# Patient Record
Sex: FEMALE | Race: WHITE | Employment: UNEMPLOYED
[De-identification: names, ages, dates, MRNs, and addresses within clinical notes are randomized per-mention and may not be internally consistent; named-entity substitution may affect disease eponyms.]

---

## 2017-01-26 ENCOUNTER — OFFICE VISIT (OUTPATIENT)
Dept: FAMILY MEDICINE CLINIC | Facility: CLINIC | Age: 77
End: 2017-01-26

## 2017-01-26 VITALS
SYSTOLIC BLOOD PRESSURE: 128 MMHG | HEART RATE: 65 BPM | OXYGEN SATURATION: 97 % | HEIGHT: 60 IN | RESPIRATION RATE: 18 BRPM | TEMPERATURE: 97 F | DIASTOLIC BLOOD PRESSURE: 62 MMHG | BODY MASS INDEX: 20.42 KG/M2 | WEIGHT: 104 LBS

## 2017-01-26 DIAGNOSIS — J18.9 ATYPICAL PNEUMONIA: ICD-10-CM

## 2017-01-26 DIAGNOSIS — R53.1 GENERALIZED WEAKNESS: ICD-10-CM

## 2017-01-26 DIAGNOSIS — Z09 HOSPITAL DISCHARGE FOLLOW-UP: Primary | ICD-10-CM

## 2017-01-26 DIAGNOSIS — I10 ESSENTIAL HYPERTENSION: ICD-10-CM

## 2017-01-26 PROCEDURE — 1036F TOBACCO NON-USER: CPT | Performed by: FAMILY MEDICINE

## 2017-01-26 PROCEDURE — 1123F ACP DISCUSS/DSCN MKR DOCD: CPT | Performed by: FAMILY MEDICINE

## 2017-01-26 PROCEDURE — 90670 PCV13 VACCINE IM: CPT | Performed by: FAMILY MEDICINE

## 2017-01-26 PROCEDURE — G8427 DOCREV CUR MEDS BY ELIG CLIN: HCPCS | Performed by: FAMILY MEDICINE

## 2017-01-26 PROCEDURE — G8419 CALC BMI OUT NRM PARAM NOF/U: HCPCS | Performed by: FAMILY MEDICINE

## 2017-01-26 PROCEDURE — G8484 FLU IMMUNIZE NO ADMIN: HCPCS | Performed by: FAMILY MEDICINE

## 2017-01-26 PROCEDURE — 4040F PNEUMOC VAC/ADMIN/RCVD: CPT | Performed by: FAMILY MEDICINE

## 2017-01-26 PROCEDURE — G8399 PT W/DXA RESULTS DOCUMENT: HCPCS | Performed by: FAMILY MEDICINE

## 2017-01-26 PROCEDURE — G0009 ADMIN PNEUMOCOCCAL VACCINE: HCPCS | Performed by: FAMILY MEDICINE

## 2017-01-26 PROCEDURE — 99213 OFFICE O/P EST LOW 20 MIN: CPT | Performed by: FAMILY MEDICINE

## 2017-01-26 PROCEDURE — 1090F PRES/ABSN URINE INCON ASSESS: CPT | Performed by: FAMILY MEDICINE

## 2017-01-26 RX ORDER — OYSTER SHELL CALCIUM WITH VITAMIN D 500; 200 MG/1; [IU]/1
1 TABLET, FILM COATED ORAL DAILY
Qty: 30 TABLET | Refills: 3 | Status: SHIPPED | OUTPATIENT
Start: 2017-01-26 | End: 2017-05-08 | Stop reason: SDUPTHER

## 2017-01-26 RX ORDER — AMINO ACIDS/PROTEIN HYDROLYS 15G-100/30
LIQUID (ML) ORAL
COMMUNITY
End: 2018-01-24 | Stop reason: ALTCHOICE

## 2017-01-26 ASSESSMENT — ENCOUNTER SYMPTOMS
COUGH: 0
VOMITING: 0
ABDOMINAL PAIN: 0
SHORTNESS OF BREATH: 0
NAUSEA: 0

## 2017-03-20 ENCOUNTER — TELEPHONE (OUTPATIENT)
Dept: GASTROENTEROLOGY | Age: 77
End: 2017-03-20

## 2017-03-22 ENCOUNTER — HOSPITAL ENCOUNTER (OUTPATIENT)
Age: 77
Setting detail: SPECIMEN
Discharge: HOME | End: 2017-03-22
Payer: MEDICARE

## 2017-03-22 LAB
ANION GAP SERPL CALCULATED.3IONS-SCNC: 11 MMOL/L (ref 9–17)
BUN BLDV-MCNC: 16 MG/DL (ref 8–23)
BUN/CREAT BLD: ABNORMAL (ref 9–20)
CALCIUM SERPL-MCNC: 8.2 MG/DL (ref 8.6–10.4)
CHLORIDE BLD-SCNC: 105 MMOL/L (ref 98–107)
CO2: 25 MMOL/L (ref 20–31)
CREAT SERPL-MCNC: 0.53 MG/DL (ref 0.5–0.9)
GFR AFRICAN AMERICAN: >60 ML/MIN
GFR NON-AFRICAN AMERICAN: >60 ML/MIN
GFR SERPL CREATININE-BSD FRML MDRD: ABNORMAL ML/MIN/{1.73_M2}
GFR SERPL CREATININE-BSD FRML MDRD: ABNORMAL ML/MIN/{1.73_M2}
GLUCOSE BLD-MCNC: 88 MG/DL (ref 70–99)
POTASSIUM SERPL-SCNC: 3.8 MMOL/L (ref 3.7–5.3)
SODIUM BLD-SCNC: 141 MMOL/L (ref 135–144)

## 2017-03-22 PROCEDURE — 80048 BASIC METABOLIC PNL TOTAL CA: CPT

## 2017-03-22 PROCEDURE — P9603 ONE-WAY ALLOW PRORATED MILES: HCPCS

## 2017-03-22 PROCEDURE — 36415 COLL VENOUS BLD VENIPUNCTURE: CPT

## 2017-04-19 ENCOUNTER — HOSPITAL ENCOUNTER (OUTPATIENT)
Age: 77
Discharge: HOME | End: 2017-04-19
Payer: MEDICARE

## 2017-04-19 LAB
ALBUMIN SERPL-MCNC: 3.1 G/DL (ref 3.5–5.2)
ALBUMIN/GLOBULIN RATIO: ABNORMAL (ref 1–2.5)
ALP BLD-CCNC: 99 U/L (ref 35–104)
ALT SERPL-CCNC: 69 U/L (ref 5–33)
ANION GAP SERPL CALCULATED.3IONS-SCNC: 13 MMOL/L (ref 9–17)
AST SERPL-CCNC: 112 U/L
BILIRUB SERPL-MCNC: 0.65 MG/DL (ref 0.3–1.2)
BUN BLDV-MCNC: 21 MG/DL (ref 8–23)
BUN/CREAT BLD: ABNORMAL (ref 9–20)
CALCIUM SERPL-MCNC: 8.5 MG/DL (ref 8.6–10.4)
CHLORIDE BLD-SCNC: 103 MMOL/L (ref 98–107)
CO2: 22 MMOL/L (ref 20–31)
CREAT SERPL-MCNC: 0.76 MG/DL (ref 0.5–0.9)
GFR AFRICAN AMERICAN: >60 ML/MIN
GFR NON-AFRICAN AMERICAN: >60 ML/MIN
GFR SERPL CREATININE-BSD FRML MDRD: ABNORMAL ML/MIN/{1.73_M2}
GFR SERPL CREATININE-BSD FRML MDRD: ABNORMAL ML/MIN/{1.73_M2}
GLUCOSE BLD-MCNC: 166 MG/DL (ref 70–99)
HCT VFR BLD CALC: 31.1 % (ref 36–46)
HEMOGLOBIN: 10.2 G/DL (ref 12–16)
MCH RBC QN AUTO: 29.6 PG (ref 26–34)
MCHC RBC AUTO-ENTMCNC: 32.9 G/DL (ref 31–37)
MCV RBC AUTO: 89.8 FL (ref 80–100)
PDW BLD-RTO: 17 % (ref 11.5–14.9)
PLATELET # BLD: 81 K/UL (ref 150–450)
PMV BLD AUTO: 10 FL (ref 6–12)
POTASSIUM SERPL-SCNC: 3.7 MMOL/L (ref 3.7–5.3)
RBC # BLD: 3.46 M/UL (ref 4–5.2)
SODIUM BLD-SCNC: 138 MMOL/L (ref 135–144)
TOTAL PROTEIN: 8.1 G/DL (ref 6.4–8.3)
WBC # BLD: 3 K/UL (ref 3.5–11)

## 2017-04-19 PROCEDURE — 85027 COMPLETE CBC AUTOMATED: CPT

## 2017-04-19 PROCEDURE — 80053 COMPREHEN METABOLIC PANEL: CPT

## 2017-04-19 PROCEDURE — 87522 HEPATITIS C REVRS TRNSCRPJ: CPT

## 2017-04-19 PROCEDURE — 36415 COLL VENOUS BLD VENIPUNCTURE: CPT

## 2017-04-21 LAB
DIRECT EXAM: ABNORMAL
Lab: ABNORMAL
SPECIMEN DESCRIPTION: ABNORMAL
SPECIMEN DESCRIPTION: ABNORMAL
STATUS: ABNORMAL

## 2017-04-26 ENCOUNTER — OFFICE VISIT (OUTPATIENT)
Dept: FAMILY MEDICINE CLINIC | Age: 77
End: 2017-04-26
Payer: MEDICARE

## 2017-04-26 VITALS
WEIGHT: 94.2 LBS | TEMPERATURE: 98.1 F | SYSTOLIC BLOOD PRESSURE: 137 MMHG | HEART RATE: 84 BPM | BODY MASS INDEX: 18.49 KG/M2 | OXYGEN SATURATION: 99 % | HEIGHT: 60 IN | DIASTOLIC BLOOD PRESSURE: 65 MMHG

## 2017-04-26 DIAGNOSIS — K92.2 UPPER GI BLEED: ICD-10-CM

## 2017-04-26 DIAGNOSIS — B18.2 CHRONIC HEPATITIS C WITHOUT HEPATIC COMA (HCC): Primary | ICD-10-CM

## 2017-04-26 DIAGNOSIS — K21.00 GASTROESOPHAGEAL REFLUX DISEASE WITH ESOPHAGITIS: ICD-10-CM

## 2017-04-26 DIAGNOSIS — I10 ESSENTIAL HYPERTENSION: ICD-10-CM

## 2017-04-26 PROCEDURE — G8399 PT W/DXA RESULTS DOCUMENT: HCPCS | Performed by: FAMILY MEDICINE

## 2017-04-26 PROCEDURE — 99214 OFFICE O/P EST MOD 30 MIN: CPT | Performed by: FAMILY MEDICINE

## 2017-04-26 PROCEDURE — G8419 CALC BMI OUT NRM PARAM NOF/U: HCPCS | Performed by: FAMILY MEDICINE

## 2017-04-26 PROCEDURE — G8427 DOCREV CUR MEDS BY ELIG CLIN: HCPCS | Performed by: FAMILY MEDICINE

## 2017-04-26 PROCEDURE — 1090F PRES/ABSN URINE INCON ASSESS: CPT | Performed by: FAMILY MEDICINE

## 2017-04-26 PROCEDURE — 1036F TOBACCO NON-USER: CPT | Performed by: FAMILY MEDICINE

## 2017-04-26 PROCEDURE — 1123F ACP DISCUSS/DSCN MKR DOCD: CPT | Performed by: FAMILY MEDICINE

## 2017-04-26 PROCEDURE — 4040F PNEUMOC VAC/ADMIN/RCVD: CPT | Performed by: FAMILY MEDICINE

## 2017-04-26 RX ORDER — PROPRANOLOL HYDROCHLORIDE 20 MG/1
20 TABLET ORAL 2 TIMES DAILY
Qty: 60 TABLET | Refills: 3 | Status: SHIPPED | OUTPATIENT
Start: 2017-04-26 | End: 2017-09-25 | Stop reason: SDUPTHER

## 2017-04-26 RX ORDER — BETHANECHOL CHLORIDE 10 MG/1
TABLET ORAL
Refills: 0 | COMMUNITY
Start: 2017-03-11 | End: 2017-09-26 | Stop reason: SDUPTHER

## 2017-04-26 RX ORDER — SPIRONOLACTONE 25 MG/1
25 TABLET ORAL 2 TIMES DAILY
Qty: 60 TABLET | Refills: 3 | Status: SHIPPED | OUTPATIENT
Start: 2017-04-26 | End: 2017-09-25 | Stop reason: SDUPTHER

## 2017-04-26 RX ORDER — FUROSEMIDE 20 MG/1
20 TABLET ORAL DAILY
Qty: 30 TABLET | Refills: 5 | Status: SHIPPED | OUTPATIENT
Start: 2017-04-26 | End: 2017-09-25 | Stop reason: SDUPTHER

## 2017-04-26 RX ORDER — PROPRANOLOL HYDROCHLORIDE 20 MG/1
20 TABLET ORAL 2 TIMES DAILY
COMMUNITY
End: 2017-04-26

## 2017-04-26 RX ORDER — PANTOPRAZOLE SODIUM 20 MG/1
20 TABLET, DELAYED RELEASE ORAL DAILY
Qty: 30 TABLET | Refills: 3 | Status: SHIPPED | OUTPATIENT
Start: 2017-04-26 | End: 2017-09-25 | Stop reason: ALTCHOICE

## 2017-04-26 RX ORDER — SPIRONOLACTONE 25 MG/1
25 TABLET ORAL 2 TIMES DAILY
COMMUNITY
End: 2017-04-26

## 2017-04-26 RX ORDER — LACTULOSE 10 G/15ML
20 SOLUTION ORAL 3 TIMES DAILY
COMMUNITY
End: 2017-09-25 | Stop reason: ALTCHOICE

## 2017-04-26 ASSESSMENT — ENCOUNTER SYMPTOMS
VOMITING: 0
COUGH: 0
CONSTIPATION: 0
NAUSEA: 0
DIARRHEA: 0
SHORTNESS OF BREATH: 0

## 2017-05-30 ENCOUNTER — HOSPITAL ENCOUNTER (OUTPATIENT)
Age: 77
Setting detail: SPECIMEN
Discharge: HOME | End: 2017-05-30
Payer: MEDICARE

## 2017-05-30 LAB
ANION GAP SERPL CALCULATED.3IONS-SCNC: 14 MMOL/L (ref 9–17)
BUN BLDV-MCNC: 19 MG/DL (ref 8–23)
BUN/CREAT BLD: 30 (ref 9–20)
CALCIUM SERPL-MCNC: 8.3 MG/DL (ref 8.6–10.4)
CHLORIDE BLD-SCNC: 103 MMOL/L (ref 98–107)
CO2: 26 MMOL/L (ref 20–31)
CREAT SERPL-MCNC: 0.63 MG/DL (ref 0.5–0.9)
GFR AFRICAN AMERICAN: >60 ML/MIN
GFR NON-AFRICAN AMERICAN: >60 ML/MIN
GFR SERPL CREATININE-BSD FRML MDRD: ABNORMAL ML/MIN/{1.73_M2}
GFR SERPL CREATININE-BSD FRML MDRD: ABNORMAL ML/MIN/{1.73_M2}
GLUCOSE BLD-MCNC: 95 MG/DL (ref 70–99)
HCT VFR BLD CALC: 27.4 % (ref 36–46)
HEMOGLOBIN: 9.2 G/DL (ref 12–16)
MCH RBC QN AUTO: 30.3 PG (ref 26–34)
MCHC RBC AUTO-ENTMCNC: 33.4 G/DL (ref 31–37)
MCV RBC AUTO: 90.7 FL (ref 80–100)
PDW BLD-RTO: 18.3 % (ref 11.5–14.5)
PLATELET # BLD: 94 K/UL (ref 130–400)
PMV BLD AUTO: 8.9 FL (ref 6–12)
POTASSIUM SERPL-SCNC: 2.9 MMOL/L (ref 3.7–5.3)
RBC # BLD: 3.02 M/UL (ref 4–5.2)
SODIUM BLD-SCNC: 143 MMOL/L (ref 135–144)
WBC # BLD: 5.7 K/UL (ref 3.5–11)

## 2017-05-30 PROCEDURE — 87086 URINE CULTURE/COLONY COUNT: CPT

## 2017-05-30 PROCEDURE — P9603 ONE-WAY ALLOW PRORATED MILES: HCPCS

## 2017-05-30 PROCEDURE — 80048 BASIC METABOLIC PNL TOTAL CA: CPT

## 2017-05-30 PROCEDURE — 85027 COMPLETE CBC AUTOMATED: CPT

## 2017-05-30 PROCEDURE — 36415 COLL VENOUS BLD VENIPUNCTURE: CPT

## 2017-05-30 PROCEDURE — 81001 URINALYSIS AUTO W/SCOPE: CPT

## 2017-05-31 ENCOUNTER — TELEPHONE (OUTPATIENT)
Dept: GASTROENTEROLOGY | Age: 77
End: 2017-05-31

## 2017-05-31 LAB
CULTURE: NORMAL
CULTURE: NORMAL
Lab: NORMAL
SPECIMEN DESCRIPTION: NORMAL
SPECIMEN DESCRIPTION: NORMAL
STATUS: NORMAL

## 2017-06-01 ENCOUNTER — HOSPITAL ENCOUNTER (OUTPATIENT)
Age: 77
Setting detail: SPECIMEN
Discharge: HOME | End: 2017-06-01
Payer: MEDICARE

## 2017-06-01 LAB
-: ABNORMAL
AMORPHOUS: ABNORMAL
BACTERIA: ABNORMAL
BILIRUBIN URINE: ABNORMAL
CASTS UA: ABNORMAL /LPF
COLOR: ABNORMAL
COMMENT UA: ABNORMAL
CRYSTALS, UA: ABNORMAL /HPF
EPITHELIAL CELLS UA: ABNORMAL /HPF
GLUCOSE URINE: NEGATIVE
KETONES, URINE: ABNORMAL
LEUKOCYTE ESTERASE, URINE: NEGATIVE
MUCUS: ABNORMAL
NITRITE, URINE: NEGATIVE
OTHER OBSERVATIONS UA: ABNORMAL
PH UA: 5.5 (ref 5–8)
POTASSIUM SERPL-SCNC: 3.8 MMOL/L (ref 3.7–5.3)
PROTEIN UA: ABNORMAL
RBC UA: ABNORMAL /HPF (ref 0–2)
RENAL EPITHELIAL, UA: ABNORMAL /HPF
SPECIFIC GRAVITY UA: 1.02 (ref 1–1.03)
TRICHOMONAS: ABNORMAL
TURBIDITY: ABNORMAL
URINE HGB: NEGATIVE
UROBILINOGEN, URINE: NORMAL
WBC UA: ABNORMAL /HPF (ref 0–5)
YEAST: ABNORMAL

## 2017-06-01 PROCEDURE — P9603 ONE-WAY ALLOW PRORATED MILES: HCPCS

## 2017-06-01 PROCEDURE — 36415 COLL VENOUS BLD VENIPUNCTURE: CPT

## 2017-06-01 PROCEDURE — 84132 ASSAY OF SERUM POTASSIUM: CPT

## 2017-06-03 ENCOUNTER — HOSPITAL ENCOUNTER (OUTPATIENT)
Age: 77
Setting detail: SPECIMEN
Discharge: HOME | End: 2017-06-03
Payer: MEDICARE

## 2017-06-03 LAB
ANION GAP SERPL CALCULATED.3IONS-SCNC: 10 MMOL/L (ref 9–17)
BUN BLDV-MCNC: 9 MG/DL (ref 8–23)
BUN/CREAT BLD: 18 (ref 9–20)
CALCIUM SERPL-MCNC: 8.3 MG/DL (ref 8.6–10.4)
CHLORIDE BLD-SCNC: 99 MMOL/L (ref 98–107)
CO2: 27 MMOL/L (ref 20–31)
CREAT SERPL-MCNC: 0.5 MG/DL (ref 0.5–0.9)
GFR AFRICAN AMERICAN: >60 ML/MIN
GFR NON-AFRICAN AMERICAN: >60 ML/MIN
GFR SERPL CREATININE-BSD FRML MDRD: ABNORMAL ML/MIN/{1.73_M2}
GFR SERPL CREATININE-BSD FRML MDRD: ABNORMAL ML/MIN/{1.73_M2}
GLUCOSE BLD-MCNC: 100 MG/DL (ref 70–99)
POTASSIUM SERPL-SCNC: 3.4 MMOL/L (ref 3.7–5.3)
SODIUM BLD-SCNC: 136 MMOL/L (ref 135–144)

## 2017-06-03 PROCEDURE — 36415 COLL VENOUS BLD VENIPUNCTURE: CPT

## 2017-06-03 PROCEDURE — P9603 ONE-WAY ALLOW PRORATED MILES: HCPCS

## 2017-06-03 PROCEDURE — 80048 BASIC METABOLIC PNL TOTAL CA: CPT

## 2017-06-05 ENCOUNTER — TELEPHONE (OUTPATIENT)
Dept: GASTROENTEROLOGY | Age: 77
End: 2017-06-05

## 2017-06-08 ENCOUNTER — HOSPITAL ENCOUNTER (OUTPATIENT)
Age: 77
Setting detail: SPECIMEN
Discharge: HOME | End: 2017-06-08
Payer: MEDICARE

## 2017-06-08 LAB
HCT VFR BLD CALC: 27.9 % (ref 36–46)
HEMOGLOBIN: 9 G/DL (ref 12–16)
MCH RBC QN AUTO: 29.3 PG (ref 26–34)
MCHC RBC AUTO-ENTMCNC: 32.2 G/DL (ref 31–37)
MCV RBC AUTO: 90.9 FL (ref 80–100)
PDW BLD-RTO: 19 % (ref 11.5–14.5)
PLATELET # BLD: 118 K/UL (ref 130–400)
PMV BLD AUTO: 8.8 FL (ref 6–12)
RBC # BLD: 3.07 M/UL (ref 4–5.2)
WBC # BLD: 3.9 K/UL (ref 3.5–11)

## 2017-06-08 PROCEDURE — 36415 COLL VENOUS BLD VENIPUNCTURE: CPT

## 2017-06-08 PROCEDURE — 85027 COMPLETE CBC AUTOMATED: CPT

## 2017-06-08 PROCEDURE — P9603 ONE-WAY ALLOW PRORATED MILES: HCPCS

## 2017-06-10 ENCOUNTER — HOSPITAL ENCOUNTER (OUTPATIENT)
Age: 77
Setting detail: SPECIMEN
Discharge: HOME | End: 2017-06-10
Payer: MEDICARE

## 2017-06-10 LAB
-: ABNORMAL
AMORPHOUS: ABNORMAL
BACTERIA: ABNORMAL
BILIRUBIN URINE: NEGATIVE
CASTS UA: ABNORMAL /LPF (ref 0–8)
COLOR: YELLOW
COMMENT UA: ABNORMAL
CRYSTALS, UA: ABNORMAL /HPF
EPITHELIAL CELLS UA: ABNORMAL /HPF (ref 0–5)
GLUCOSE URINE: NEGATIVE
KETONES, URINE: NEGATIVE
LEUKOCYTE ESTERASE, URINE: ABNORMAL
MUCUS: ABNORMAL
NITRITE, URINE: NEGATIVE
OTHER OBSERVATIONS UA: ABNORMAL
PH UA: 7 (ref 5–8)
PROTEIN UA: NEGATIVE
RBC UA: ABNORMAL /HPF (ref 0–4)
RENAL EPITHELIAL, UA: ABNORMAL /HPF
SPECIFIC GRAVITY UA: 1.01 (ref 1–1.03)
TRICHOMONAS: ABNORMAL
TURBIDITY: CLEAR
URINE HGB: NEGATIVE
UROBILINOGEN, URINE: NORMAL
WBC UA: ABNORMAL /HPF (ref 0–5)
YEAST: ABNORMAL

## 2017-06-10 PROCEDURE — 87186 SC STD MICRODIL/AGAR DIL: CPT

## 2017-06-10 PROCEDURE — 87088 URINE BACTERIA CULTURE: CPT

## 2017-06-10 PROCEDURE — 81001 URINALYSIS AUTO W/SCOPE: CPT

## 2017-06-10 PROCEDURE — 87086 URINE CULTURE/COLONY COUNT: CPT

## 2017-06-11 LAB
CULTURE: ABNORMAL
CULTURE: ABNORMAL
Lab: ABNORMAL
Lab: ABNORMAL
ORGANISM: ABNORMAL
SPECIMEN DESCRIPTION: ABNORMAL
SPECIMEN DESCRIPTION: ABNORMAL
STATUS: ABNORMAL

## 2017-06-28 ENCOUNTER — HOSPITAL ENCOUNTER (OUTPATIENT)
Age: 77
Setting detail: SPECIMEN
Discharge: HOME | End: 2017-06-28
Payer: MEDICARE

## 2017-06-28 PROCEDURE — 36415 COLL VENOUS BLD VENIPUNCTURE: CPT

## 2017-06-28 PROCEDURE — P9603 ONE-WAY ALLOW PRORATED MILES: HCPCS

## 2017-06-28 PROCEDURE — 87522 HEPATITIS C REVRS TRNSCRPJ: CPT

## 2017-06-30 LAB
DIRECT EXAM: NORMAL
Lab: NORMAL
SPECIMEN DESCRIPTION: NORMAL
SPECIMEN DESCRIPTION: NORMAL
STATUS: NORMAL

## 2017-07-01 ENCOUNTER — HOSPITAL ENCOUNTER (OUTPATIENT)
Age: 77
Setting detail: SPECIMEN
Discharge: HOME | End: 2017-07-01
Payer: MEDICARE

## 2017-07-01 LAB
-: ABNORMAL
AMORPHOUS: ABNORMAL
BACTERIA: ABNORMAL
BILIRUBIN URINE: NEGATIVE
CASTS UA: ABNORMAL /LPF (ref 0–2)
COLOR: ABNORMAL
COMMENT UA: ABNORMAL
CRYSTALS, UA: ABNORMAL /HPF
EPITHELIAL CELLS UA: ABNORMAL /HPF (ref 0–5)
GLUCOSE URINE: NEGATIVE
KETONES, URINE: NEGATIVE
LEUKOCYTE ESTERASE, URINE: ABNORMAL
MUCUS: ABNORMAL
NITRITE, URINE: NEGATIVE
OTHER OBSERVATIONS UA: ABNORMAL
PH UA: 6 (ref 5–8)
PROTEIN UA: NEGATIVE
RBC UA: ABNORMAL /HPF (ref 0–2)
RENAL EPITHELIAL, UA: ABNORMAL /HPF
SPECIFIC GRAVITY UA: 1.02 (ref 1–1.03)
TRICHOMONAS: ABNORMAL
TURBIDITY: CLEAR
URINE HGB: NEGATIVE
UROBILINOGEN, URINE: NORMAL
WBC UA: ABNORMAL /HPF (ref 0–5)
YEAST: ABNORMAL

## 2017-07-01 PROCEDURE — 87086 URINE CULTURE/COLONY COUNT: CPT

## 2017-07-01 PROCEDURE — 87186 SC STD MICRODIL/AGAR DIL: CPT

## 2017-07-01 PROCEDURE — 81001 URINALYSIS AUTO W/SCOPE: CPT

## 2017-07-01 PROCEDURE — 87088 URINE BACTERIA CULTURE: CPT

## 2017-07-02 LAB
CULTURE: ABNORMAL
CULTURE: ABNORMAL
Lab: ABNORMAL
ORGANISM: ABNORMAL
SPECIMEN DESCRIPTION: ABNORMAL
STATUS: ABNORMAL

## 2017-07-26 ENCOUNTER — HOSPITAL ENCOUNTER (OUTPATIENT)
Age: 77
Setting detail: SPECIMEN
Discharge: HOME | End: 2017-07-26
Payer: MEDICARE

## 2017-07-26 LAB
BILIRUBIN URINE: NEGATIVE
COLOR: YELLOW
COMMENT UA: NORMAL
GLUCOSE URINE: NEGATIVE
KETONES, URINE: NEGATIVE
LEUKOCYTE ESTERASE, URINE: NEGATIVE
NITRITE, URINE: NEGATIVE
PH UA: 6.5 (ref 5–8)
PROTEIN UA: NEGATIVE
SPECIFIC GRAVITY UA: 1.02 (ref 1–1.03)
TURBIDITY: CLEAR
URINE HGB: NEGATIVE
UROBILINOGEN, URINE: NORMAL

## 2017-07-26 PROCEDURE — 81003 URINALYSIS AUTO W/O SCOPE: CPT

## 2017-07-28 ENCOUNTER — HOSPITAL ENCOUNTER (OUTPATIENT)
Age: 77
Setting detail: SPECIMEN
Discharge: HOME | End: 2017-07-28
Payer: MEDICARE

## 2017-07-28 PROCEDURE — 36415 COLL VENOUS BLD VENIPUNCTURE: CPT

## 2017-07-28 PROCEDURE — 87522 HEPATITIS C REVRS TRNSCRPJ: CPT

## 2017-07-28 PROCEDURE — P9603 ONE-WAY ALLOW PRORATED MILES: HCPCS

## 2017-08-02 ENCOUNTER — HOSPITAL ENCOUNTER (OUTPATIENT)
Age: 77
Setting detail: SPECIMEN
Discharge: HOME | End: 2017-08-02
Payer: MEDICARE

## 2017-08-02 LAB
ANION GAP SERPL CALCULATED.3IONS-SCNC: 14 MMOL/L (ref 9–17)
BUN BLDV-MCNC: 22 MG/DL (ref 8–23)
BUN/CREAT BLD: NORMAL (ref 9–20)
CALCIUM SERPL-MCNC: 9.1 MG/DL (ref 8.6–10.4)
CHLORIDE BLD-SCNC: 100 MMOL/L (ref 98–107)
CO2: 24 MMOL/L (ref 20–31)
CREAT SERPL-MCNC: 0.54 MG/DL (ref 0.5–0.9)
GFR AFRICAN AMERICAN: >60 ML/MIN
GFR NON-AFRICAN AMERICAN: >60 ML/MIN
GFR SERPL CREATININE-BSD FRML MDRD: NORMAL ML/MIN/{1.73_M2}
GFR SERPL CREATININE-BSD FRML MDRD: NORMAL ML/MIN/{1.73_M2}
GLUCOSE BLD-MCNC: 85 MG/DL (ref 70–99)
HCT VFR BLD CALC: 31.5 % (ref 36–46)
HEMOGLOBIN: 10.1 G/DL (ref 12–16)
MCH RBC QN AUTO: 26.9 PG (ref 26–34)
MCHC RBC AUTO-ENTMCNC: 32.2 G/DL (ref 31–37)
MCV RBC AUTO: 83.6 FL (ref 80–100)
PDW BLD-RTO: 20.3 % (ref 12.5–15.4)
PLATELET # BLD: 90 K/UL (ref 140–450)
PMV BLD AUTO: 10.2 FL (ref 6–12)
POTASSIUM SERPL-SCNC: 4.5 MMOL/L (ref 3.7–5.3)
RBC # BLD: 3.76 M/UL (ref 4–5.2)
SODIUM BLD-SCNC: 138 MMOL/L (ref 135–144)
WBC # BLD: 4.5 K/UL (ref 3.5–11)

## 2017-08-02 PROCEDURE — 36415 COLL VENOUS BLD VENIPUNCTURE: CPT

## 2017-08-02 PROCEDURE — P9603 ONE-WAY ALLOW PRORATED MILES: HCPCS

## 2017-08-02 PROCEDURE — 80048 BASIC METABOLIC PNL TOTAL CA: CPT

## 2017-08-02 PROCEDURE — 85027 COMPLETE CBC AUTOMATED: CPT

## 2017-09-04 ENCOUNTER — HOSPITAL ENCOUNTER (OUTPATIENT)
Age: 77
Setting detail: SPECIMEN
Discharge: HOME | End: 2017-09-04
Payer: MEDICARE

## 2017-09-04 PROCEDURE — 87522 HEPATITIS C REVRS TRNSCRPJ: CPT

## 2017-09-04 PROCEDURE — 80048 BASIC METABOLIC PNL TOTAL CA: CPT

## 2017-09-04 PROCEDURE — 36415 COLL VENOUS BLD VENIPUNCTURE: CPT

## 2017-09-04 PROCEDURE — 85025 COMPLETE CBC W/AUTO DIFF WBC: CPT

## 2017-09-05 LAB
ABSOLUTE EOS #: 0.13 K/UL (ref 0–0.4)
ABSOLUTE LYMPH #: 0.78 K/UL (ref 1–4.8)
ABSOLUTE MONO #: 0.85 K/UL (ref 0.1–1.3)
ANION GAP SERPL CALCULATED.3IONS-SCNC: 15 MMOL/L (ref 9–17)
BASOPHILS # BLD: 0 %
BASOPHILS ABSOLUTE: 0 K/UL (ref 0–0.2)
BUN BLDV-MCNC: 39 MG/DL (ref 8–23)
BUN/CREAT BLD: ABNORMAL (ref 9–20)
CALCIUM SERPL-MCNC: 9.6 MG/DL (ref 8.6–10.4)
CHLORIDE BLD-SCNC: 100 MMOL/L (ref 98–107)
CO2: 22 MMOL/L (ref 20–31)
CREAT SERPL-MCNC: 0.68 MG/DL (ref 0.5–0.9)
DIFFERENTIAL TYPE: ABNORMAL
EOSINOPHILS RELATIVE PERCENT: 2 %
GFR AFRICAN AMERICAN: >60 ML/MIN
GFR NON-AFRICAN AMERICAN: >60 ML/MIN
GFR SERPL CREATININE-BSD FRML MDRD: ABNORMAL ML/MIN/{1.73_M2}
GFR SERPL CREATININE-BSD FRML MDRD: ABNORMAL ML/MIN/{1.73_M2}
GLUCOSE BLD-MCNC: 89 MG/DL (ref 70–99)
HCT VFR BLD CALC: 36.2 % (ref 36–46)
HEMOGLOBIN: 11.9 G/DL (ref 12–16)
LYMPHOCYTES # BLD: 12 %
MCH RBC QN AUTO: 28.7 PG (ref 26–34)
MCHC RBC AUTO-ENTMCNC: 32.8 G/DL (ref 31–37)
MCV RBC AUTO: 87.5 FL (ref 80–100)
MONOCYTES # BLD: 13 %
MORPHOLOGY: ABNORMAL
PDW BLD-RTO: 21.9 % (ref 11.5–14.9)
PLATELET # BLD: 121 K/UL (ref 150–450)
PLATELET ESTIMATE: ABNORMAL
PMV BLD AUTO: 9.5 FL (ref 6–12)
POTASSIUM SERPL-SCNC: 3.8 MMOL/L (ref 3.7–5.3)
RBC # BLD: 4.14 M/UL (ref 4–5.2)
RBC # BLD: ABNORMAL 10*6/UL
SEG NEUTROPHILS: 73 %
SEGMENTED NEUTROPHILS ABSOLUTE COUNT: 4.74 K/UL (ref 1.3–9.1)
SODIUM BLD-SCNC: 137 MMOL/L (ref 135–144)
WBC # BLD: 6.5 K/UL (ref 3.5–11)
WBC # BLD: ABNORMAL 10*3/UL

## 2017-09-25 ENCOUNTER — OFFICE VISIT (OUTPATIENT)
Dept: FAMILY MEDICINE CLINIC | Age: 77
End: 2017-09-25
Payer: MEDICARE

## 2017-09-25 VITALS
TEMPERATURE: 97.9 F | HEART RATE: 89 BPM | WEIGHT: 96 LBS | SYSTOLIC BLOOD PRESSURE: 132 MMHG | HEIGHT: 60 IN | BODY MASS INDEX: 18.85 KG/M2 | DIASTOLIC BLOOD PRESSURE: 63 MMHG | RESPIRATION RATE: 21 BRPM

## 2017-09-25 DIAGNOSIS — K21.00 GASTROESOPHAGEAL REFLUX DISEASE WITH ESOPHAGITIS: ICD-10-CM

## 2017-09-25 DIAGNOSIS — K74.69 OTHER CIRRHOSIS OF LIVER (HCC): ICD-10-CM

## 2017-09-25 DIAGNOSIS — F51.04 PSYCHOPHYSIOLOGICAL INSOMNIA: ICD-10-CM

## 2017-09-25 DIAGNOSIS — Z13.6 SCREENING FOR CARDIOVASCULAR CONDITION: ICD-10-CM

## 2017-09-25 DIAGNOSIS — B18.2 CHRONIC HEPATITIS C WITHOUT HEPATIC COMA (HCC): Primary | ICD-10-CM

## 2017-09-25 DIAGNOSIS — I10 ESSENTIAL HYPERTENSION: ICD-10-CM

## 2017-09-25 DIAGNOSIS — Z87.19 HISTORY OF GI BLEED: ICD-10-CM

## 2017-09-25 DIAGNOSIS — F33.42 RECURRENT MAJOR DEPRESSIVE EPISODES, IN FULL REMISSION (HCC): ICD-10-CM

## 2017-09-25 DIAGNOSIS — K73.2 HEPATITIS, CHRONIC ACTIVE (HCC): ICD-10-CM

## 2017-09-25 DIAGNOSIS — E88.89: ICD-10-CM

## 2017-09-25 DIAGNOSIS — I77.6 VASCULITIS (HCC): ICD-10-CM

## 2017-09-25 DIAGNOSIS — K76.9 LIVER DISEASE: ICD-10-CM

## 2017-09-25 DIAGNOSIS — M15.9 PRIMARY OSTEOARTHRITIS INVOLVING MULTIPLE JOINTS: ICD-10-CM

## 2017-09-25 PROBLEM — K92.2 GASTROINTESTINAL BLEEDING, UPPER: Status: ACTIVE | Noted: 2017-05-23

## 2017-09-25 PROBLEM — K92.2 BLEEDING GASTROINTESTINAL: Status: ACTIVE | Noted: 2017-03-03

## 2017-09-25 PROBLEM — K74.60 HEPATIC CIRRHOSIS (HCC): Status: ACTIVE | Noted: 2017-09-25

## 2017-09-25 PROCEDURE — 1123F ACP DISCUSS/DSCN MKR DOCD: CPT | Performed by: FAMILY MEDICINE

## 2017-09-25 PROCEDURE — 4040F PNEUMOC VAC/ADMIN/RCVD: CPT | Performed by: FAMILY MEDICINE

## 2017-09-25 PROCEDURE — 1036F TOBACCO NON-USER: CPT | Performed by: FAMILY MEDICINE

## 2017-09-25 PROCEDURE — G8420 CALC BMI NORM PARAMETERS: HCPCS | Performed by: FAMILY MEDICINE

## 2017-09-25 PROCEDURE — G8399 PT W/DXA RESULTS DOCUMENT: HCPCS | Performed by: FAMILY MEDICINE

## 2017-09-25 PROCEDURE — 1090F PRES/ABSN URINE INCON ASSESS: CPT | Performed by: FAMILY MEDICINE

## 2017-09-25 PROCEDURE — G8427 DOCREV CUR MEDS BY ELIG CLIN: HCPCS | Performed by: FAMILY MEDICINE

## 2017-09-25 PROCEDURE — 99215 OFFICE O/P EST HI 40 MIN: CPT | Performed by: FAMILY MEDICINE

## 2017-09-25 RX ORDER — PROPRANOLOL HYDROCHLORIDE 20 MG/1
20 TABLET ORAL 2 TIMES DAILY
Qty: 60 TABLET | Refills: 3 | Status: SHIPPED | OUTPATIENT
Start: 2017-09-25 | End: 2017-12-18 | Stop reason: SDUPTHER

## 2017-09-25 RX ORDER — SPIRONOLACTONE 25 MG/1
25 TABLET ORAL DAILY
Qty: 30 TABLET | Refills: 3 | Status: SHIPPED | OUTPATIENT
Start: 2017-09-25 | End: 2017-12-18 | Stop reason: SDUPTHER

## 2017-09-25 RX ORDER — TRAZODONE HYDROCHLORIDE 50 MG/1
50 TABLET ORAL NIGHTLY
Qty: 30 TABLET | Refills: 0 | Status: SHIPPED | OUTPATIENT
Start: 2017-09-25 | End: 2017-10-19 | Stop reason: SDUPTHER

## 2017-09-25 RX ORDER — PANTOPRAZOLE SODIUM 20 MG/1
20 TABLET, DELAYED RELEASE ORAL DAILY
Qty: 30 TABLET | Refills: 3 | Status: SHIPPED | OUTPATIENT
Start: 2017-09-25 | End: 2018-02-07 | Stop reason: SDUPTHER

## 2017-09-25 RX ORDER — FUROSEMIDE 20 MG/1
20 TABLET ORAL 2 TIMES DAILY
Qty: 60 TABLET | Refills: 5 | Status: SHIPPED | OUTPATIENT
Start: 2017-09-25 | End: 2018-02-07 | Stop reason: SDUPTHER

## 2017-09-25 RX ORDER — GABAPENTIN 100 MG/1
100 CAPSULE ORAL 3 TIMES DAILY
Qty: 90 CAPSULE | Refills: 3 | Status: SHIPPED | OUTPATIENT
Start: 2017-09-25 | End: 2017-12-18 | Stop reason: SDUPTHER

## 2017-09-25 ASSESSMENT — ENCOUNTER SYMPTOMS
ABDOMINAL PAIN: 0
COUGH: 0
CONSTIPATION: 0
TROUBLE SWALLOWING: 1
CHEST TIGHTNESS: 0
ABDOMINAL DISTENTION: 0
WHEEZING: 0
DIARRHEA: 1
SORE THROAT: 0
VOMITING: 0
SHORTNESS OF BREATH: 0
NAUSEA: 0

## 2017-09-25 ASSESSMENT — PATIENT HEALTH QUESTIONNAIRE - PHQ9
SUM OF ALL RESPONSES TO PHQ QUESTIONS 1-9: 0
SUM OF ALL RESPONSES TO PHQ9 QUESTIONS 1 & 2: 0
1. LITTLE INTEREST OR PLEASURE IN DOING THINGS: 0
2. FEELING DOWN, DEPRESSED OR HOPELESS: 0

## 2017-09-26 DIAGNOSIS — M1A.9XX0 CHRONIC GOUT WITHOUT TOPHUS, UNSPECIFIED CAUSE, UNSPECIFIED SITE: Primary | ICD-10-CM

## 2017-09-26 RX ORDER — BETHANECHOL CHLORIDE 10 MG/1
10 TABLET ORAL 3 TIMES DAILY
Qty: 21 TABLET | Refills: 0 | Status: SHIPPED | OUTPATIENT
Start: 2017-09-26 | End: 2018-02-07 | Stop reason: ALTCHOICE

## 2017-09-27 DIAGNOSIS — K76.82 HEPATIC ENCEPHALOPATHY: ICD-10-CM

## 2017-09-27 DIAGNOSIS — K74.69 OTHER CIRRHOSIS OF LIVER (HCC): Primary | ICD-10-CM

## 2017-09-27 RX ORDER — LACTULOSE 10 G/15ML
10 SOLUTION ORAL 2 TIMES DAILY
Qty: 946 ML | Refills: 3 | Status: SHIPPED | OUTPATIENT
Start: 2017-09-27 | End: 2017-12-20 | Stop reason: SDUPTHER

## 2017-10-19 DIAGNOSIS — F51.04 PSYCHOPHYSIOLOGICAL INSOMNIA: ICD-10-CM

## 2017-10-20 RX ORDER — TRAZODONE HYDROCHLORIDE 50 MG/1
50 TABLET ORAL NIGHTLY
Qty: 30 TABLET | Refills: 3 | Status: SHIPPED | OUTPATIENT
Start: 2017-10-20 | End: 2018-02-07 | Stop reason: SDUPTHER

## 2017-12-11 DIAGNOSIS — Z29.9 PREVENTIVE MEASURE: Primary | ICD-10-CM

## 2017-12-12 NOTE — TELEPHONE ENCOUNTER
Please Approve or Refuse. Next Visit Date:  Visit date not found    Hemoglobin A1C (%)   Date Value   06/22/2016 4.6   09/04/2015 4.9   10/25/2012 4.9             ( goal A1C is < 7)   Microalb/Crt. Ratio (mcg/mg creat)   Date Value   09/29/2011 Unable to calculate or report.      LDL Cholesterol (mg/dL)   Date Value   06/22/2016 54       (goal LDL is <100)   AST (U/L)   Date Value   04/19/2017 112 (H)     ALT (U/L)   Date Value   04/19/2017 69 (H)     BUN (mg/dL)   Date Value   09/04/2017 39 (H)     BP Readings from Last 3 Encounters:   09/25/17 132/63   04/26/17 137/65   01/26/17 128/62          (goal 120/80)        Patient Active Problem List:     Osteoarthritis     Menopausal state     Carpal tunnel syndrome     Hypertension     GERD (gastroesophageal reflux disease)     Hepatitis, chronic active ([address]     Osteopenia     Allergic rhinitis     History of TIA (transient ischemic attack)     Hepatitis C, chronic (HCC)     Chronic viral hepatitis C (HCC)     Anemia     Acid reflux disease     Ulnar neuropathy of right upper extremity     Chronic midline low back pain without sciatica     Essential hypertension     Hep C w/o coma, chronic (HCC)     Chronic hepatitis C without hepatic coma (HCC)     Hepatic cirrhosis (HCC)     Psychophysiological insomnia     History of GI bleed     Recurrent major depressive episodes, in full remission ([address]     Hepatic encephalopathy ([address]

## 2017-12-18 ENCOUNTER — HOSPITAL ENCOUNTER (OUTPATIENT)
Age: 77
Discharge: HOME | End: 2017-12-18
Payer: MEDICARE

## 2017-12-18 DIAGNOSIS — I10 ESSENTIAL HYPERTENSION: ICD-10-CM

## 2017-12-18 DIAGNOSIS — M15.9 PRIMARY OSTEOARTHRITIS INVOLVING MULTIPLE JOINTS: ICD-10-CM

## 2017-12-18 DIAGNOSIS — B18.2 CHRONIC HEPATITIS C WITHOUT HEPATIC COMA (HCC): ICD-10-CM

## 2017-12-18 DIAGNOSIS — Z13.6 SCREENING FOR CARDIOVASCULAR CONDITION: ICD-10-CM

## 2017-12-18 DIAGNOSIS — K74.69 OTHER CIRRHOSIS OF LIVER (HCC): ICD-10-CM

## 2017-12-18 DIAGNOSIS — K73.2 HEPATITIS, CHRONIC ACTIVE (HCC): ICD-10-CM

## 2017-12-18 DIAGNOSIS — K76.9 LIVER DISEASE: ICD-10-CM

## 2017-12-18 LAB
ALBUMIN SERPL-MCNC: 3.8 G/DL (ref 3.5–5.2)
ALBUMIN/GLOBULIN RATIO: ABNORMAL (ref 1–2.5)
ALP BLD-CCNC: 98 U/L (ref 35–104)
ALT SERPL-CCNC: 19 U/L (ref 5–33)
ANION GAP SERPL CALCULATED.3IONS-SCNC: 12 MMOL/L (ref 9–17)
AST SERPL-CCNC: 35 U/L
BILIRUB SERPL-MCNC: 0.7 MG/DL (ref 0.3–1.2)
BUN BLDV-MCNC: 24 MG/DL (ref 8–23)
BUN/CREAT BLD: ABNORMAL (ref 9–20)
CALCIUM SERPL-MCNC: 9.6 MG/DL (ref 8.6–10.4)
CHLORIDE BLD-SCNC: 104 MMOL/L (ref 98–107)
CHOLESTEROL/HDL RATIO: 3.4
CHOLESTEROL: 158 MG/DL
CO2: 26 MMOL/L (ref 20–31)
CREAT SERPL-MCNC: 0.72 MG/DL (ref 0.5–0.9)
GFR AFRICAN AMERICAN: >60 ML/MIN
GFR NON-AFRICAN AMERICAN: >60 ML/MIN
GFR SERPL CREATININE-BSD FRML MDRD: ABNORMAL ML/MIN/{1.73_M2}
GFR SERPL CREATININE-BSD FRML MDRD: ABNORMAL ML/MIN/{1.73_M2}
GLUCOSE BLD-MCNC: 93 MG/DL (ref 70–99)
HCT VFR BLD CALC: 33.7 % (ref 36–46)
HDLC SERPL-MCNC: 46 MG/DL
HEMOGLOBIN: 11.1 G/DL (ref 12–16)
LDL CHOLESTEROL: 91 MG/DL (ref 0–130)
MCH RBC QN AUTO: 28.8 PG (ref 26–34)
MCHC RBC AUTO-ENTMCNC: 32.8 G/DL (ref 31–37)
MCV RBC AUTO: 87.6 FL (ref 80–100)
PDW BLD-RTO: 15.3 % (ref 11.5–14.9)
PLATELET # BLD: 79 K/UL (ref 150–450)
PMV BLD AUTO: 9.4 FL (ref 6–12)
POTASSIUM SERPL-SCNC: 4 MMOL/L (ref 3.7–5.3)
RBC # BLD: 3.85 M/UL (ref 4–5.2)
SODIUM BLD-SCNC: 142 MMOL/L (ref 135–144)
TOTAL PROTEIN: 8.2 G/DL (ref 6.4–8.3)
TRIGL SERPL-MCNC: 103 MG/DL
TSH SERPL DL<=0.05 MIU/L-ACNC: 0.7 MIU/L (ref 0.3–5)
VITAMIN D 25-HYDROXY: 52.1 NG/ML (ref 30–100)
VLDLC SERPL CALC-MCNC: NORMAL MG/DL (ref 1–30)
WBC # BLD: 3.4 K/UL (ref 3.5–11)

## 2017-12-18 PROCEDURE — 84443 ASSAY THYROID STIM HORMONE: CPT

## 2017-12-18 PROCEDURE — 82306 VITAMIN D 25 HYDROXY: CPT

## 2017-12-18 PROCEDURE — 36415 COLL VENOUS BLD VENIPUNCTURE: CPT

## 2017-12-18 PROCEDURE — 80061 LIPID PANEL: CPT

## 2017-12-18 PROCEDURE — 80053 COMPREHEN METABOLIC PANEL: CPT

## 2017-12-18 PROCEDURE — 85027 COMPLETE CBC AUTOMATED: CPT

## 2017-12-19 DIAGNOSIS — D69.6 THROMBOCYTOPENIA (HCC): Primary | ICD-10-CM

## 2017-12-19 DIAGNOSIS — D64.9 ANEMIA, UNSPECIFIED TYPE: ICD-10-CM

## 2017-12-19 DIAGNOSIS — D72.818 OTHER DECREASED WHITE BLOOD CELL (WBC) COUNT: ICD-10-CM

## 2017-12-19 PROBLEM — D72.819 LEUCOPENIA: Status: ACTIVE | Noted: 2017-12-19

## 2017-12-19 RX ORDER — PROPRANOLOL HYDROCHLORIDE 20 MG/1
20 TABLET ORAL 2 TIMES DAILY
Qty: 60 TABLET | Refills: 3 | Status: SHIPPED | OUTPATIENT
Start: 2017-12-19 | End: 2018-02-07 | Stop reason: SDUPTHER

## 2017-12-19 RX ORDER — SPIRONOLACTONE 25 MG/1
25 TABLET ORAL DAILY
Qty: 30 TABLET | Refills: 3 | Status: SHIPPED | OUTPATIENT
Start: 2017-12-19 | End: 2018-02-07 | Stop reason: SDUPTHER

## 2017-12-19 RX ORDER — GABAPENTIN 100 MG/1
100 CAPSULE ORAL 3 TIMES DAILY
Qty: 90 CAPSULE | Refills: 3 | Status: SHIPPED | OUTPATIENT
Start: 2017-12-19 | End: 2018-02-07 | Stop reason: DRUGHIGH

## 2017-12-20 NOTE — PROGRESS NOTES
PLEASE NOTIFY PATIENT. Improved liver function tests  improved kidney function  Has worsening anemia, low white blood cells, low platelets which could be related to her liver disease or bone marrow disease. I have made a referral to hematology oncology please giving her the contact information.   Needs to make an appointment with me in a few weeks

## 2018-01-24 ENCOUNTER — HOSPITAL ENCOUNTER (OUTPATIENT)
Age: 78
Discharge: HOME | End: 2018-01-24
Payer: MEDICARE

## 2018-01-24 ENCOUNTER — INITIAL CONSULT (OUTPATIENT)
Dept: ONCOLOGY | Age: 78
End: 2018-01-24
Payer: MEDICARE

## 2018-01-24 VITALS
RESPIRATION RATE: 20 BRPM | SYSTOLIC BLOOD PRESSURE: 147 MMHG | DIASTOLIC BLOOD PRESSURE: 72 MMHG | HEART RATE: 70 BPM | TEMPERATURE: 97.5 F

## 2018-01-24 DIAGNOSIS — D61.818 PANCYTOPENIA (HCC): Primary | ICD-10-CM

## 2018-01-24 DIAGNOSIS — K73.9 CHRONIC HEPATITIS (HCC): ICD-10-CM

## 2018-01-24 DIAGNOSIS — R64 CACHEXIA (HCC): ICD-10-CM

## 2018-01-24 DIAGNOSIS — R73.03 PREDIABETES: ICD-10-CM

## 2018-01-24 DIAGNOSIS — D61.818 PANCYTOPENIA (HCC): ICD-10-CM

## 2018-01-24 DIAGNOSIS — K74.60 CIRRHOSIS OF LIVER WITHOUT ASCITES, UNSPECIFIED HEPATIC CIRRHOSIS TYPE (HCC): ICD-10-CM

## 2018-01-24 LAB
ABSOLUTE EOS #: 0.1 K/UL (ref 0–0.4)
ABSOLUTE IMMATURE GRANULOCYTE: ABNORMAL K/UL (ref 0–0.3)
ABSOLUTE LYMPH #: 0.8 K/UL (ref 1–4.8)
ABSOLUTE MONO #: 0.4 K/UL (ref 0.1–1.3)
ABSOLUTE RETIC #: 0.05 M/UL (ref 0.02–0.1)
AFP: 4 UG/L
ALBUMIN SERPL-MCNC: 4.1 G/DL (ref 3.5–5.2)
ALBUMIN/GLOBULIN RATIO: ABNORMAL (ref 1–2.5)
ALP BLD-CCNC: 123 U/L (ref 35–104)
ALT SERPL-CCNC: 19 U/L (ref 5–33)
ANION GAP SERPL CALCULATED.3IONS-SCNC: 16 MMOL/L (ref 9–17)
AST SERPL-CCNC: 35 U/L
BASOPHILS # BLD: 1 % (ref 0–2)
BASOPHILS ABSOLUTE: 0 K/UL (ref 0–0.2)
BILIRUB SERPL-MCNC: 1.22 MG/DL (ref 0.3–1.2)
BILIRUBIN DIRECT: 0.24 MG/DL
BILIRUBIN, INDIRECT: 0.98 MG/DL (ref 0–1)
BUN BLDV-MCNC: 22 MG/DL (ref 8–23)
BUN/CREAT BLD: NORMAL (ref 9–20)
CALCIUM SERPL-MCNC: 10.2 MG/DL (ref 8.6–10.4)
CHLORIDE BLD-SCNC: 99 MMOL/L (ref 98–107)
CO2: 27 MMOL/L (ref 20–31)
CREAT SERPL-MCNC: 0.7 MG/DL (ref 0.5–0.9)
DIFFERENTIAL TYPE: ABNORMAL
EOSINOPHILS RELATIVE PERCENT: 2 % (ref 0–4)
FERRITIN: 17 UG/L (ref 13–150)
FOLATE: >20 NG/ML
GFR AFRICAN AMERICAN: >60 ML/MIN
GFR NON-AFRICAN AMERICAN: >60 ML/MIN
GFR SERPL CREATININE-BSD FRML MDRD: NORMAL ML/MIN/{1.73_M2}
GFR SERPL CREATININE-BSD FRML MDRD: NORMAL ML/MIN/{1.73_M2}
GLOBULIN: ABNORMAL G/DL (ref 1.5–3.8)
GLUCOSE BLD-MCNC: 98 MG/DL (ref 70–99)
HAPTOGLOBIN: 136 MG/DL (ref 30–200)
HCT VFR BLD CALC: 33.9 % (ref 36–46)
HEMOGLOBIN: 11.2 G/DL (ref 12–16)
IMMATURE GRANULOCYTES: ABNORMAL %
IMMATURE RETIC FRACT: NORMAL %
IRON SATURATION: 26 % (ref 20–55)
IRON: 143 UG/DL (ref 37–145)
LYMPHOCYTES # BLD: 16 % (ref 24–44)
MCH RBC QN AUTO: 28.3 PG (ref 26–34)
MCHC RBC AUTO-ENTMCNC: 33.1 G/DL (ref 31–37)
MCV RBC AUTO: 85.5 FL (ref 80–100)
MONOCYTES # BLD: 9 % (ref 1–7)
NRBC AUTOMATED: ABNORMAL PER 100 WBC
PDW BLD-RTO: 16.1 % (ref 11.5–14.9)
PLATELET # BLD: 92 K/UL (ref 150–450)
PLATELET ESTIMATE: ABNORMAL
PMV BLD AUTO: 9.3 FL (ref 6–12)
POTASSIUM SERPL-SCNC: 3.9 MMOL/L (ref 3.7–5.3)
RBC # BLD: 3.97 M/UL (ref 4–5.2)
RBC # BLD: ABNORMAL 10*6/UL
RETIC %: 1.3 % (ref 0.5–2)
RETIC HEMOGLOBIN: NORMAL PG (ref 28.2–35.7)
SEG NEUTROPHILS: 72 % (ref 36–66)
SEGMENTED NEUTROPHILS ABSOLUTE COUNT: 3.6 K/UL (ref 1.3–9.1)
SODIUM BLD-SCNC: 142 MMOL/L (ref 135–144)
TOTAL IRON BINDING CAPACITY: 547 UG/DL (ref 250–450)
TOTAL PROTEIN: 9.4 G/DL (ref 6.4–8.3)
TSH SERPL DL<=0.05 MIU/L-ACNC: 0.88 MIU/L (ref 0.3–5)
UNSATURATED IRON BINDING CAPACITY: 404 UG/DL (ref 112–347)
VITAMIN B-12: 1396 PG/ML (ref 232–1245)
WBC # BLD: 4.9 K/UL (ref 3.5–11)
WBC # BLD: ABNORMAL 10*3/UL

## 2018-01-24 PROCEDURE — 1090F PRES/ABSN URINE INCON ASSESS: CPT | Performed by: INTERNAL MEDICINE

## 2018-01-24 PROCEDURE — 82746 ASSAY OF FOLIC ACID SERUM: CPT

## 2018-01-24 PROCEDURE — 83540 ASSAY OF IRON: CPT

## 2018-01-24 PROCEDURE — 80048 BASIC METABOLIC PNL TOTAL CA: CPT

## 2018-01-24 PROCEDURE — 82607 VITAMIN B-12: CPT

## 2018-01-24 PROCEDURE — 99205 OFFICE O/P NEW HI 60 MIN: CPT | Performed by: INTERNAL MEDICINE

## 2018-01-24 PROCEDURE — G8420 CALC BMI NORM PARAMETERS: HCPCS | Performed by: INTERNAL MEDICINE

## 2018-01-24 PROCEDURE — G8427 DOCREV CUR MEDS BY ELIG CLIN: HCPCS | Performed by: INTERNAL MEDICINE

## 2018-01-24 PROCEDURE — 82728 ASSAY OF FERRITIN: CPT

## 2018-01-24 PROCEDURE — 83550 IRON BINDING TEST: CPT

## 2018-01-24 PROCEDURE — 84443 ASSAY THYROID STIM HORMONE: CPT

## 2018-01-24 PROCEDURE — 85025 COMPLETE CBC W/AUTO DIFF WBC: CPT

## 2018-01-24 PROCEDURE — 99211 OFF/OP EST MAY X REQ PHY/QHP: CPT | Performed by: INTERNAL MEDICINE

## 2018-01-24 PROCEDURE — G8484 FLU IMMUNIZE NO ADMIN: HCPCS | Performed by: INTERNAL MEDICINE

## 2018-01-24 PROCEDURE — 4040F PNEUMOC VAC/ADMIN/RCVD: CPT | Performed by: INTERNAL MEDICINE

## 2018-01-24 PROCEDURE — 82105 ALPHA-FETOPROTEIN SERUM: CPT

## 2018-01-24 PROCEDURE — 85045 AUTOMATED RETICULOCYTE COUNT: CPT

## 2018-01-24 PROCEDURE — 83010 ASSAY OF HAPTOGLOBIN QUANT: CPT

## 2018-01-24 PROCEDURE — 86334 IMMUNOFIX E-PHORESIS SERUM: CPT

## 2018-01-24 PROCEDURE — 80076 HEPATIC FUNCTION PANEL: CPT

## 2018-01-24 PROCEDURE — 36415 COLL VENOUS BLD VENIPUNCTURE: CPT

## 2018-01-24 NOTE — PROGRESS NOTES
Ulnar tunnel release (LEFT); Total knee arthroplasty; Finger surgery; cervical fusion (06/20/2016); Upper gastrointestinal endoscopy (05/2017); and Esophageal varice ligation (05/2017). CURRENT MEDICATIONS:  has a current medication list which includes the following prescription(s): gabapentin, spironolactone, propranolol, multivitamin women 50+, trazodone, bethanechol, furosemide, pantoprazole, oyster shell calcium/d, and milk thistle. ALLERGIES:  is allergic to morphine; aspirin; cephalexin; fosamax [alendronate]; tylenol [acetaminophen]; ciprofloxacin; and seasonal.    FAMILY HISTORY: Negative for any hematological or oncological conditions. SOCIAL HISTORY:  reports that she has never smoked. She has never used smokeless tobacco. She reports that she does not drink alcohol or use drugs. REVIEW OF SYSTEMS:     · General: No weakness or fatigue. No unanticipated weight loss or decreased appetite. No fever or chills. · Eyes: No blurred vision, eye pain or double vision. · Ears: No hearing problems or drainage. No tinnitus. · Throat: No sore throat, problems with swallowing or dysphagia. · Respiratory: No cough, sputum or hemoptysis. No shortness of breath. No pleuritic chest pain. · Cardiovascular: No chest pain, orthopnea or PND. No lower extremity edema. No palpitation. · Gastrointestinal: No problems with swallowing. No abdominal pain or bloating. No nausea or vomiting. No diarrhea or constipation. No GI bleeding. · Genitourinary: No dysuria, hematuria, frequency or urgency. · Musculoskeletal: No muscle aches or pains. No limitation of movement. No back pain. No gait disturbance, No joint complaints. · Dermatologic: No skin rashes or pruritus. No skin lesions or discolorations. · Psychiatric: No depression, anxiety, or stress or signs of schizophrenia. No change in mood or affect. · Hematologic: No history of bleeding tendency. No bruises or ecchymosis.  No history of clotting problems. · Infectious disease: No fever, chills or frequent infections. · Endocrine: No problems with opacity. No polydipsia or polyuria. No temperature intolerance. · Neurologic: No headaches or dizziness. No weakness or numbness of the extremities. No changes in balance, coordination,  memory, mentation, behavior. · Allergic/Immunologic: No nasal congestion or hives. No repeated infections. PHYSICAL EXAM:  The patient is not in acute distress. Vital signs: Blood pressure (!) 147/72, pulse 70, temperature 97.5 °F (36.4 °C), temperature source Oral, resp. rate 20, last menstrual period 01/01/1987, not currently breastfeeding. HEENT:  Eyes are normal. Ears, nose and throat are normal.  Neck: Supple. No lymph node enlargement. No thyroid enlargement. Trachea is centrally located. Chest:  Clear to auscultation. No wheezes or crepitations. Heart: Regular sinus rhythm. Abdomen: Soft, nontender. No hepatosplenomegaly. No masses. Extremities:  With no edema. Lymph Nodes:  No cervical, axillary or inguinal lymph node enlargement. Neurologic:  Conscious and oriented. No focal neurological deficits. Psychosocial: No depression, anxiety or stress. Skin: No rashes, bruises or ecchymoses. Review of Diagnostic data:   Lab Results   Component Value Date    WBC 4.9 01/24/2018    HGB 11.2 (L) 01/24/2018    HCT 33.9 (L) 01/24/2018    MCV 85.5 01/24/2018    PLT 92 (L) 01/24/2018    LYMPHOPCT 16 (L) 01/24/2018    RBC 3.97 (L) 01/24/2018    MCH 28.3 01/24/2018    MCHC 33.1 01/24/2018    RDW 16.1 (H) 01/24/2018         Recent Labs      01/24/18   1613   WBC  4.9   HGB  11.2*   HCT  33.9*   MCV  85.5   PLT  92*         IMPRESSION:   Normocytic anemia  Thrombocytopenia  Previous history of pancytopenia  Previous history of macrocytosis  Hepatitis C status post treatment with Harvoni. Liver cirrhosis    PLAN: I reviewed the labs available to me and discussed with the patient.  For more than 60 minutes of face to face discussion, I explained to the patient the nature of this hematologic problem. I explained the significance of these abnormalities in layman language. Lab tests in the past showed significant pancytopenia related to liver cirrhosis and hepatitis C. Previously she had GI bleeding from varices and she had transfusions. Recent labs for hepatitis C showed undetected virus. Recent lab tests related to her blood counts showed recovery of the white blood cells but continues to have anemia. At the present time her anemia is normocytic. This is not the typical finding with liver cirrhosis as well expect to see macrocytosis. So concerned about possible other underlying causes for her anemia. We will investigate this further and will make further recommendation based on the results. Patient's questions were answered to the best of her satisfaction and she verbalized full understanding and agreement.

## 2018-01-26 LAB
PATHOLOGIST: NORMAL
SERUM IFX INTERP: NORMAL

## 2018-01-29 ENCOUNTER — OFFICE VISIT (OUTPATIENT)
Dept: ONCOLOGY | Age: 78
End: 2018-01-29
Payer: MEDICARE

## 2018-01-29 VITALS
HEART RATE: 67 BPM | SYSTOLIC BLOOD PRESSURE: 134 MMHG | WEIGHT: 105.7 LBS | BODY MASS INDEX: 20.64 KG/M2 | RESPIRATION RATE: 16 BRPM | DIASTOLIC BLOOD PRESSURE: 63 MMHG | TEMPERATURE: 98.1 F

## 2018-01-29 DIAGNOSIS — K74.69 OTHER CIRRHOSIS OF LIVER (HCC): ICD-10-CM

## 2018-01-29 DIAGNOSIS — D69.6 THROMBOCYTOPENIA (HCC): Primary | ICD-10-CM

## 2018-01-29 DIAGNOSIS — D72.818 OTHER DECREASED WHITE BLOOD CELL (WBC) COUNT: ICD-10-CM

## 2018-01-29 PROCEDURE — G8484 FLU IMMUNIZE NO ADMIN: HCPCS | Performed by: INTERNAL MEDICINE

## 2018-01-29 PROCEDURE — 1036F TOBACCO NON-USER: CPT | Performed by: INTERNAL MEDICINE

## 2018-01-29 PROCEDURE — 1090F PRES/ABSN URINE INCON ASSESS: CPT | Performed by: INTERNAL MEDICINE

## 2018-01-29 PROCEDURE — 4040F PNEUMOC VAC/ADMIN/RCVD: CPT | Performed by: INTERNAL MEDICINE

## 2018-01-29 PROCEDURE — G8399 PT W/DXA RESULTS DOCUMENT: HCPCS | Performed by: INTERNAL MEDICINE

## 2018-01-29 PROCEDURE — G8427 DOCREV CUR MEDS BY ELIG CLIN: HCPCS | Performed by: INTERNAL MEDICINE

## 2018-01-29 PROCEDURE — 99214 OFFICE O/P EST MOD 30 MIN: CPT | Performed by: INTERNAL MEDICINE

## 2018-01-29 PROCEDURE — G8420 CALC BMI NORM PARAMETERS: HCPCS | Performed by: INTERNAL MEDICINE

## 2018-01-29 PROCEDURE — 1123F ACP DISCUSS/DSCN MKR DOCD: CPT | Performed by: INTERNAL MEDICINE

## 2018-01-30 NOTE — PROGRESS NOTES
Hyperglycerolemia ([facility] [address] Hypertension; Menopausal state; Osteoarthritis; Osteopenia; PONV (postoperative nausea and vomiting); PT-NANBH (post-transfusion non-A, non-B hepatitis); TIA (transient ischemic attack); Urinary retention; Vasculitis ([address] Vertigo, central; and Vertigo, central.    PAST SURGICAL HISTORY: has a past surgical history that includes back surgery (89); Carpal tunnel release (Left); shoulder surgery (Left); Colonoscopy (2007); Cataract removal (2008); Cholecystectomy (1994); Rotator cuff repair (Left, 1994); lumbar laminectomy (2009); Ulnar tunnel release (LEFT); Total knee arthroplasty; Finger surgery; cervical fusion (06/20/2016); Upper gastrointestinal endoscopy (05/2017); and Esophageal varice ligation (05/2017). CURRENT MEDICATIONS:  has a current medication list which includes the following prescription(s): gabapentin, spironolactone, propranolol, multivitamin women 50+, trazodone, bethanechol, furosemide, pantoprazole, oyster shell calcium/d, and milk thistle. ALLERGIES:  is allergic to morphine; aspirin; cephalexin; fosamax [alendronate]; tylenol [acetaminophen]; ciprofloxacin; and seasonal.    FAMILY HISTORY: Negative for any hematological or oncological conditions. SOCIAL HISTORY:  reports that she has never smoked. She has never used smokeless tobacco. She reports that she does not drink alcohol or use drugs. REVIEW OF SYSTEMS:     · General: No weakness or fatigue. No unanticipated weight loss or decreased appetite. No fever or chills. · Eyes: No blurred vision, eye pain or double vision. · Ears: No hearing problems or drainage. No tinnitus. · Throat: No sore throat, problems with swallowing or dysphagia. · Respiratory: No cough, sputum or hemoptysis. No shortness of breath. No pleuritic chest pain. · Cardiovascular: No chest pain, orthopnea or PND. No lower extremity edema. No palpitation. · Gastrointestinal: No problems with swallowing.  No abdominal pain

## 2018-02-07 ENCOUNTER — OFFICE VISIT (OUTPATIENT)
Dept: FAMILY MEDICINE CLINIC | Age: 78
End: 2018-02-07
Payer: MEDICARE

## 2018-02-07 VITALS
RESPIRATION RATE: 16 BRPM | WEIGHT: 106.13 LBS | TEMPERATURE: 98 F | HEART RATE: 73 BPM | BODY MASS INDEX: 21.4 KG/M2 | HEIGHT: 59 IN | SYSTOLIC BLOOD PRESSURE: 100 MMHG | DIASTOLIC BLOOD PRESSURE: 54 MMHG

## 2018-02-07 DIAGNOSIS — M54.41 CHRONIC BILATERAL LOW BACK PAIN WITH BILATERAL SCIATICA: Primary | ICD-10-CM

## 2018-02-07 DIAGNOSIS — I10 ESSENTIAL HYPERTENSION: ICD-10-CM

## 2018-02-07 DIAGNOSIS — M15.9 PRIMARY OSTEOARTHRITIS INVOLVING MULTIPLE JOINTS: ICD-10-CM

## 2018-02-07 DIAGNOSIS — K21.00 GASTROESOPHAGEAL REFLUX DISEASE WITH ESOPHAGITIS: ICD-10-CM

## 2018-02-07 DIAGNOSIS — F51.04 PSYCHOPHYSIOLOGICAL INSOMNIA: ICD-10-CM

## 2018-02-07 DIAGNOSIS — B18.2 CHRONIC HEPATITIS C WITHOUT HEPATIC COMA (HCC): ICD-10-CM

## 2018-02-07 DIAGNOSIS — K74.69 OTHER CIRRHOSIS OF LIVER (HCC): ICD-10-CM

## 2018-02-07 DIAGNOSIS — D69.6 THROMBOCYTOPENIA (HCC): ICD-10-CM

## 2018-02-07 DIAGNOSIS — G89.29 CHRONIC BILATERAL LOW BACK PAIN WITH BILATERAL SCIATICA: Primary | ICD-10-CM

## 2018-02-07 DIAGNOSIS — D64.9 ANEMIA, UNSPECIFIED TYPE: ICD-10-CM

## 2018-02-07 DIAGNOSIS — M54.42 CHRONIC BILATERAL LOW BACK PAIN WITH BILATERAL SCIATICA: Primary | ICD-10-CM

## 2018-02-07 PROCEDURE — G8420 CALC BMI NORM PARAMETERS: HCPCS | Performed by: FAMILY MEDICINE

## 2018-02-07 PROCEDURE — 1090F PRES/ABSN URINE INCON ASSESS: CPT | Performed by: FAMILY MEDICINE

## 2018-02-07 PROCEDURE — G8399 PT W/DXA RESULTS DOCUMENT: HCPCS | Performed by: FAMILY MEDICINE

## 2018-02-07 PROCEDURE — 1036F TOBACCO NON-USER: CPT | Performed by: FAMILY MEDICINE

## 2018-02-07 PROCEDURE — 99214 OFFICE O/P EST MOD 30 MIN: CPT | Performed by: FAMILY MEDICINE

## 2018-02-07 PROCEDURE — 4040F PNEUMOC VAC/ADMIN/RCVD: CPT | Performed by: FAMILY MEDICINE

## 2018-02-07 PROCEDURE — G8484 FLU IMMUNIZE NO ADMIN: HCPCS | Performed by: FAMILY MEDICINE

## 2018-02-07 PROCEDURE — G8427 DOCREV CUR MEDS BY ELIG CLIN: HCPCS | Performed by: FAMILY MEDICINE

## 2018-02-07 PROCEDURE — 1123F ACP DISCUSS/DSCN MKR DOCD: CPT | Performed by: FAMILY MEDICINE

## 2018-02-07 RX ORDER — FUROSEMIDE 20 MG/1
20 TABLET ORAL 2 TIMES DAILY
Qty: 180 TABLET | Refills: 3 | Status: SHIPPED | OUTPATIENT
Start: 2018-02-07 | End: 2018-12-03 | Stop reason: SDUPTHER

## 2018-02-07 RX ORDER — SPIRONOLACTONE 25 MG/1
25 TABLET ORAL DAILY
Qty: 90 TABLET | Refills: 3 | Status: SHIPPED | OUTPATIENT
Start: 2018-02-07 | End: 2018-12-07 | Stop reason: SDUPTHER

## 2018-02-07 RX ORDER — TRAZODONE HYDROCHLORIDE 50 MG/1
50 TABLET ORAL NIGHTLY
Qty: 90 TABLET | Refills: 3 | Status: SHIPPED | OUTPATIENT
Start: 2018-02-07 | End: 2018-12-03 | Stop reason: SDUPTHER

## 2018-02-07 RX ORDER — LIDOCAINE 40 MG/G
CREAM TOPICAL
Qty: 120 G | Refills: 3 | Status: SHIPPED | OUTPATIENT
Start: 2018-02-07

## 2018-02-07 RX ORDER — GABAPENTIN 300 MG/1
300 CAPSULE ORAL 3 TIMES DAILY
Qty: 90 CAPSULE | Refills: 0 | Status: SHIPPED | OUTPATIENT
Start: 2018-02-07 | End: 2018-07-31 | Stop reason: SDUPTHER

## 2018-02-07 RX ORDER — PROPRANOLOL HYDROCHLORIDE 20 MG/1
20 TABLET ORAL 2 TIMES DAILY
Qty: 180 TABLET | Refills: 3 | Status: SHIPPED | OUTPATIENT
Start: 2018-02-07 | End: 2018-12-03 | Stop reason: SDUPTHER

## 2018-02-07 RX ORDER — PANTOPRAZOLE SODIUM 20 MG/1
20 TABLET, DELAYED RELEASE ORAL DAILY
Qty: 90 TABLET | Refills: 3 | Status: SHIPPED | OUTPATIENT
Start: 2018-02-07 | End: 2018-08-23 | Stop reason: SDUPTHER

## 2018-02-07 ASSESSMENT — ENCOUNTER SYMPTOMS
SHORTNESS OF BREATH: 0
WHEEZING: 0
ABDOMINAL DISTENTION: 0
VOMITING: 0
CHEST TIGHTNESS: 0
CONSTIPATION: 0
NAUSEA: 0
COUGH: 0
DIARRHEA: 0
ABDOMINAL PAIN: 0
BACK PAIN: 1

## 2018-02-07 NOTE — PATIENT INSTRUCTIONS
FOAM TOP MATTRESS -FOR YOUR BACK. I increased gabapentin. Take it 3 times a day. Avoid taking trazodone if gabapentin is enough to make you sleep well. If unable to sleep, then take 1/2 tablet of Trazodone      Patient Education        DASH Diet: Care Instructions  Your Care Instructions    The DASH diet is an eating plan that can help lower your blood pressure. DASH stands for Dietary Approaches to Stop Hypertension. Hypertension is high blood pressure. The DASH diet focuses on eating foods that are high in calcium, potassium, and magnesium. These nutrients can lower blood pressure. The foods that are highest in these nutrients are fruits, vegetables, low-fat dairy products, nuts, seeds, and legumes. But taking calcium, potassium, and magnesium supplements instead of eating foods that are high in those nutrients does not have the same effect. The DASH diet also includes whole grains, fish, and poultry. The DASH diet is one of several lifestyle changes your doctor may recommend to lower your high blood pressure. Your doctor may also want you to decrease the amount of sodium in your diet. Lowering sodium while following the DASH diet can lower blood pressure even further than just the DASH diet alone. Follow-up care is a key part of your treatment and safety. Be sure to make and go to all appointments, and call your doctor if you are having problems. It's also a good idea to know your test results and keep a list of the medicines you take. How can you care for yourself at home? Following the DASH diet  · Eat 4 to 5 servings of fruit each day. A serving is 1 medium-sized piece of fruit, ½ cup chopped or canned fruit, 1/4 cup dried fruit, or 4 ounces (½ cup) of fruit juice. Choose fruit more often than fruit juice. · Eat 4 to 5 servings of vegetables each day. A serving is 1 cup of lettuce or raw leafy vegetables, ½ cup of chopped or cooked vegetables, or 4 ounces (½ cup) of vegetable juice.  Choose vegetables more often than vegetable juice. · Get 2 to 3 servings of low-fat and fat-free dairy each day. A serving is 8 ounces of milk, 1 cup of yogurt, or 1 ½ ounces of cheese. · Eat 6 to 8 servings of grains each day. A serving is 1 slice of bread, 1 ounce of dry cereal, or ½ cup of cooked rice, pasta, or cooked cereal. Try to choose whole-grain products as much as possible. · Limit lean meat, poultry, and fish to 2 servings each day. A serving is 3 ounces, about the size of a deck of cards. · Eat 4 to 5 servings of nuts, seeds, and legumes (cooked dried beans, lentils, and split peas) each week. A serving is 1/3 cup of nuts, 2 tablespoons of seeds, or ½ cup of cooked beans or peas. · Limit fats and oils to 2 to 3 servings each day. A serving is 1 teaspoon of vegetable oil or 2 tablespoons of salad dressing. · Limit sweets and added sugars to 5 servings or less a week. A serving is 1 tablespoon jelly or jam, ½ cup sorbet, or 1 cup of lemonade. · Eat less than 2,300 milligrams (mg) of sodium a day. If you limit your sodium to 1,500 mg a day, you can lower your blood pressure even more. Tips for success  · Start small. Do not try to make dramatic changes to your diet all at once. You might feel that you are missing out on your favorite foods and then be more likely to not follow the plan. Make small changes, and stick with them. Once those changes become habit, add a few more changes. · Try some of the following:  ¨ Make it a goal to eat a fruit or vegetable at every meal and at snacks. This will make it easy to get the recommended amount of fruits and vegetables each day. ¨ Try yogurt topped with fruit and nuts for a snack or healthy dessert. ¨ Add lettuce, tomato, cucumber, and onion to sandwiches. ¨ Combine a ready-made pizza crust with low-fat mozzarella cheese and lots of vegetable toppings. Try using tomatoes, squash, spinach, broccoli, carrots, cauliflower, and onions.   ¨ Have a variety of cut-up 12/10/2011    CALCIUM 10.2 01/24/2018        Lab Results   Component Value Date    ALT 19 01/24/2018    AST 35 (H) 01/24/2018    ALKPHOS 123 (H) 01/24/2018    BILITOT 1.22 (H) 01/24/2018       Lab Results   Component Value Date    TSH 0.88 01/24/2018       Lab Results   Component Value Date    CHOL 158 12/18/2017    CHOL 89 06/22/2016    CHOL 122 09/04/2015     Lab Results   Component Value Date    TRIG 103 12/18/2017    TRIG 73 06/22/2016    TRIG 97 09/04/2015     Lab Results   Component Value Date    HDL 46 12/18/2017    HDL 20 (L) 06/22/2016    HDL 30 (L) 09/04/2015     Lab Results   Component Value Date    LDLCHOLESTEROL 91 12/18/2017    LDLCHOLESTEROL 54 06/22/2016    LDLCHOLESTEROL 73 09/04/2015     Lab Results   Component Value Date    VLDL NOT REPORTED 12/18/2017    VLDL NOT REPORTED 06/22/2016    VLDL NOT REPORTED 09/04/2015     Lab Results   Component Value Date    CHOLHDLRATIO 3.4 12/18/2017    CHOLHDLRATIO 4.5 06/22/2016    CHOLHDLRATIO 4.1 09/04/2015       Lab Results   Component Value Date    LABA1C 4.6 06/22/2016       Lab Results   Component Value Date    [id number] 1396 (H) 01/24/2018       Lab Results   Component Value Date    FOLATE >20.0 01/24/2018       Lab Results   Component Value Date    IRON 143 01/24/2018    TIBC 547 (H) 01/24/2018    FERRITIN 17 01/24/2018       Lab Results   Component Value Date    VITD25 52.1 12/18/2017

## 2018-02-07 NOTE — PROGRESS NOTES
Chief Complaint   Patient presents with   [facility]    Hypertension       Jenn Norberto  here today for follow up on chronic medical problems, go over labs and/or diagnostic studies, and medication refills. Discuss Labs and Hypertension    Comes alone. Aleksandra complains of chronic low back pain,  radiates both legs. Pain is constant. Intensity of pain is 5/10. Had back 2 surgeries. Reports that she has been doing some home exercises, she declines referral to physical therapy at this time, wants to do home exercises. Has been using Heating pad and helps. Patient reports she is able to do her own cleaning, dusting, chores, and if she gets back pain, she just rests a bit and starts working again. Cold makes the back pain worse too. Tens unit helped before. Had shots before, didn't help much, and declines referral to pain management. Says she was on higher dose of Gabapentin and it did help with pain better. She also takes gabapentin for osteoarthritis pain. Has walker and cane at home. Had home care, but not anymore. Doesn't drive, says her children doesn't let her drive anymore. Comes with the [employer] Almendarez. Hypertension: Patient here for follow-up of elevated blood pressure. She is active, doing her chores,  and is adherent to low salt diet. Blood pressure is well controlled at home. Cardiac symptoms fatigue. Patient denies chest pain, chest pressure/discomfort, claudication, dyspnea, exertional chest pressure/discomfort, irregular heart beat, lower extremity edema, near-syncope, orthopnea, palpitations, paroxysmal nocturnal dyspnea, syncope and tachypnea. Cardiovascular risk factors: advanced age (older than 54 for men, 72 for women) and hypertension. Use of agents associated with hypertension: none. History of target organ damage: transient ischemic attack. Had Echo 2-D 9/30/16, EF greater than 63%, grade 1 diastolic dysfunction.   Patient had stress test 6/13/16 -low risk, EKG portion was borderline    Low BP today, but better or high recently. Denies lightheadedness   BP Readings from Last 3 Encounters:   02/07/18 (!) 100/54   01/29/18 134/63   01/24/18 (!) 147/72      Pulse controlled. Pulse Readings from Last 3 Encounters:   02/07/18 73   01/29/18 67   01/24/18 70     Gained some weight, says the appetite has improved  Wt Readings from Last 3 Encounters:   02/07/18 106 lb 2 oz (48.1 kg)   01/29/18 105 lb 11.2 oz (47.9 kg)   09/25/17 96 lb (43.5 kg)     Lucia Casas has known Hep C status post Harvoni, in April 2017. Follows with Dr. Bri Sanchez. She acquired Hep C after blood transfusions due to having genital bleeding after  5 miscarriages . Lucia Casas has now Liver cirrhosis. Patient was last admitted 5/23 through 5/28/17 for esophageal varices bleeding,and she underwent EGD and banding of esophageal varices. Fosamax was stopped at that time    -vital signs stable and within normal limits except mildly low BP    BP (!) 100/54   Pulse 73   Temp 98 °F (36.7 °C) (Oral)   Resp 16   Ht 4' 11\" (1.499 m)   Wt 106 lb 2 oz (48.1 kg)   LMP 01/01/1987   BMI 21.43 kg/m²   Body mass index is 21.43 kg/m². Discussed testing with the patient and all questions fully answered. Anemia, thrombocytopenia .  Patient was referred to heme/onc and per notes reviewed, hematologic changes are due to Hep C  Increased liver function tests related to hep C  High B12    Hospital Outpatient Visit on 01/24/2018   Component Date Value Ref Range Status    WBC 01/24/2018 4.9  3.5 - 11.0 k/uL Final    RBC 01/24/2018 3.97* 4.0 - 5.2 m/uL Final    Hemoglobin 01/24/2018 11.2* 12.0 - 16.0 g/dL Final    Hematocrit 01/24/2018 33.9* 36 - 46 % Final    MCV 01/24/2018 85.5  80 - 100 fL Final    MCH 01/24/2018 28.3  26 - 34 pg Final    MCHC 01/24/2018 33.1  31 - 37 g/dL Final    RDW 01/24/2018 16.1* 11.5 - 14.9 % Final    Platelets 37/51/8114 92* 150 - 450 k/uL Final    MPV 01/24/2018 9.3  6.0 - 12.0 fL Final    NRBC Automated Saturation 01/24/2018 26  20 - 55 % Final    UIBC 01/24/2018 404* 112 - 347 ug/dL Final    Serum IFX Interp 01/26/2018 NO MONOCLONAL BANDS IDENTIFIED. Final    Comment: Reviewed by pathologist:  Calista Brady M.D. Performed at [facility]: JARRELL TAYLOR [address] [place] [address].  [address]   [phone]      Pathologist 01/26/2018 NOT REPORTED   Final         Lab Results   Component Value Date    CHOL 158 12/18/2017    CHOL 89 06/22/2016    CHOL 122 09/04/2015     Lab Results   Component Value Date    TRIG 103 12/18/2017    TRIG 73 06/22/2016    TRIG 97 09/04/2015     Lab Results   Component Value Date    HDL 46 12/18/2017    HDL 20 (L) 06/22/2016    HDL 30 (L) 09/04/2015     Lab Results   Component Value Date    LDLCHOLESTEROL 91 12/18/2017    LDLCHOLESTEROL 54 06/22/2016    LDLCHOLESTEROL 73 09/04/2015       Lab Results   Component Value Date    CHOLHDLRATIO 3.4 12/18/2017    CHOLHDLRATIO 4.5 06/22/2016    CHOLHDLRATIO 4.1 09/04/2015         Lab Results   Component Value Date    VNKAUHWF30 1396 (H) 01/24/2018     Lab Results   Component Value Date    FOLATE >20.0 01/24/2018     Lab Results   Component Value Date    VITD25 52.1 12/18/2017           Allergies   Allergen Reactions    Aspirin Itching     Thrombocytopenia    Fosamax [Alendronate]      Upper GI bleed, required admission, EGD and banding of the esophageal varices May 2017    Morphine Shortness Of Breath    Cephalexin Diarrhea    Ciprofloxacin Rash    Seasonal Other (See Comments)     Running Nose    Tylenol [Acetaminophen] Nausea Only        Current Outpatient Prescriptions   Medication Sig Dispense Refill    gabapentin (NEURONTIN) 100 MG capsule Take 1 capsule by mouth 3 times daily **for pain** 90 capsule 3    spironolactone (ALDACTONE) 25 MG tablet Take 1 tablet by mouth daily 30 tablet 3    propranolol (INDERAL) 20 MG tablet Take 1 tablet by mouth 2 times daily 60 tablet 3    Multiple Vitamins-Minerals (MULTIVITAMIN have the cane with her  Long vertical old lumbar surgical scar noted. Unable to lay on her back   Neurological: She is alert and oriented to person, place, and time. No cranial nerve deficit. She exhibits normal muscle tone. Skin: Skin is warm and dry. No rash noted. She is not diaphoretic. Psychiatric: She has a normal mood and affect. Her behavior is normal. Judgment and thought content normal.   Nursing note and vitals reviewed. ASSESSMENT AND PLAN      1. Chronic bilateral low back pain with bilateral sciatica  Poorly controlled  -Dose increased   gabapentin (NEURONTIN) 300 MG capsule; Take 1 capsule by mouth 3 times daily for 30 days. Dispense: 90 capsule; Refill: 0  - lidocaine (LMX) 4 % cream; Apply 2-3 times a day as needed for pain  Dispense: 120 g; Refill: 3  - Tens Unit MISC; by Does not apply route OK to substitute,  Please dispense according to patients insurance. Dispense: 1 each; Refill: 0  -Home exercises advised, given patient instructions; defers PT at this time  I advised her to buy a phone top mattress    2. Essential hypertension  Low normal  Has been higher beffore  Continue current treatment. - spironolactone (ALDACTONE) 25 MG tablet; Take 1 tablet by mouth daily  Dispense: 90 tablet; Refill: 3  - propranolol (INDERAL) 20 MG tablet; Take 1 tablet by mouth 2 times daily  Dispense: 180 tablet; Refill: 3  - furosemide (LASIX) 20 MG tablet; Take 1 tablet by mouth 2 times daily  Dispense: 180 tablet; Refill: 3  - CBC Auto Differential; Future  - Comprehensive Metabolic Panel; Future    Discussed low salt diet and BP and pulse monitoring daily, BP log given    3. Other cirrhosis of liver ([address]  Stable  status post Harvoni  Needs to make appointment with Dr. Jose J Marcus    - Multiple Vitamins-Minerals (MULTIVITAMIN WOMEN 50+) TABS; Take 1 tablet by mouth daily  Dispense: 90 tablet; Refill: 3  - spironolactone (ALDACTONE) 25 MG tablet;  Take 1 tablet by mouth daily  Dispense: 90 tablet; Refill: 3  - propranolol (INDERAL) 20 MG tablet; Take 1 tablet by mouth 2 times daily  Dispense: 180 tablet; Refill: 3  - furosemide (LASIX) 20 MG tablet; Take 1 tablet by mouth 2 times daily  Dispense: 180 tablet; Refill: 3  - CBC Auto Differential; Future  - Comprehensive Metabolic Panel; Future    4. Chronic hepatitis C without hepatic coma (HCC)  Stable  status post Harvoni  Needs to follow up with Dr. Sterling Nondenominational  - CBC Auto Differential; Future  - Comprehensive Metabolic Panel; Future    5. Anemia, unspecified type  - CBC Auto Differential; Future  - Comprehensive Metabolic Panel; Future  Re-start multivitamin     6. Primary osteoarthritis involving multiple joints  - gabapentin (NEURONTIN) 300 MG capsule; Take 1 capsule by mouth 3 times daily for 30 days. Dispense: 90 capsule; Refill: 0  - lidocaine (LMX) 4 % cream; Apply 2-3 times a day as needed for pain  Dispense: 120 g; Refill: 3    7. Thrombocytopenia ([address]  improved from prior   Related to Hep C per heme/onc  Continue monitoring   - CBC Auto Differential; Future    8. Gastroesophageal reflux disease with esophagitis  - pantoprazole (PROTONIX) 20 MG tablet; Take 1 tablet by mouth daily  Dispense: 90 tablet; Refill: 3    9. Psychophysiological insomnia  Refilled Trazodone  Other instructions given: \"I increased gabapentin. Take it 3 times a day. Avoid taking trazodone if gabapentin is enough to make you sleep well. If unable to sleep, then take 1/2 tablet of Trazodone\" as needed      Controlled Substances Monitoring: The Prescription Monitoring Report for this patient was reviewed today. Osbaldo Payan MD)    Possible medication side effects, risk of tolerance/dependence & alternative treatments discussed., No signs of potential drug abuse or diversion identified. Osbaldo Payan MD)            Medication contract signed today.  Osbaldo Payan MD)        Orders Placed This Encounter   Procedures    CBC Auto Differential     Standing Sig: Take 1 tablet by mouth 2 times daily    pantoprazole (PROTONIX) 20 MG tablet 90 tablet 3     Sig: Take 1 tablet by mouth daily    Tens Unit MISC 1 each 0     Sig: by Does not apply route OK to substitute,  Please dispense according to patients insurance. All patient questions answered. Patient voiced understanding. Quality Measures    Body mass index is 21.43 kg/m². Normal. Weight control planned discussed Healthy diet and regular exercise. BP: (!) 100/54. Blood pressure is low normal. Treatment plan consists of DASH Eating Plan, Dietary Sodium Restriction, Increased Physical Activity, Patient In-home Blood Pressure Monitoring and No treatment change needed. Fall Risk 4/26/2017 12/30/2015 9/8/2014   2 or more falls in past year? no no no   Fall with injury in past year? no no no     The patient does not have a history of falls. I did , complete a risk assessment for falls. A plan of care for falls in-office gait and balance testing performed using The Timed Up and Go Test was positive for increased falls risk, home safety tips provided, patient declines any further evaluation/treatment for increased falls risk, -Home exercises advised, given patient instructions; defers PT at this time     LDL controlled     Lab Results   Component Value Date    LDLCHOLESTEROL 91 12/18/2017    (goal LDL reduction with dx if diabetes is 50% LDL reduction)      Negative depression screening. PHQ Scores 9/25/2017 12/30/2015 8/12/2013   PHQ2 Score 0 0 -   PHQ9 Score 0 0 6     Interpretation of Total Score Depression Severity: 1-4 = Minimal depression, 5-9 = Mild depression, 10-14 = Moderate depression, 15-19 = Moderately severe depression, 20-27 = Severe depression    The patient's past medical, surgical, social, and family history as well as her   current medications and allergies were reviewed as documented in today's encounter.       Medications, labs, diagnostic studies, consultations and follow-up as

## 2018-02-07 NOTE — LETTER
involuntary weight loss, insomnia, irritability, and headaches. These risks may increase when these medications are combined with other stimulants, such as caffeine pills or energy drinks, certain weight loss supplements and oral decongestants. Dependence withdrawal symptoms may include depressed mood, loss of interest, suicidal thoughts, anxiety, fatigue, appetite changes and agitation. Testosterone replacement therapy:  Potential side effects include increased risk of stroke and heart attack, blood clots, increased blood pressure, increased cholesterol, enlarged prostate, sleep apnea, irritability/aggression and other mood disorders, and decreased fertility. Other:     1. I understand that I have the following responsibilities:  · I will take medications at the dose and frequency prescribed. · I will not increase or change how I take my medications without the approval of the health care provider who signs this Medication Agreement. · I will arrange for refills at the prescribed interval ONLY during regular office hours. I will not ask for refills earlier than agreed, after-hours, on holidays or on weekends. · I will obtain all refills for these medications at  ·  ____________________________________  pharmacy (phone number  ·  ________________________), with full consent for my provider and pharmacist to exchange information in writing or verbally. · I will not request any pain medications or controlled substances from other providers and will inform this provider of all other medications I am taking. · I will inform my other health care providers that I am taking these medications and of the existence of this [address]. In the event of an emergency, I will provide the same information to the emergency department providers. · I will protect my prescriptions and medications. I understand that lost or misplaced prescriptions will not be replaced. · I develop rapid tolerance or loss of improvement, as described in my treatment plan. · I develop significant side effects from the medication. · My behavior is inconsistent with the responsibilities outlined above, which may also result in my being prevented from receiving further care from this office. · Other:____________________________________________________________________    AGREEMENT:    I have read the above and have had all of my questions answered. For chronic disease management, I know that my symptoms can be managed with many types of treatments. A chronic medication trial may be part of my treatment, but I must be an active participant in my care. Medication therapy is only one part of my symptom management plan. In some cases, there may be limited scientific evidence to support the chronic use of certain medications to improve symptoms and daily function. Furthermore, in certain circumstances, there may be scientific information that suggests that use of chronic controlled substances may actually worsen my symptoms and increase my risk of unintentional death directly related to this medication therapy. I know that if my provider feels my risk from controlled medications is greater than my benefit, I will have my controlled substance medication(s) compassionately lowered or removed altogether. I agree to a controlled substance medication trial.      I further agree to allow this office to contact family or friends if there are concerns about my safety and use of the controlled medications. I have agreed to use the following medications above as instructed by my physician and as stated in this [address].      Patient Signature:  ______________________  JKIA:1/0/5549 or _____________    Provider Signature:______________________  VZZJ:3/0/1892 or _____________

## 2018-02-08 ENCOUNTER — TELEPHONE (OUTPATIENT)
Dept: FAMILY MEDICINE CLINIC | Age: 78
End: 2018-02-08

## 2018-02-08 NOTE — TELEPHONE ENCOUNTER
[facility] called, they do not do the Tens unit. Not sure where to send order.

## 2018-03-27 ENCOUNTER — TELEPHONE (OUTPATIENT)
Dept: FAMILY MEDICINE CLINIC | Age: 78
End: 2018-03-27

## 2018-03-27 DIAGNOSIS — B18.2 CHRONIC HEPATITIS C WITHOUT HEPATIC COMA (HCC): Primary | ICD-10-CM

## 2018-03-27 DIAGNOSIS — M15.9 PRIMARY OSTEOARTHRITIS INVOLVING MULTIPLE JOINTS: ICD-10-CM

## 2018-03-27 DIAGNOSIS — Z87.19 HISTORY OF GI BLEED: ICD-10-CM

## 2018-03-27 DIAGNOSIS — F51.04 PSYCHOPHYSIOLOGICAL INSOMNIA: ICD-10-CM

## 2018-03-27 DIAGNOSIS — I77.6 VASCULITIS (HCC): ICD-10-CM

## 2018-03-27 DIAGNOSIS — G89.29 CHRONIC BILATERAL LOW BACK PAIN WITH BILATERAL SCIATICA: ICD-10-CM

## 2018-03-27 DIAGNOSIS — I10 ESSENTIAL HYPERTENSION: ICD-10-CM

## 2018-03-27 DIAGNOSIS — F33.42 RECURRENT MAJOR DEPRESSIVE EPISODES, IN FULL REMISSION (HCC): ICD-10-CM

## 2018-03-27 DIAGNOSIS — K21.9 GASTROESOPHAGEAL REFLUX DISEASE WITHOUT ESOPHAGITIS: ICD-10-CM

## 2018-03-27 DIAGNOSIS — M54.41 CHRONIC BILATERAL LOW BACK PAIN WITH BILATERAL SCIATICA: ICD-10-CM

## 2018-03-27 DIAGNOSIS — D69.6 THROMBOCYTOPENIA (HCC): ICD-10-CM

## 2018-03-27 DIAGNOSIS — K74.69 OTHER CIRRHOSIS OF LIVER (HCC): ICD-10-CM

## 2018-03-27 DIAGNOSIS — M54.42 CHRONIC BILATERAL LOW BACK PAIN WITH BILATERAL SCIATICA: ICD-10-CM

## 2018-04-23 ENCOUNTER — HOSPITAL ENCOUNTER (OUTPATIENT)
Age: 78
Discharge: HOME | End: 2018-04-23
Payer: MEDICARE

## 2018-04-23 DIAGNOSIS — I10 ESSENTIAL HYPERTENSION: ICD-10-CM

## 2018-04-23 DIAGNOSIS — D69.6 THROMBOCYTOPENIA (HCC): ICD-10-CM

## 2018-04-23 DIAGNOSIS — B18.2 CHRONIC HEPATITIS C WITHOUT HEPATIC COMA (HCC): ICD-10-CM

## 2018-04-23 DIAGNOSIS — D64.9 ANEMIA, UNSPECIFIED TYPE: ICD-10-CM

## 2018-04-23 DIAGNOSIS — K74.69 OTHER CIRRHOSIS OF LIVER (HCC): ICD-10-CM

## 2018-04-23 LAB
ABSOLUTE EOS #: 0.07 K/UL (ref 0–0.4)
ABSOLUTE IMMATURE GRANULOCYTE: ABNORMAL K/UL (ref 0–0.3)
ABSOLUTE LYMPH #: 0.6 K/UL (ref 1–4.8)
ABSOLUTE MONO #: 0.42 K/UL (ref 0.1–1.3)
ALBUMIN SERPL-MCNC: 3.9 G/DL (ref 3.5–5.2)
ALBUMIN/GLOBULIN RATIO: ABNORMAL (ref 1–2.5)
ALP BLD-CCNC: 132 U/L (ref 35–104)
ALT SERPL-CCNC: 15 U/L (ref 5–33)
ANION GAP SERPL CALCULATED.3IONS-SCNC: 14 MMOL/L (ref 9–17)
AST SERPL-CCNC: 32 U/L
BASOPHILS # BLD: 1 % (ref 0–2)
BASOPHILS ABSOLUTE: 0.04 K/UL (ref 0–0.2)
BILIRUB SERPL-MCNC: 0.65 MG/DL (ref 0.3–1.2)
BUN BLDV-MCNC: 22 MG/DL (ref 8–23)
BUN/CREAT BLD: ABNORMAL (ref 9–20)
CALCIUM SERPL-MCNC: 9.4 MG/DL (ref 8.6–10.4)
CHLORIDE BLD-SCNC: 105 MMOL/L (ref 98–107)
CO2: 22 MMOL/L (ref 20–31)
CREAT SERPL-MCNC: 0.7 MG/DL (ref 0.5–0.9)
DIFFERENTIAL TYPE: ABNORMAL
EOSINOPHILS RELATIVE PERCENT: 2 % (ref 0–4)
GFR AFRICAN AMERICAN: >60 ML/MIN
GFR NON-AFRICAN AMERICAN: >60 ML/MIN
GFR SERPL CREATININE-BSD FRML MDRD: ABNORMAL ML/MIN/{1.73_M2}
GFR SERPL CREATININE-BSD FRML MDRD: ABNORMAL ML/MIN/{1.73_M2}
GLUCOSE BLD-MCNC: 114 MG/DL (ref 70–99)
HCT VFR BLD CALC: 27.6 % (ref 36–46)
HEMOGLOBIN: 8.6 G/DL (ref 12–16)
IMMATURE GRANULOCYTES: ABNORMAL %
LYMPHOCYTES # BLD: 17 % (ref 24–44)
MCH RBC QN AUTO: 24 PG (ref 26–34)
MCHC RBC AUTO-ENTMCNC: 31.1 G/DL (ref 31–37)
MCV RBC AUTO: 77.1 FL (ref 80–100)
MONOCYTES # BLD: 12 % (ref 1–7)
MORPHOLOGY: ABNORMAL
NRBC AUTOMATED: ABNORMAL PER 100 WBC
PDW BLD-RTO: 16.6 % (ref 11.5–14.9)
PLATELET # BLD: 97 K/UL (ref 150–450)
PLATELET ESTIMATE: ABNORMAL
PMV BLD AUTO: 9.1 FL (ref 6–12)
POTASSIUM SERPL-SCNC: 3.7 MMOL/L (ref 3.7–5.3)
RBC # BLD: 3.58 M/UL (ref 4–5.2)
RBC # BLD: ABNORMAL 10*6/UL
SEG NEUTROPHILS: 68 % (ref 36–66)
SEGMENTED NEUTROPHILS ABSOLUTE COUNT: 2.37 K/UL (ref 1.3–9.1)
SODIUM BLD-SCNC: 141 MMOL/L (ref 135–144)
TOTAL PROTEIN: 7.7 G/DL (ref 6.4–8.3)
WBC # BLD: 3.5 K/UL (ref 3.5–11)
WBC # BLD: ABNORMAL 10*3/UL

## 2018-04-23 PROCEDURE — 36415 COLL VENOUS BLD VENIPUNCTURE: CPT

## 2018-04-23 PROCEDURE — 85025 COMPLETE CBC W/AUTO DIFF WBC: CPT

## 2018-04-23 PROCEDURE — 80053 COMPREHEN METABOLIC PANEL: CPT

## 2018-04-24 DIAGNOSIS — D64.9 ANEMIA, UNSPECIFIED TYPE: Primary | ICD-10-CM

## 2018-04-24 DIAGNOSIS — D69.6 THROMBOCYTOPENIA (HCC): ICD-10-CM

## 2018-04-24 DIAGNOSIS — Z87.19 HISTORY OF GI BLEED: ICD-10-CM

## 2018-04-24 DIAGNOSIS — K74.69 OTHER CIRRHOSIS OF LIVER (HCC): ICD-10-CM

## 2018-04-27 ENCOUNTER — HOSPITAL ENCOUNTER (OUTPATIENT)
Age: 78
Discharge: HOME | End: 2018-04-27
Payer: MEDICARE

## 2018-04-27 DIAGNOSIS — K74.69 OTHER CIRRHOSIS OF LIVER (HCC): ICD-10-CM

## 2018-04-27 DIAGNOSIS — Z87.19 HISTORY OF GI BLEED: ICD-10-CM

## 2018-04-27 DIAGNOSIS — D64.9 ANEMIA, UNSPECIFIED TYPE: ICD-10-CM

## 2018-04-27 DIAGNOSIS — D69.6 THROMBOCYTOPENIA (HCC): ICD-10-CM

## 2018-04-27 LAB
ABSOLUTE EOS #: 0.09 K/UL (ref 0–0.4)
ABSOLUTE IMMATURE GRANULOCYTE: ABNORMAL K/UL (ref 0–0.3)
ABSOLUTE LYMPH #: 0.87 K/UL (ref 1–4.8)
ABSOLUTE MONO #: 0.69 K/UL (ref 0.1–1.3)
BASOPHILS # BLD: 1 % (ref 0–2)
BASOPHILS ABSOLUTE: 0.05 K/UL (ref 0–0.2)
DIFFERENTIAL TYPE: ABNORMAL
EOSINOPHILS RELATIVE PERCENT: 2 % (ref 0–4)
HCT VFR BLD CALC: 29.8 % (ref 36–46)
HEMOGLOBIN: 9.4 G/DL (ref 12–16)
IMMATURE GRANULOCYTES: ABNORMAL %
LYMPHOCYTES # BLD: 19 % (ref 24–44)
MCH RBC QN AUTO: 24.3 PG (ref 26–34)
MCHC RBC AUTO-ENTMCNC: 31.5 G/DL (ref 31–37)
MCV RBC AUTO: 77 FL (ref 80–100)
MONOCYTES # BLD: 15 % (ref 1–7)
MORPHOLOGY: ABNORMAL
NRBC AUTOMATED: ABNORMAL PER 100 WBC
PDW BLD-RTO: 16.4 % (ref 11.5–14.9)
PLATELET # BLD: 103 K/UL (ref 150–450)
PLATELET ESTIMATE: ABNORMAL
PMV BLD AUTO: 9.2 FL (ref 6–12)
RBC # BLD: 3.87 M/UL (ref 4–5.2)
RBC # BLD: ABNORMAL 10*6/UL
SEG NEUTROPHILS: 63 % (ref 36–66)
SEGMENTED NEUTROPHILS ABSOLUTE COUNT: 2.9 K/UL (ref 1.3–9.1)
WBC # BLD: 4.6 K/UL (ref 3.5–11)
WBC # BLD: ABNORMAL 10*3/UL

## 2018-04-27 PROCEDURE — 85025 COMPLETE CBC W/AUTO DIFF WBC: CPT

## 2018-04-27 PROCEDURE — 36415 COLL VENOUS BLD VENIPUNCTURE: CPT

## 2018-06-18 ENCOUNTER — TELEPHONE (OUTPATIENT)
Dept: ONCOLOGY | Age: 78
End: 2018-06-18

## 2018-06-21 DIAGNOSIS — M15.9 PRIMARY OSTEOARTHRITIS INVOLVING MULTIPLE JOINTS: ICD-10-CM

## 2018-06-21 RX ORDER — GABAPENTIN 100 MG/1
100 CAPSULE ORAL 3 TIMES DAILY
Qty: 90 CAPSULE | Refills: 0 | Status: SHIPPED | OUTPATIENT
Start: 2018-06-21 | End: 2018-07-31 | Stop reason: DRUGHIGH

## 2018-08-23 DIAGNOSIS — K21.00 GASTROESOPHAGEAL REFLUX DISEASE WITH ESOPHAGITIS: ICD-10-CM

## 2018-08-23 DIAGNOSIS — M54.41 CHRONIC BILATERAL LOW BACK PAIN WITH BILATERAL SCIATICA: ICD-10-CM

## 2018-08-23 DIAGNOSIS — M15.9 PRIMARY OSTEOARTHRITIS INVOLVING MULTIPLE JOINTS: ICD-10-CM

## 2018-08-23 DIAGNOSIS — M54.42 CHRONIC BILATERAL LOW BACK PAIN WITH BILATERAL SCIATICA: ICD-10-CM

## 2018-08-23 DIAGNOSIS — G89.29 CHRONIC BILATERAL LOW BACK PAIN WITH BILATERAL SCIATICA: ICD-10-CM

## 2018-08-23 DIAGNOSIS — Z87.19 HISTORY OF GI BLEED: ICD-10-CM

## 2018-08-23 RX ORDER — PANTOPRAZOLE SODIUM 20 MG/1
20 TABLET, DELAYED RELEASE ORAL DAILY
Qty: 30 TABLET | Refills: 3 | Status: SHIPPED | OUTPATIENT
Start: 2018-08-23 | End: 2018-11-29 | Stop reason: SDUPTHER

## 2018-08-23 RX ORDER — GABAPENTIN 300 MG/1
300 CAPSULE ORAL 3 TIMES DAILY
Qty: 90 CAPSULE | Refills: 0 | Status: SHIPPED | OUTPATIENT
Start: 2018-08-23 | End: 2018-08-29 | Stop reason: SDUPTHER

## 2018-08-23 NOTE — TELEPHONE ENCOUNTER
Please Approve or Refuse.   Send to Pharmacy per Pt's Request:      Next Visit Date:  Visit date not found   Last Visit Date: 2/7/2018    Hemoglobin A1C (%)   Date Value   06/22/2016 4.6   09/04/2015 4.9   10/25/2012 4.9             ( goal A1C is < 7)   BP Readings from Last 3 Encounters:   02/07/18 (!) 100/54   01/29/18 134/63   01/24/18 (!) 147/72          (goal 120/80)  Lab Results   Component Value Date    BUN 22 04/23/2018     Lab Results   Component Value Date    CREATININE 0.70 04/23/2018     Lab Results   Component Value Date    K 3.7 04/23/2018     @[id number])@

## 2018-08-27 ENCOUNTER — HOSPITAL ENCOUNTER (OUTPATIENT)
Age: 78
Discharge: HOME | End: 2018-08-27
Payer: MEDICARE

## 2018-08-27 DIAGNOSIS — D69.6 THROMBOCYTOPENIA (HCC): ICD-10-CM

## 2018-08-27 LAB
ABSOLUTE EOS #: 0.1 K/UL (ref 0–0.4)
ABSOLUTE IMMATURE GRANULOCYTE: ABNORMAL K/UL (ref 0–0.3)
ABSOLUTE LYMPH #: 0.63 K/UL (ref 1–4.8)
ABSOLUTE MONO #: 0.33 K/UL (ref 0.1–1.3)
ALBUMIN SERPL-MCNC: 4 G/DL (ref 3.5–5.2)
ALBUMIN/GLOBULIN RATIO: ABNORMAL (ref 1–2.5)
ALP BLD-CCNC: 141 U/L (ref 35–104)
ALT SERPL-CCNC: 15 U/L (ref 5–33)
AST SERPL-CCNC: 29 U/L
BASOPHILS # BLD: 2 % (ref 0–2)
BASOPHILS ABSOLUTE: 0.07 K/UL (ref 0–0.2)
BILIRUB SERPL-MCNC: 0.78 MG/DL (ref 0.3–1.2)
BILIRUBIN DIRECT: 0.18 MG/DL
BILIRUBIN, INDIRECT: 0.6 MG/DL (ref 0–1)
DIFFERENTIAL TYPE: ABNORMAL
EOSINOPHILS RELATIVE PERCENT: 3 % (ref 0–4)
FERRITIN: 9 UG/L (ref 13–150)
FOLATE: >20 NG/ML
GLOBULIN: ABNORMAL G/DL (ref 1.5–3.8)
HCT VFR BLD CALC: 25.6 % (ref 36–46)
HEMOGLOBIN: 7.8 G/DL (ref 12–16)
IMMATURE GRANULOCYTES: ABNORMAL %
IRON SATURATION: 8 % (ref 20–55)
IRON: 46 UG/DL (ref 37–145)
LYMPHOCYTES # BLD: 19 % (ref 24–44)
MCH RBC QN AUTO: 20.5 PG (ref 26–34)
MCHC RBC AUTO-ENTMCNC: 30.6 G/DL (ref 31–37)
MCV RBC AUTO: 67 FL (ref 80–100)
MONOCYTES # BLD: 10 % (ref 1–7)
MORPHOLOGY: ABNORMAL
NRBC AUTOMATED: ABNORMAL PER 100 WBC
PDW BLD-RTO: 20.3 % (ref 11.5–14.9)
PLATELET # BLD: 71 K/UL (ref 150–450)
PLATELET ESTIMATE: ABNORMAL
PMV BLD AUTO: 8.5 FL (ref 6–12)
RBC # BLD: 3.82 M/UL (ref 4–5.2)
RBC # BLD: ABNORMAL 10*6/UL
SEG NEUTROPHILS: 66 % (ref 36–66)
SEGMENTED NEUTROPHILS ABSOLUTE COUNT: 2.17 K/UL (ref 1.3–9.1)
TOTAL IRON BINDING CAPACITY: 577 UG/DL (ref 250–450)
TOTAL PROTEIN: 8.4 G/DL (ref 6.4–8.3)
UNSATURATED IRON BINDING CAPACITY: 531 UG/DL (ref 112–347)
VITAMIN B-12: 1316 PG/ML (ref 232–1245)
WBC # BLD: 3.3 K/UL (ref 3.5–11)
WBC # BLD: ABNORMAL 10*3/UL

## 2018-08-27 PROCEDURE — 80076 HEPATIC FUNCTION PANEL: CPT

## 2018-08-27 PROCEDURE — 83540 ASSAY OF IRON: CPT

## 2018-08-27 PROCEDURE — 85025 COMPLETE CBC W/AUTO DIFF WBC: CPT

## 2018-08-27 PROCEDURE — 82607 VITAMIN B-12: CPT

## 2018-08-27 PROCEDURE — 83550 IRON BINDING TEST: CPT

## 2018-08-27 PROCEDURE — 36415 COLL VENOUS BLD VENIPUNCTURE: CPT

## 2018-08-27 PROCEDURE — 82728 ASSAY OF FERRITIN: CPT

## 2018-08-27 PROCEDURE — 82746 ASSAY OF FOLIC ACID SERUM: CPT

## 2018-08-28 DIAGNOSIS — G89.29 CHRONIC BILATERAL LOW BACK PAIN WITH BILATERAL SCIATICA: ICD-10-CM

## 2018-08-28 DIAGNOSIS — M15.9 PRIMARY OSTEOARTHRITIS INVOLVING MULTIPLE JOINTS: ICD-10-CM

## 2018-08-28 DIAGNOSIS — M54.42 CHRONIC BILATERAL LOW BACK PAIN WITH BILATERAL SCIATICA: ICD-10-CM

## 2018-08-28 DIAGNOSIS — M54.41 CHRONIC BILATERAL LOW BACK PAIN WITH BILATERAL SCIATICA: ICD-10-CM

## 2018-08-29 RX ORDER — GABAPENTIN 300 MG/1
300 CAPSULE ORAL 3 TIMES DAILY
Qty: 90 CAPSULE | Refills: 0 | Status: SHIPPED | OUTPATIENT
Start: 2018-08-29 | End: 2018-10-25 | Stop reason: SDUPTHER

## 2018-08-31 ENCOUNTER — OFFICE VISIT (OUTPATIENT)
Dept: ONCOLOGY | Age: 78
End: 2018-08-31
Payer: MEDICARE

## 2018-08-31 VITALS
WEIGHT: 112.13 LBS | SYSTOLIC BLOOD PRESSURE: 129 MMHG | HEART RATE: 67 BPM | BODY MASS INDEX: 22.65 KG/M2 | TEMPERATURE: 98.2 F | DIASTOLIC BLOOD PRESSURE: 55 MMHG

## 2018-08-31 DIAGNOSIS — B18.2 CHRONIC HEPATITIS C WITHOUT HEPATIC COMA (HCC): ICD-10-CM

## 2018-08-31 DIAGNOSIS — K74.69 OTHER CIRRHOSIS OF LIVER (HCC): ICD-10-CM

## 2018-08-31 DIAGNOSIS — D69.6 THROMBOCYTOPENIA (HCC): ICD-10-CM

## 2018-08-31 DIAGNOSIS — K90.89 OTHER SPECIFIED INTESTINAL MALABSORPTION: ICD-10-CM

## 2018-08-31 DIAGNOSIS — D50.8 IRON DEFICIENCY ANEMIA SECONDARY TO INADEQUATE DIETARY IRON INTAKE: Primary | ICD-10-CM

## 2018-08-31 PROBLEM — K90.9 MALABSORPTION: Status: ACTIVE | Noted: 2018-08-31

## 2018-08-31 PROCEDURE — 1101F PT FALLS ASSESS-DOCD LE1/YR: CPT | Performed by: INTERNAL MEDICINE

## 2018-08-31 PROCEDURE — G8399 PT W/DXA RESULTS DOCUMENT: HCPCS | Performed by: INTERNAL MEDICINE

## 2018-08-31 PROCEDURE — 99211 OFF/OP EST MAY X REQ PHY/QHP: CPT | Performed by: INTERNAL MEDICINE

## 2018-08-31 PROCEDURE — 4040F PNEUMOC VAC/ADMIN/RCVD: CPT | Performed by: INTERNAL MEDICINE

## 2018-08-31 PROCEDURE — G8420 CALC BMI NORM PARAMETERS: HCPCS | Performed by: INTERNAL MEDICINE

## 2018-08-31 PROCEDURE — 1036F TOBACCO NON-USER: CPT | Performed by: INTERNAL MEDICINE

## 2018-08-31 PROCEDURE — G8427 DOCREV CUR MEDS BY ELIG CLIN: HCPCS | Performed by: INTERNAL MEDICINE

## 2018-08-31 PROCEDURE — 1090F PRES/ABSN URINE INCON ASSESS: CPT | Performed by: INTERNAL MEDICINE

## 2018-08-31 PROCEDURE — 99214 OFFICE O/P EST MOD 30 MIN: CPT | Performed by: INTERNAL MEDICINE

## 2018-08-31 PROCEDURE — 1123F ACP DISCUSS/DSCN MKR DOCD: CPT | Performed by: INTERNAL MEDICINE

## 2018-08-31 RX ORDER — DIPHENHYDRAMINE HYDROCHLORIDE 50 MG/ML
50 INJECTION INTRAMUSCULAR; INTRAVENOUS ONCE
Status: CANCELLED | OUTPATIENT
Start: 2018-08-31 | End: 2018-08-31

## 2018-08-31 RX ORDER — SODIUM CHLORIDE 0.9 % (FLUSH) 0.9 %
10 SYRINGE (ML) INJECTION PRN
Status: CANCELLED | OUTPATIENT
Start: 2018-08-31

## 2018-08-31 RX ORDER — METHYLPREDNISOLONE SODIUM SUCCINATE 125 MG/2ML
125 INJECTION, POWDER, LYOPHILIZED, FOR SOLUTION INTRAMUSCULAR; INTRAVENOUS ONCE
Status: CANCELLED | OUTPATIENT
Start: 2018-08-31 | End: 2018-08-31

## 2018-08-31 RX ORDER — SODIUM CHLORIDE 9 MG/ML
INJECTION, SOLUTION INTRAVENOUS ONCE
Status: CANCELLED | OUTPATIENT
Start: 2018-08-31 | End: 2018-08-31

## 2018-08-31 RX ORDER — SODIUM CHLORIDE 0.9 % (FLUSH) 0.9 %
5 SYRINGE (ML) INJECTION PRN
Status: CANCELLED | OUTPATIENT
Start: 2018-08-31

## 2018-08-31 RX ORDER — SODIUM CHLORIDE 9 MG/ML
INJECTION, SOLUTION INTRAVENOUS CONTINUOUS
Status: CANCELLED | OUTPATIENT
Start: 2018-08-31

## 2018-08-31 RX ORDER — HEPARIN SODIUM (PORCINE) LOCK FLUSH IV SOLN 100 UNIT/ML 100 UNIT/ML
500 SOLUTION INTRAVENOUS PRN
Status: CANCELLED | OUTPATIENT
Start: 2018-08-31

## 2018-08-31 RX ORDER — 0.9 % SODIUM CHLORIDE 0.9 %
10 VIAL (ML) INJECTION ONCE
Status: CANCELLED | OUTPATIENT
Start: 2018-08-31 | End: 2018-08-31

## 2018-08-31 NOTE — PROGRESS NOTES
Hepatic encephalopathy ([facility] Hepatitis C, chronic ([facility] Hepatitis, chronic active ([address] Hyperglycerolemia ([facility] Hypertension; Menopausal state; Osteoarthritis; Osteopenia; PONV (postoperative nausea and vomiting); PT-NANBH (post-transfusion non-A, non-B hepatitis); TIA (transient ischemic attack); Urinary retention; Vasculitis ([facility] Vertigo, central; and Vertigo, central.    PAST SURGICAL HISTORY: has a past surgical history that includes back surgery (1990); Carpal tunnel release (Left); shoulder surgery (Left); Colonoscopy (2007); Cataract removal (2008); Cholecystectomy (1994); Rotator cuff repair (Left, 1994); lumbar laminectomy (2009); Ulnar tunnel release (LEFT); Total knee arthroplasty; Finger surgery; cervical fusion (06/20/2016); Upper gastrointestinal endoscopy (05/2017); and Esophageal varice ligation (05/2017). CURRENT MEDICATIONS:  has a current medication list which includes the following prescription(s): gabapentin, pantoprazole, lidocaine, multivitamin women 50+, spironolactone, propranolol, trazodone, furosemide, and milk thistle. ALLERGIES:  is allergic to aspirin; fosamax [alendronate]; morphine; cephalexin; ciprofloxacin; seasonal; and tylenol [acetaminophen]. FAMILY HISTORY: Negative for any hematological or oncological conditions. SOCIAL HISTORY:  reports that she has never smoked. She has never used smokeless tobacco. She reports that she does not drink alcohol or use drugs. REVIEW OF SYSTEMS:     · General: No weakness or fatigue. No unanticipated weight loss or decreased appetite. No fever or chills. · Eyes: No blurred vision, eye pain or double vision. · Ears: No hearing problems or drainage. No tinnitus. · Throat: No sore throat, problems with swallowing or dysphagia. · Respiratory: No cough, sputum or hemoptysis. No shortness of breath. No pleuritic chest pain. · Cardiovascular: No chest pain, orthopnea or PND. No lower extremity edema. No palpitation.

## 2018-08-31 NOTE — PATIENT INSTRUCTIONS
IV Iron infusion in [facility] weekly for 2 weeks  RV 6 months at [facility] with labs before RV

## 2018-10-05 ENCOUNTER — HOSPITAL ENCOUNTER (OUTPATIENT)
Dept: INFUSION THERAPY | Age: 78
Discharge: HOME | End: 2018-10-05
Payer: MEDICARE

## 2018-10-05 VITALS
RESPIRATION RATE: 16 BRPM | TEMPERATURE: 98.1 F | SYSTOLIC BLOOD PRESSURE: 115 MMHG | DIASTOLIC BLOOD PRESSURE: 64 MMHG | HEART RATE: 64 BPM

## 2018-10-05 DIAGNOSIS — K90.89 OTHER SPECIFIED INTESTINAL MALABSORPTION: ICD-10-CM

## 2018-10-05 DIAGNOSIS — D50.8 IRON DEFICIENCY ANEMIA SECONDARY TO INADEQUATE DIETARY IRON INTAKE: ICD-10-CM

## 2018-10-05 PROCEDURE — 6360000002 HC RX W HCPCS: Performed by: INTERNAL MEDICINE

## 2018-10-05 PROCEDURE — 96365 THER/PROPH/DIAG IV INF INIT: CPT

## 2018-10-05 PROCEDURE — 2580000003 HC RX 258: Performed by: INTERNAL MEDICINE

## 2018-10-05 RX ORDER — DIPHENHYDRAMINE HYDROCHLORIDE 50 MG/ML
50 INJECTION INTRAMUSCULAR; INTRAVENOUS ONCE
Status: CANCELLED | OUTPATIENT
Start: 2018-10-05 | End: 2018-10-05

## 2018-10-05 RX ORDER — SODIUM CHLORIDE 9 MG/ML
INJECTION, SOLUTION INTRAVENOUS ONCE
Status: CANCELLED | OUTPATIENT
Start: 2018-10-05 | End: 2018-10-05

## 2018-10-05 RX ORDER — SODIUM CHLORIDE 9 MG/ML
INJECTION, SOLUTION INTRAVENOUS ONCE
Status: COMPLETED | OUTPATIENT
Start: 2018-10-05 | End: 2018-10-05

## 2018-10-05 RX ORDER — SODIUM CHLORIDE 9 MG/ML
INJECTION, SOLUTION INTRAVENOUS CONTINUOUS
Status: CANCELLED | OUTPATIENT
Start: 2018-10-05

## 2018-10-05 RX ORDER — EPINEPHRINE 1 MG/ML
0.3 INJECTION, SOLUTION, CONCENTRATE INTRAVENOUS PRN
Status: CANCELLED | OUTPATIENT
Start: 2018-10-05

## 2018-10-05 RX ORDER — HEPARIN SODIUM (PORCINE) LOCK FLUSH IV SOLN 100 UNIT/ML 100 UNIT/ML
500 SOLUTION INTRAVENOUS PRN
Status: CANCELLED | OUTPATIENT
Start: 2018-10-05

## 2018-10-05 RX ORDER — SODIUM CHLORIDE 0.9 % (FLUSH) 0.9 %
5 SYRINGE (ML) INJECTION PRN
Status: CANCELLED | OUTPATIENT
Start: 2018-10-05

## 2018-10-05 RX ORDER — SODIUM CHLORIDE 0.9 % (FLUSH) 0.9 %
10 SYRINGE (ML) INJECTION PRN
Status: CANCELLED | OUTPATIENT
Start: 2018-10-05

## 2018-10-05 RX ORDER — 0.9 % SODIUM CHLORIDE 0.9 %
10 VIAL (ML) INJECTION ONCE
Status: CANCELLED | OUTPATIENT
Start: 2018-10-05 | End: 2018-10-05

## 2018-10-05 RX ORDER — METHYLPREDNISOLONE SODIUM SUCCINATE 125 MG/2ML
125 INJECTION, POWDER, LYOPHILIZED, FOR SOLUTION INTRAMUSCULAR; INTRAVENOUS ONCE
Status: CANCELLED | OUTPATIENT
Start: 2018-10-05 | End: 2018-10-05

## 2018-10-05 RX ADMIN — FERRIC CARBOXYMALTOSE INJECTION 750 MG: 50 INJECTION, SOLUTION INTRAVENOUS at 14:08

## 2018-10-05 RX ADMIN — SODIUM CHLORIDE: 9 INJECTION, SOLUTION INTRAVENOUS at 14:08

## 2018-10-05 NOTE — PROGRESS NOTES
Pt here for 1 of 2 Injectafer infusions. Pt was treated without incident and discharged in stable condition. Pt will return in 1 week for 2 of 2.

## 2018-10-15 ENCOUNTER — HOSPITAL ENCOUNTER (OUTPATIENT)
Dept: INFUSION THERAPY | Age: 78
Discharge: HOME | End: 2018-10-15
Payer: MEDICARE

## 2018-10-15 VITALS
SYSTOLIC BLOOD PRESSURE: 127 MMHG | RESPIRATION RATE: 18 BRPM | HEART RATE: 66 BPM | TEMPERATURE: 98.2 F | DIASTOLIC BLOOD PRESSURE: 67 MMHG

## 2018-10-15 DIAGNOSIS — D50.8 IRON DEFICIENCY ANEMIA SECONDARY TO INADEQUATE DIETARY IRON INTAKE: ICD-10-CM

## 2018-10-15 DIAGNOSIS — K90.89 OTHER SPECIFIED INTESTINAL MALABSORPTION: ICD-10-CM

## 2018-10-15 PROCEDURE — 6360000002 HC RX W HCPCS: Performed by: INTERNAL MEDICINE

## 2018-10-15 PROCEDURE — 2580000003 HC RX 258: Performed by: INTERNAL MEDICINE

## 2018-10-15 PROCEDURE — 96365 THER/PROPH/DIAG IV INF INIT: CPT

## 2018-10-15 RX ORDER — SODIUM CHLORIDE 0.9 % (FLUSH) 0.9 %
10 SYRINGE (ML) INJECTION PRN
Status: CANCELLED | OUTPATIENT
Start: 2018-10-15

## 2018-10-15 RX ORDER — SODIUM CHLORIDE 9 MG/ML
INJECTION, SOLUTION INTRAVENOUS ONCE
Status: CANCELLED | OUTPATIENT
Start: 2018-10-15 | End: 2018-10-15

## 2018-10-15 RX ORDER — SODIUM CHLORIDE 9 MG/ML
INJECTION, SOLUTION INTRAVENOUS CONTINUOUS
Status: CANCELLED | OUTPATIENT
Start: 2018-10-15

## 2018-10-15 RX ORDER — METHYLPREDNISOLONE SODIUM SUCCINATE 125 MG/2ML
125 INJECTION, POWDER, LYOPHILIZED, FOR SOLUTION INTRAMUSCULAR; INTRAVENOUS ONCE
Status: CANCELLED | OUTPATIENT
Start: 2018-10-15 | End: 2018-10-15

## 2018-10-15 RX ORDER — SODIUM CHLORIDE 9 MG/ML
INJECTION, SOLUTION INTRAVENOUS ONCE
Status: COMPLETED | OUTPATIENT
Start: 2018-10-15 | End: 2018-10-15

## 2018-10-15 RX ORDER — HEPARIN SODIUM (PORCINE) LOCK FLUSH IV SOLN 100 UNIT/ML 100 UNIT/ML
500 SOLUTION INTRAVENOUS PRN
Status: CANCELLED | OUTPATIENT
Start: 2018-10-15

## 2018-10-15 RX ORDER — SODIUM CHLORIDE 0.9 % (FLUSH) 0.9 %
5 SYRINGE (ML) INJECTION PRN
Status: CANCELLED | OUTPATIENT
Start: 2018-10-15

## 2018-10-15 RX ORDER — EPINEPHRINE 1 MG/ML
0.3 INJECTION, SOLUTION, CONCENTRATE INTRAVENOUS PRN
Status: CANCELLED | OUTPATIENT
Start: 2018-10-15

## 2018-10-15 RX ORDER — DIPHENHYDRAMINE HYDROCHLORIDE 50 MG/ML
50 INJECTION INTRAMUSCULAR; INTRAVENOUS ONCE
Status: CANCELLED | OUTPATIENT
Start: 2018-10-15 | End: 2018-10-15

## 2018-10-15 RX ORDER — 0.9 % SODIUM CHLORIDE 0.9 %
10 VIAL (ML) INJECTION ONCE
Status: CANCELLED | OUTPATIENT
Start: 2018-10-15 | End: 2018-10-15

## 2018-10-15 RX ADMIN — SODIUM CHLORIDE: 9 INJECTION, SOLUTION INTRAVENOUS at 10:32

## 2018-10-15 RX ADMIN — FERRIC CARBOXYMALTOSE INJECTION 750 MG: 50 INJECTION, SOLUTION INTRAVENOUS at 10:32

## 2018-11-29 ENCOUNTER — OFFICE VISIT (OUTPATIENT)
Dept: FAMILY MEDICINE CLINIC | Age: 78
End: 2018-11-29
Payer: COMMERCIAL

## 2018-11-29 ENCOUNTER — HOSPITAL ENCOUNTER (OUTPATIENT)
Age: 78
Discharge: HOME | End: 2018-11-29
Payer: COMMERCIAL

## 2018-11-29 VITALS
HEART RATE: 66 BPM | WEIGHT: 112.2 LBS | OXYGEN SATURATION: 98 % | DIASTOLIC BLOOD PRESSURE: 84 MMHG | BODY MASS INDEX: 22.62 KG/M2 | SYSTOLIC BLOOD PRESSURE: 140 MMHG | HEIGHT: 59 IN

## 2018-11-29 DIAGNOSIS — N39.41 URINARY INCONTINENCE, URGE: ICD-10-CM

## 2018-11-29 DIAGNOSIS — R53.82 CHRONIC FATIGUE: ICD-10-CM

## 2018-11-29 DIAGNOSIS — M54.42 CHRONIC BILATERAL LOW BACK PAIN WITH BILATERAL SCIATICA: ICD-10-CM

## 2018-11-29 DIAGNOSIS — G89.29 CHRONIC BILATERAL LOW BACK PAIN WITH BILATERAL SCIATICA: ICD-10-CM

## 2018-11-29 DIAGNOSIS — K21.00 GASTROESOPHAGEAL REFLUX DISEASE WITH ESOPHAGITIS: ICD-10-CM

## 2018-11-29 DIAGNOSIS — Z91.81 AT HIGH RISK FOR FALLS: ICD-10-CM

## 2018-11-29 DIAGNOSIS — B18.2 CHRONIC HEPATITIS C WITHOUT HEPATIC COMA (HCC): ICD-10-CM

## 2018-11-29 DIAGNOSIS — Z23 NEED FOR PROPHYLACTIC VACCINATION AGAINST DIPHTHERIA-TETANUS-PERTUSSIS (DTP): ICD-10-CM

## 2018-11-29 DIAGNOSIS — R73.9 HYPERGLYCEMIA: ICD-10-CM

## 2018-11-29 DIAGNOSIS — M85.89 OSTEOPENIA OF MULTIPLE SITES: ICD-10-CM

## 2018-11-29 DIAGNOSIS — I10 ESSENTIAL HYPERTENSION: ICD-10-CM

## 2018-11-29 DIAGNOSIS — D50.8 OTHER IRON DEFICIENCY ANEMIA: ICD-10-CM

## 2018-11-29 DIAGNOSIS — D69.6 THROMBOCYTOPENIA (HCC): ICD-10-CM

## 2018-11-29 DIAGNOSIS — K74.69 OTHER CIRRHOSIS OF LIVER (HCC): ICD-10-CM

## 2018-11-29 DIAGNOSIS — M54.41 CHRONIC BILATERAL LOW BACK PAIN WITH BILATERAL SCIATICA: ICD-10-CM

## 2018-11-29 DIAGNOSIS — M15.9 PRIMARY OSTEOARTHRITIS INVOLVING MULTIPLE JOINTS: ICD-10-CM

## 2018-11-29 DIAGNOSIS — Z87.19 HISTORY OF GI BLEED: ICD-10-CM

## 2018-11-29 DIAGNOSIS — R53.82 CHRONIC FATIGUE: Primary | ICD-10-CM

## 2018-11-29 DIAGNOSIS — Z23 NEED FOR PROPHYLACTIC VACCINATION AND INOCULATION AGAINST VARICELLA: ICD-10-CM

## 2018-11-29 DIAGNOSIS — F33.42 RECURRENT MAJOR DEPRESSIVE EPISODES, IN FULL REMISSION (HCC): ICD-10-CM

## 2018-11-29 LAB
ALBUMIN SERPL-MCNC: 4 G/DL (ref 3.5–5.2)
ALBUMIN/GLOBULIN RATIO: ABNORMAL (ref 1–2.5)
ALP BLD-CCNC: 180 U/L (ref 35–104)
ALT SERPL-CCNC: 23 U/L (ref 5–33)
ANION GAP SERPL CALCULATED.3IONS-SCNC: 11 MMOL/L (ref 9–17)
AST SERPL-CCNC: 38 U/L
BILIRUB SERPL-MCNC: 0.98 MG/DL (ref 0.3–1.2)
BILIRUBIN URINE: NEGATIVE
BUN BLDV-MCNC: 17 MG/DL (ref 8–23)
BUN/CREAT BLD: ABNORMAL (ref 9–20)
CALCIUM SERPL-MCNC: 9.9 MG/DL (ref 8.6–10.4)
CHLORIDE BLD-SCNC: 105 MMOL/L (ref 98–107)
CO2: 25 MMOL/L (ref 20–31)
COLOR: YELLOW
COMMENT UA: NORMAL
CREAT SERPL-MCNC: 0.67 MG/DL (ref 0.5–0.9)
GFR AFRICAN AMERICAN: >60 ML/MIN
GFR NON-AFRICAN AMERICAN: >60 ML/MIN
GFR SERPL CREATININE-BSD FRML MDRD: ABNORMAL ML/MIN/{1.73_M2}
GFR SERPL CREATININE-BSD FRML MDRD: ABNORMAL ML/MIN/{1.73_M2}
GLUCOSE BLD-MCNC: 99 MG/DL (ref 70–99)
GLUCOSE URINE: NEGATIVE
HBA1C MFR BLD: 4.8 %
HCT VFR BLD CALC: 38 % (ref 36–46)
HEMOGLOBIN: 12.9 G/DL (ref 12–16)
KETONES, URINE: NEGATIVE
LEUKOCYTE ESTERASE, URINE: NEGATIVE
MCH RBC QN AUTO: 29.6 PG (ref 26–34)
MCHC RBC AUTO-ENTMCNC: 33.8 G/DL (ref 31–37)
MCV RBC AUTO: 87.5 FL (ref 80–100)
NITRITE, URINE: NEGATIVE
NRBC AUTOMATED: ABNORMAL PER 100 WBC
PDW BLD-RTO: 27.5 % (ref 11.5–14.9)
PH UA: 7 (ref 5–8)
PLATELET # BLD: 90 K/UL (ref 150–450)
PMV BLD AUTO: 9.4 FL (ref 6–12)
POTASSIUM SERPL-SCNC: 4.2 MMOL/L (ref 3.7–5.3)
PROTEIN UA: NEGATIVE
RBC # BLD: 4.35 M/UL (ref 4–5.2)
SODIUM BLD-SCNC: 141 MMOL/L (ref 135–144)
SPECIFIC GRAVITY UA: 1.02 (ref 1–1.03)
TOTAL PROTEIN: 8.6 G/DL (ref 6.4–8.3)
TURBIDITY: CLEAR
URINE HGB: NEGATIVE
UROBILINOGEN, URINE: NORMAL
WBC # BLD: 4.5 K/UL (ref 3.5–11)

## 2018-11-29 PROCEDURE — 83036 HEMOGLOBIN GLYCOSYLATED A1C: CPT | Performed by: FAMILY MEDICINE

## 2018-11-29 PROCEDURE — 80053 COMPREHEN METABOLIC PANEL: CPT

## 2018-11-29 PROCEDURE — 99214 OFFICE O/P EST MOD 30 MIN: CPT | Performed by: FAMILY MEDICINE

## 2018-11-29 PROCEDURE — 85027 COMPLETE CBC AUTOMATED: CPT

## 2018-11-29 PROCEDURE — 36415 COLL VENOUS BLD VENIPUNCTURE: CPT

## 2018-11-29 PROCEDURE — 81003 URINALYSIS AUTO W/O SCOPE: CPT

## 2018-11-29 PROCEDURE — 82306 VITAMIN D 25 HYDROXY: CPT

## 2018-11-29 RX ORDER — PANTOPRAZOLE SODIUM 20 MG/1
20 TABLET, DELAYED RELEASE ORAL DAILY
Qty: 90 TABLET | Refills: 3 | Status: SHIPPED | OUTPATIENT
Start: 2018-11-29

## 2018-11-29 RX ORDER — GABAPENTIN 300 MG/1
300 CAPSULE ORAL 3 TIMES DAILY
Qty: 90 CAPSULE | Refills: 0 | Status: SHIPPED | OUTPATIENT
Start: 2018-11-29 | End: 2018-12-14 | Stop reason: SDUPTHER

## 2018-11-29 ASSESSMENT — PATIENT HEALTH QUESTIONNAIRE - PHQ9
SUM OF ALL RESPONSES TO PHQ QUESTIONS 1-9: 1
SUM OF ALL RESPONSES TO PHQ9 QUESTIONS 1 & 2: 1
2. FEELING DOWN, DEPRESSED OR HOPELESS: 0
SUM OF ALL RESPONSES TO PHQ QUESTIONS 1-9: 1
1. LITTLE INTEREST OR PLEASURE IN DOING THINGS: 1

## 2018-11-29 ASSESSMENT — ENCOUNTER SYMPTOMS
NAUSEA: 0
DIARRHEA: 0
BACK PAIN: 1
CONSTIPATION: 0
WHEEZING: 0
COUGH: 0
SHORTNESS OF BREATH: 0
VOMITING: 0
ABDOMINAL DISTENTION: 0
CHEST TIGHTNESS: 0
ABDOMINAL PAIN: 0

## 2018-11-29 NOTE — PATIENT INSTRUCTIONS
pressure numbers will appear on the screen. · Write your numbers in your log book, along with the date and time. Manual blood pressure monitors  · Place the earpieces of a stethoscope in your ears, and place the bell of the stethoscope over the artery, just below the cuff. · Close the valve on the rubber inflating bulb. · Squeeze the bulb rapidly with your opposite hand to inflate the cuff until the dial or column of mercury reads about 30 mm Hg higher than your usual systolic pressure. If you do not know your usual pressure, inflate the cuff to 210 mm Hg or until the pulse at your wrist disappears. · Open the pressure valve just slightly by twisting or pressing the valve on the bulb. · As you watch the pressure slowly fall, note the level on the dial at which you first start to hear a pulsing or tapping sound through the stethoscope. This is your systolic blood pressure. · Continue letting the air out slowly. The sounds will become muffled and will finally disappear. Note the pressure when the sounds completely disappear. This is your diastolic blood pressure. Let out all the remaining air. · Write your numbers in your log book, along with the date and time. What else should you know about the test?  Here are the categories of blood pressure for adults:  Ideal blood pressure. Systolic is less than 550, and diastolic is less than 80. Elevated blood pressure. Systolic is 071 to 198, and diastolic is less than 80. High blood pressure (hypertension). Systolic is 088 or above. Diastolic is 80 or above. One or both numbers may be high. It is more accurate to take the average of several readings made throughout the day than to rely on a single reading. Follow-up care is a key part of your treatment and safety. Be sure to make and go to all appointments, and call your doctor if you are having problems. It's also a good idea to keep a list of the medicines you take. Where can you learn more?   Go to

## 2018-11-29 NOTE — PROGRESS NOTES
depression, 15-19 = Moderately severe depression, 20-27 = Severe depression      Has been noticed worsening arthritis pain in her hands, pain in her hands and lower back. The pain seems to be worse with activities and since the weather changed. Patient has lower back pain, chronic for many years, radiates down to both legs intermittently. Intensity of pain at its worse is 5 out of 10. She has been on gabapentin for it. She denies any pain today. She has history of 2 back surgeries. Patient has been also using heating pad, TENS unit. She used to follow with pain management and she had steroid shots in the back, but she says it didn't help much  Is doing her own chores he did. He does have a cane and a walker at home. She denies any recent falls since she last has seen me. Patient tells me again that she doesn't drive anymore as her children took car keys away. Hypertension: Patient here for follow-up of elevated blood pressure. She is exercising on her own, staying active, doing the rehab exercises she learned one year ago, and is adherent to low salt diet. . Cardiac symptoms fatigue. Patient denies chest pain, chest pressure/discomfort, claudication, dyspnea, exertional chest pressure/discomfort, irregular heart beat, lower extremity edema, near-syncope, orthopnea, palpitations, paroxysmal nocturnal dyspnea, syncope and tachypnea. Cardiovascular risk factors: advanced age (older than 54 for men, 72 for women) and hypertension. Use of agents associated with hypertension: none. History of target organ damage: transient ischemic attack. Echo 2-D on 9/30/16 EF greater than 78%, grade 1 diastolic dysfunction. Stress test on 6/13/16 low risk      Has BP machine  Doesn't know how to use the BP cuff, but her son could show her. I discuss with her how automatic BP cuff works. BP controlled for age group. could hold lasix for 1 day if BP less than 115/65    BP Readings from Last 3 Encounters:   11/29/18 [place]    - CBC; Future  - Comprehensive Metabolic Panel; Future  - Vitamin D 25 Hydroxy; Future    14. Osteopenia of multiple sites  - Vitamin D 25 Hydroxy; Future    15. Thrombocytopenia (HCC)  Worsening  Platelets 956 on 8/00/83  Platelets 71 on 5/65/28  Continue to monitor    - CBC; Future    16. At high risk for falls  - Vitamin D 25 Hydroxy; Future  Falls precautions discussed      17. Essential hypertension  Will do labs: CBC, CMP, UA  Controlled  Continue current treatment. Discussed low salt diet and BP and pulse monitoring daily, BP log given       Orders Placed This Encounter   Procedures    CBC     Standing Status:   Future     Number of Occurrences:   1     Standing Expiration Date:   12/29/2018    Urinalysis Reflex to Culture     Standing Status:   Future     Number of Occurrences:   1     Standing Expiration Date:   12/29/2018     Order Specific Question:   SPECIFY(EX-CATH,MIDSTREAM,CYSTO,ETC)? Answer:   midsteam    Comprehensive Metabolic Panel     Standing Status:   Future     Number of Occurrences:   1     Standing Expiration Date:   12/29/2018    Vitamin D 25 Hydroxy     Standing Status:   Future     Number of Occurrences:   1     Standing Expiration Date:   12/29/2018    POCT glycosylated hemoglobin (Hb A1C)         Medications Discontinued During This Encounter   Medication Reason    pantoprazole (PROTONIX) 20 MG tablet REORDER    gabapentin (NEURONTIN) 300 MG capsule REORDER       Aleksandra received counseling on the following healthy behaviors: nutrition, exercise and medication adherence  Reviewed prior labs and health maintenance  Continue current medications, diet and exercise. Discussed use, benefit, and side effects of prescribed medications. Barriers to medication compliance addressed. Patient given educational materials - see patient instructions  Was a self-tracking handout given in paper form or via [employer]?  YES    Requested Prescriptions     Signed Prescriptions Disp

## 2018-11-30 LAB — VITAMIN D 25-HYDROXY: 43.1 NG/ML (ref 30–100)

## 2018-12-02 DIAGNOSIS — B18.2 CHRONIC HEPATITIS C WITHOUT HEPATIC COMA (HCC): Primary | ICD-10-CM

## 2018-12-02 DIAGNOSIS — R74.8 BLOOD ALKALINE PHOSPHATASE INCREASED COMPARED WITH PRIOR MEASUREMENT: ICD-10-CM

## 2018-12-03 DIAGNOSIS — F51.04 PSYCHOPHYSIOLOGICAL INSOMNIA: ICD-10-CM

## 2018-12-03 DIAGNOSIS — K74.69 OTHER CIRRHOSIS OF LIVER (HCC): ICD-10-CM

## 2018-12-03 DIAGNOSIS — I10 ESSENTIAL HYPERTENSION: ICD-10-CM

## 2018-12-03 RX ORDER — TRAZODONE HYDROCHLORIDE 50 MG/1
50 TABLET ORAL NIGHTLY
Qty: 90 TABLET | Refills: 3 | Status: SHIPPED | OUTPATIENT
Start: 2018-12-03

## 2018-12-03 RX ORDER — PROPRANOLOL HYDROCHLORIDE 20 MG/1
20 TABLET ORAL 2 TIMES DAILY
Qty: 180 TABLET | Refills: 3 | Status: SHIPPED | OUTPATIENT
Start: 2018-12-03

## 2018-12-03 RX ORDER — FUROSEMIDE 20 MG/1
20 TABLET ORAL 2 TIMES DAILY
Qty: 180 TABLET | Refills: 3 | Status: ON HOLD | OUTPATIENT
Start: 2018-12-03 | End: 2019-01-08

## 2018-12-06 PROBLEM — Z91.81 AT HIGH RISK FOR FALLS: Status: ACTIVE | Noted: 2018-12-06

## 2018-12-06 PROBLEM — R73.9 HYPERGLYCEMIA: Status: ACTIVE | Noted: 2018-12-06

## 2018-12-06 PROBLEM — N39.41 URINARY INCONTINENCE, URGE: Status: ACTIVE | Noted: 2018-12-06

## 2018-12-06 PROBLEM — Z23 NEED FOR PROPHYLACTIC VACCINATION AND INOCULATION AGAINST VARICELLA: Status: ACTIVE | Noted: 2018-12-06

## 2018-12-07 DIAGNOSIS — K74.69 OTHER CIRRHOSIS OF LIVER (HCC): Primary | ICD-10-CM

## 2018-12-07 DIAGNOSIS — I10 ESSENTIAL HYPERTENSION: ICD-10-CM

## 2018-12-07 RX ORDER — SPIRONOLACTONE 25 MG/1
25 TABLET ORAL DAILY
Qty: 90 TABLET | Refills: 3 | Status: SHIPPED | OUTPATIENT
Start: 2018-12-07

## 2018-12-10 ENCOUNTER — HOSPITAL ENCOUNTER (OUTPATIENT)
Dept: ULTRASOUND IMAGING | Age: 78
Discharge: HOME | End: 2018-12-12
Payer: COMMERCIAL

## 2018-12-10 DIAGNOSIS — R74.8 BLOOD ALKALINE PHOSPHATASE INCREASED COMPARED WITH PRIOR MEASUREMENT: ICD-10-CM

## 2018-12-10 DIAGNOSIS — B18.2 CHRONIC HEPATITIS C WITHOUT HEPATIC COMA (HCC): ICD-10-CM

## 2018-12-10 PROCEDURE — 76705 ECHO EXAM OF ABDOMEN: CPT

## 2018-12-14 DIAGNOSIS — M54.42 CHRONIC BILATERAL LOW BACK PAIN WITH BILATERAL SCIATICA: ICD-10-CM

## 2018-12-14 DIAGNOSIS — M15.9 PRIMARY OSTEOARTHRITIS INVOLVING MULTIPLE JOINTS: ICD-10-CM

## 2018-12-14 DIAGNOSIS — G89.29 CHRONIC BILATERAL LOW BACK PAIN WITH BILATERAL SCIATICA: ICD-10-CM

## 2018-12-14 DIAGNOSIS — M54.41 CHRONIC BILATERAL LOW BACK PAIN WITH BILATERAL SCIATICA: ICD-10-CM

## 2018-12-14 RX ORDER — GABAPENTIN 300 MG/1
300 CAPSULE ORAL 3 TIMES DAILY
Qty: 90 CAPSULE | Refills: 0 | Status: ON HOLD | OUTPATIENT
Start: 2018-12-14 | End: 2019-01-08

## 2019-01-06 ENCOUNTER — HOSPITAL ENCOUNTER (INPATIENT)
Age: 79
LOS: 1 days | Discharge: HOME | DRG: 689 | End: 2019-01-08
Attending: EMERGENCY MEDICINE | Admitting: INTERNAL MEDICINE
Payer: COMMERCIAL

## 2019-01-06 ENCOUNTER — APPOINTMENT (OUTPATIENT)
Dept: CT IMAGING | Age: 79
DRG: 689 | End: 2019-01-06
Payer: COMMERCIAL

## 2019-01-06 ENCOUNTER — APPOINTMENT (OUTPATIENT)
Dept: GENERAL RADIOLOGY | Age: 79
DRG: 689 | End: 2019-01-06
Payer: COMMERCIAL

## 2019-01-06 DIAGNOSIS — R31.9 URINARY TRACT INFECTION WITH HEMATURIA, SITE UNSPECIFIED: Primary | ICD-10-CM

## 2019-01-06 DIAGNOSIS — N39.0 URINARY TRACT INFECTION WITH HEMATURIA, SITE UNSPECIFIED: Primary | ICD-10-CM

## 2019-01-06 DIAGNOSIS — G89.29 CHRONIC BILATERAL LOW BACK PAIN WITH BILATERAL SCIATICA: ICD-10-CM

## 2019-01-06 DIAGNOSIS — I10 ESSENTIAL HYPERTENSION: ICD-10-CM

## 2019-01-06 DIAGNOSIS — M54.41 CHRONIC BILATERAL LOW BACK PAIN WITH BILATERAL SCIATICA: ICD-10-CM

## 2019-01-06 DIAGNOSIS — K74.69 OTHER CIRRHOSIS OF LIVER (HCC): ICD-10-CM

## 2019-01-06 DIAGNOSIS — M15.9 PRIMARY OSTEOARTHRITIS INVOLVING MULTIPLE JOINTS: ICD-10-CM

## 2019-01-06 DIAGNOSIS — M54.42 CHRONIC BILATERAL LOW BACK PAIN WITH BILATERAL SCIATICA: ICD-10-CM

## 2019-01-06 LAB
-: ABNORMAL
ABSOLUTE EOS #: 0.1 K/UL (ref 0–0.4)
ABSOLUTE IMMATURE GRANULOCYTE: ABNORMAL K/UL (ref 0–0.3)
ABSOLUTE LYMPH #: 1 K/UL (ref 1–4.8)
ABSOLUTE MONO #: 0.9 K/UL (ref 0.1–1.3)
ALBUMIN SERPL-MCNC: 3.7 G/DL (ref 3.5–5.2)
ALBUMIN/GLOBULIN RATIO: ABNORMAL (ref 1–2.5)
ALP BLD-CCNC: 168 U/L (ref 35–104)
ALT SERPL-CCNC: 34 U/L (ref 5–33)
AMMONIA: 30 UMOL/L (ref 11–51)
AMORPHOUS: ABNORMAL
ANION GAP SERPL CALCULATED.3IONS-SCNC: 18 MMOL/L (ref 9–17)
AST SERPL-CCNC: 54 U/L
BACTERIA: ABNORMAL
BASOPHILS # BLD: 0 % (ref 0–2)
BASOPHILS ABSOLUTE: 0 K/UL (ref 0–0.2)
BILIRUB SERPL-MCNC: 1.65 MG/DL (ref 0.3–1.2)
BILIRUBIN URINE: ABNORMAL
BNP INTERPRETATION: ABNORMAL
BUN BLDV-MCNC: 19 MG/DL (ref 8–23)
BUN/CREAT BLD: ABNORMAL (ref 9–20)
CALCIUM SERPL-MCNC: 9.8 MG/DL (ref 8.6–10.4)
CASTS UA: ABNORMAL /LPF
CHLORIDE BLD-SCNC: 98 MMOL/L (ref 98–107)
CO2: 19 MMOL/L (ref 20–31)
COLOR: ABNORMAL
COMMENT UA: ABNORMAL
CREAT SERPL-MCNC: 0.93 MG/DL (ref 0.5–0.9)
CRYSTALS, UA: ABNORMAL /HPF
DIFFERENTIAL TYPE: ABNORMAL
DIRECT EXAM: NORMAL
EOSINOPHILS RELATIVE PERCENT: 1 % (ref 0–4)
EPITHELIAL CELLS UA: ABNORMAL /HPF
GFR AFRICAN AMERICAN: >60 ML/MIN
GFR NON-AFRICAN AMERICAN: 58 ML/MIN
GFR SERPL CREATININE-BSD FRML MDRD: ABNORMAL ML/MIN/{1.73_M2}
GFR SERPL CREATININE-BSD FRML MDRD: ABNORMAL ML/MIN/{1.73_M2}
GLUCOSE BLD-MCNC: 110 MG/DL (ref 70–99)
GLUCOSE URINE: NEGATIVE
HCT VFR BLD CALC: 39.7 % (ref 36–46)
HEMOGLOBIN: 13.7 G/DL (ref 12–16)
IMMATURE GRANULOCYTES: ABNORMAL %
INR BLD: 1.2
KETONES, URINE: ABNORMAL
LACTIC ACID, SEPSIS WHOLE BLOOD: ABNORMAL MMOL/L (ref 0.5–1.9)
LACTIC ACID, SEPSIS WHOLE BLOOD: NORMAL MMOL/L (ref 0.5–1.9)
LACTIC ACID, SEPSIS: 1.7 MMOL/L (ref 0.5–1.9)
LACTIC ACID, SEPSIS: 2.9 MMOL/L (ref 0.5–1.9)
LEUKOCYTE ESTERASE, URINE: ABNORMAL
LIPASE: 60 U/L (ref 13–60)
LYMPHOCYTES # BLD: 13 % (ref 24–44)
Lab: NORMAL
MCH RBC QN AUTO: 31 PG (ref 26–34)
MCHC RBC AUTO-ENTMCNC: 34.5 G/DL (ref 31–37)
MCV RBC AUTO: 90 FL (ref 80–100)
MONOCYTES # BLD: 11 % (ref 1–7)
MUCUS: ABNORMAL
MYOGLOBIN: 52 NG/ML (ref 25–58)
NITRITE, URINE: NEGATIVE
NRBC AUTOMATED: ABNORMAL PER 100 WBC
OTHER OBSERVATIONS UA: ABNORMAL
PARTIAL THROMBOPLASTIN TIME: 26 SEC (ref 23–31)
PDW BLD-RTO: 13.3 % (ref 11.5–14.9)
PH UA: 8.5 (ref 5–8)
PLATELET # BLD: 121 K/UL (ref 150–450)
PLATELET ESTIMATE: ABNORMAL
PMV BLD AUTO: 10.1 FL (ref 6–12)
POTASSIUM SERPL-SCNC: 3.7 MMOL/L (ref 3.7–5.3)
PRO-BNP: 327 PG/ML
PROTEIN UA: ABNORMAL
PROTHROMBIN TIME: 12 SEC (ref 9.7–12)
RBC # BLD: 4.4 M/UL (ref 4–5.2)
RBC # BLD: ABNORMAL 10*6/UL
RBC UA: ABNORMAL /HPF
RENAL EPITHELIAL, UA: ABNORMAL /HPF
SEG NEUTROPHILS: 75 % (ref 36–66)
SEGMENTED NEUTROPHILS ABSOLUTE COUNT: 6 K/UL (ref 1.3–9.1)
SODIUM BLD-SCNC: 135 MMOL/L (ref 135–144)
SPECIFIC GRAVITY UA: 1.02 (ref 1–1.03)
SPECIMEN DESCRIPTION: NORMAL
STATUS: NORMAL
TOTAL CK: 81 U/L (ref 26–192)
TOTAL PROTEIN: 9.1 G/DL (ref 6.4–8.3)
TRICHOMONAS: ABNORMAL
TROPONIN INTERP: ABNORMAL
TROPONIN INTERP: ABNORMAL
TROPONIN T: ABNORMAL NG/ML
TROPONIN T: ABNORMAL NG/ML
TROPONIN, HIGH SENSITIVITY: 22 NG/L (ref 0–14)
TROPONIN, HIGH SENSITIVITY: 22 NG/L (ref 0–14)
TURBIDITY: ABNORMAL
URINE HGB: ABNORMAL
UROBILINOGEN, URINE: NORMAL
WBC # BLD: 8 K/UL (ref 3.5–11)
WBC # BLD: ABNORMAL 10*3/UL
WBC UA: ABNORMAL /HPF
YEAST: ABNORMAL

## 2019-01-06 PROCEDURE — 80053 COMPREHEN METABOLIC PANEL: CPT

## 2019-01-06 PROCEDURE — 83605 ASSAY OF LACTIC ACID: CPT

## 2019-01-06 PROCEDURE — 82550 ASSAY OF CK (CPK): CPT

## 2019-01-06 PROCEDURE — 2580000003 HC RX 258: Performed by: EMERGENCY MEDICINE

## 2019-01-06 PROCEDURE — 96375 TX/PRO/DX INJ NEW DRUG ADDON: CPT

## 2019-01-06 PROCEDURE — 83874 ASSAY OF MYOGLOBIN: CPT

## 2019-01-06 PROCEDURE — 83880 ASSAY OF NATRIURETIC PEPTIDE: CPT

## 2019-01-06 PROCEDURE — 74177 CT ABD & PELVIS W/CONTRAST: CPT

## 2019-01-06 PROCEDURE — 96365 THER/PROPH/DIAG IV INF INIT: CPT

## 2019-01-06 PROCEDURE — 6360000002 HC RX W HCPCS: Performed by: EMERGENCY MEDICINE

## 2019-01-06 PROCEDURE — 81001 URINALYSIS AUTO W/SCOPE: CPT

## 2019-01-06 PROCEDURE — 84484 ASSAY OF TROPONIN QUANT: CPT

## 2019-01-06 PROCEDURE — 87086 URINE CULTURE/COLONY COUNT: CPT

## 2019-01-06 PROCEDURE — 6360000004 HC RX CONTRAST MEDICATION: Performed by: EMERGENCY MEDICINE

## 2019-01-06 PROCEDURE — 82140 ASSAY OF AMMONIA: CPT

## 2019-01-06 PROCEDURE — 85610 PROTHROMBIN TIME: CPT

## 2019-01-06 PROCEDURE — 36415 COLL VENOUS BLD VENIPUNCTURE: CPT

## 2019-01-06 PROCEDURE — 99285 EMERGENCY DEPT VISIT HI MDM: CPT

## 2019-01-06 PROCEDURE — 96361 HYDRATE IV INFUSION ADD-ON: CPT

## 2019-01-06 PROCEDURE — 85730 THROMBOPLASTIN TIME PARTIAL: CPT

## 2019-01-06 PROCEDURE — 85025 COMPLETE CBC W/AUTO DIFF WBC: CPT

## 2019-01-06 PROCEDURE — G0378 HOSPITAL OBSERVATION PER HR: HCPCS

## 2019-01-06 PROCEDURE — 87804 INFLUENZA ASSAY W/OPTIC: CPT

## 2019-01-06 PROCEDURE — 87040 BLOOD CULTURE FOR BACTERIA: CPT

## 2019-01-06 PROCEDURE — 83690 ASSAY OF LIPASE: CPT

## 2019-01-06 PROCEDURE — 71046 X-RAY EXAM CHEST 2 VIEWS: CPT

## 2019-01-06 RX ORDER — TRAZODONE HYDROCHLORIDE 50 MG/1
50 TABLET ORAL NIGHTLY
Status: DISCONTINUED | OUTPATIENT
Start: 2019-01-07 | End: 2019-01-08 | Stop reason: HOSPADM

## 2019-01-06 RX ORDER — SODIUM CHLORIDE 0.9 % (FLUSH) 0.9 %
10 SYRINGE (ML) INJECTION PRN
Status: DISCONTINUED | OUTPATIENT
Start: 2019-01-06 | End: 2019-01-08 | Stop reason: HOSPADM

## 2019-01-06 RX ORDER — 0.9 % SODIUM CHLORIDE 0.9 %
500 INTRAVENOUS SOLUTION INTRAVENOUS ONCE
Status: COMPLETED | OUTPATIENT
Start: 2019-01-06 | End: 2019-01-06

## 2019-01-06 RX ORDER — MILK THISTLE 500 MG
500 CAPSULE ORAL 2 TIMES DAILY
Status: DISCONTINUED | OUTPATIENT
Start: 2019-01-07 | End: 2019-01-06 | Stop reason: RX

## 2019-01-06 RX ORDER — ONDANSETRON 2 MG/ML
4 INJECTION INTRAMUSCULAR; INTRAVENOUS EVERY 8 HOURS PRN
Status: DISCONTINUED | OUTPATIENT
Start: 2019-01-06 | End: 2019-01-08 | Stop reason: HOSPADM

## 2019-01-06 RX ORDER — PROPRANOLOL HYDROCHLORIDE 20 MG/1
20 TABLET ORAL 2 TIMES DAILY
Status: DISCONTINUED | OUTPATIENT
Start: 2019-01-07 | End: 2019-01-08 | Stop reason: HOSPADM

## 2019-01-06 RX ORDER — FENTANYL CITRATE 50 UG/ML
25 INJECTION, SOLUTION INTRAMUSCULAR; INTRAVENOUS ONCE
Status: COMPLETED | OUTPATIENT
Start: 2019-01-06 | End: 2019-01-06

## 2019-01-06 RX ORDER — SODIUM CHLORIDE 0.9 % (FLUSH) 0.9 %
10 SYRINGE (ML) INJECTION EVERY 12 HOURS SCHEDULED
Status: DISCONTINUED | OUTPATIENT
Start: 2019-01-06 | End: 2019-01-08 | Stop reason: HOSPADM

## 2019-01-06 RX ORDER — SPIRONOLACTONE 25 MG/1
25 TABLET ORAL DAILY
Status: DISCONTINUED | OUTPATIENT
Start: 2019-01-07 | End: 2019-01-08 | Stop reason: HOSPADM

## 2019-01-06 RX ORDER — 0.9 % SODIUM CHLORIDE 0.9 %
80 INTRAVENOUS SOLUTION INTRAVENOUS ONCE
Status: COMPLETED | OUTPATIENT
Start: 2019-01-06 | End: 2019-01-06

## 2019-01-06 RX ORDER — HEPARIN SODIUM 5000 [USP'U]/ML
5000 INJECTION, SOLUTION INTRAVENOUS; SUBCUTANEOUS 2 TIMES DAILY
Status: DISCONTINUED | OUTPATIENT
Start: 2019-01-07 | End: 2019-01-08 | Stop reason: HOSPADM

## 2019-01-06 RX ORDER — PANTOPRAZOLE SODIUM 20 MG/1
20 TABLET, DELAYED RELEASE ORAL DAILY
Status: DISCONTINUED | OUTPATIENT
Start: 2019-01-07 | End: 2019-01-08 | Stop reason: HOSPADM

## 2019-01-06 RX ORDER — FUROSEMIDE 20 MG/1
20 TABLET ORAL DAILY
Status: DISCONTINUED | OUTPATIENT
Start: 2019-01-07 | End: 2019-01-08 | Stop reason: HOSPADM

## 2019-01-06 RX ORDER — M-VIT,TX,IRON,MINS/CALC/FOLIC 27MG-0.4MG
1 TABLET ORAL DAILY
Status: DISCONTINUED | OUTPATIENT
Start: 2019-01-07 | End: 2019-01-08 | Stop reason: HOSPADM

## 2019-01-06 RX ORDER — GABAPENTIN 300 MG/1
300 CAPSULE ORAL NIGHTLY
Status: DISCONTINUED | OUTPATIENT
Start: 2019-01-07 | End: 2019-01-08 | Stop reason: HOSPADM

## 2019-01-06 RX ORDER — SODIUM CHLORIDE 9 MG/ML
INJECTION, SOLUTION INTRAVENOUS CONTINUOUS
Status: DISCONTINUED | OUTPATIENT
Start: 2019-01-06 | End: 2019-01-06

## 2019-01-06 RX ORDER — LIDOCAINE 40 MG/G
CREAM TOPICAL PRN
Status: DISCONTINUED | OUTPATIENT
Start: 2019-01-06 | End: 2019-01-08 | Stop reason: HOSPADM

## 2019-01-06 RX ORDER — 0.9 % SODIUM CHLORIDE 0.9 %
1000 INTRAVENOUS SOLUTION INTRAVENOUS ONCE
Status: COMPLETED | OUTPATIENT
Start: 2019-01-06 | End: 2019-01-06

## 2019-01-06 RX ADMIN — CEFTRIAXONE SODIUM 1 G: 1 INJECTION, POWDER, FOR SOLUTION INTRAMUSCULAR; INTRAVENOUS at 20:10

## 2019-01-06 RX ADMIN — FENTANYL CITRATE 25 MCG: 50 INJECTION INTRAMUSCULAR; INTRAVENOUS at 18:45

## 2019-01-06 RX ADMIN — Medication 10 ML: at 20:20

## 2019-01-06 RX ADMIN — SODIUM CHLORIDE 1000 ML: 9 INJECTION, SOLUTION INTRAVENOUS at 20:30

## 2019-01-06 RX ADMIN — SODIUM CHLORIDE 80 ML: 9 INJECTION, SOLUTION INTRAVENOUS at 20:19

## 2019-01-06 RX ADMIN — IOVERSOL 75 ML: 741 INJECTION INTRA-ARTERIAL; INTRAVENOUS at 20:20

## 2019-01-06 RX ADMIN — SODIUM CHLORIDE 500 ML: 9 INJECTION, SOLUTION INTRAVENOUS at 18:45

## 2019-01-06 ASSESSMENT — ENCOUNTER SYMPTOMS
NAUSEA: 1
EYE ITCHING: 1
BLOOD IN STOOL: 0
EYE REDNESS: 1
DIARRHEA: 1
COUGH: 1
BACK PAIN: 0
SHORTNESS OF BREATH: 1
SORE THROAT: 0
VOMITING: 0
EYE PAIN: 1
SINUS PRESSURE: 1
ABDOMINAL PAIN: 1

## 2019-01-06 ASSESSMENT — PAIN DESCRIPTION - DESCRIPTORS: DESCRIPTORS: ACHING

## 2019-01-06 ASSESSMENT — PAIN DESCRIPTION - LOCATION: LOCATION: GENERALIZED

## 2019-01-06 ASSESSMENT — PAIN SCALES - GENERAL
PAINLEVEL_OUTOF10: 8

## 2019-01-06 ASSESSMENT — PAIN DESCRIPTION - ONSET: ONSET: ON-GOING

## 2019-01-06 ASSESSMENT — PAIN DESCRIPTION - PAIN TYPE: TYPE: ACUTE PAIN

## 2019-01-06 ASSESSMENT — PAIN DESCRIPTION - FREQUENCY: FREQUENCY: CONTINUOUS

## 2019-01-07 LAB
CULTURE: NORMAL
Lab: NORMAL
SPECIMEN DESCRIPTION: NORMAL
STATUS: NORMAL

## 2019-01-07 PROCEDURE — 6360000002 HC RX W HCPCS: Performed by: INTERNAL MEDICINE

## 2019-01-07 PROCEDURE — 99223 1ST HOSP IP/OBS HIGH 75: CPT | Performed by: INTERNAL MEDICINE

## 2019-01-07 PROCEDURE — 2580000003 HC RX 258: Performed by: INTERNAL MEDICINE

## 2019-01-07 PROCEDURE — 6370000000 HC RX 637 (ALT 250 FOR IP): Performed by: INTERNAL MEDICINE

## 2019-01-07 PROCEDURE — 96367 TX/PROPH/DG ADDL SEQ IV INF: CPT

## 2019-01-07 PROCEDURE — G0378 HOSPITAL OBSERVATION PER HR: HCPCS

## 2019-01-07 PROCEDURE — 1200000000 HC SEMI PRIVATE

## 2019-01-07 PROCEDURE — 96366 THER/PROPH/DIAG IV INF ADDON: CPT

## 2019-01-07 RX ADMIN — MULTIPLE VITAMINS W/ MINERALS TAB 1 TABLET: TAB at 10:13

## 2019-01-07 RX ADMIN — Medication 10 ML: at 00:00

## 2019-01-07 RX ADMIN — GABAPENTIN 300 MG: 300 CAPSULE ORAL at 21:48

## 2019-01-07 RX ADMIN — GABAPENTIN 300 MG: 300 CAPSULE ORAL at 03:34

## 2019-01-07 RX ADMIN — SPIRONOLACTONE 25 MG: 25 TABLET, FILM COATED ORAL at 10:13

## 2019-01-07 RX ADMIN — FUROSEMIDE 20 MG: 20 TABLET ORAL at 10:13

## 2019-01-07 RX ADMIN — TRAZODONE HYDROCHLORIDE 50 MG: 50 TABLET ORAL at 21:48

## 2019-01-07 RX ADMIN — PROPRANOLOL HYDROCHLORIDE 20 MG: 20 TABLET ORAL at 21:48

## 2019-01-07 RX ADMIN — PROPRANOLOL HYDROCHLORIDE 20 MG: 20 TABLET ORAL at 10:13

## 2019-01-07 RX ADMIN — CEFTRIAXONE SODIUM 1 G: 1 INJECTION, POWDER, FOR SOLUTION INTRAMUSCULAR; INTRAVENOUS at 21:48

## 2019-01-07 RX ADMIN — Medication 10 ML: at 10:01

## 2019-01-07 RX ADMIN — AZITHROMYCIN MONOHYDRATE 250 MG: 500 INJECTION, POWDER, LYOPHILIZED, FOR SOLUTION INTRAVENOUS at 10:14

## 2019-01-07 RX ADMIN — PANTOPRAZOLE SODIUM 20 MG: 20 TABLET, DELAYED RELEASE ORAL at 10:13

## 2019-01-08 VITALS
HEIGHT: 59 IN | SYSTOLIC BLOOD PRESSURE: 98 MMHG | TEMPERATURE: 98.2 F | RESPIRATION RATE: 16 BRPM | DIASTOLIC BLOOD PRESSURE: 42 MMHG | HEART RATE: 68 BPM | OXYGEN SATURATION: 94 % | WEIGHT: 101.85 LBS | BODY MASS INDEX: 20.53 KG/M2

## 2019-01-08 PROBLEM — E43 SEVERE MALNUTRITION (HCC): Status: ACTIVE | Noted: 2019-01-08

## 2019-01-08 LAB
ABSOLUTE EOS #: 0.2 K/UL (ref 0–0.4)
ABSOLUTE IMMATURE GRANULOCYTE: ABNORMAL K/UL (ref 0–0.3)
ABSOLUTE LYMPH #: 0.6 K/UL (ref 1–4.8)
ABSOLUTE MONO #: 0.5 K/UL (ref 0.1–1.3)
ANION GAP SERPL CALCULATED.3IONS-SCNC: 12 MMOL/L (ref 9–17)
BASOPHILS # BLD: 0 % (ref 0–2)
BASOPHILS ABSOLUTE: 0 K/UL (ref 0–0.2)
BUN BLDV-MCNC: 19 MG/DL (ref 8–23)
BUN/CREAT BLD: ABNORMAL (ref 9–20)
CALCIUM SERPL-MCNC: 9 MG/DL (ref 8.6–10.4)
CHLORIDE BLD-SCNC: 105 MMOL/L (ref 98–107)
CO2: 22 MMOL/L (ref 20–31)
CREAT SERPL-MCNC: 0.77 MG/DL (ref 0.5–0.9)
DIFFERENTIAL TYPE: ABNORMAL
EOSINOPHILS RELATIVE PERCENT: 4 % (ref 0–4)
GFR AFRICAN AMERICAN: >60 ML/MIN
GFR NON-AFRICAN AMERICAN: >60 ML/MIN
GFR SERPL CREATININE-BSD FRML MDRD: ABNORMAL ML/MIN/{1.73_M2}
GFR SERPL CREATININE-BSD FRML MDRD: ABNORMAL ML/MIN/{1.73_M2}
GLUCOSE BLD-MCNC: 101 MG/DL (ref 70–99)
HCT VFR BLD CALC: 33.9 % (ref 36–46)
HEMOGLOBIN: 11.6 G/DL (ref 12–16)
IMMATURE GRANULOCYTES: ABNORMAL %
LYMPHOCYTES # BLD: 11 % (ref 24–44)
MCH RBC QN AUTO: 30.5 PG (ref 26–34)
MCHC RBC AUTO-ENTMCNC: 34.2 G/DL (ref 31–37)
MCV RBC AUTO: 89.2 FL (ref 80–100)
MONOCYTES # BLD: 9 % (ref 1–7)
NRBC AUTOMATED: ABNORMAL PER 100 WBC
PDW BLD-RTO: 13.2 % (ref 11.5–14.9)
PLATELET # BLD: 96 K/UL (ref 150–450)
PLATELET ESTIMATE: ABNORMAL
PMV BLD AUTO: 9.4 FL (ref 6–12)
POTASSIUM SERPL-SCNC: 3.9 MMOL/L (ref 3.7–5.3)
PROCALCITONIN: 0.11 NG/ML
RBC # BLD: 3.8 M/UL (ref 4–5.2)
RBC # BLD: ABNORMAL 10*6/UL
SEG NEUTROPHILS: 76 % (ref 36–66)
SEGMENTED NEUTROPHILS ABSOLUTE COUNT: 4 K/UL (ref 1.3–9.1)
SODIUM BLD-SCNC: 139 MMOL/L (ref 135–144)
WBC # BLD: 5.2 K/UL (ref 3.5–11)
WBC # BLD: ABNORMAL 10*3/UL

## 2019-01-08 PROCEDURE — 80048 BASIC METABOLIC PNL TOTAL CA: CPT

## 2019-01-08 PROCEDURE — 85025 COMPLETE CBC W/AUTO DIFF WBC: CPT

## 2019-01-08 PROCEDURE — 97161 PT EVAL LOW COMPLEX 20 MIN: CPT

## 2019-01-08 PROCEDURE — 96366 THER/PROPH/DIAG IV INF ADDON: CPT

## 2019-01-08 PROCEDURE — 99239 HOSP IP/OBS DSCHRG MGMT >30: CPT | Performed by: INTERNAL MEDICINE

## 2019-01-08 PROCEDURE — 2580000003 HC RX 258: Performed by: INTERNAL MEDICINE

## 2019-01-08 PROCEDURE — 36415 COLL VENOUS BLD VENIPUNCTURE: CPT

## 2019-01-08 PROCEDURE — 97166 OT EVAL MOD COMPLEX 45 MIN: CPT

## 2019-01-08 PROCEDURE — 97530 THERAPEUTIC ACTIVITIES: CPT

## 2019-01-08 PROCEDURE — 6360000002 HC RX W HCPCS: Performed by: INTERNAL MEDICINE

## 2019-01-08 PROCEDURE — 6370000000 HC RX 637 (ALT 250 FOR IP): Performed by: INTERNAL MEDICINE

## 2019-01-08 PROCEDURE — 97116 GAIT TRAINING THERAPY: CPT

## 2019-01-08 PROCEDURE — 84145 PROCALCITONIN (PCT): CPT

## 2019-01-08 RX ORDER — CEPHALEXIN 500 MG/1
500 CAPSULE ORAL 3 TIMES DAILY
Qty: 15 CAPSULE | Refills: 0 | Status: SHIPPED | OUTPATIENT
Start: 2019-01-08 | End: 2019-01-13

## 2019-01-08 RX ORDER — GABAPENTIN 300 MG/1
300 CAPSULE ORAL NIGHTLY
Qty: 90 CAPSULE | Refills: 0 | COMMUNITY
Start: 2019-01-08 | End: 2019-01-30 | Stop reason: SDUPTHER

## 2019-01-08 RX ORDER — FUROSEMIDE 20 MG/1
20 TABLET ORAL DAILY
Qty: 180 TABLET | Refills: 3 | COMMUNITY
Start: 2019-01-08

## 2019-01-08 RX ADMIN — SPIRONOLACTONE 25 MG: 25 TABLET, FILM COATED ORAL at 08:58

## 2019-01-08 RX ADMIN — MULTIPLE VITAMINS W/ MINERALS TAB 1 TABLET: TAB at 08:58

## 2019-01-08 RX ADMIN — AZITHROMYCIN MONOHYDRATE 250 MG: 500 INJECTION, POWDER, LYOPHILIZED, FOR SOLUTION INTRAVENOUS at 09:41

## 2019-01-08 RX ADMIN — PROPRANOLOL HYDROCHLORIDE 20 MG: 20 TABLET ORAL at 08:58

## 2019-01-08 RX ADMIN — FUROSEMIDE 20 MG: 20 TABLET ORAL at 08:58

## 2019-01-08 RX ADMIN — Medication 10 ML: at 09:41

## 2019-01-08 RX ADMIN — PANTOPRAZOLE SODIUM 20 MG: 20 TABLET, DELAYED RELEASE ORAL at 08:58

## 2019-01-08 ASSESSMENT — PAIN SCALES - GENERAL
PAINLEVEL_OUTOF10: 4
PAINLEVEL_OUTOF10: 4

## 2019-01-08 ASSESSMENT — PAIN DESCRIPTION - ORIENTATION
ORIENTATION: LOWER
ORIENTATION: LOWER

## 2019-01-08 ASSESSMENT — PAIN DESCRIPTION - DESCRIPTORS
DESCRIPTORS: SORE
DESCRIPTORS: SORE

## 2019-01-08 ASSESSMENT — PAIN DESCRIPTION - LOCATION
LOCATION: BACK
LOCATION: BACK

## 2019-01-08 ASSESSMENT — PAIN DESCRIPTION - PAIN TYPE
TYPE: CHRONIC PAIN
TYPE: CHRONIC PAIN

## 2019-01-08 ASSESSMENT — PAIN - FUNCTIONAL ASSESSMENT: PAIN_FUNCTIONAL_ASSESSMENT: ACTIVITIES ARE NOT PREVENTED

## 2019-01-08 ASSESSMENT — PAIN DESCRIPTION - FREQUENCY
FREQUENCY: CONTINUOUS
FREQUENCY: CONTINUOUS

## 2019-01-08 ASSESSMENT — PAIN DESCRIPTION - PROGRESSION
CLINICAL_PROGRESSION: NOT CHANGED
CLINICAL_PROGRESSION: NOT CHANGED

## 2019-01-09 ENCOUNTER — TELEPHONE (OUTPATIENT)
Dept: FAMILY MEDICINE CLINIC | Age: 79
End: 2019-01-09

## 2019-01-12 ENCOUNTER — HOSPITAL ENCOUNTER (OUTPATIENT)
Dept: CT IMAGING | Age: 79
Discharge: HOME | End: 2019-01-14
Payer: COMMERCIAL

## 2019-01-12 DIAGNOSIS — I81 PORTAL VEIN THROMBOSIS: ICD-10-CM

## 2019-01-12 LAB
CULTURE: NORMAL
CULTURE: NORMAL
Lab: NORMAL
Lab: NORMAL
SPECIMEN DESCRIPTION: NORMAL
SPECIMEN DESCRIPTION: NORMAL
STATUS: NORMAL
STATUS: NORMAL

## 2019-01-12 PROCEDURE — 74177 CT ABD & PELVIS W/CONTRAST: CPT

## 2019-01-12 PROCEDURE — 6360000004 HC RX CONTRAST MEDICATION: Performed by: INTERNAL MEDICINE

## 2019-01-12 PROCEDURE — 2580000003 HC RX 258: Performed by: INTERNAL MEDICINE

## 2019-01-12 RX ORDER — 0.9 % SODIUM CHLORIDE 0.9 %
80 INTRAVENOUS SOLUTION INTRAVENOUS ONCE
Status: DISCONTINUED | OUTPATIENT
Start: 2019-01-12 | End: 2019-01-15 | Stop reason: HOSPADM

## 2019-01-12 RX ORDER — SODIUM CHLORIDE 0.9 % (FLUSH) 0.9 %
10 SYRINGE (ML) INJECTION PRN
Status: DISCONTINUED | OUTPATIENT
Start: 2019-01-12 | End: 2019-01-15 | Stop reason: HOSPADM

## 2019-01-12 RX ADMIN — Medication 10 ML: at 15:31

## 2019-01-12 RX ADMIN — IOHEXOL 50 ML: 240 INJECTION, SOLUTION INTRATHECAL; INTRAVASCULAR; INTRAVENOUS; ORAL at 15:31

## 2019-01-12 RX ADMIN — IOPAMIDOL 75 ML: 755 INJECTION, SOLUTION INTRAVENOUS at 15:31

## 2019-01-22 ENCOUNTER — HOSPITAL ENCOUNTER (OUTPATIENT)
Age: 79
Setting detail: SPECIMEN
Discharge: HOME | End: 2019-01-22
Payer: COMMERCIAL

## 2019-01-22 ENCOUNTER — OFFICE VISIT (OUTPATIENT)
Dept: FAMILY MEDICINE CLINIC | Age: 79
End: 2019-01-22
Payer: COMMERCIAL

## 2019-01-22 VITALS
WEIGHT: 111.6 LBS | SYSTOLIC BLOOD PRESSURE: 134 MMHG | OXYGEN SATURATION: 96 % | DIASTOLIC BLOOD PRESSURE: 67 MMHG | HEIGHT: 59 IN | HEART RATE: 59 BPM | BODY MASS INDEX: 22.5 KG/M2

## 2019-01-22 DIAGNOSIS — D69.6 THROMBOCYTOPENIA (HCC): ICD-10-CM

## 2019-01-22 DIAGNOSIS — K74.69 OTHER CIRRHOSIS OF LIVER (HCC): ICD-10-CM

## 2019-01-22 DIAGNOSIS — Z23 NEED FOR PROPHYLACTIC VACCINATION AND INOCULATION AGAINST VARICELLA: ICD-10-CM

## 2019-01-22 DIAGNOSIS — R30.0 DYSURIA: Primary | ICD-10-CM

## 2019-01-22 DIAGNOSIS — N30.01 ACUTE CYSTITIS WITH HEMATURIA: ICD-10-CM

## 2019-01-22 DIAGNOSIS — E43 SEVERE MALNUTRITION (HCC): ICD-10-CM

## 2019-01-22 DIAGNOSIS — Z23 NEED FOR PROPHYLACTIC VACCINATION AGAINST DIPHTHERIA-TETANUS-PERTUSSIS (DTP): ICD-10-CM

## 2019-01-22 DIAGNOSIS — I10 ESSENTIAL HYPERTENSION: ICD-10-CM

## 2019-01-22 DIAGNOSIS — B18.2 CHRONIC HEPATITIS C WITHOUT HEPATIC COMA (HCC): ICD-10-CM

## 2019-01-22 DIAGNOSIS — R82.90 ABNORMAL URINALYSIS: ICD-10-CM

## 2019-01-22 DIAGNOSIS — R39.198 DIFFICULTY URINATING: ICD-10-CM

## 2019-01-22 DIAGNOSIS — I51.7 CARDIOMEGALY: ICD-10-CM

## 2019-01-22 LAB
BILIRUBIN, POC: NEGATIVE
BLOOD URINE, POC: NORMAL
CLARITY, POC: NORMAL
COLOR, POC: YELLOW
GLUCOSE URINE, POC: NEGATIVE
KETONES, POC: NEGATIVE
LEUKOCYTE EST, POC: NORMAL
NITRITE, POC: NEGATIVE
PH, POC: 6.5
PROTEIN, POC: NEGATIVE
SPECIFIC GRAVITY, POC: 1.01
UROBILINOGEN, POC: NORMAL

## 2019-01-22 PROCEDURE — 99495 TRANSJ CARE MGMT MOD F2F 14D: CPT | Performed by: FAMILY MEDICINE

## 2019-01-22 PROCEDURE — 1111F DSCHRG MED/CURRENT MED MERGE: CPT | Performed by: FAMILY MEDICINE

## 2019-01-22 PROCEDURE — 81002 URINALYSIS NONAUTO W/O SCOPE: CPT | Performed by: FAMILY MEDICINE

## 2019-01-22 RX ORDER — NITROFURANTOIN 25; 75 MG/1; MG/1
100 CAPSULE ORAL 2 TIMES DAILY
Qty: 10 CAPSULE | Refills: 0 | Status: SHIPPED | OUTPATIENT
Start: 2019-01-22

## 2019-01-22 ASSESSMENT — ENCOUNTER SYMPTOMS
BACK PAIN: 1
CONSTIPATION: 0
SHORTNESS OF BREATH: 0
ABDOMINAL DISTENTION: 0
NAUSEA: 0
COUGH: 0
DIARRHEA: 0
ABDOMINAL PAIN: 1
VOMITING: 0
WHEEZING: 0
CHEST TIGHTNESS: 0

## 2019-01-22 ASSESSMENT — PATIENT HEALTH QUESTIONNAIRE - PHQ9
SUM OF ALL RESPONSES TO PHQ9 QUESTIONS 1 & 2: 0
SUM OF ALL RESPONSES TO PHQ QUESTIONS 1-9: 0
1. LITTLE INTEREST OR PLEASURE IN DOING THINGS: 0
SUM OF ALL RESPONSES TO PHQ QUESTIONS 1-9: 0
2. FEELING DOWN, DEPRESSED OR HOPELESS: 0

## 2019-01-23 LAB
CULTURE: NORMAL
Lab: NORMAL
SPECIMEN DESCRIPTION: NORMAL
STATUS: NORMAL

## 2019-01-27 PROBLEM — N39.0 UTI (URINARY TRACT INFECTION): Status: RESOLVED | Noted: 2019-01-06 | Resolved: 2019-01-27

## 2019-01-30 DIAGNOSIS — M54.41 CHRONIC BILATERAL LOW BACK PAIN WITH BILATERAL SCIATICA: ICD-10-CM

## 2019-01-30 DIAGNOSIS — M54.42 CHRONIC BILATERAL LOW BACK PAIN WITH BILATERAL SCIATICA: ICD-10-CM

## 2019-01-30 DIAGNOSIS — G89.29 CHRONIC BILATERAL LOW BACK PAIN WITH BILATERAL SCIATICA: ICD-10-CM

## 2019-01-30 DIAGNOSIS — M15.9 PRIMARY OSTEOARTHRITIS INVOLVING MULTIPLE JOINTS: ICD-10-CM

## 2019-01-30 RX ORDER — GABAPENTIN 300 MG/1
300 CAPSULE ORAL 3 TIMES DAILY
Qty: 90 CAPSULE | Refills: 0 | Status: SHIPPED | OUTPATIENT
Start: 2019-01-30 | End: 2019-02-27 | Stop reason: SDUPTHER

## 2019-02-19 DIAGNOSIS — K74.69 OTHER CIRRHOSIS OF LIVER (HCC): ICD-10-CM

## 2019-02-27 DIAGNOSIS — M54.41 CHRONIC BILATERAL LOW BACK PAIN WITH BILATERAL SCIATICA: ICD-10-CM

## 2019-02-27 DIAGNOSIS — M54.42 CHRONIC BILATERAL LOW BACK PAIN WITH BILATERAL SCIATICA: ICD-10-CM

## 2019-02-27 DIAGNOSIS — G89.29 CHRONIC BILATERAL LOW BACK PAIN WITH BILATERAL SCIATICA: ICD-10-CM

## 2019-02-27 DIAGNOSIS — M15.9 PRIMARY OSTEOARTHRITIS INVOLVING MULTIPLE JOINTS: ICD-10-CM

## 2019-02-27 RX ORDER — GABAPENTIN 300 MG/1
300 CAPSULE ORAL 3 TIMES DAILY
Qty: 90 CAPSULE | Refills: 0 | Status: SHIPPED | OUTPATIENT
Start: 2019-02-27 | End: 2019-03-28 | Stop reason: SDUPTHER

## 2019-03-11 ENCOUNTER — HOSPITAL ENCOUNTER (OUTPATIENT)
Dept: NON INVASIVE DIAGNOSTICS | Age: 79
Discharge: HOME | End: 2019-03-11
Payer: COMMERCIAL

## 2019-03-11 ENCOUNTER — HOSPITAL ENCOUNTER (OUTPATIENT)
Age: 79
Discharge: HOME | End: 2019-03-11
Payer: COMMERCIAL

## 2019-03-11 DIAGNOSIS — B18.2 CHRONIC HEPATITIS C WITHOUT HEPATIC COMA (HCC): ICD-10-CM

## 2019-03-11 DIAGNOSIS — D69.6 THROMBOCYTOPENIA (HCC): ICD-10-CM

## 2019-03-11 DIAGNOSIS — I10 ESSENTIAL HYPERTENSION: ICD-10-CM

## 2019-03-11 DIAGNOSIS — I51.7 CARDIOMEGALY: ICD-10-CM

## 2019-03-11 DIAGNOSIS — K74.69 OTHER CIRRHOSIS OF LIVER (HCC): ICD-10-CM

## 2019-03-11 LAB
ABSOLUTE EOS #: 0.1 K/UL (ref 0–0.4)
ABSOLUTE EOS #: 0.1 K/UL (ref 0–0.4)
ABSOLUTE IMMATURE GRANULOCYTE: ABNORMAL K/UL (ref 0–0.3)
ABSOLUTE IMMATURE GRANULOCYTE: ABNORMAL K/UL (ref 0–0.3)
ABSOLUTE LYMPH #: 0.7 K/UL (ref 1–4.8)
ABSOLUTE LYMPH #: 0.7 K/UL (ref 1–4.8)
ABSOLUTE MONO #: 0.3 K/UL (ref 0.1–1.3)
ABSOLUTE MONO #: 0.3 K/UL (ref 0.1–1.3)
ALBUMIN SERPL-MCNC: 4.1 G/DL (ref 3.5–5.2)
ALBUMIN SERPL-MCNC: 4.2 G/DL (ref 3.5–5.2)
ALBUMIN/GLOBULIN RATIO: ABNORMAL (ref 1–2.5)
ALBUMIN/GLOBULIN RATIO: ABNORMAL (ref 1–2.5)
ALP BLD-CCNC: 142 U/L (ref 35–104)
ALP BLD-CCNC: 147 U/L (ref 35–104)
ALT SERPL-CCNC: 18 U/L (ref 5–33)
ALT SERPL-CCNC: 19 U/L (ref 5–33)
ANION GAP SERPL CALCULATED.3IONS-SCNC: 11 MMOL/L (ref 9–17)
AST SERPL-CCNC: 32 U/L
AST SERPL-CCNC: 33 U/L
BASOPHILS # BLD: 1 % (ref 0–2)
BASOPHILS # BLD: 1 % (ref 0–2)
BASOPHILS ABSOLUTE: 0 K/UL (ref 0–0.2)
BASOPHILS ABSOLUTE: 0 K/UL (ref 0–0.2)
BILIRUB SERPL-MCNC: 0.89 MG/DL (ref 0.3–1.2)
BILIRUB SERPL-MCNC: 0.91 MG/DL (ref 0.3–1.2)
BILIRUBIN DIRECT: 0.94 MG/DL
BILIRUBIN, INDIRECT: ABNORMAL MG/DL (ref 0–1)
BUN BLDV-MCNC: 20 MG/DL (ref 8–23)
BUN/CREAT BLD: ABNORMAL (ref 9–20)
CALCIUM SERPL-MCNC: 10.2 MG/DL (ref 8.6–10.4)
CHLORIDE BLD-SCNC: 103 MMOL/L (ref 98–107)
CO2: 27 MMOL/L (ref 20–31)
CREAT SERPL-MCNC: 0.72 MG/DL (ref 0.5–0.9)
DIFFERENTIAL TYPE: ABNORMAL
DIFFERENTIAL TYPE: ABNORMAL
EOSINOPHILS RELATIVE PERCENT: 4 % (ref 0–4)
EOSINOPHILS RELATIVE PERCENT: 4 % (ref 0–4)
FERRITIN: 36 UG/L (ref 13–150)
GFR AFRICAN AMERICAN: >60 ML/MIN
GFR NON-AFRICAN AMERICAN: >60 ML/MIN
GFR SERPL CREATININE-BSD FRML MDRD: ABNORMAL ML/MIN/{1.73_M2}
GFR SERPL CREATININE-BSD FRML MDRD: ABNORMAL ML/MIN/{1.73_M2}
GLOBULIN: ABNORMAL G/DL (ref 1.5–3.8)
GLUCOSE BLD-MCNC: 100 MG/DL (ref 70–99)
HCT VFR BLD CALC: 37.6 % (ref 36–46)
HCT VFR BLD CALC: 37.6 % (ref 36–46)
HEMOGLOBIN: 12.5 G/DL (ref 12–16)
HEMOGLOBIN: 12.5 G/DL (ref 12–16)
IMMATURE GRANULOCYTES: ABNORMAL %
IMMATURE GRANULOCYTES: ABNORMAL %
IRON SATURATION: 15 % (ref 20–55)
IRON: 66 UG/DL (ref 37–145)
LV EF: 55 %
LVEF MODALITY: NORMAL
LYMPHOCYTES # BLD: 20 % (ref 24–44)
LYMPHOCYTES # BLD: 20 % (ref 24–44)
MCH RBC QN AUTO: 30.2 PG (ref 26–34)
MCH RBC QN AUTO: 30.2 PG (ref 26–34)
MCHC RBC AUTO-ENTMCNC: 33.2 G/DL (ref 31–37)
MCHC RBC AUTO-ENTMCNC: 33.2 G/DL (ref 31–37)
MCV RBC AUTO: 90.8 FL (ref 80–100)
MCV RBC AUTO: 90.8 FL (ref 80–100)
MONOCYTES # BLD: 8 % (ref 1–7)
MONOCYTES # BLD: 8 % (ref 1–7)
NRBC AUTOMATED: ABNORMAL PER 100 WBC
NRBC AUTOMATED: ABNORMAL PER 100 WBC
PDW BLD-RTO: 15.3 % (ref 11.5–14.9)
PDW BLD-RTO: 15.3 % (ref 11.5–14.9)
PLATELET # BLD: 75 K/UL (ref 150–450)
PLATELET # BLD: 75 K/UL (ref 150–450)
PLATELET ESTIMATE: ABNORMAL
PLATELET ESTIMATE: ABNORMAL
PMV BLD AUTO: 10 FL (ref 6–12)
PMV BLD AUTO: 10 FL (ref 6–12)
POTASSIUM SERPL-SCNC: 4.4 MMOL/L (ref 3.7–5.3)
RBC # BLD: 4.14 M/UL (ref 4–5.2)
RBC # BLD: 4.14 M/UL (ref 4–5.2)
RBC # BLD: ABNORMAL 10*6/UL
RBC # BLD: ABNORMAL 10*6/UL
SEG NEUTROPHILS: 67 % (ref 36–66)
SEG NEUTROPHILS: 67 % (ref 36–66)
SEGMENTED NEUTROPHILS ABSOLUTE COUNT: 2.4 K/UL (ref 1.3–9.1)
SEGMENTED NEUTROPHILS ABSOLUTE COUNT: 2.4 K/UL (ref 1.3–9.1)
SODIUM BLD-SCNC: 141 MMOL/L (ref 135–144)
TOTAL IRON BINDING CAPACITY: 449 UG/DL (ref 250–450)
TOTAL PROTEIN: 8.5 G/DL (ref 6.4–8.3)
TOTAL PROTEIN: 8.5 G/DL (ref 6.4–8.3)
TSH SERPL DL<=0.05 MIU/L-ACNC: 0.77 MIU/L (ref 0.3–5)
UNSATURATED IRON BINDING CAPACITY: 383 UG/DL (ref 112–347)
WBC # BLD: 3.5 K/UL (ref 3.5–11)
WBC # BLD: 3.5 K/UL (ref 3.5–11)
WBC # BLD: ABNORMAL 10*3/UL
WBC # BLD: ABNORMAL 10*3/UL

## 2019-03-11 PROCEDURE — 80053 COMPREHEN METABOLIC PANEL: CPT

## 2019-03-11 PROCEDURE — 93306 TTE W/DOPPLER COMPLETE: CPT

## 2019-03-11 PROCEDURE — 82607 VITAMIN B-12: CPT

## 2019-03-11 PROCEDURE — 36415 COLL VENOUS BLD VENIPUNCTURE: CPT

## 2019-03-11 PROCEDURE — 80076 HEPATIC FUNCTION PANEL: CPT

## 2019-03-11 PROCEDURE — 85025 COMPLETE CBC W/AUTO DIFF WBC: CPT

## 2019-03-11 PROCEDURE — 83550 IRON BINDING TEST: CPT

## 2019-03-11 PROCEDURE — 84443 ASSAY THYROID STIM HORMONE: CPT

## 2019-03-11 PROCEDURE — 83540 ASSAY OF IRON: CPT

## 2019-03-11 PROCEDURE — 82746 ASSAY OF FOLIC ACID SERUM: CPT

## 2019-03-11 PROCEDURE — 82728 ASSAY OF FERRITIN: CPT

## 2019-03-12 LAB
FOLATE: >20 NG/ML
VITAMIN B-12: 1112 PG/ML (ref 232–1245)

## 2019-03-20 ENCOUNTER — OFFICE VISIT (OUTPATIENT)
Dept: ONCOLOGY | Age: 79
End: 2019-03-20
Payer: COMMERCIAL

## 2019-03-20 VITALS
SYSTOLIC BLOOD PRESSURE: 98 MMHG | DIASTOLIC BLOOD PRESSURE: 58 MMHG | TEMPERATURE: 97.8 F | HEART RATE: 68 BPM | BODY MASS INDEX: 21.77 KG/M2 | WEIGHT: 107.8 LBS

## 2019-03-20 DIAGNOSIS — D69.6 THROMBOCYTOPENIA (HCC): ICD-10-CM

## 2019-03-20 DIAGNOSIS — D50.8 IRON DEFICIENCY ANEMIA SECONDARY TO INADEQUATE DIETARY IRON INTAKE: Primary | ICD-10-CM

## 2019-03-20 DIAGNOSIS — K74.60 CIRRHOSIS OF LIVER WITHOUT ASCITES, UNSPECIFIED HEPATIC CIRRHOSIS TYPE (HCC): ICD-10-CM

## 2019-03-20 PROCEDURE — 99214 OFFICE O/P EST MOD 30 MIN: CPT | Performed by: INTERNAL MEDICINE

## 2019-03-20 RX ORDER — ONDANSETRON 4 MG/1
4 TABLET, FILM COATED ORAL EVERY 8 HOURS PRN
COMMUNITY

## 2019-03-28 DIAGNOSIS — M54.42 CHRONIC BILATERAL LOW BACK PAIN WITH BILATERAL SCIATICA: ICD-10-CM

## 2019-03-28 DIAGNOSIS — G89.29 CHRONIC BILATERAL LOW BACK PAIN WITH BILATERAL SCIATICA: ICD-10-CM

## 2019-03-28 DIAGNOSIS — M54.41 CHRONIC BILATERAL LOW BACK PAIN WITH BILATERAL SCIATICA: ICD-10-CM

## 2019-03-28 DIAGNOSIS — M15.9 PRIMARY OSTEOARTHRITIS INVOLVING MULTIPLE JOINTS: ICD-10-CM

## 2019-03-28 RX ORDER — GABAPENTIN 300 MG/1
300 CAPSULE ORAL 3 TIMES DAILY
Qty: 90 CAPSULE | Refills: 0 | Status: SHIPPED | OUTPATIENT
Start: 2019-03-28 | End: 2019-04-27

## 2019-04-05 ENCOUNTER — APPOINTMENT (OUTPATIENT)
Dept: CT IMAGING | Age: 79
DRG: 023 | End: 2019-04-05
Payer: COMMERCIAL

## 2019-04-05 ENCOUNTER — HOSPITAL ENCOUNTER (INPATIENT)
Age: 79
LOS: 13 days | Discharge: NURSING, REHAB OR LONG-TERM CARE | DRG: 023 | End: 2019-04-18
Attending: EMERGENCY MEDICINE | Admitting: INTERNAL MEDICINE
Payer: COMMERCIAL

## 2019-04-05 ENCOUNTER — APPOINTMENT (OUTPATIENT)
Dept: GENERAL RADIOLOGY | Age: 79
DRG: 023 | End: 2019-04-05
Payer: COMMERCIAL

## 2019-04-05 DIAGNOSIS — R56.9 SEIZURE-LIKE ACTIVITY (HCC): ICD-10-CM

## 2019-04-05 DIAGNOSIS — I62.9 INTRACRANIAL HEMORRHAGE (HCC): ICD-10-CM

## 2019-04-05 DIAGNOSIS — R41.82 ALTERED MENTAL STATUS, UNSPECIFIED ALTERED MENTAL STATUS TYPE: Primary | ICD-10-CM

## 2019-04-05 LAB
ABO/RH: NORMAL
ALBUMIN SERPL-MCNC: 3.4 G/DL (ref 3.5–5.2)
ALBUMIN/GLOBULIN RATIO: 1 (ref 1–2.5)
ALLEN TEST: ABNORMAL
ALP BLD-CCNC: 107 U/L (ref 35–104)
ALT SERPL-CCNC: 24 U/L (ref 5–33)
ANGLE TEG: 71.4 DEG (ref 53–72)
ANION GAP SERPL CALCULATED.3IONS-SCNC: 14 MMOL/L (ref 9–17)
ANTIBODY SCREEN: NEGATIVE
ARM BAND NUMBER: NORMAL
AST SERPL-CCNC: 62 U/L
BILIRUB SERPL-MCNC: 1.03 MG/DL (ref 0.3–1.2)
BILIRUBIN DIRECT: 0.27 MG/DL
BILIRUBIN, INDIRECT: 0.76 MG/DL (ref 0–1)
BLOOD BANK SPECIMEN: ABNORMAL
BUN BLDV-MCNC: 31 MG/DL (ref 8–23)
CARBOXYHEMOGLOBIN: 2.7 % (ref 0–5)
CHLORIDE BLD-SCNC: 102 MMOL/L (ref 98–107)
CO2: 20 MMOL/L (ref 20–31)
CREAT SERPL-MCNC: 0.72 MG/DL (ref 0.5–0.9)
EPL-TEG: 0 % (ref 0–15)
ETHANOL PERCENT: <0.01 %
ETHANOL: <10 MG/DL
EXPIRATION DATE: NORMAL
FIO2: ABNORMAL
GFR AFRICAN AMERICAN: ABNORMAL ML/MIN
GFR NON-AFRICAN AMERICAN: ABNORMAL ML/MIN
GFR SERPL CREATININE-BSD FRML MDRD: ABNORMAL ML/MIN/{1.73_M2}
GFR SERPL CREATININE-BSD FRML MDRD: ABNORMAL ML/MIN/{1.73_M2}
GLOBULIN: ABNORMAL G/DL (ref 1.5–3.8)
GLUCOSE BLD-MCNC: 118 MG/DL (ref 70–99)
HCG QUALITATIVE: NEGATIVE
HCO3 VENOUS: 20.9 MMOL/L (ref 24–30)
HCT VFR BLD CALC: 42.9 % (ref 36.3–47.1)
HEMOGLOBIN: 14.3 G/DL (ref 11.9–15.1)
HEPARIN THERAPY: ABNORMAL
INR BLD: 1.2
KINETICS TEG: 1.3 MIN (ref 1–3)
LY30 (LYSIS) TEG: 0 % (ref 0–8)
MA (MAX CLOT) TEG: 69.9 MM (ref 50–70)
MCH RBC QN AUTO: 29.4 PG (ref 25.2–33.5)
MCHC RBC AUTO-ENTMCNC: 33.3 G/DL (ref 28.4–34.8)
MCV RBC AUTO: 88.3 FL (ref 82.6–102.9)
METHEMOGLOBIN: ABNORMAL % (ref 0–1.5)
MODE: ABNORMAL
MYOGLOBIN: 729 NG/ML (ref 25–58)
NEGATIVE BASE EXCESS, VEN: 0.2 MMOL/L (ref 0–2)
NOTIFICATION TIME: ABNORMAL
NOTIFICATION: ABNORMAL
NRBC AUTOMATED: 0 PER 100 WBC
O2 DEVICE/FLOW/%: ABNORMAL
O2 SAT, VEN: 50.2 % (ref 60–85)
OXYHEMOGLOBIN: ABNORMAL % (ref 95–98)
PARTIAL THROMBOPLASTIN TIME: 23.5 SEC (ref 20.5–30.5)
PATIENT TEMP: 37
PCO2, VEN, TEMP ADJ: ABNORMAL MMHG (ref 39–55)
PCO2, VEN: 25.4 (ref 39–55)
PDW BLD-RTO: 14.7 % (ref 11.8–14.4)
PEEP/CPAP: ABNORMAL
PERFORMING LOCATION: ABNORMAL
PH VENOUS: 7.53 (ref 7.32–7.42)
PH, VEN, TEMP ADJ: ABNORMAL (ref 7.32–7.42)
PLATELET # BLD: 159 K/UL (ref 138–453)
PMV BLD AUTO: 11.2 FL (ref 8.1–13.5)
PO2, VEN, TEMP ADJ: ABNORMAL MMHG (ref 30–50)
PO2, VEN: 25.2 (ref 30–50)
POSITIVE BASE EXCESS, VEN: ABNORMAL MMOL/L (ref 0–2)
POTASSIUM SERPL-SCNC: 4.4 MMOL/L (ref 3.7–5.3)
PROTHROMBIN TIME: 12.5 SEC (ref 9–12)
PSV: ABNORMAL
PT. POSITION: ABNORMAL
RBC # BLD: 4.86 M/UL (ref 3.95–5.11)
REACTION TIME TEG: 4.4 MIN (ref 5–10)
RESPIRATORY RATE: ABNORMAL
SAMPLE SITE: ABNORMAL
SET RATE: ABNORMAL
SODIUM BLD-SCNC: 136 MMOL/L (ref 135–144)
TEG COMMENT: ABNORMAL
TEXT FOR RESPIRATORY: ABNORMAL
TOTAL CK: 1046 U/L (ref 26–192)
TOTAL HB: ABNORMAL G/DL (ref 12–16)
TOTAL PROTEIN: 6.8 G/DL (ref 6.4–8.3)
TOTAL RATE: ABNORMAL
TROPONIN INTERP: ABNORMAL
TROPONIN T: ABNORMAL NG/ML
TROPONIN, HIGH SENSITIVITY: 359 NG/L (ref 0–14)
VT: ABNORMAL
WBC # BLD: 9.9 K/UL (ref 3.5–11.3)

## 2019-04-05 PROCEDURE — 87641 MR-STAPH DNA AMP PROBE: CPT

## 2019-04-05 PROCEDURE — 70498 CT ANGIOGRAPHY NECK: CPT

## 2019-04-05 PROCEDURE — 2500000003 HC RX 250 WO HCPCS: Performed by: PSYCHIATRY & NEUROLOGY

## 2019-04-05 PROCEDURE — 82565 ASSAY OF CREATININE: CPT

## 2019-04-05 PROCEDURE — 80076 HEPATIC FUNCTION PANEL: CPT

## 2019-04-05 PROCEDURE — 82805 BLOOD GASES W/O2 SATURATION: CPT

## 2019-04-05 PROCEDURE — 81001 URINALYSIS AUTO W/SCOPE: CPT

## 2019-04-05 PROCEDURE — 85730 THROMBOPLASTIN TIME PARTIAL: CPT

## 2019-04-05 PROCEDURE — 94762 N-INVAS EAR/PLS OXIMTRY CONT: CPT

## 2019-04-05 PROCEDURE — 94660 CPAP INITIATION&MGMT: CPT

## 2019-04-05 PROCEDURE — 99285 EMERGENCY DEPT VISIT HI MDM: CPT

## 2019-04-05 PROCEDURE — 94770 HC ETCO2 MONITOR DAILY: CPT

## 2019-04-05 PROCEDURE — 009630Z DRAINAGE OF CEREBRAL VENTRICLE WITH DRAINAGE DEVICE, PERCUTANEOUS APPROACH: ICD-10-PCS | Performed by: NEUROLOGICAL SURGERY

## 2019-04-05 PROCEDURE — 85027 COMPLETE CBC AUTOMATED: CPT

## 2019-04-05 PROCEDURE — 71045 X-RAY EXAM CHEST 1 VIEW: CPT

## 2019-04-05 PROCEDURE — 2500000003 HC RX 250 WO HCPCS

## 2019-04-05 PROCEDURE — 80307 DRUG TEST PRSMV CHEM ANLYZR: CPT

## 2019-04-05 PROCEDURE — 86901 BLOOD TYPING SEROLOGIC RH(D): CPT

## 2019-04-05 PROCEDURE — 6810039000 HC L1 TRAUMA ALERT

## 2019-04-05 PROCEDURE — 93005 ELECTROCARDIOGRAM TRACING: CPT

## 2019-04-05 PROCEDURE — 86900 BLOOD TYPING SEROLOGIC ABO: CPT

## 2019-04-05 PROCEDURE — 85384 FIBRINOGEN ACTIVITY: CPT

## 2019-04-05 PROCEDURE — 85390 FIBRINOLYSINS SCREEN I&R: CPT

## 2019-04-05 PROCEDURE — 72128 CT CHEST SPINE W/O DYE: CPT

## 2019-04-05 PROCEDURE — 84520 ASSAY OF UREA NITROGEN: CPT

## 2019-04-05 PROCEDURE — 85576 BLOOD PLATELET AGGREGATION: CPT

## 2019-04-05 PROCEDURE — 2000000003 HC NEURO ICU R&B

## 2019-04-05 PROCEDURE — 82140 ASSAY OF AMMONIA: CPT

## 2019-04-05 PROCEDURE — 84703 CHORIONIC GONADOTROPIN ASSAY: CPT

## 2019-04-05 PROCEDURE — 85610 PROTHROMBIN TIME: CPT

## 2019-04-05 PROCEDURE — 36415 COLL VENOUS BLD VENIPUNCTURE: CPT

## 2019-04-05 PROCEDURE — 70496 CT ANGIOGRAPHY HEAD: CPT

## 2019-04-05 PROCEDURE — 82550 ASSAY OF CK (CPK): CPT

## 2019-04-05 PROCEDURE — 86850 RBC ANTIBODY SCREEN: CPT

## 2019-04-05 PROCEDURE — 72131 CT LUMBAR SPINE W/O DYE: CPT

## 2019-04-05 PROCEDURE — 72125 CT NECK SPINE W/O DYE: CPT

## 2019-04-05 PROCEDURE — 82947 ASSAY GLUCOSE BLOOD QUANT: CPT

## 2019-04-05 PROCEDURE — 70450 CT HEAD/BRAIN W/O DYE: CPT

## 2019-04-05 PROCEDURE — 2700000000 HC OXYGEN THERAPY PER DAY

## 2019-04-05 PROCEDURE — 6360000002 HC RX W HCPCS: Performed by: NURSE PRACTITIONER

## 2019-04-05 PROCEDURE — 99291 CRITICAL CARE FIRST HOUR: CPT | Performed by: PSYCHIATRY & NEUROLOGY

## 2019-04-05 PROCEDURE — 84484 ASSAY OF TROPONIN QUANT: CPT

## 2019-04-05 PROCEDURE — 74177 CT ABD & PELVIS W/CONTRAST: CPT

## 2019-04-05 PROCEDURE — 72170 X-RAY EXAM OF PELVIS: CPT

## 2019-04-05 PROCEDURE — 83605 ASSAY OF LACTIC ACID: CPT

## 2019-04-05 PROCEDURE — 70486 CT MAXILLOFACIAL W/O DYE: CPT

## 2019-04-05 PROCEDURE — 2580000003 HC RX 258: Performed by: EMERGENCY MEDICINE

## 2019-04-05 PROCEDURE — G0480 DRUG TEST DEF 1-7 CLASSES: HCPCS

## 2019-04-05 PROCEDURE — 85347 COAGULATION TIME ACTIVATED: CPT

## 2019-04-05 PROCEDURE — 6360000004 HC RX CONTRAST MEDICATION: Performed by: STUDENT IN AN ORGANIZED HEALTH CARE EDUCATION/TRAINING PROGRAM

## 2019-04-05 PROCEDURE — 83874 ASSAY OF MYOGLOBIN: CPT

## 2019-04-05 PROCEDURE — 80051 ELECTROLYTE PANEL: CPT

## 2019-04-05 RX ORDER — FENTANYL CITRATE 50 UG/ML
100 INJECTION, SOLUTION INTRAMUSCULAR; INTRAVENOUS ONCE
Status: DISCONTINUED | OUTPATIENT
Start: 2019-04-05 | End: 2019-04-11

## 2019-04-05 RX ORDER — OXYCODONE HYDROCHLORIDE 5 MG/1
5 TABLET ORAL EVERY 6 HOURS PRN
Status: DISCONTINUED | OUTPATIENT
Start: 2019-04-05 | End: 2019-04-15

## 2019-04-05 RX ORDER — CHLORHEXIDINE GLUCONATE 0.12 MG/ML
15 RINSE ORAL 2 TIMES DAILY
Status: DISCONTINUED | OUTPATIENT
Start: 2019-04-05 | End: 2019-04-18

## 2019-04-05 RX ORDER — ONDANSETRON 2 MG/ML
4 INJECTION INTRAMUSCULAR; INTRAVENOUS EVERY 6 HOURS PRN
Status: DISCONTINUED | OUTPATIENT
Start: 2019-04-05 | End: 2019-04-18 | Stop reason: HOSPADM

## 2019-04-05 RX ORDER — SODIUM CHLORIDE 0.9 % (FLUSH) 0.9 %
10 SYRINGE (ML) INJECTION PRN
Status: DISCONTINUED | OUTPATIENT
Start: 2019-04-05 | End: 2019-04-18 | Stop reason: HOSPADM

## 2019-04-05 RX ORDER — SODIUM CHLORIDE 0.9 % (FLUSH) 0.9 %
10 SYRINGE (ML) INJECTION EVERY 12 HOURS SCHEDULED
Status: DISCONTINUED | OUTPATIENT
Start: 2019-04-05 | End: 2019-04-18 | Stop reason: HOSPADM

## 2019-04-05 RX ORDER — FENTANYL CITRATE 50 UG/ML
25 INJECTION, SOLUTION INTRAMUSCULAR; INTRAVENOUS ONCE
Status: COMPLETED | OUTPATIENT
Start: 2019-04-05 | End: 2019-04-07

## 2019-04-05 RX ORDER — LABETALOL HYDROCHLORIDE 5 MG/ML
10 INJECTION, SOLUTION INTRAVENOUS EVERY 10 MIN PRN
Status: DISCONTINUED | OUTPATIENT
Start: 2019-04-05 | End: 2019-04-11

## 2019-04-05 RX ORDER — ONDANSETRON 2 MG/ML
4 INJECTION INTRAMUSCULAR; INTRAVENOUS ONCE
Status: DISCONTINUED | OUTPATIENT
Start: 2019-04-05 | End: 2019-04-11

## 2019-04-05 RX ORDER — LEVETIRACETAM 15 MG/ML
1500 INJECTION INTRAVASCULAR ONCE
Status: CANCELLED | OUTPATIENT
Start: 2019-04-05 | End: 2019-04-05

## 2019-04-05 RX ORDER — HYDRALAZINE HYDROCHLORIDE 20 MG/ML
5 INJECTION INTRAMUSCULAR; INTRAVENOUS EVERY 6 HOURS PRN
Status: DISCONTINUED | OUTPATIENT
Start: 2019-04-05 | End: 2019-04-11

## 2019-04-05 RX ORDER — MIDAZOLAM HYDROCHLORIDE 1 MG/ML
5 INJECTION INTRAMUSCULAR; INTRAVENOUS ONCE
Status: COMPLETED | OUTPATIENT
Start: 2019-04-05 | End: 2019-04-05

## 2019-04-05 RX ORDER — 0.9 % SODIUM CHLORIDE 0.9 %
1000 INTRAVENOUS SOLUTION INTRAVENOUS ONCE
Status: DISCONTINUED | OUTPATIENT
Start: 2019-04-05 | End: 2019-04-05

## 2019-04-05 RX ADMIN — NICARDIPINE HYDROCHLORIDE 7.5 MG/HR: 0.1 INJECTION, SOLUTION INTRAVENOUS at 18:15

## 2019-04-05 RX ADMIN — NICARDIPINE HYDROCHLORIDE 5 MG/HR: 0.1 INJECTION, SOLUTION INTRAVENOUS at 20:15

## 2019-04-05 RX ADMIN — Medication 10 ML: at 20:10

## 2019-04-05 RX ADMIN — MIDAZOLAM HYDROCHLORIDE 0.5 MG: 1 INJECTION, SOLUTION INTRAMUSCULAR; INTRAVENOUS at 18:00

## 2019-04-05 RX ADMIN — IOVERSOL 65 ML: 741 INJECTION INTRA-ARTERIAL; INTRAVENOUS at 15:49

## 2019-04-05 RX ADMIN — IOVERSOL 75 ML: 741 INJECTION INTRA-ARTERIAL; INTRAVENOUS at 15:51

## 2019-04-05 ASSESSMENT — PULMONARY FUNCTION TESTS: PIF_VALUE: 10

## 2019-04-05 NOTE — PROCEDURES
EVD Procedure Note    Indications:  ICP monitoring and IVH drain    Pre-operative Diagnosis: ICH and IVH    Post-operative Diagnosis: ICH and IVH    Surgeon: David Thakkar MD    Assistants: Easton Allen MD        Procedure Details   The patient was seen in the neuro ICU. The risks, benefits, complications, treatment options, and expected outcomes were discussed with the patient's family, consent in the chart. . The risks and potential complications of their problem and purposed treatment include but are not limited to infection, bleeding, stroke, pain, nerve and vessel injury and complication secondary to the anesthetic. The patient's family concurred with the proposed plan, giving informed consent. The site of surgery properly noted/marked. She was prepped with ChloraPrep and Betadine. Incisions are infiltrated with 1% Lidocaine She was draped in a sterile manner. Incision made just to the right of the right mid pupillary line 10 cm behind the nasion. A self-retaining retractor was placed. Glory Ranjan hole was drilled with the cranial twist drill. The dura was punctured with a twist drill. The CSF was blood tinged. The CSF drainage rapidly tapered off. The depth of catheter is 7 cm from the outer table of the skull. There was intermittent drainage of CSF after that. The catheter was secured to the scalp with #2-0 silk suture. The patient tolerated the procedure well. No complications. Sponge and needle counts were correct. Blood loss is minimal. None replaced. Drain was set at 20cm above the ear with the [address] monitor. Dr. David Thakkar was present for the entire procedure.         Janna Coleman MD

## 2019-04-05 NOTE — H&P
Neuro ICU History & Physical    Patient Name: Carmen Feldman  Patient : [id number]  Room/Bed: TRAUMA A/TRAUMAA  Allergies: Allergies not on file  Problem List: There is no problem list on file for this patient. CHIEF COMPLAINT     ICH    HPI    History Obtained From: Trauma Service, Past medical records, Sons'    The patient is a 80 y.o. female presented as Trauma Alert. Pt found down by son. Last known well 1800 19. Reported altered mentation on scene. Pt found in bathroom and suspected to be down overnight. GCS 11 on arrival. Per family, pt has hx of hepatitis C w/ cirrhosis. Followed by [facility]. ICH score: 1    Admitted to ICU From: ED  Reason for ICU Admission: [address]       PATIENT HISTORY   Past Medical History:    No past medical history on file. Past Surgical History:    No past surgical history on file.     Social History:   Social History     Socioeconomic History    Marital status: Not on file     Spouse name: Not on file    Number of children: Not on file    Years of education: Not on file    Highest education level: Not on file   Occupational History    Not on file   Social Needs    Financial resource strain: Not on file    Food insecurity:     Worry: Not on file     Inability: Not on file    Transportation needs:     Medical: Not on file     Non-medical: Not on file   Tobacco Use    Smoking status: Not on file   Substance and Sexual Activity    Alcohol use: Not on file    Drug use: Not on file    Sexual activity: Not on file   Lifestyle    Physical activity:     Days per week: Not on file     Minutes per session: Not on file    Stress: Not on file   Relationships    Social connections:     Talks on phone: Not on file     Gets together: Not on file     Attends [employer] service: Not on file     Active member of club or organization: Not on file     Attends meetings of clubs or organizations: Not on file     Relationship status: Not on file    Intimate partner violence:     Fear of current or ex partner: Not on file     Emotionally abused: Not on file     Physically abused: Not on file     Forced sexual activity: Not on file   Other Topics Concern    Not on file   Social History Narrative    Not on file       Family History:   No family history on file. Allergies:    Patient has no allergy information on record. Medications Prior to Admission:    Not in a hospital admission. Current Medications:  Current Facility-Administered Medications: ondansetron (ZOFRAN) injection 4 mg, 4 mg, Intravenous, Once    REVIEW OF SYSTEMS     ROS unable to be obtained due to patient acuity     PHYSICAL EXAM:     There were no vitals taken for this visit. There is no height or weight on file to calculate BMI.  []<16 Severe malnutrition  []16-16.99 Moderate malnutrition  []17-18.49 Mild malnutrition  []18.5-24.9 Normal  []25-29.9 Overweight (not obese)  []30-34.9 Obese class 1 (Low Risk)  []35-39.9 Obese class 2 (Moderate Risk)  []?40 Obese class 3 (High Risk)    PHYSICAL EXAM:  CONSTITUTIONAL:  Cachetic, responding in to questioning w/ incomprehensible slurred speech. GCS 11. Nontoxic. Positive dysarthria. Positive aphasia.    HEAD:  R sided periorbital, facial ecchymosis    EYES:  PERRLA, EOMI.   ENT:  moist mucous membranes   LUNGS:  Equal air entry bilaterally   CARDIOVASCULAR:  normal s1 / s2   ABDOMEN:  Soft, no rigidity   NECK Cervical collar in place   BACK without step-offs or deformities    EXTREMITIES LUE held in flexion, moving upper extremities, LLE non-purposefully    NEUROLOGIC:  Mental Status:  Not oriented to date, Not oriented to person and Not oriented to place,lethargic             Cranial Nerves:    III: Pupils:  equal, round, reactive to light  III,IV,VI: Extra Ocular Movements: intact  VII: Facial strength: intact  XII: Tongue movement:  normal    Motor Exam:    Drift:  present - RUE, RLE, LLE  Tone:  abnormal - decreased    LUE 3/5,  RUE, LLE, RLE 1/5 throughout    Sensory:      Deep Tendon Reflexes:    Right Bicep:  2+  Left Bicep:  1+  Right Knee:  1+  Left Knee:  1+    Plantar Response:  Right:  downgoing  Left:  downgoing    Clonus:  absent  Branham's:  N/A       SKIN No obvious ecchymosis, rashes, or lesions    NIH Stroke Scale Total (if not done complete detailed one below):    1a.  Level of consciousness:  2 - not alert, requires repeated stimulation to attend, or is obtunded and requires strong or painful stimulation to make movements (not stereotyped)   1b. Level of consciousness questions:  2 - answers neither question correctly  1c. Level of consciousness questions:  1 - performs one task correctly  2. Best Gaze:  0 - normal  3. Visual:  0 - no visual loss  4. Facial Palsy:  0 - normal symmetric movement  5a. Motor left arm:  0 - no drift, limb holds 90 (or 45) degrees for full 10 seconds  5b. Motor right arm:  2 - some effort against gravity, limb cannot get to or maintain (if cued) 90 (or 45) degrees, drifts down to bed, but has some effort against gravity   6a. Motor left le - some effort against gravity; leg falls to bed by 5 seconds but has some effort against gravity  6b. Motor right le - some effort against gravity; leg falls to bed by 5 seconds but has some effort against gravity  7. Limb Ataxia:  N/A  8. Sensory:  1 - mild to moderate sensory loss; patient feels pinprick is less sharp or is dull on the affected side; there is a loss of superficial pain with pinprick but patient is aware of being touched   9. Best Language:  2 - severe aphasia; all communication is through fragmentary expression; great need for inference, questioning, and guessing by the listener. Range of information that can be exchanged is limited; listener carries burden of communication. Examiner cannot identify materials provided from patient response.    10.  Dysarthria:  2 - severe; patient speech is so slurred as to be hemorrhagic products within the lateral ventricles bilaterally as well as the 3rd ventricle, cerebral aqueduct, and 4th ventricle. There is minimal rightward midline shift. There is age-appropriate cerebral volume loss. The infratentorial structures are otherwise unremarkable. ORBITS: The visualized portion of the orbits demonstrate no acute abnormality. SINUSES: The visualized paranasal sinuses and mastoid air cells demonstrate no acute abnormality. SOFT TISSUES/SKULL:  There is right-sided soft tissue swelling involving the right frontal, parietal, and temporal region. There is no definite acute osseous abnormalities. Intraparenchymal hemorrhage in the left thalamic region with adjacent vasogenic edema. Hemorrhagic products within the lateral ventricles, 3rd ventricle, cerebral aqueduct, and 4th ventricle. Minimal rightward midline shift. Critical results were called by Dr. Tan King to Dr. Rosalee Najera on 4/5/2019 at 16:02. Ct Facial Bones Wo Contrast    Result Date: 4/5/2019  EXAMINATION: CT OF THE FACE WITHOUT CONTRAST  4/5/2019 3:24 pm TECHNIQUE: CT of the face was performed without the administration of intravenous contrast. Multiplanar reformatted images are provided for review. Dose modulation, iterative reconstruction, and/or weight based adjustment of the mA/kV was utilized to reduce the radiation dose to as low as reasonably achievable. COMPARISON: None HISTORY: ORDERING SYSTEM PROVIDED HISTORY: trauma; significant R facial bruising FINDINGS: FACIAL BONES:  The maxilla, pterygoid plates and zygomatic arches are intact. The mandible is intact. The mandibular condyles are normally situated. The nasal bones and maxillary nasal processes are intact. ORBITS:  The globes appear intact. The extraocular muscles, optic nerve sheath complexes and lacrimal glands appear unremarkable. No retrobulbar hematoma or mass is seen. The orbital walls and rims are intact.  SINUSES/MASTOIDS:  The paranasal sinuses and mastoid air cells are well aerated. No acute fracture is seen. SOFT TISSUES:  Preseptal swelling of the right face extending superiorly along the supraorbital ridge into the right frontal scalp. Asymmetric swelling of the lateral face on the right compared with the left. No fluid collection is identified. No air or unexpected radiopaque foreign bodies. Soft tissue swelling/edema along the upper neck circumferentially right greater than left. The visualized intracranial contents show acute left thalamic intraparenchymal blood products and intraventricular blood products. 1. Bruising of the face without fracture, radiopaque foreign body, or superficial fluid collection. 2. Acute left thalamic hemorrhage with intraventricular extension. This is previously called to Dr. Kostas Snider by Dr. Elyssa Lamb at  on 4/5/19. Ct Cervical Spine Wo Contrast    Result Date: 4/5/2019  EXAMINATION: CT OF THE CERVICAL SPINE WITHOUT CONTRAST 4/5/2019 3:19 pm TECHNIQUE: CT of the cervical spine was performed without the administration of intravenous contrast. Multiplanar reformatted images are provided for review. Dose modulation, iterative reconstruction, and/or weight based adjustment of the mA/kV was utilized to reduce the radiation dose to as low as reasonably achievable. COMPARISON: None. HISTORY: ORDERING SYSTEM PROVIDED HISTORY: trauma FINDINGS: BONES/ALIGNMENT: There is no evidence of an acute cervical spine fracture. There is normal alignment of the cervical spine except moderate dextroconvex scoliosis and reversal of the normal lordotic curvature. .  Decompressive laminectomies at C4 and C5. Posterior fusion rods and pedicular screws at C3, 4, 5 and 6 on the right and C5 and 6 on the left. DEGENERATIVE CHANGES: Multilevel disc space narrowing. SOFT TISSUES: There is no prevertebral soft tissue swelling. No acute abnormality of the cervical spine. Posterior interbody fixation as above.      Ct Thoracic Spine Wo Contrast    Result Date: 4/5/2019  EXAMINATION: CT OF THE THORACIC SPINE WITHOUT CONTRAST  4/5/2019 3:18 pm: TECHNIQUE: CT of the thoracic spine was performed without the administration of intravenous contrast. Multiplanar reformatted images are provided for review. Dose modulation, iterative reconstruction, and/or weight based adjustment of the mA/kV was utilized to reduce the radiation dose to as low as reasonably achievable. COMPARISON: None. HISTORY: ORDERING SYSTEM PROVIDED HISTORY: trauma FINDINGS: BONES/ALIGNMENT: There is normal alignment of the spine. The vertebral body heights are maintained. No osseous destructive lesion is seen. DEGENERATIVE CHANGES: No gross spinal canal stenosis or bony neural foraminal narrowing of the thoracic spine. SOFT TISSUES: No paraspinal mass is seen. Unremarkable CT of the thoracic spine. Xr Chest Portable    Result Date: 4/5/2019  EXAMINATION: SINGLE XRAY VIEW OF THE CHEST 4/5/2019 3:24 pm COMPARISON: None. HISTORY: ORDERING SYSTEM PROVIDED HISTORY: trauma TECHNOLOGIST PROVIDED HISTORY: trauma FINDINGS: Slight increase in interstitial markings. The lungs are without acute focal process. There is no effusion or pneumothorax. The cardiomediastinal silhouette is without acute process. The osseous structures are without acute process. No fracture or pneumothorax. Increased interstitial markings     Cta Neck W Contrast    Result Date: 4/5/2019  EXAMINATION: CTA OF THE HEAD WITH CONTRAST; CTA OF THE NECK 4/5/2019 3:32 pm: TECHNIQUE: CTA of the head/brain was performed with the administration of intravenous contrast. Multiplanar reformatted images are provided for review. MIP images are provided for review.  Dose modulation, iterative reconstruction, and/or weight based adjustment of the mA/kV was utilized to reduce the radiation dose to as low as reasonably achievable.; CTA of the neck was performed with the administration of intravenous contrast. intraparenchymal hemorrhage within the left thalamic region as well as intraventricular hemorrhagic products. Unremarkable CTA of the head and neck. Critical results were called by Dr. Michela Flores to Dr. Carline Ayala on 4/5/2019 at 16:12. Cta Head W Contrast    Result Date: 4/5/2019  EXAMINATION: CTA OF THE HEAD WITH CONTRAST; CTA OF THE NECK 4/5/2019 3:32 pm: TECHNIQUE: CTA of the head/brain was performed with the administration of intravenous contrast. Multiplanar reformatted images are provided for review. MIP images are provided for review. Dose modulation, iterative reconstruction, and/or weight based adjustment of the mA/kV was utilized to reduce the radiation dose to as low as reasonably achievable.; CTA of the neck was performed with the administration of intravenous contrast. Multiplanar reformatted images are provided for review. MIP images are provided for review. Stenosis of the internal carotid arteries measured using NASCET criteria. Dose modulation, iterative reconstruction, and/or weight based adjustment of the mA/kV was utilized to reduce the radiation dose to as low as reasonably achievable. COMPARISON: None. HISTORY: ORDERING SYSTEM PROVIDED HISTORY: [facility], ?hemorrhagic stroke TECHNOLOGIST PROVIDED HISTORY: ; ORDERING SYSTEM PROVIDED HISTORY: [facility], ?hemorrhagic stroke TECHNOLOGIST PROVIDED HISTORY: Ordering Physician Provided Reason for Exam: head bleed/ found down Acuity: Unknown FINDINGS: CTA NECK: AORTIC ARCH/ARCH VESSELS: There is a normal branch pattern of the aortic arch. No significant stenosis is seen of the innominate artery or subclavian arteries. CAROTID ARTERIES: The common carotid arteries are normal in appearance without evidence of a flow limiting stenosis. The internal carotid arteries are normal in appearance without evidence of a flow limiting stenosis by NASCET criteria. No dissection or arterial injury is seen.  VERTEBRAL ARTERIES: The vertebral arteries both arise from the subclavian arteries and are normal in caliber without evidence of flow limiting stenosis or dissection. SOFT TISSUES: The lung apices are clear. No cervical or superior mediastinal lymphadenopathy. The visualized portion of the larynx and pharynx appear unremarkable. The parotid, submandibular and thyroid glands demonstrate no acute abnormality. BONES: The visualized osseous structures appear unremarkable. CTA HEAD: ANTERIOR CIRCULATION: The internal carotid arteries are normal in course and caliber without focal stenosis. The anterior cerebral and middle cerebral arteries demonstrate no focal stenosis. POSTERIOR CIRCULATION: The posterior cerebral arteries demonstrate no focal stenosis. The vertebral and basilar arteries appear unremarkable. BRAIN: There is redemonstration of an intraparenchymal hemorrhage within the left thalamic region as well as intraventricular hemorrhagic products. Unremarkable CTA of the head and neck. Critical results were called by Dr. Sean Marroquin to Dr. Lexii Leal on 4/5/2019 at 16:12. Labs and Images reviewed with:    [] Graham Patel MD    [] Marylou Ca MD  [x] Flower Bajwa MD  --[] there are no new interval images to review. ASSESSMENT AND PLAN:         ASSESSMENT:     This is a 80 y.o. female with last known well 1800 4/4/19, the evening prior to arrival. Suspected prolonged period down. Found on ground by son in bathroom with altered mental status. Presented to ED with altered mental status, GCS 11. NIH 16. Hx of Hepatitis C with esophageal varices. Imaging remarkable for IPH in left thalamic region w/ adjacent vasogenic edema. Hemorrhagic products within lateral, 3rd, 4th ventrical and cerebral aqueduct. Neurosurgery consulted by Trauma Surgery service. Pt admitted under Neuro-Critical Care. Patient care will be discussed with attending, will reevaluate patient along with attending.      PLAN/MEDICAL DECISION MAKING:    IPH in left thalamic region with adjacent vasogenic edema. NEUROLOGIC:  - Imaging reviewed. Plan for repeat head CT in morning. Repeat Head CT PRN pending change in mental status. - EVD placed following admission to Neuro ICU  - AEDs: Not indicated at this time  - Goal SBP < 140  - Neuro checks per protocol    CARDIOVASCULAR:  - Goal SBP < 140  - Troponin elevated, trend q 6 hr  - Repeat EKG  - Continue telemetry    PULMONARY:  - On NC on admission. - VBG on admission 7.525/25.4/25.2/20.9  - Repeat CXR in am      RENAL/FLUID/ELECTROLYTE:  - BUN 31/ Creatinine 0.72  - Monitor urine output   - Replace electrolytes PRN  - Daily BMP    GI/NUTRITION:  NUTRITION:  No diet orders on file  - Bowel regimen: Pepcid 20 mg qd  - GI prophylaxis: MoM PRN    ID:  - WBC 9.9 on admission  - Monitor for fevers  - Daily CBC    HEME:   - H&H 14.3  - Platelets 34.6  - Daily CBC    ENDOCRINE:  - Continue to monitor blood glucose, goal <180      OTHER:  - PT/OT/ST   - Code Status: Full    PROPHYLAXIS:  Stress ulcer: H2 blocker    DVT PROPHYLAXIS:  - SCD sleeves - Thigh High   - JENNIFER stockings - Thigh High  - No chemoprophylaxis anticoagulation at this time.     DISPOSITION: Neuro ICU      Ronda Watts MD  Neuro Critical Care Service   Pager [phone]  4/5/2019     4:17 PM

## 2019-04-05 NOTE — H&P
TRAUMA HISTORY AND PHYSICAL EXAMINATION    PATIENT NAME: Jackson Trammell  YOB: 1880  MEDICAL RECORD NO. [id number]   DATE: 4/5/2019  PRIMARY CARE PHYSICIAN: No primary care provider on file. PATIENT EVALUATED AT THE REQUEST OF : Raudel Garcia    ACTIVATION   [x]Trauma Alert     [] Trauma Priority     []Trauma Consult. IMPRESSION:     Patient Active Problem List   Diagnosis    Intracranial bleed (HCC)   Found Down  Intraparenchymal hemorrhage with IVH  Age-indeterminate L4 compression fracture  Elevated CK    MEDICAL DECISION MAKING AND PLAN:       1. CT head with intraparenchymal hemorrhage in the left thalamic region with adjacent vasogenic edema and IVH, with minimal rightward midline shift - unlikely traumatic  2. Age indeterminate compression fracture L4 with less than 20% height loss and no significant retropulsion  3. No other acute injuries on CT facial bones, CT chest/abdomen/pelvis  4. Endovascular Neurosurgical consultation for IPH w/ intraventricular extension  5. Neurosurgery consultation for IPH and for L4 compression fracture  6. Recommend trend CK  7. Neuro Critical Care to admit patient  8. Will obtain tertiary survey    Discussed with Dr. Elke Hoyt who agrees with plan. CONSULT SERVICES    [x] Neurosurgery     [] Orthopedic Surgery    [] Cardiothoracic     [] Facial Trauma    [] Plastic Surgery (Burn)    [] Pediatric Surgery     [] Internal Medicine    [] Pulmonary Medicine    [x] Other:  Stroke priority    HISTORY:     SOURCE OF INFORMATION  Patient information was obtained from patient and EMS personnel. History/Exam limitations: due to condition. INJURY SUMMARY  Right forehead bruising  Right hip/thigh bruise  Right elbow erythema  Bilateral knee bruising/erythema  Right shoulder bruising    GENERAL DATA  Age 80 y.o.  female   Patient information was obtained from patient and EMS personnel. History/Exam limitations: due to condition.   Patient presented to the Emergency Department by ambulance where the patient received see Ambulance Run Sheet prior to arrival.  Injury Date: 4/5/2019   Approximate Injury Time: unknown        Transport mode:   [x]Ambulance      [] Helicopter     []Car       [] Other  Referring Hospital:     [address], (e.g., home, farm, industry, street)  Specific Details of Location (e.g., bedroom, kitchen, garage): bathroom  Type of Residence (if occurred in home setting) (e.g., apartment, mobile home, single family home): single family home    MECHANISM OF INJURY    [] Motor Vehicle Collision   Specific vehicle type involved (e.g., sedan, minivan, SUV, pickup truck):   Collision with (e.g., type of vehicle, building, barn, ditch, tree):     Type of collision  [] Single Vehicle Collision  []Multiple Vehicle Collision  [] unknown collision type    Mechanism considerations  [] Fatality in Same Vehicle      []Ejected       []Rollover          []Extricated    Internal Compartment   []                      []Passenger:      []Front Seat        []Rear Seat     Personal Restraints  [] Unrestrained   []Lap Belt Only Restrained   [] Shoulder Belt Only Restrained  [] 3 Point Restrained  [] unknown     Air Bags  [] Front Air Bag  []Side Air Bag  []Curtain Airbag [][employer] Not Deployed        Pediatric Consideration:      [] Booster Seat  []Infant Car Seat  [] Child Car Seat      [] Motorcycle Collision   Wearing Helmet     []Yes     []No    []Unknown    [] Bicycle Collision Wearing Helmet     []Yes     []No    []Unknown    [] Pedestrian Struck         [] Fall    []From Standing     []From Height  Ft     []Down Stairs ___steps    [] Assault    [] Gunshot  Specify caliber / type of gun: ____________________________    [] Stabbing  Specify weapon type, size: _____________________________    [] Burn  []Flame   []Scald   []Electrical   []Chemical  []Inhalation   []House fire    [] Other ______________________________________________________    [] Other protective devices used / worn ___________________________    HISTORY:     Jackson Malik is a 80 y.o. female that presented to the Emergency Department following being found down by her son. Last known well was 1800 yesterday. Patient's son went to visit her today and found her down in her bathroom with altered mentation. Loss of Consciousness []No   []Yes Duration(min)       [x] Unknown     Total Fluids Given Prior To Arrival unknown mL    MEDICATIONS:   []  None     []  Information not available due to exam limitations documented above  Prior to Admission medications    Not on File       ALLERGIES:   []  None    [x]   Information not available due to exam limitations documented above   Patient has no allergy information on record. PAST MEDICAL HISTORY: []  None   [x]   Information not available due to exam limitations documented above    has no past medical history on file. has no past surgical history on file. FAMILY HISTORY   [x]   Information not available due to exam limitations documented above    family history is not on file. SOCIAL HISTORY  [x]   Information not available due to exam limitations documented above     has no tobacco history on file. has no alcohol history on file. has no drug history on file. PERTINENT SYSTEMIC REVIEW:    [x]   Information not available due to exam limitations documented above    Pertinent items are noted in HPI. PHYSICAL EXAMINATION:     GLASCOW COMA SCALE  NEUROMUSCULAR BLOCKADE PRIOR TO ARRIVAL     [x]No        []Yes      Variable  Score   Variable  Score  Eye opening [x]Spontaneous 4 Verbal  []Oriented  5     []To voice  3   []Confused  4    []To pain  2   [x]Inapp words  3    []None  1   []Incomp words 2       []None  1   Motor   []Obeys  6    []Localizes pain 5    [x]Withdraws(pain) 4    []Flexion(pain) 3  []Extension(pain) 2    []None  1     GCS Total = 11    PHYSICAL EXAMINATION    VITAL SIGNS: There were no vitals filed for this visit.     General Appearance: alert but confused  Skin: bruising and redness as charted  Head: normocephalic  Eyes: pupils 2 and sluggish  ENT: tympanic membrane, external ear and ear canal normal bilaterally, oropharynx clear and moist with normal mucous membranes  Neck: c-collar intact   Pulmonary/Chest: clear to auscultation bilaterally- no wheezes, rales or rhonchi, normal air movement, no respiratory distress  Cardiovascular: normal rate, normal S1 and S2, no gallops, intact distal pulses and no carotid bruits  Abdomen: soft, diffuse tenderness  Musculoskeletal: diffuse thoracic/lumbar tenderness. No stepoffs or deformities. Neurologic: confused     FOCUSED ABDOMINAL SONOGRAM FOR TRAUMA (FAST): A good  quality examination was performed by  and representative images were obtained.   [x] No free fluid in the abdomen         RADIOLOGY    PLAIN FILMS  Ordered Findings  [x]CHEST []Normal   [] Preliminary findings: Results Pending See radiology report  [x]PELVIS  []Normal   [] Preliminary findings: Results Pending See radiology report    CT SCANS  Ordered  [x]HEAD  []Normal   [] Preliminary findings: Results Pending See radiology report  [x]CHEST  []Normal   [] Preliminary findings: Results Pending See radiology report  [x]ABD/PELVIS[]Normal  [] Preliminary findings: Results Pending  See radiology report  [x]C-SPINE  []Normal   [] Preliminary findings: Results Pending See radiology report   [x]T-SPINE  []Normal   [] Preliminary findings: Results Pending See radiology report  [x]L-SPINE  []Normal   [] Preliminary findings: Results Pending See radiology report      LABS    Labs Reviewed   CK - Abnormal; Notable for the following components:       Result Value    Total CK 1,046 (*)     All other components within normal limits   TRAUMA PANEL - Abnormal; Notable for the following components:    RDW 14.7 (*)     Glucose 118 (*)     pH, Julio 7.525 (*)     pCO2, Julio 25.4 (*)     pO2, Julio 25.2 (*)     HCO3, Venous 20.9 (*)     O2 Sat, Julio 50.2 (*)     BUN 31 (*)     Protime 12.5 (*)     All other components within normal limits   MYOGLOBIN, SERUM - Abnormal; Notable for the following components:    Myoglobin 729 (*)     All other components within normal limits   KAOLIN TEG CITRATED - Abnormal; Notable for the following components:    Reaction Time TEG 4.4 (*)     All other components within normal limits   TROPONIN   TRAUMA PANEL   CK   MYOGLOBIN, SERUM   AMMONIA   LACTIC ACID, WHOLE BLOOD   TOX SCR, BLD, ED   URINE DRUG SCREEN   URINALYSIS WITH MICROSCOPIC   POC BLOOD GAS   TYPE AND SCREEN         Kodak Pisano MD  4/5/19, 4:51 PM             Trauma Attending Attestation      I have reviewed the above GCS note(s) and confirmed the key elements of the medical history and physical exam. I have seen and examined the pt. I have discussed the findings, established the care plan and recommendations with Resident, GCS RN, bedside nurse.   Pt with CVA   Tertiary exam   YADIRA/ NS       Roselyn Velázquez DO  4/7/2019  5:40 PM

## 2019-04-05 NOTE — CONSULTS
Department of Neurosurgery                                       Resident Consult Note      Reason for Consult:  Found down  Requesting Physician:  Dr. Khai Hawkins  Neurosurgeon: Dr. Mikala Walker    History Obtained From:  family member - sons, electronic medical record, other ED providers    CHIEF COMPLAINT:         Found down at home    HISTORY OF PRESENT ILLNESS:       The patient is a 80 y.o. female who presents as a trauma alert, stroke priority after being found down at home earlier today. LKW was approximately 24 hours ago. Patient is very lethargic and confused on examination. Reportedly she has a history of cirrhosis, HCV, esophogeal varices and is currently on ASA daily. No other reported anticoagulants. PAST MEDICAL HISTORY :       Past Medical History:    No past medical history on file. Past Surgical History:    No past surgical history on file.     Social History:   Social History     Socioeconomic History    Marital status: Not on file     Spouse name: Not on file    Number of children: Not on file    Years of education: Not on file    Highest education level: Not on file   Occupational History    Not on file   Social Needs    Financial resource strain: Not on file    Food insecurity:     Worry: Not on file     Inability: Not on file    Transportation needs:     Medical: Not on file     Non-medical: Not on file   Tobacco Use    Smoking status: Not on file   Substance and Sexual Activity    Alcohol use: Not on file    Drug use: Not on file    Sexual activity: Not on file   Lifestyle    Physical activity:     Days per week: Not on file     Minutes per session: Not on file    Stress: Not on file   Relationships    Social connections:     Talks on phone: Not on file     Gets together: Not on file     Attends Sabianism service: Not on file     Active member of club or organization: Not on file     Attends meetings of clubs or organizations: Not on file     Relationship status: Not on file    Intimate partner violence:     Fear of current or ex partner: Not on file     Emotionally abused: Not on file     Physically abused: Not on file     Forced sexual activity: Not on file   Other Topics Concern    Not on file   Social History Narrative    Not on file       Family History:   No family history on file. Allergies:  Patient has no allergy information on record. Home Medications:  Prior to Admission medications    Not on File       Current Medications:   Current Facility-Administered Medications: ondansetron (ZOFRAN) injection 4 mg, 4 mg, Intravenous, Once  ioversol (OPTIRAY) 74 % injection 75 mL, 75 mL, Intravenous, ONCE PRN  ioversol (OPTIRAY) 74 % injection 65 mL, 65 mL, Intravenous, ONCE PRN    REVIEW OF SYSTEMS:        ROS unable to be obtained given mental status    PHYSICAL EXAM:       There were no vitals taken for this visit. CONSTITUTIONAL:  Well developed, well nourished. Lethargic, confused. Can follow simple commands intermittently.      HEAD:  normocephalic, significant ecchymoses covering entire right side of face/head   EYES:  Pupils appear equal, non-reactive, EOMI.   ENT:  moist mucous membranes   NECK:  supple, symmetric, c collar in place   BACK:  without step-offs or deformities    LUNGS:  Equal air entry bilaterally   CARDIOVASCULAR:  normal s1 / s2   ABDOMEN:  Soft, no rigidity   NEUROLOGIC:  EYE OPENING     Spontaneous - 4 []       To voice - 3 [x]       To pain - 2 []       None - 1 []    VERBAL RESPONSE     Appropriate, oriented - 5 []       Dazed or confused - 4 []       Syllables, expletives - 3 [x]       Grunts - 2 []       None - 1 []     MOTOR RESPONSE     Spontaneous, command - 6 []       Localizes pain - 5 [x]       Withdraws pain - 4 []       Abnormal flexion - 3 []       Abnormal extension - 2 []       None - 1 []            Total GCS: 11    Mental Status:  Not oriented to date, Not oriented to person and Not oriented to place, lethargic antiplatelets and anticoagulants  - SBP < 140    Additional recommendations may follow    Please contact neurosurgery with any changes in patients neurologic status. Thank you for your consult.        Mikala Selby DO     4/5/2019  3:49 PM

## 2019-04-05 NOTE — ED PROVIDER NOTES
[facility]  Emergency Department Encounter  EmergencyMedicine Resident     Pt Name:Aleksandra Greenfield  MRN: [id number]  [place] 1940  Date of evaluation: 4/5/19  PCP:  Enid Castillo MD    CHIEF COMPLAINT       Altered. Fall    HISTORY OF PRESENT ILLNESS  (Location/Symptom, Timing/Onset, Context/Setting, Quality, Duration, Modifying Factors, Severity.)      Lázaro Boles is a 66 y.o. female who presents with a fall and altered mental status. Patient was found on the floor. Last known well for patient was about 24 hours ago. It appears that the patient was on the floor for a prolonged time. Unknown what medical history the patient has. Patient GCS [id number] (11) on arrival.  Signs of trauma and contusion over the forehead and periorbital ecchymosis on the right side. Tenderness over the abdomen as well as the chest on the right side. Patient moaning incomprehensible sounds unable to give any history, unknown what medications patient is on, unknown if she is on any blood thinners, unknown mechanism of fall. No family available at this time to answer questions.       PAST MEDICAL / SURGICAL / SOCIAL / FAMILY HISTORY     PMH: Cirrhosis    PSH: Unknown    Social History     Socioeconomic History    Marital status:      Spouse name: Not on file    Number of children: Not on file    Years of education: Not on file    Highest education level: Not on file   Occupational History    Not on file   Social Needs    Financial resource strain: Not on file    Food insecurity:     Worry: Not on file     Inability: Not on file    Transportation needs:     Medical: Not on file     Non-medical: Not on file   Tobacco Use    Smoking status: Not on file   Substance and Sexual Activity    Alcohol use: Not on file    Drug use: Not on file    Sexual activity: Not on file   Lifestyle    Physical activity:     Days per week: Not on file     Minutes per session: Not on file    Stress: Not on file   Relationships  Social connections:     Talks on phone: Not on file     Gets together: Not on file     Attends Confucianism service: Not on file     Active member of club or organization: Not on file     Attends meetings of clubs or organizations: Not on file     Relationship status: Not on file    Intimate partner violence:     Fear of current or ex partner: Not on file     Emotionally abused: Not on file     Physically abused: Not on file     Forced sexual activity: Not on file   Other Topics Concern    Not on file   Social History Narrative    Not on file       No family history on file. Allergies:  Patient has no allergy information on record. Home Medications:  Prior to Admission medications    Not on File       REVIEW OF SYSTEMS    (2-9 systems for level 4, 10 or more for level 5)      Review of Systems   Unable to perform ROS: Mental status change       PHYSICAL EXAM   (up to 7 for level 4, 8 or more for level 5)      INITIAL VITALS:   There were no vitals taken for this visit. Physical Exam   Constitutional: She appears well-developed and well-nourished. No distress. HENT:   Head: Normocephalic. Bruising over the right forehead and the right periorbital area. No hemotympanum bilaterally. Pupils 2 mm equal and sluggish   Eyes:   Pupils 2 mm equal and sluggish. Neck:   C collar in place   Cardiovascular: Normal rate, regular rhythm and normal heart sounds. Exam reveals no gallop and no friction rub. No murmur heard. Pulmonary/Chest: Effort normal and breath sounds normal. No respiratory distress. She has no wheezes. She has no rales. Bilateral breath sounds   Abdominal: Soft. She exhibits no distension. There is tenderness. There is no rebound and no guarding. Diffuse abdominal tenderness on examination. Musculoskeletal: She exhibits tenderness. Bruising and tenderness over the right hip   Neurological: She is alert.    Moaning incomprehensible sounds, GCS of 11 E4V2M5     Moving bilateral lower extremities to painful stimulus, weakness of the right upper extremity to painful stimulus, moves the left upper extremity to painful symptoms. Altered and not following commands otherwise. Skin: Skin is warm. No erythema. Vitals reviewed.       DIFFERENTIAL  DIAGNOSIS     PLAN (LABS / IMAGING / EKG):  Orders Placed This Encounter   Procedures    MRSA DNA Probe, Nasal    XR CHEST PORTABLE    CT HEAD WO CONTRAST    CT CERVICAL SPINE WO CONTRAST    CT CHEST ABDOMEN PELVIS W CONTRAST    CT THORACIC SPINE WO CONTRAST    CT LUMBAR SPINE WO CONTRAST    XR PELVIS (1-2 VIEWS)    CT FACIAL BONES WO CONTRAST    CTA HEAD W CONTRAST    CTA NECK W CONTRAST    TRAUMA PANEL    CK    MYOGLOBIN, SERUM    AMMONIA    Lactic Acid, Whole Blood    TOX SCR, BLD, ED    Urine Drug Screen    Urinalysis with microscopic    CK    Trauma Panel    Myoglobin, Serum    Kaolin TEG Citrated    Troponin    Inpatient consult to Neurosurgery    POC Blood Gas    EKG 12 Lead    Type and Screen    PATIENT STATUS (FROM ED OR OR/PROCEDURAL) Inpatient       MEDICATIONS ORDERED:  Orders Placed This Encounter   Medications    DISCONTD: 0.9 % sodium chloride bolus    ondansetron (ZOFRAN) injection 4 mg    ioversol (OPTIRAY) 74 % injection 75 mL    ioversol (OPTIRAY) 74 % injection 65 mL    fentaNYL (SUBLIMAZE) injection 100 mcg    midazolam (VERSED) injection 5 mg       DDX: Intracranial hemorrhage versus rib fractures versus pneumothorax versus hip fracture versus pneumonia versus rhabdo versus UTI versus hepatic encephalopathy     DIAGNOSTIC RESULTS / EMERGENCY DEPARTMENT COURSE / MDM     LABS:  Results for orders placed or performed during the hospital encounter of 04/05/19   CK   Result Value Ref Range    Total CK 1,046 (H) 26 - 192 U/L   Trauma Panel   Result Value Ref Range    WBC 9.9 3.5 - 11.3 k/uL    RBC 4.86 3.95 - 5.11 m/uL    Hemoglobin 14.3 11.9 - 15.1 g/dL    Hematocrit 42.9 36.3 - 47.1 %    MCV 88.3 82.6 - 102.9 fL    MCH 29.4 25.2 - 33.5 pg    MCHC 33.3 28.4 - 34.8 g/dL    RDW 14.7 (H) 11.8 - 14.4 %    Platelets 871 179 - 148 k/uL    MPV 11.2 8.1 - 13.5 fL    NRBC Automated 0.0 0.0 per 100 WBC    Sodium 136 135 - 144 mmol/L    Potassium 4.4 3.7 - 5.3 mmol/L    Chloride 102 98 - 107 mmol/L    CO2 20 20 - 31 mmol/L    Anion Gap 14 9 - 17 mmol/L    Glucose 118 (H) 70 - 99 mg/dL    pH, Julio 7.525 (H) 7.320 - 7.420    pCO2, Julio 25.4 (L) 39 - 55    pO2, Julio 25.2 (L) 30 - 50    HCO3, Venous 20.9 (L) 24 - 30 mmol/L    Positive Base Excess, Julio NOT REPORTED 0.0 - 2.0 mmol/L    Negative Base Excess, Julio 0.2 0.0 - 2.0 mmol/L    O2 Sat, Julio 50.2 (L) 60.0 - 85.0 %    Total Hb NOT REPORTED 12.0 - 16.0 g/dl    Oxyhemoglobin NOT REPORTED 95.0 - 98.0 %    Carboxyhemoglobin 2.7 0 - 5 %    Methemoglobin NOT REPORTED 0.0 - 1.5 %    Pt Temp 37.0     pH, Juilo, Temp Adj NOT REPORTED 7.320 - 7.420    pCO2, Julio, Temp Adj NOT REPORTED 39 - 55 mmHg    pO2, Julio, Temp Adj NOT REPORTED 30 - 50 mmHg    O2 Device/Flow/% NOT REPORTED     Respiratory Rate NOT REPORTED     Chucky Test NOT REPORTED     Sample Site NOT REPORTED     Pt.  Position NOT REPORTED     Mode NOT REPORTED     Set Rate NOT REPORTED     Total Rate NOT REPORTED     VT NOT REPORTED     FIO2 UNKNOWN     Peep/Cpap NOT REPORTED     PSV NOT REPORTED     Text for Respiratory NOT REPORTED     NOTIFICATION NOT REPORTED     NOTIFICATION TIME NOT REPORTED     Blood Bank Specimen BILL FOR SERVICES PERFORMED     hCG Qual NEGATIVE NEGATIVE    BUN 31 (H) 8 - 23 mg/dL    CREATININE 0.72 0.50 - 0.90 mg/dL    GFR Non- NOT REPORTED >60 mL/min    GFR  NOT REPORTED >60 mL/min    GFR Comment      GFR Staging NOT REPORTED     Ethanol <10 <10 mg/dL    Ethanol percent <0.010 <0.010 %    Protime 12.5 (H) 9.0 - 12.0 sec    INR 1.2     PTT 23.5 20.5 - 30.5 sec   Myoglobin, Serum   Result Value Ref Range    Myoglobin 729 (H) 25 - 58 ng/mL   Kaolin TEG Citrated   Result Value Ref Range    Reaction Time TEG 4.4 (L) 5 - 10 min    Kinetics TEG 1.3 1 - 3 min    Angle TEG 71.4 53 - 72 deg    MA (Max Clot) TEG 69.9 50 - 70 mm    LY30(Lysis) TEG 0.0 0 - 8 %    EPL-TEG 0.0 0.0 - 15.0 %    TEG Comment       TEG testing is not FDA approved for pediatric patients. Performing Location [employer]     Heparin Therapy UNKNOWN    Troponin   Result Value Ref Range    Troponin, High Sensitivity PENDING 0 - 14 ng/L    Troponin T NOT REPORTED <0.03 ng/mL    Troponin Interp NOT REPORTED    TYPE AND SCREEN   Result Value Ref Range    Expiration Date 04/08/2019     Arm Band Number [id number]     ABO/Rh O POSITIVE     Antibody Screen NEGATIVE        IMPRESSION: Elderly female, very limited history available, presents with altered mental status, fall, bruising over her face right hip, diffuse abdominal tenderness, trauma team on board and bedside, patient will be getting CT head, chest abdomen pelvis and spine. We will also do an EKG, ammonia, normal saline, troponin, plan to admit. RADIOLOGY:    Xr Pelvis (1-2 Views)    Result Date: 4/5/2019  EXAMINATION: SINGLE XRAY VIEW OF THE PELVIS 4/5/2019 3:23 pm COMPARISON: None. HISTORY: ORDERING SYSTEM PROVIDED HISTORY: trauma TECHNOLOGIST PROVIDED HISTORY: trauma Initial evaluation. FINDINGS: The osseous structures appear osteopenic. No convincing acute abnormality seen of the bony pelvis. Degenerative changes of the lumbar spine, sacroiliac joints and hips bilaterally. Vascular calcifications are noted. Osteopenia without convincing acute abnormality of the bony pelvis. Ct Head Wo Contrast    Result Date: 4/5/2019  EXAMINATION: CT OF THE HEAD WITHOUT CONTRAST  4/5/2019 3:19 pm TECHNIQUE: CT of the head was performed without the administration of intravenous contrast. Dose modulation, iterative reconstruction, and/or weight based adjustment of the mA/kV was utilized to reduce the radiation dose to as low as reasonably achievable.  COMPARISON: None. HISTORY: ORDERING SYSTEM PROVIDED HISTORY: trauma TECHNOLOGIST PROVIDED HISTORY: FINDINGS: BRAIN/VENTRICLES: There is an intraparenchymal hemorrhage within the left thalamic region measuring 3.2 x 2.1 x 3.6 cm. There is adjacent vasogenic edema. There are hemorrhagic products within the lateral ventricles bilaterally as well as the 3rd ventricle, cerebral aqueduct, and 4th ventricle. There is minimal rightward midline shift. There is age-appropriate cerebral volume loss. The infratentorial structures are otherwise unremarkable. ORBITS: The visualized portion of the orbits demonstrate no acute abnormality. SINUSES: The visualized paranasal sinuses and mastoid air cells demonstrate no acute abnormality. SOFT TISSUES/SKULL:  There is right-sided soft tissue swelling involving the right frontal, parietal, and temporal region. There is no definite acute osseous abnormalities. Intraparenchymal hemorrhage in the left thalamic region with adjacent vasogenic edema. Hemorrhagic products within the lateral ventricles, 3rd ventricle, cerebral aqueduct, and 4th ventricle. Minimal rightward midline shift. Critical results were called by Dr. Fatoumata Mcdonald to Dr. Kajal Mathias on 4/5/2019 at 16:02. Ct Facial Bones Wo Contrast    Result Date: 4/5/2019  EXAMINATION: CT OF THE FACE WITHOUT CONTRAST  4/5/2019 3:24 pm TECHNIQUE: CT of the face was performed without the administration of intravenous contrast. Multiplanar reformatted images are provided for review. Dose modulation, iterative reconstruction, and/or weight based adjustment of the mA/kV was utilized to reduce the radiation dose to as low as reasonably achievable. COMPARISON: None HISTORY: ORDERING SYSTEM PROVIDED HISTORY: trauma; significant R facial bruising FINDINGS: FACIAL BONES:  The maxilla, pterygoid plates and zygomatic arches are intact. The mandible is intact. The mandibular condyles are normally situated.   The nasal bones and maxillary nasal processes are intact. ORBITS:  The globes appear intact. The extraocular muscles, optic nerve sheath complexes and lacrimal glands appear unremarkable. No retrobulbar hematoma or mass is seen. The orbital walls and rims are intact. SINUSES/MASTOIDS:  The paranasal sinuses and mastoid air cells are well aerated. No acute fracture is seen. SOFT TISSUES:  Preseptal swelling of the right face extending superiorly along the supraorbital ridge into the right frontal scalp. Asymmetric swelling of the lateral face on the right compared with the left. No fluid collection is identified. No air or unexpected radiopaque foreign bodies. Soft tissue swelling/edema along the upper neck circumferentially right greater than left. The visualized intracranial contents show acute left thalamic intraparenchymal blood products and intraventricular blood products. 1. Bruising of the face without fracture, radiopaque foreign body, or superficial fluid collection. 2. Acute left thalamic hemorrhage with intraventricular extension. This is previously called to Dr. Melia Babinski by Dr. Marcelo Cabello at  on 4/5/19. Ct Cervical Spine Wo Contrast    Result Date: 4/5/2019  EXAMINATION: CT OF THE CERVICAL SPINE WITHOUT CONTRAST 4/5/2019 3:19 pm TECHNIQUE: CT of the cervical spine was performed without the administration of intravenous contrast. Multiplanar reformatted images are provided for review. Dose modulation, iterative reconstruction, and/or weight based adjustment of the mA/kV was utilized to reduce the radiation dose to as low as reasonably achievable. COMPARISON: None. HISTORY: ORDERING SYSTEM PROVIDED HISTORY: trauma FINDINGS: BONES/ALIGNMENT: There is no evidence of an acute cervical spine fracture. There is normal alignment of the cervical spine except moderate dextroconvex scoliosis and reversal of the normal lordotic curvature. .  Decompressive laminectomies at C4 and C5.   Posterior fusion rods and pedicular screws at C3, 4, 5 and 6 on the right and C5 and 6 on the left. DEGENERATIVE CHANGES: Multilevel disc space narrowing. SOFT TISSUES: There is no prevertebral soft tissue swelling. No acute abnormality of the cervical spine. Posterior interbody fixation as above. Ct Thoracic Spine Wo Contrast    Result Date: 4/5/2019  EXAMINATION: CT OF THE THORACIC SPINE WITHOUT CONTRAST  4/5/2019 3:18 pm: TECHNIQUE: CT of the thoracic spine was performed without the administration of intravenous contrast. Multiplanar reformatted images are provided for review. Dose modulation, iterative reconstruction, and/or weight based adjustment of the mA/kV was utilized to reduce the radiation dose to as low as reasonably achievable. COMPARISON: None. HISTORY: ORDERING SYSTEM PROVIDED HISTORY: trauma FINDINGS: BONES/ALIGNMENT: There is normal alignment of the spine. The vertebral body heights are maintained. No osseous destructive lesion is seen. DEGENERATIVE CHANGES: No gross spinal canal stenosis or bony neural foraminal narrowing of the thoracic spine. SOFT TISSUES: No paraspinal mass is seen. Unremarkable CT of the thoracic spine. Ct Lumbar Spine Wo Contrast    Result Date: 4/5/2019  EXAMINATION: CT OF THE LUMBAR SPINE WITHOUT CONTRAST  4/5/2019 TECHNIQUE: CT of the lumbar spine was performed without the administration of intravenous contrast. Multiplanar reformatted images are provided for review. Dose modulation, iterative reconstruction, and/or weight based adjustment of the mA/kV was utilized to reduce the radiation dose to as low as reasonably achievable. COMPARISON: None HISTORY: ORDERING SYSTEM PROVIDED HISTORY: trauma TECHNOLOGIST PROVIDED HISTORY: trauma FINDINGS: BONES/ALIGNMENT: There is minimal compression deformity of L4 vertebral body with less than 20% height loss/wedging anteriorly. Multilevel degenerative disc disease is evident. The facets are intact. The spinous processes are intact. Lamina and pedicles are intact. There is evidence of prior posterior decompression. DEGENERATIVE CHANGES: Multilevel degenerative disc disease with facet arthropathy. SOFT TISSUES/RETROPERITONEUM: Diverticulosis. Atherosclerotic changes of aorta. Large stool throughout the colon. An avidly enhancing dilated left ovarian vessel is noted. 1. Age-indeterminate compression deformity of L4 with less than 20% height loss and no significant retropulsion. 2. Osteopenic skeleton with multilevel degenerative changes. Xr Chest Portable    Result Date: 4/5/2019  EXAMINATION: SINGLE XRAY VIEW OF THE CHEST 4/5/2019 3:24 pm COMPARISON: None. HISTORY: ORDERING SYSTEM PROVIDED HISTORY: trauma TECHNOLOGIST PROVIDED HISTORY: trauma FINDINGS: Slight increase in interstitial markings. The lungs are without acute focal process. There is no effusion or pneumothorax. The cardiomediastinal silhouette is without acute process. The osseous structures are without acute process. No fracture or pneumothorax. Increased interstitial markings     Cta Neck W Contrast    Result Date: 4/5/2019  EXAMINATION: CTA OF THE HEAD WITH CONTRAST; CTA OF THE NECK 4/5/2019 3:32 pm: TECHNIQUE: CTA of the head/brain was performed with the administration of intravenous contrast. Multiplanar reformatted images are provided for review. MIP images are provided for review. Dose modulation, iterative reconstruction, and/or weight based adjustment of the mA/kV was utilized to reduce the radiation dose to as low as reasonably achievable.; CTA of the neck was performed with the administration of intravenous contrast. Multiplanar reformatted images are provided for review. MIP images are provided for review. Stenosis of the internal carotid arteries measured using NASCET criteria. Dose modulation, iterative reconstruction, and/or weight based adjustment of the mA/kV was utilized to reduce the radiation dose to as low as reasonably achievable. COMPARISON: None.  HISTORY: ORDERING SYSTEM 3:19 pm TECHNIQUE: CT of the chest, abdomen and pelvis was performed with the administration of intravenous contrast. Multiplanar reformatted images are provided for review. Dose modulation, iterative reconstruction, and/or weight based adjustment of the mA/kV was utilized to reduce the radiation dose to as low as reasonably achievable. COMPARISON: None HISTORY: ORDERING SYSTEM PROVIDED HISTORY: trauma TECHNOLOGIST PROVIDED HISTORY: Ordering Physician Provided Reason for Exam: found down Acuity: Unknown FINDINGS: Chest: Mediastinum:  Thoracic aorta and central portion of the pulmonary artery opacify normally. The ascending thoracic aorta is of normal caliber. Heart size is normal. There is no pericardial effusion. No mediastinal or hilar lymph nodes exceed the CT criteria for abnormal enlargement. Lungs/pleura: Clear Soft Tissues/Bones: No fracture identified Abdomen/Pelvis: Organs: The abdominal wall appears normal. Liver appears somewhat nodular. Spleen is enlarged. Gastroesophageal varices are noted. Pancreas appears normal.  The adrenals are normal.  The gallbladder surgically absent. . Kidneys appear normal. The bladder appears normal. GI/Bowel: The stomach,small bowel, and colon appear normal. Appendix is not identified. Pelvis: Uterus appears atrophic. Peritoneum/Retroperitoneum: The abdominal aorta and iliac arteries are normal in caliber. There is no pathologic adenopathy. There is calcified plaque along the aorta and its branches. Bones/Soft Tissues: Normal     No acute traumatic sequelae. Cirrhosis, splenomegaly, and esophageal varices consistent with portal venous hypertension. Cta Head W Contrast    Result Date: 4/5/2019  EXAMINATION: CTA OF THE HEAD WITH CONTRAST; CTA OF THE NECK 4/5/2019 3:32 pm: TECHNIQUE: CTA of the head/brain was performed with the administration of intravenous contrast. Multiplanar reformatted images are provided for review. MIP images are provided for review.  Dose modulation, iterative reconstruction, and/or weight based adjustment of the mA/kV was utilized to reduce the radiation dose to as low as reasonably achievable.; CTA of the neck was performed with the administration of intravenous contrast. Multiplanar reformatted images are provided for review. MIP images are provided for review. Stenosis of the internal carotid arteries measured using NASCET criteria. Dose modulation, iterative reconstruction, and/or weight based adjustment of the mA/kV was utilized to reduce the radiation dose to as low as reasonably achievable. COMPARISON: None. HISTORY: ORDERING SYSTEM PROVIDED HISTORY: SAH, ?hemorrhagic stroke TECHNOLOGIST PROVIDED HISTORY: ; ORDERING SYSTEM PROVIDED HISTORY: SAH, ?hemorrhagic stroke TECHNOLOGIST PROVIDED HISTORY: Ordering Physician Provided Reason for Exam: head bleed/ found down Acuity: Unknown FINDINGS: CTA NECK: AORTIC ARCH/ARCH VESSELS: There is a normal branch pattern of the aortic arch. No significant stenosis is seen of the innominate artery or subclavian arteries. CAROTID ARTERIES: The common carotid arteries are normal in appearance without evidence of a flow limiting stenosis. The internal carotid arteries are normal in appearance without evidence of a flow limiting stenosis by NASCET criteria. No dissection or arterial injury is seen. VERTEBRAL ARTERIES: The vertebral arteries both arise from the subclavian arteries and are normal in caliber without evidence of flow limiting stenosis or dissection. SOFT TISSUES: The lung apices are clear. No cervical or superior mediastinal lymphadenopathy. The visualized portion of the larynx and pharynx appear unremarkable. The parotid, submandibular and thyroid glands demonstrate no acute abnormality. BONES: The visualized osseous structures appear unremarkable. CTA HEAD: ANTERIOR CIRCULATION: The internal carotid arteries are normal in course and caliber without focal stenosis.  The anterior cerebral and middle cerebral arteries demonstrate no focal stenosis. POSTERIOR CIRCULATION: The posterior cerebral arteries demonstrate no focal stenosis. The vertebral and basilar arteries appear unremarkable. BRAIN: There is redemonstration of an intraparenchymal hemorrhage within the left thalamic region as well as intraventricular hemorrhagic products. Unremarkable CTA of the head and neck. Critical results were called by Dr. Andreea Elliott to Dr. Francy Fabian on 4/5/2019 at 16:12. EKG  None    All EKG's are interpreted by the Emergency Department Physician who either signs or Co-signs this chart in the absence of a cardiologist.    EMERGENCY DEPARTMENT COURSE:    CT head shows a intraoperative parenchymal, intraventricular and subarachnoid hemorrhage. Family is now in the hospital, family says the patient has a long history of cirrhosis, not on any anticoagulation or antiplatelet medications. Patient was found down on the floor by her son. Otherwise neurosurgery and neuro critical care team was brought on board. Patient will be admitted to the neuro ICU. Continues to be spontaneously breathing in no acute distress, GCS continues to be above 8. Patient will be admitted to the neuro ICU. PROCEDURES:  None    CONSULTS:  IP CONSULT TO NEUROSURGERY    CRITICAL CARE:  None    FINAL IMPRESSION      1. Altered mental status, unspecified altered mental status type    2. Intracranial hemorrhage ([facility] [address]          DISPOSITION / PLAN     DISPOSITION Admitted 04/05/2019 04:30:19 PM      PATIENT REFERRED TO:  No follow-up provider specified. DISCHARGE MEDICATIONS:  There are no discharge medications for this patient.       Yuridia Ramos MD  Emergency Medicine Resident    (Please note that portions of thisnote were completed with a voice recognition program.  Efforts were made to edit the dictations but occasionally words are mis-transcribed.)       Yuridia Ramos MD  04/05/19 [id number]

## 2019-04-05 NOTE — FLOWSHEET NOTE
[facility] CARE DEPARTMENT - [address] 83     Emergency/Trauma Note    PATIENT NAME: Rafa Wheatley    Shift date: 4/5/19  Shift day: Friday   Shift # 2    Room # 7233/1884-83   Name: Rafa Wheatley            Age: 66 y.o. Gender: female          Shinto: Rosa Pathak [employer]  of Confucianism: [facility]    Trauma/Incident type: Adult Trauma Alert  Admit Date & Time: 4/5/2019  3:09 PM  TRAUMA NAME: Thea Duarte    PATIENT/EVENT DESCRIPTION:  Rafa Wheatley is a 66 y.o. female who arrived via ambulance from home as an Adult Trauma Alert. Pt was found unresponsive at home in her bathroom. Concern was that she may have been there for a few hours. Pt to be admitted to 0522/0522-01. SPIRITUAL ASSESSMENT/INTERVENTION:   met with family in ED waiting room and got information for registration.  was able to get Dr. Sol Bajwa and then Dr. Jolie Proctor to explain Pts condition. Theador Em (son): [phone]  Sergio Callaway (son): [phone]  Jarad Mckeon (Jesus's wife) (daughter-in-law): [phone]  Prayer was politely declined as one son said, \"I just can't, it's my mother, I just can't. \"  Zygmunt Nephew took family to see pt.  provided hospitality.  took family to the 5th floor where Pt was being admitted to 56. Family wanted anointing of the sick for the Pt. After talking with Dr. Wally Maharaj called the [place] on call, Father Johanny Chan. The  [facility] did not [facility] his phone, Father Amanda Hall knows that  and said he knew Father Stella Theodore was busy. Father Amanda Hall anointed Pt and the person in the partition next to her. Family was very thankful for all the help. PATIENT BELONGINGS:  Given to Family    ANY BELONGINGS OF SIGNIFICANT VALUE NOTED:  no    REGISTRATION STAFF NOTIFIED? yes      WHAT IS YOUR SPIRITUAL CARE PLAN FOR THIS PATIENT?:   Chaplains will remain available to offer spiritual and emotional support as needed.      04/05/19 1505   Encounter Summary   Services provided to:

## 2019-04-06 ENCOUNTER — APPOINTMENT (OUTPATIENT)
Dept: GENERAL RADIOLOGY | Age: 79
DRG: 023 | End: 2019-04-06
Payer: COMMERCIAL

## 2019-04-06 ENCOUNTER — APPOINTMENT (OUTPATIENT)
Dept: CT IMAGING | Age: 79
DRG: 023 | End: 2019-04-06
Payer: COMMERCIAL

## 2019-04-06 LAB
ABSOLUTE EOS #: <0.03 K/UL (ref 0–0.44)
ABSOLUTE IMMATURE GRANULOCYTE: 0.03 K/UL (ref 0–0.3)
ABSOLUTE LYMPH #: 1.13 K/UL (ref 1.1–3.7)
ABSOLUTE MONO #: 0.87 K/UL (ref 0.1–1.2)
ANION GAP SERPL CALCULATED.3IONS-SCNC: 13 MMOL/L (ref 9–17)
BASOPHILS # BLD: 0 % (ref 0–2)
BASOPHILS ABSOLUTE: <0.03 K/UL (ref 0–0.2)
BUN BLDV-MCNC: 33 MG/DL (ref 8–23)
BUN/CREAT BLD: ABNORMAL (ref 9–20)
CALCIUM SERPL-MCNC: 8.5 MG/DL (ref 8.6–10.4)
CHLORIDE BLD-SCNC: 110 MMOL/L (ref 98–107)
CO2: 20 MMOL/L (ref 20–31)
CREAT SERPL-MCNC: 0.64 MG/DL (ref 0.5–0.9)
DIFFERENTIAL TYPE: ABNORMAL
EKG ATRIAL RATE: 78 BPM
EKG P AXIS: 58 DEGREES
EKG P-R INTERVAL: 152 MS
EKG Q-T INTERVAL: 470 MS
EKG QRS DURATION: 88 MS
EKG QTC CALCULATION (BAZETT): 535 MS
EKG R AXIS: -19 DEGREES
EKG T AXIS: 48 DEGREES
EKG VENTRICULAR RATE: 78 BPM
EOSINOPHILS RELATIVE PERCENT: 0 % (ref 1–4)
GFR AFRICAN AMERICAN: >60 ML/MIN
GFR NON-AFRICAN AMERICAN: >60 ML/MIN
GFR SERPL CREATININE-BSD FRML MDRD: ABNORMAL ML/MIN/{1.73_M2}
GFR SERPL CREATININE-BSD FRML MDRD: ABNORMAL ML/MIN/{1.73_M2}
GLUCOSE BLD-MCNC: 120 MG/DL (ref 70–99)
HCT VFR BLD CALC: 35.5 % (ref 36.3–47.1)
HEMOGLOBIN: 11.4 G/DL (ref 11.9–15.1)
IMMATURE GRANULOCYTES: 0 %
LACTIC ACID, WHOLE BLOOD: 4.2 MMOL/L (ref 0.7–2.1)
LYMPHOCYTES # BLD: 11 % (ref 24–43)
MCH RBC QN AUTO: 29.4 PG (ref 25.2–33.5)
MCHC RBC AUTO-ENTMCNC: 32.1 G/DL (ref 28.4–34.8)
MCV RBC AUTO: 91.5 FL (ref 82.6–102.9)
MONOCYTES # BLD: 8 % (ref 3–12)
MRSA, DNA, NASAL: NORMAL
NRBC AUTOMATED: 0 PER 100 WBC
PDW BLD-RTO: 15.3 % (ref 11.8–14.4)
PLATELET # BLD: 162 K/UL (ref 138–453)
PLATELET ESTIMATE: ABNORMAL
PMV BLD AUTO: 11 FL (ref 8.1–13.5)
POTASSIUM SERPL-SCNC: 3.8 MMOL/L (ref 3.7–5.3)
RBC # BLD: 3.88 M/UL (ref 3.95–5.11)
RBC # BLD: ABNORMAL 10*6/UL
SEG NEUTROPHILS: 81 % (ref 36–65)
SEGMENTED NEUTROPHILS ABSOLUTE COUNT: 8.46 K/UL (ref 1.5–8.1)
SODIUM BLD-SCNC: 143 MMOL/L (ref 135–144)
SPECIMEN DESCRIPTION: NORMAL
TOTAL CK: 267 U/L (ref 26–192)
TOTAL CK: 442 U/L (ref 26–192)
TROPONIN INTERP: ABNORMAL
TROPONIN T: ABNORMAL NG/ML
TROPONIN, HIGH SENSITIVITY: 223 NG/L (ref 0–14)
WBC # BLD: 10.5 K/UL (ref 3.5–11.3)
WBC # BLD: ABNORMAL 10*3/UL

## 2019-04-06 PROCEDURE — 2500000003 HC RX 250 WO HCPCS: Performed by: PSYCHIATRY & NEUROLOGY

## 2019-04-06 PROCEDURE — 36415 COLL VENOUS BLD VENIPUNCTURE: CPT

## 2019-04-06 PROCEDURE — 2580000003 HC RX 258: Performed by: EMERGENCY MEDICINE

## 2019-04-06 PROCEDURE — 71045 X-RAY EXAM CHEST 1 VIEW: CPT

## 2019-04-06 PROCEDURE — 82550 ASSAY OF CK (CPK): CPT

## 2019-04-06 PROCEDURE — 85025 COMPLETE CBC W/AUTO DIFF WBC: CPT

## 2019-04-06 PROCEDURE — 94003 VENT MGMT INPAT SUBQ DAY: CPT

## 2019-04-06 PROCEDURE — 94760 N-INVAS EAR/PLS OXIMETRY 1: CPT

## 2019-04-06 PROCEDURE — 2700000000 HC OXYGEN THERAPY PER DAY

## 2019-04-06 PROCEDURE — 73030 X-RAY EXAM OF SHOULDER: CPT

## 2019-04-06 PROCEDURE — 2500000003 HC RX 250 WO HCPCS: Performed by: EMERGENCY MEDICINE

## 2019-04-06 PROCEDURE — 94660 CPAP INITIATION&MGMT: CPT

## 2019-04-06 PROCEDURE — 84484 ASSAY OF TROPONIN QUANT: CPT

## 2019-04-06 PROCEDURE — 73090 X-RAY EXAM OF FOREARM: CPT

## 2019-04-06 PROCEDURE — 80048 BASIC METABOLIC PNL TOTAL CA: CPT

## 2019-04-06 PROCEDURE — 94762 N-INVAS EAR/PLS OXIMTRY CONT: CPT

## 2019-04-06 PROCEDURE — 2000000003 HC NEURO ICU R&B

## 2019-04-06 PROCEDURE — 6360000002 HC RX W HCPCS: Performed by: EMERGENCY MEDICINE

## 2019-04-06 PROCEDURE — 6370000000 HC RX 637 (ALT 250 FOR IP): Performed by: STUDENT IN AN ORGANIZED HEALTH CARE EDUCATION/TRAINING PROGRAM

## 2019-04-06 PROCEDURE — 99291 CRITICAL CARE FIRST HOUR: CPT | Performed by: PSYCHIATRY & NEUROLOGY

## 2019-04-06 PROCEDURE — 73080 X-RAY EXAM OF ELBOW: CPT

## 2019-04-06 RX ORDER — SODIUM CHLORIDE 9 MG/ML
INJECTION, SOLUTION INTRAVENOUS CONTINUOUS
Status: DISCONTINUED | OUTPATIENT
Start: 2019-04-06 | End: 2019-04-15

## 2019-04-06 RX ORDER — FENTANYL CITRATE 50 UG/ML
12.5 INJECTION, SOLUTION INTRAMUSCULAR; INTRAVENOUS ONCE
Status: COMPLETED | OUTPATIENT
Start: 2019-04-06 | End: 2019-04-06

## 2019-04-06 RX ADMIN — FAMOTIDINE 20 MG: 10 INJECTION, SOLUTION INTRAVENOUS at 09:46

## 2019-04-06 RX ADMIN — NICARDIPINE HYDROCHLORIDE 2.5 MG/HR: 0.1 INJECTION, SOLUTION INTRAVENOUS at 22:31

## 2019-04-06 RX ADMIN — SODIUM CHLORIDE: 9 INJECTION, SOLUTION INTRAVENOUS at 10:02

## 2019-04-06 RX ADMIN — Medication 10 ML: at 09:46

## 2019-04-06 RX ADMIN — FENTANYL CITRATE 12.5 MCG: 50 INJECTION, SOLUTION INTRAMUSCULAR; INTRAVENOUS at 13:40

## 2019-04-06 RX ADMIN — NICARDIPINE HYDROCHLORIDE 2.5 MG/HR: 0.1 INJECTION, SOLUTION INTRAVENOUS at 03:39

## 2019-04-06 RX ADMIN — NICARDIPINE HYDROCHLORIDE 5 MG/HR: 0.1 INJECTION, SOLUTION INTRAVENOUS at 09:06

## 2019-04-06 RX ADMIN — Medication 10 ML: at 09:52

## 2019-04-06 RX ADMIN — Medication 15 ML: at 20:32

## 2019-04-06 ASSESSMENT — PAIN SCALES - WONG BAKER
WONGBAKER_NUMERICALRESPONSE: 0

## 2019-04-06 ASSESSMENT — PAIN SCALES - GENERAL: PAINLEVEL_OUTOF10: 10

## 2019-04-06 ASSESSMENT — PULMONARY FUNCTION TESTS
PIF_VALUE: 20
PIF_VALUE: 20
PIF_VALUE: 19
PIF_VALUE: 20

## 2019-04-06 NOTE — PLAN OF CARE
Problem: Falls - Risk of:  Goal: Absence of physical injury  Description  Absence of physical injury  Outcome: Ongoing  Note:   No falls entire shift. Fall precautions in place. Continue to monitor.

## 2019-04-06 NOTE — PROGRESS NOTES
Daily Progress Note  Neuro Critical Care    Patient Name: Moe Santos  Patient : 1940  Room/Bed: [facility]4175-41  Allergies: Allergies not on file  Problem List:   Patient Active Problem List   Diagnosis    Intracranial bleed (HCC)    Altered mental status       CHIEF COMPLAINT:       Fall, AMS     INTERVAL HISTORY    Initial Presentation (Admitted ):  Pt found down, LKW ~ 24 hours prior to arrival.  Hx of hepatitis C w/ cirrhosis, esophageal varices. Found to have acute IVH, acute ICH, ACS, with elevated troponin concerning for ACS. ICH score: 1    Hospital Course:   Admitted under Neuro Critical Care. EVD placed by Neurosurgery/Neuro critical care. Goad ICP< 21.  Cardiology consulted for evaluation of elevated troponin. Last 24h:   Stable overnight. Mild bleeding from EVD site, stitch placed overnight. No additional bleeding noted. Cardene gtt started and titrated. BP within range of SBP< 140. Placed on BiPAP for comfort. Repeat CXR this am shows no acute cardiopulmonary abnormalities.      CURRENT MEDICATIONS:  SCHEDULED MEDICATIONS:   ondansetron  4 mg Intravenous Once    sodium chloride flush  10 mL Intravenous 2 times per day    famotidine (PEPCID) injection  20 mg Intravenous Daily    fentanNYL  100 mcg Intravenous Once    chlorhexidine  15 mL Mouth/Throat BID    fentanNYL  25 mcg Intravenous Once     CONTINUOUS INFUSIONS:   niCARdipine Stopped (19 0500)     PRN MEDICATIONS:   sodium chloride flush, magnesium hydroxide, ondansetron, oxyCODONE, labetalol, hydrALAZINE    VITALS:  Temperature Range: Temp: 97.2 °F (36.2 °C) Temp  Av.2 °F (36.2 °C)  Min: 97 °F (36.1 °C)  Max: 97.5 °F (36.4 °C)  BP Range: Systolic (10SKK), IVP:601 , Min:105 , WQ     Diastolic (40RXG), WBB:72, Min:30, Max:79    Pulse Range: Pulse  Av.7  Min: 81  Max: 106  Respiration Range: Resp  Av.3  Min: 14  Max: 28  Current Pulse Ox: SpO2: 98 %  24HR Pulse Ox Range: SpO2  Av.1 %  Min: 93 %  Max: 100 %  Patient Vitals for the past 12 hrs:   BP Temp Temp src Pulse Resp SpO2   04/06/19 0602 (!) 130/49 -- -- 94 28 98 %   04/06/19 0600 -- -- -- 96 21 --   04/06/19 0547 (!) 136/42 -- -- 94 19 95 %   04/06/19 0532 (!) 129/48 -- -- 98 17 98 %   04/06/19 0517 (!) 145/52 -- -- 103 17 100 %   04/06/19 0502 (!) 140/49 -- -- 106 18 100 %   04/06/19 0500 -- -- -- 101 17 --   04/06/19 0447 (!) 105/30 -- -- 89 17 100 %   04/06/19 0443 -- -- -- 85 17 100 %   04/06/19 0432 (!) 107/36 -- -- 92 19 100 %   04/06/19 0417 (!) 115/43 -- -- 91 20 100 %   04/06/19 0402 (!) 122/44 97.2 °F (36.2 °C) Oral 99 18 99 %   04/06/19 0400 -- -- -- 91 15 --   04/06/19 0347 (!) 142/47 -- -- 105 14 99 %   04/06/19 0332 (!) 129/47 -- -- 103 15 99 %   04/06/19 0317 (!) 147/45 -- -- 106 14 100 %   04/06/19 0302 (!) 137/38 -- -- 90 18 99 %   04/06/19 0300 -- -- -- 92 22 --   04/06/19 0247 -- -- -- 102 17 100 %   04/06/19 0232 (!) 131/49 -- -- 96 17 100 %   04/06/19 0217 (!) 132/51 -- -- 102 17 99 %   04/06/19 0202 (!) 128/52 -- -- 105 22 98 %   04/06/19 0200 -- -- -- 105 23 --   04/06/19 0147 (!) 134/49 -- -- 105 16 99 %   04/06/19 0132 (!) 141/48 -- -- 105 21 100 %   04/06/19 0117 (!) 138/46 -- -- 105 21 100 %   04/06/19 0113 -- -- -- 104 21 100 %   04/06/19 0102 (!) 129/36 -- -- 93 23 97 %   04/06/19 0100 -- -- -- 94 21 --   04/06/19 0047 (!) 131/34 -- -- 93 20 98 %   04/06/19 0032 (!) 127/50 -- -- 106 19 99 %   04/06/19 0017 (!) 119/39 -- -- 91 20 93 %   04/06/19 0002 (!) 108/36 97 °F (36.1 °C) Oral 88 20 97 %   04/06/19 0000 -- -- -- 92 20 --   04/05/19 2347 (!) 111/43 -- -- 88 18 98 %   04/05/19 2302 -- -- -- 104 16 100 %   04/05/19 2247 (!) 122/44 -- -- 92 14 100 %   04/05/19 2232 (!) 123/41 -- -- 95 17 100 %   04/05/19 2217 (!) 141/46 -- -- 104 20 100 %   04/05/19 2202 (!) 133/37 -- -- 94 19 100 %   04/05/19 2200 -- -- -- 96 18 --   04/05/19 2147 (!) 134/36 -- -- 91 15 99 %   04/05/19 2132 (!) 144/39 -- -- 102 16 100 % 04/05/19 2117 -- -- -- 89 15 100 %   04/05/19 2102 (!) 125/35 -- -- 90 17 100 %   04/05/19 2100 -- -- -- 91 15 --   04/05/19 2047 (!) 126/42 -- -- 87 15 100 %   04/05/19 2032 (!) 122/36 -- -- 89 17 100 %   04/05/19 2017 (!) 117/53 -- -- 93 19 100 %   04/05/19 2002 (!) 132/39 97.5 °F (36.4 °C) -- 93 16 100 %   04/05/19 2000 -- -- -- 90 14 --   04/05/19 1947 (!) 114/34 -- -- 93 19 100 %   04/05/19 1933 (!) 124/36 -- -- 89 16 --   04/05/19 1918 (!) 116/40 -- -- 84 18 100 %   04/05/19 1903 (!) 114/33 -- -- 81 17 --   04/05/19 1900 (!) 113/33 -- -- 85 17 --   04/05/19 1848 (!) 141/44 -- -- 86 22 --     There is no height or weight on file to calculate BMI.  []<16 Severe malnutrition  []16-16.99 Moderate malnutrition  []17-18.49 Mild malnutrition  []18.5-24.9 Normal  []25-29.9 Overweight (not obese)  []30-34.9 Obese class 1 (Low Risk)  []35-39.9 Obese class 2 (Moderate Risk)  []?40 Obese class 3 (High Risk)    RECENT LABS:   Lab Results   Component Value Date    WBC 9.9 04/05/2019    HGB 14.3 04/05/2019    HCT 42.9 04/05/2019     04/05/2019    ALT 24 04/05/2019    AST 62 (H) 04/05/2019     04/06/2019    K 3.8 04/06/2019     (H) 04/06/2019    CREATININE 0.64 04/06/2019    BUN 33 (H) 04/06/2019    CO2 20 04/06/2019    INR 1.2 04/05/2019     24 HOUR INTAKE/OUTPUT:    Intake/Output Summary (Last 24 hours) at 4/6/2019 6765  Last data filed at 4/6/2019 0606  Gross per 24 hour   Intake 711 ml   Output 343 ml   Net 368 ml       RADIOLOGY:   Xr Pelvis (1-2 Views)    Result Date: 4/5/2019  EXAMINATION: SINGLE XRAY VIEW OF THE PELVIS 4/5/2019 3:23 pm COMPARISON: None. HISTORY: ORDERING SYSTEM PROVIDED HISTORY: trauma TECHNOLOGIST PROVIDED HISTORY: trauma Initial evaluation. FINDINGS: The osseous structures appear osteopenic. No convincing acute abnormality seen of the bony pelvis. Degenerative changes of the lumbar spine, sacroiliac joints and hips bilaterally. Vascular calcifications are noted. Osteopenia without convincing acute abnormality of the bony pelvis. Xr Shoulder Right (min 2 Views)    Result Date: 4/6/2019  EXAMINATION: 3 XRAY VIEWS OF THE RIGHT SHOULDER; AP AND LATERAL XRAY VIEWS OF THE RIGHT FOREARM; 3 XRAY VIEWS OF THE RIGHT ELBOW 4/6/2019 3:10 am COMPARISON: None. HISTORY: ORDERING SYSTEM PROVIDED HISTORY: trauma TECHNOLOGIST PROVIDED HISTORY: trauma FINDINGS: Right shoulder: Three views of the right shoulder. No acute fracture or dislocation. Soft tissue swelling about the shoulder. Normal alignment of the glenohumeral and acromioclavicular joints. No aggressive skeletal lesion. Visualized lung is clear. Visualized ribs are intact. Osteopenia. Right elbow: Frontal, lateral and oblique views. Soft tissue swelling about the elbow. No radiographic evidence of an elbow joint effusion. No acute fracture or malalignment. Mild ulnohumeral compartment DJD. Osteopenia. Right forearm: Frontal and lateral views. Soft tissue swelling about the forearm. No acute fracture or malalignment. Mild-to-moderate DJD in the wrist.  Osteopenia. No acute fracture in the right shoulder, right elbow or right forearm. Osteopenia. Mild DJD in the right elbow and mild-to-moderate DJD in the right wrist.     Xr Elbow Right (min 3 Views)    Result Date: 4/6/2019  EXAMINATION: 3 XRAY VIEWS OF THE RIGHT SHOULDER; AP AND LATERAL XRAY VIEWS OF THE RIGHT FOREARM; 3 XRAY VIEWS OF THE RIGHT ELBOW 4/6/2019 3:10 am COMPARISON: None. HISTORY: ORDERING SYSTEM PROVIDED HISTORY: trauma TECHNOLOGIST PROVIDED HISTORY: trauma FINDINGS: Right shoulder: Three views of the right shoulder. No acute fracture or dislocation. Soft tissue swelling about the shoulder. Normal alignment of the glenohumeral and acromioclavicular joints. No aggressive skeletal lesion. Visualized lung is clear. Visualized ribs are intact. Osteopenia. Right elbow: Frontal, lateral and oblique views.   Soft tissue swelling about the elbow.  No radiographic evidence of an elbow joint effusion. No acute fracture or malalignment. Mild ulnohumeral compartment DJD. Osteopenia. Right forearm: Frontal and lateral views. Soft tissue swelling about the forearm. No acute fracture or malalignment. Mild-to-moderate DJD in the wrist.  Osteopenia. No acute fracture in the right shoulder, right elbow or right forearm. Osteopenia. Mild DJD in the right elbow and mild-to-moderate DJD in the right wrist.     Xr Radius Ulna Right (2 Views)    Result Date: 4/6/2019  EXAMINATION: 3 XRAY VIEWS OF THE RIGHT SHOULDER; AP AND LATERAL XRAY VIEWS OF THE RIGHT FOREARM; 3 XRAY VIEWS OF THE RIGHT ELBOW 4/6/2019 3:10 am COMPARISON: None. HISTORY: ORDERING SYSTEM PROVIDED HISTORY: trauma TECHNOLOGIST PROVIDED HISTORY: trauma FINDINGS: Right shoulder: Three views of the right shoulder. No acute fracture or dislocation. Soft tissue swelling about the shoulder. Normal alignment of the glenohumeral and acromioclavicular joints. No aggressive skeletal lesion. Visualized lung is clear. Visualized ribs are intact. Osteopenia. Right elbow: Frontal, lateral and oblique views. Soft tissue swelling about the elbow. No radiographic evidence of an elbow joint effusion. No acute fracture or malalignment. Mild ulnohumeral compartment DJD. Osteopenia. Right forearm: Frontal and lateral views. Soft tissue swelling about the forearm. No acute fracture or malalignment. Mild-to-moderate DJD in the wrist.  Osteopenia. No acute fracture in the right shoulder, right elbow or right forearm. Osteopenia.  Mild DJD in the right elbow and mild-to-moderate DJD in the right wrist.     Ct Head Wo Contrast    Result Date: 4/6/2019  EXAMINATION: CT OF THE HEAD WITHOUT CONTRAST  4/5/2019 11:11 pm TECHNIQUE: CT of the head was performed without the administration of intravenous contrast. Dose modulation, iterative reconstruction, and/or weight based adjustment of the mA/kV was utilized to reduce the radiation dose to as low as reasonably achievable. COMPARISON: CT head 04/05/2029 HISTORY: ORDERING SYSTEM PROVIDED HISTORY: f/u [facility] s/p EVD placement TECHNOLOGIST PROVIDED HISTORY: Ordering Physician Provided Reason for Exam: EVD placement follow up Acuity: Unknown Type of Exam: Subsequent/Follow-up FINDINGS: BRAIN/VENTRICLES: Interval placement of right frontal approach external ventricular drainage device. Tip terminates within the frontal horn left lateral ventricle near the caudal thalamic groove. Interval pneumocephalus identified consistent with interval placement. Redemonstration of the left thalamic intraparenchymal hemorrhage measuring 3.6 x 2.5 cm in size with evidence of subarachnoid decompression/subarachnoid extension. Evidence of blood products within the lateral 3rd and 4th ventricle identified. Evidence of blood products identified overlying both frontal and parietal lobes and along the interhemispheric falx new from the prior examination but may be related to redistribution of subarachnoid blood products. No shift of midline structures. Basal cisterns are patent. Mild generalized involutional change with chronic small vessel ischemic disease. Intracranial vascular calcifications. . ORBITS: The visualized portion of the orbits demonstrate no acute abnormality. SINUSES: Mild ethmoid sinus mucosal thickening. SOFT TISSUES/SKULL:  No depressed skull fracture. Edema identified overlying the right temporalis muscle may be related to prior trauma or surgery. Interval placement of right frontal approach EVD. Evolving left thalamic intraparenchymal hemorrhage with secondary subarachnoid extension and blood products within the lateral 3rd and 4th ventricles.  The blood products identified overlying both frontal lobes parietal lobes and along the interhemispheric falx could be related to placement by EVD versus redistribution of the previously noted subarachnoid blood products. Ct Head Wo Contrast    Result Date: 4/5/2019  EXAMINATION: CT OF THE HEAD WITHOUT CONTRAST  4/5/2019 3:19 pm TECHNIQUE: CT of the head was performed without the administration of intravenous contrast. Dose modulation, iterative reconstruction, and/or weight based adjustment of the mA/kV was utilized to reduce the radiation dose to as low as reasonably achievable. COMPARISON: None. HISTORY: ORDERING SYSTEM PROVIDED HISTORY: trauma TECHNOLOGIST PROVIDED HISTORY: FINDINGS: BRAIN/VENTRICLES: There is an intraparenchymal hemorrhage within the left thalamic region measuring 3.2 x 2.1 x 3.6 cm. There is adjacent vasogenic edema. There are hemorrhagic products within the lateral ventricles bilaterally as well as the 3rd ventricle, cerebral aqueduct, and 4th ventricle. There is minimal rightward midline shift. There is age-appropriate cerebral volume loss. The infratentorial structures are otherwise unremarkable. ORBITS: The visualized portion of the orbits demonstrate no acute abnormality. SINUSES: The visualized paranasal sinuses and mastoid air cells demonstrate no acute abnormality. SOFT TISSUES/SKULL:  There is right-sided soft tissue swelling involving the right frontal, parietal, and temporal region. There is no definite acute osseous abnormalities. Intraparenchymal hemorrhage in the left thalamic region with adjacent vasogenic edema. Hemorrhagic products within the lateral ventricles, 3rd ventricle, cerebral aqueduct, and 4th ventricle. Minimal rightward midline shift. Critical results were called by Dr. Fatoumata Mcdonald to Dr. Kajal Mathias on 4/5/2019 at 16:02. Ct Facial Bones Wo Contrast    Result Date: 4/5/2019  EXAMINATION: CT OF THE FACE WITHOUT CONTRAST  4/5/2019 3:24 pm TECHNIQUE: CT of the face was performed without the administration of intravenous contrast. Multiplanar reformatted images are provided for review.  Dose modulation, iterative reconstruction, and/or weight based achievable. COMPARISON: None. HISTORY: ORDERING SYSTEM PROVIDED HISTORY: trauma FINDINGS: BONES/ALIGNMENT: There is no evidence of an acute cervical spine fracture. There is normal alignment of the cervical spine except moderate dextroconvex scoliosis and reversal of the normal lordotic curvature. .  Decompressive laminectomies at C4 and C5. Posterior fusion rods and pedicular screws at C3, 4, 5 and 6 on the right and C5 and 6 on the left. DEGENERATIVE CHANGES: Multilevel disc space narrowing. SOFT TISSUES: There is no prevertebral soft tissue swelling. No acute abnormality of the cervical spine. Posterior interbody fixation as above. Ct Thoracic Spine Wo Contrast    Result Date: 4/5/2019  EXAMINATION: CT OF THE THORACIC SPINE WITHOUT CONTRAST  4/5/2019 3:18 pm: TECHNIQUE: CT of the thoracic spine was performed without the administration of intravenous contrast. Multiplanar reformatted images are provided for review. Dose modulation, iterative reconstruction, and/or weight based adjustment of the mA/kV was utilized to reduce the radiation dose to as low as reasonably achievable. COMPARISON: None. HISTORY: ORDERING SYSTEM PROVIDED HISTORY: trauma FINDINGS: BONES/ALIGNMENT: There is normal alignment of the spine. The vertebral body heights are maintained. No osseous destructive lesion is seen. DEGENERATIVE CHANGES: No gross spinal canal stenosis or bony neural foraminal narrowing of the thoracic spine. SOFT TISSUES: No paraspinal mass is seen. Unremarkable CT of the thoracic spine. Ct Lumbar Spine Wo Contrast    Result Date: 4/5/2019  EXAMINATION: CT OF THE LUMBAR SPINE WITHOUT CONTRAST  4/5/2019 TECHNIQUE: CT of the lumbar spine was performed without the administration of intravenous contrast. Multiplanar reformatted images are provided for review.  Dose modulation, iterative reconstruction, and/or weight based adjustment of the mA/kV was utilized to reduce the radiation dose to as low as reasonably Neck W Contrast    Result Date: 4/5/2019  EXAMINATION: CTA OF THE HEAD WITH CONTRAST; CTA OF THE NECK 4/5/2019 3:32 pm: TECHNIQUE: CTA of the head/brain was performed with the administration of intravenous contrast. Multiplanar reformatted images are provided for review. MIP images are provided for review. Dose modulation, iterative reconstruction, and/or weight based adjustment of the mA/kV was utilized to reduce the radiation dose to as low as reasonably achievable.; CTA of the neck was performed with the administration of intravenous contrast. Multiplanar reformatted images are provided for review. MIP images are provided for review. Stenosis of the internal carotid arteries measured using NASCET criteria. Dose modulation, iterative reconstruction, and/or weight based adjustment of the mA/kV was utilized to reduce the radiation dose to as low as reasonably achievable. COMPARISON: None. HISTORY: ORDERING SYSTEM PROVIDED HISTORY: SAH, ?hemorrhagic stroke TECHNOLOGIST PROVIDED HISTORY: ; ORDERING SYSTEM PROVIDED HISTORY: SAH, ?hemorrhagic stroke TECHNOLOGIST PROVIDED HISTORY: Ordering Physician Provided Reason for Exam: head bleed/ found down Acuity: Unknown FINDINGS: CTA NECK: AORTIC ARCH/ARCH VESSELS: There is a normal branch pattern of the aortic arch. No significant stenosis is seen of the innominate artery or subclavian arteries. CAROTID ARTERIES: The common carotid arteries are normal in appearance without evidence of a flow limiting stenosis. The internal carotid arteries are normal in appearance without evidence of a flow limiting stenosis by NASCET criteria. No dissection or arterial injury is seen. VERTEBRAL ARTERIES: The vertebral arteries both arise from the subclavian arteries and are normal in caliber without evidence of flow limiting stenosis or dissection. SOFT TISSUES: The lung apices are clear. No cervical or superior mediastinal lymphadenopathy.   The visualized portion of the larynx and pharynx appear unremarkable. The parotid, submandibular and thyroid glands demonstrate no acute abnormality. BONES: The visualized osseous structures appear unremarkable. CTA HEAD: ANTERIOR CIRCULATION: The internal carotid arteries are normal in course and caliber without focal stenosis. The anterior cerebral and middle cerebral arteries demonstrate no focal stenosis. POSTERIOR CIRCULATION: The posterior cerebral arteries demonstrate no focal stenosis. The vertebral and basilar arteries appear unremarkable. BRAIN: There is redemonstration of an intraparenchymal hemorrhage within the left thalamic region as well as intraventricular hemorrhagic products. Unremarkable CTA of the head and neck. Critical results were called by Dr. Kassandra Meza to Dr. Carole Salas on 4/5/2019 at 16:12.     Ct Chest Abdomen Pelvis W Contrast    Result Date: 4/5/2019  EXAMINATION: CT OF THE CHEST, ABDOMEN, AND PELVIS WITH CONTRAST 4/5/2019 3:19 pm TECHNIQUE: CT of the chest, abdomen and pelvis was performed with the administration of intravenous contrast. Multiplanar reformatted images are provided for review. Dose modulation, iterative reconstruction, and/or weight based adjustment of the mA/kV was utilized to reduce the radiation dose to as low as reasonably achievable. COMPARISON: None HISTORY: ORDERING SYSTEM PROVIDED HISTORY: trauma TECHNOLOGIST PROVIDED HISTORY: Ordering Physician Provided Reason for Exam: found down Acuity: Unknown FINDINGS: Chest: Mediastinum:  Thoracic aorta and central portion of the pulmonary artery opacify normally. The ascending thoracic aorta is of normal caliber. Heart size is normal. There is no pericardial effusion. No mediastinal or hilar lymph nodes exceed the CT criteria for abnormal enlargement. Lungs/pleura: Clear Soft Tissues/Bones: No fracture identified Abdomen/Pelvis: Organs: The abdominal wall appears normal. Liver appears somewhat nodular. Spleen is enlarged. Gastroesophageal varices are noted. Pancreas appears normal.  The adrenals are normal.  The gallbladder surgically absent. . Kidneys appear normal. The bladder appears normal. GI/Bowel: The stomach,small bowel, and colon appear normal. Appendix is not identified. Pelvis: Uterus appears atrophic. Peritoneum/Retroperitoneum: The abdominal aorta and iliac arteries are normal in caliber. There is no pathologic adenopathy. There is calcified plaque along the aorta and its branches. Bones/Soft Tissues: Normal     No acute traumatic sequelae. Cirrhosis, splenomegaly, and esophageal varices consistent with portal venous hypertension. Cta Head W Contrast    Result Date: 4/5/2019  EXAMINATION: CTA OF THE HEAD WITH CONTRAST; CTA OF THE NECK 4/5/2019 3:32 pm: TECHNIQUE: CTA of the head/brain was performed with the administration of intravenous contrast. Multiplanar reformatted images are provided for review. MIP images are provided for review. Dose modulation, iterative reconstruction, and/or weight based adjustment of the mA/kV was utilized to reduce the radiation dose to as low as reasonably achievable.; CTA of the neck was performed with the administration of intravenous contrast. Multiplanar reformatted images are provided for review. MIP images are provided for review. Stenosis of the internal carotid arteries measured using NASCET criteria. Dose modulation, iterative reconstruction, and/or weight based adjustment of the mA/kV was utilized to reduce the radiation dose to as low as reasonably achievable. COMPARISON: None. HISTORY: ORDERING SYSTEM PROVIDED HISTORY: SAH, ?hemorrhagic stroke TECHNOLOGIST PROVIDED HISTORY: ; ORDERING SYSTEM PROVIDED HISTORY: SAH, ?hemorrhagic stroke TECHNOLOGIST PROVIDED HISTORY: Ordering Physician Provided Reason for Exam: head bleed/ found down Acuity: Unknown FINDINGS: CTA NECK: AORTIC ARCH/ARCH VESSELS: There is a normal branch pattern of the aortic arch.  No significant stenosis is seen of the innominate artery or Soft, no rigidity   NEUROLOGIC:  Mental Status:  Not oriented to date, Not oriented to person and Not oriented to place,somnolent             Cranial Nerves:    III: Pupils:  equal, round, sluggish reactivity to light  III,IV,VI: Extra Ocular Movements: intact  VII: Facial strength: intact    Motor Exam:    Drift:  Present RUE, RLE, LLE   Tone:  abnormal - RUE, RLE          Deep Tendon Reflexes:    Right Bicep:  1+  Left Bicep:  2+  Right Knee:  2+  Left Knee:  2+    Plantar Response:  Right:  downgoing  Left:  downgoing    Clonus:  absent  Branham's:  N/A           DRAINS: R EVD. ICP<20 mmHg. ASSESSMENT AND PLAN:           NEUROLOGIC:  - Imaging reviewed. Repeat CT head indicates evolving L IPH w/ 2/2 SAH, IVH of lateral 3rd, 4th ventricles. - R EVD placed following admission. ICP 3-8 mmHg. Total EVD output 43 cc since admission.    - AEDs: Not indicated at this time  - Goal SBP < 140/90  - Neuro checks per protocol    CARDIOVASCULAR:  - Goal SBP < 140/90  - Elevated troponin on admission at 359. EKG on admission indicates lateral T wave changes.  - Repeat EKG pending, troponin 359->223. Cardiology consulted and following.   - Continue telemetry    PULMONARY:  - Placed on BiPAP for comfort. Sp02 stable. - CXR this morning shows no acute intrathoracic process. RENAL/FLUID/ELECTROLYTE:  - BUN 33/ Creatinine 0.64  - Urine output 300 cc since admission  - IVF: NS @ 30 ml/hr  - CK trending down. 1,046->442  - Replace electrolytes PRN  - Daily BMP    GI/NUTRITION:  -LFT for evaluation of Hep C w/ cirrhosis:  Mildly elevated AST, Alk phos. Borderline decreased albumin. NUTRITION:  Diet NPO Effective Now  - Bowel regimen:  MoM PRN  - GI prophylaxis: Pepcid 20 mg qd    ID:  - Tmax 36.4  - WBC 9.9 on admission  - Continue to monitor for fevers  - Daily CBC    HEME:   - H&H 14.3/42.9 on admission  - Platelets 531  - Daily CBC    ENDOCRINE:  - Continue to monitor blood glucose, goal <180      OTHER:  - PT/OT/ST   - Code Status: Full, goals of care to be discussed today    PROPHYLAXIS:  Stress ulcer: H2 blocker    DVT PROPHYLAXIS:  - SCD sleeves - Thigh High   - JENNIFER stockings - Thigh High  - No chemoprophylaxis anticoagulation at this time. DISPOSITION:  [x] To remain ICU: Close monitoring of neurologic status, EVD pressures  [] OK for out of ICU from Neuro Critical Care standpoint    We will continue to follow along. For any changes in exam or patient status please contact Neuro Critical Care.       Hayden Gentile MD  Neuro Critical Care  Pager [phone]  4/6/2019     6:37 AM

## 2019-04-06 NOTE — PROGRESS NOTES
Patient's son, Gerard Diehl states he is power of  and will look for paper work. Also states he and his brother are disagreeing on patients code status. Encouraged to bring paper work in the morning.

## 2019-04-06 NOTE — CONSULTS
SpO2 99%    Constitutional and General Appearance: Patient is on BiPAP. Patient is minimally responsive. Opens her eyes, does not follow commands. Moves extremities by herself  HEENT: PERRL, no cervical lymphadenopathy. No masses palpable. Normal oral mucosa  Respiratory:  · Normal excursion and expansion without use of accessory muscles  · Resp Auscultation: Bilateral air entry+. Clear to auscultation  Cardiovascular:  · Heart tones are normal. regular S1 and S2. Tachycardic  · Jugular venous pulsation Normal  · Peripheral pulses are symmetrical and full   Abdomen:   · No masses or tenderness  · Bowel sounds present  Extremities:  ·  No Cyanosis or Clubbing  ·  Lower extremity edema: No  ·  Skin: Warm and dry  Neurological:  · Minimally responsive. Does not follow commands  · Moves extremities by self. Withdrawals to pain. Venous    DATA:    Diagnostics:    EKG: Sinus tachycardia. Nonspecific ST depression in V3 to V6  ECHO: ordered, but not yet obtained. Ejection fraction: -  Stress Test: not obtained. Cardiac Angiography: not obtained. Labs:     CBC:   Recent Labs     04/05/19  1515   WBC 9.9   HGB 14.3   HCT 42.9        BMP:   Recent Labs     04/05/19  1515 04/06/19  0541    143   K 4.4 3.8   CO2 20 20   BUN 31* 33*   CREATININE 0.72 0.64   LABGLOM NOT REPORTED >60   GLUCOSE 118* 120*     BNP: No results for input(s): BNP in the last 72 hours. PT/INR:   Recent Labs     04/05/19  1515   PROTIME 12.5*   INR 1.2     APTT:  Recent Labs     04/05/19  1515   APTT 23.5     CARDIAC ENZYMES:  Recent Labs     04/05/19  1515 04/06/19  0541   CKTOTAL 1,046* 442*     FASTING LIPID PANEL:No results found for: HDL, LDLDIRECT, LDLCALC, TRIG  LIVER PROFILE:  Recent Labs     04/05/19 2031   AST 62*   ALT 24   LABALBU 3.4*       IMPRESSION:    Patient Active Problem List   Diagnosis    Intracranial bleed (HCC)    Altered mental status     1.  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 Altered mental status  3. Elevated troponin    RECOMMENDATIONS:    1. Patient elevated troponin and EKG changes probably from intracranial hemorrhage and nonspecific to cardiac cause  2. Follow-up echo    Discussed with Nurse.     Electronically signed by Griselda Michaels MD on 4/6/2019 at 10:38 [address] [place] [address] [facility]      [phone]

## 2019-04-06 NOTE — CARE COORDINATION
Pt is unable to complete admission assessment, unresponsive. No family present at bedside. Bedside nurse Wadell Boast informs pt's sons are to arrive to hospital this afternoon to have a meeting with Neuro [facility] Care to discuss goals of care and palliation to be discussed d/t pt suffering IPH with poor prognosis. Writer informed nurse to contact CM when family is available. Full assessment not appropriate at this time. Will monitor needs. 1450 Received call from Conrado Raza pt's sons will both be available soon, one son here other on their way. Writer informed she will be available in about 15 min. 1507 On unit no family at bedside, call to Conrado Raza bedside RN they have just entered a family meeting and she will contact the writer when family is available.

## 2019-04-06 NOTE — PROGRESS NOTES
[address]  Speech Language Pathology    Date: 4/6/2019  Patient Name: Kimberly Jaquez  YOB: 1940   AGE: 66 y.o. MRN: [id number]        Patient Not Available for Speech Therapy     Due to:  [] Testing  [] Hemodialysis  [x] Cancelled by RN  [] Surgery   [] Intubation/Sedation/Pain Medication  [x] Medical instability  [x] Other: Pt with external ventricular drain; not appropriate for evaluation at this time. Possible palliative needs    Next scheduled treatment: 4/8/19    Completed by: KELLY Caldwell Ed.  CCC-SLP

## 2019-04-06 NOTE — PROGRESS NOTES
Alcohol Screening and Brief Intervention  Deferred SBIRT due to TBI, AMS    Recent Labs     04/05/19  1515   ALC <10       Alcohol Pre-screening          Alcohol Screening Audit       Drug Pre-Screening        Drug Screening DAST       Mood Pre-Screening (PHQ-2)          Mood Pre-Screening (PHQ-9)         Deferred [x]    Completed on: 4/6/2019   Jeannett Hatchet, RN

## 2019-04-06 NOTE — PROGRESS NOTES
Physical Therapy  DATE: 2019    NAME: Lala Hannah  MRN: [id number]   : 1940    Patient not seen this date for Physical Therapy due to:  [] Blood transfusion in progress  [] Hemodialysis  []  Patient Declined  [] Spine Precautions   [] Strict Bedrest  [] Surgery/ Procedure  [] Testing      [x] Other RN cx, Ventricular drain. Possible Palliative       [] PT being discontinued at this time. Patient independent. No further needs. [] PT being discontinued at this time as the patient has been transferred to palliative care. No further needs.     Pan Ennis, PT

## 2019-04-06 NOTE — PLAN OF CARE
Problem: Restraint Use - Nonviolent/Non-Self-Destructive Behavior:  Goal: Absence of restraint-related injury  Description  Absence of restraint-related injury  Outcome: Ongoing  Note:   Restraints in place on right hand. ROM performed. No injury noted. Continue to monitor.

## 2019-04-06 NOTE — PROGRESS NOTES
Pt transported from neuro ICU to CT on V60 with RNs- Orlando Frey and Ricardo Sanchez. Pt tolerated well. No incidents.

## 2019-04-06 NOTE — PROGRESS NOTES
Drewviviane  22.     Neurosurgery Service      Daily Progress Note         Chief complaint:     traumatic left thalamic intraparenchymal hemorrhage     Subjective :     No major events or new complaints through the night . No changes in mental status throughout the night . Mild bleeding at the  EVD site, stitch placed overnight. ICP has been 3-8 through a tthe night . No New Neuromuscular changes in his upper or lower extremities. A febrile . Hemodynamically stable . Cardene gtt started and titrated/ weaned . She had periods of apnea yesterday and was placed on BiPAP. No vomiting . No problems with urination or bowel movements. Objective :     Vitals:    04/06/19 0717 04/06/19 0729 04/06/19 0732 04/06/19 0747   BP: (!) 150/56  (!) 155/51 (!) 144/46   Pulse: 106  109 101   Resp: 18 15 17 22   Temp:    98.2 °F (36.8 °C)   TempSrc:    Axillary   SpO2: 100% 100%  99%         Physical Examination :    Resting in bed, appears comfortable in no distress. On BIPAP         Head: normocephalic,bruising to right side of the face . EVD   Eyes:  Right periorbital ecchymosis  / edema . Pupils:  equal, round, sluggish reactivity to light  ENT: Unremarkable. Tongue midline   Neck Supple,Trachea midline . Lungs - Clear to auscultation. No crackles or wheezes. Cardiovascular - S1, S2, regular rate and rhythm. Abdomen - soft, non-distended, non-tender, no peritoneal inflammation signs  Back:  No midline spine tenderness  Skin :  PWD. No open wounds , abrasions or contusions . No rash   Neuro-Muscular exam:    Somnolent,Eyes closed open to Verbal/ painful stimuli, left upper extremity spontaneous movement. Left lower extremity intermittent minimal movements. Lt Upper extremities held in flexion, right sided hemiparesis. GCS 8.aphasic, mumbling .  Pupils:  equal, round, sluggish reactivity to light      Lab Results   Component Value Date    WBC 9.9 04/05/2019    HGB 14.3 04/05/2019    HCT 42.9 04/05/2019     04/05/2019    ALT 24 04/05/2019    AST 62 (H) 04/05/2019     04/06/2019    K 3.8 04/06/2019     (H) 04/06/2019    CREATININE 0.64 04/06/2019    BUN 33 (H) 04/06/2019    CO2 20 04/06/2019    INR 1.2 04/05/2019       Radiology   Xr Pelvis (1-2 Views)    Result Date: 4/5/2019  EXAMINATION: SINGLE XRAY VIEW OF THE PELVIS 4/5/2019 3:23 pm COMPARISON: None. HISTORY: ORDERING SYSTEM PROVIDED HISTORY: trauma TECHNOLOGIST PROVIDED HISTORY: trauma Initial evaluation. FINDINGS: The osseous structures appear osteopenic. No convincing acute abnormality seen of the bony pelvis. Degenerative changes of the lumbar spine, sacroiliac joints and hips bilaterally. Vascular calcifications are noted. Osteopenia without convincing acute abnormality of the bony pelvis. Xr Shoulder Right (min 2 Views)    Result Date: 4/6/2019  EXAMINATION: 3 XRAY VIEWS OF THE RIGHT SHOULDER; AP AND LATERAL XRAY VIEWS OF THE RIGHT FOREARM; 3 XRAY VIEWS OF THE RIGHT ELBOW 4/6/2019 3:10 am COMPARISON: None. HISTORY: ORDERING SYSTEM PROVIDED HISTORY: trauma TECHNOLOGIST PROVIDED HISTORY: trauma FINDINGS: Right shoulder: Three views of the right shoulder. No acute fracture or dislocation. Soft tissue swelling about the shoulder. Normal alignment of the glenohumeral and acromioclavicular joints. No aggressive skeletal lesion. Visualized lung is clear. Visualized ribs are intact. Osteopenia. Right elbow: Frontal, lateral and oblique views. Soft tissue swelling about the elbow. No radiographic evidence of an elbow joint effusion. No acute fracture or malalignment. Mild ulnohumeral compartment DJD. Osteopenia. Right forearm: Frontal and lateral views. Soft tissue swelling about the forearm. No acute fracture or malalignment. Mild-to-moderate DJD in the wrist.  Osteopenia. No acute fracture in the right shoulder, right elbow or right forearm. Osteopenia.  Mild DJD in the right elbow and mild-to-moderate DJD in the right wrist.     Xr Elbow Right (min 3 Views)    Result Date: 4/6/2019  EXAMINATION: 3 XRAY VIEWS OF THE RIGHT SHOULDER; AP AND LATERAL XRAY VIEWS OF THE RIGHT FOREARM; 3 XRAY VIEWS OF THE RIGHT ELBOW 4/6/2019 3:10 am COMPARISON: None. HISTORY: ORDERING SYSTEM PROVIDED HISTORY: trauma TECHNOLOGIST PROVIDED HISTORY: trauma FINDINGS: Right shoulder: Three views of the right shoulder. No acute fracture or dislocation. Soft tissue swelling about the shoulder. Normal alignment of the glenohumeral and acromioclavicular joints. No aggressive skeletal lesion. Visualized lung is clear. Visualized ribs are intact. Osteopenia. Right elbow: Frontal, lateral and oblique views. Soft tissue swelling about the elbow. No radiographic evidence of an elbow joint effusion. No acute fracture or malalignment. Mild ulnohumeral compartment DJD. Osteopenia. Right forearm: Frontal and lateral views. Soft tissue swelling about the forearm. No acute fracture or malalignment. Mild-to-moderate DJD in the wrist.  Osteopenia. No acute fracture in the right shoulder, right elbow or right forearm. Osteopenia. Mild DJD in the right elbow and mild-to-moderate DJD in the right wrist.     Xr Radius Ulna Right (2 Views)    Result Date: 4/6/2019  EXAMINATION: 3 XRAY VIEWS OF THE RIGHT SHOULDER; AP AND LATERAL XRAY VIEWS OF THE RIGHT FOREARM; 3 XRAY VIEWS OF THE RIGHT ELBOW 4/6/2019 3:10 am COMPARISON: None. HISTORY: ORDERING SYSTEM PROVIDED HISTORY: trauma TECHNOLOGIST PROVIDED HISTORY: trauma FINDINGS: Right shoulder: Three views of the right shoulder. No acute fracture or dislocation. Soft tissue swelling about the shoulder. Normal alignment of the glenohumeral and acromioclavicular joints. No aggressive skeletal lesion. Visualized lung is clear. Visualized ribs are intact. Osteopenia. Right elbow: Frontal, lateral and oblique views. Soft tissue swelling about the elbow.   No radiographic evidence of an elbow joint effusion. No acute fracture or malalignment. Mild ulnohumeral compartment DJD. Osteopenia. Right forearm: Frontal and lateral views. Soft tissue swelling about the forearm. No acute fracture or malalignment. Mild-to-moderate DJD in the wrist.  Osteopenia. No acute fracture in the right shoulder, right elbow or right forearm. Osteopenia. Mild DJD in the right elbow and mild-to-moderate DJD in the right wrist.     Ct Head Wo Contrast    Result Date: 4/6/2019  EXAMINATION: CT OF THE HEAD WITHOUT CONTRAST  4/5/2019 11:11 pm TECHNIQUE: CT of the head was performed without the administration of intravenous contrast. Dose modulation, iterative reconstruction, and/or weight based adjustment of the mA/kV was utilized to reduce the radiation dose to as low as reasonably achievable. COMPARISON: CT head 04/05/2029 HISTORY: ORDERING SYSTEM PROVIDED HISTORY: f/u [facility] s/p EVD placement TECHNOLOGIST PROVIDED HISTORY: Ordering Physician Provided Reason for Exam: EVD placement follow up Acuity: Unknown Type of Exam: Subsequent/Follow-up FINDINGS: BRAIN/VENTRICLES: Interval placement of right frontal approach external ventricular drainage device. Tip terminates within the frontal horn left lateral ventricle near the caudal thalamic groove. Interval pneumocephalus identified consistent with interval placement. Redemonstration of the left thalamic intraparenchymal hemorrhage measuring 3.6 x 2.5 cm in size with evidence of subarachnoid decompression/subarachnoid extension. Evidence of blood products within the lateral 3rd and 4th ventricle identified. Evidence of blood products identified overlying both frontal and parietal lobes and along the interhemispheric falx new from the prior examination but may be related to redistribution of subarachnoid blood products. No shift of midline structures. Basal cisterns are patent. Mild generalized involutional change with chronic small vessel ischemic disease.   Intracranial region. There is no definite acute osseous abnormalities. Intraparenchymal hemorrhage in the left thalamic region with adjacent vasogenic edema. Hemorrhagic products within the lateral ventricles, 3rd ventricle, cerebral aqueduct, and 4th ventricle. Minimal rightward midline shift. Critical results were called by Dr. Kassandra Meza to Dr. Carole Salas on 4/5/2019 at 16:02. Ct Facial Bones Wo Contrast    Result Date: 4/5/2019  EXAMINATION: CT OF THE FACE WITHOUT CONTRAST  4/5/2019 3:24 pm TECHNIQUE: CT of the face was performed without the administration of intravenous contrast. Multiplanar reformatted images are provided for review. Dose modulation, iterative reconstruction, and/or weight based adjustment of the mA/kV was utilized to reduce the radiation dose to as low as reasonably achievable. COMPARISON: None HISTORY: ORDERING SYSTEM PROVIDED HISTORY: trauma; significant R facial bruising FINDINGS: FACIAL BONES:  The maxilla, pterygoid plates and zygomatic arches are intact. The mandible is intact. The mandibular condyles are normally situated. The nasal bones and maxillary nasal processes are intact. ORBITS:  The globes appear intact. The extraocular muscles, optic nerve sheath complexes and lacrimal glands appear unremarkable. No retrobulbar hematoma or mass is seen. The orbital walls and rims are intact. SINUSES/MASTOIDS:  The paranasal sinuses and mastoid air cells are well aerated. No acute fracture is seen. SOFT TISSUES:  Preseptal swelling of the right face extending superiorly along the supraorbital ridge into the right frontal scalp. Asymmetric swelling of the lateral face on the right compared with the left. No fluid collection is identified. No air or unexpected radiopaque foreign bodies. Soft tissue swelling/edema along the upper neck circumferentially right greater than left.  The visualized intracranial contents show acute left thalamic intraparenchymal blood products and intraventricular blood products. 1. Bruising of the face without fracture, radiopaque foreign body, or superficial fluid collection. 2. Acute left thalamic hemorrhage with intraventricular extension. This is previously called to Dr. Harrison Tariq by Dr. Augusta Escobar at 0 on 19. Ct Cervical Spine Wo Contrast    Result Date: 2019  EXAMINATION: CT OF THE CERVICAL SPINE WITHOUT CONTRAST 2019 3:19 pm TECHNIQUE: CT of the cervical spine was performed without the administration of intravenous contrast. Multiplanar reformatted images are provided for review. Dose modulation, iterative reconstruction, and/or weight based adjustment of the mA/kV was utilized to reduce the radiation dose to as low as reasonably achievable. COMPARISON: None. HISTORY: ORDERING SYSTEM PROVIDED HISTORY: trauma FINDINGS: BONES/ALIGNMENT: There is no evidence of an acute cervical spine fracture. There is normal alignment of the cervical spine except moderate dextroconvex scoliosis and reversal of the normal lordotic curvature. .  Decompressive laminectomies at C4 and C5. Posterior fusion rods and pedicular screws at C3, 4, 5 and 6 on the right and C5 and 6 on the left. DEGENERATIVE CHANGES: Multilevel disc space narrowing. SOFT TISSUES: There is no prevertebral soft tissue swelling. No acute abnormality of the cervical spine. Posterior interbody fixation as above. Ct Thoracic Spine Wo Contrast    Result Date: 2019  EXAMINATION: CT OF THE THORACIC SPINE WITHOUT CONTRAST  2019 3:18 pm: TECHNIQUE: CT of the thoracic spine was performed without the administration of intravenous contrast. Multiplanar reformatted images are provided for review. Dose modulation, iterative reconstruction, and/or weight based adjustment of the mA/kV was utilized to reduce the radiation dose to as low as reasonably achievable. COMPARISON: None.  HISTORY: ORDERING SYSTEM PROVIDED HISTORY: trauma FINDINGS: BONES/ALIGNMENT: There is normal alignment of the spine. The vertebral body heights are maintained. No osseous destructive lesion is seen. DEGENERATIVE CHANGES: No gross spinal canal stenosis or bony neural foraminal narrowing of the thoracic spine. SOFT TISSUES: No paraspinal mass is seen. Unremarkable CT of the thoracic spine. Ct Lumbar Spine Wo Contrast    Result Date: 4/5/2019  EXAMINATION: CT OF THE LUMBAR SPINE WITHOUT CONTRAST  4/5/2019 TECHNIQUE: CT of the lumbar spine was performed without the administration of intravenous contrast. Multiplanar reformatted images are provided for review. Dose modulation, iterative reconstruction, and/or weight based adjustment of the mA/kV was utilized to reduce the radiation dose to as low as reasonably achievable. COMPARISON: None HISTORY: ORDERING SYSTEM PROVIDED HISTORY: trauma TECHNOLOGIST PROVIDED HISTORY: trauma FINDINGS: BONES/ALIGNMENT: There is minimal compression deformity of L4 vertebral body with less than 20% height loss/wedging anteriorly. Multilevel degenerative disc disease is evident. The facets are intact. The spinous processes are intact. Lamina and pedicles are intact. There is evidence of prior posterior decompression. DEGENERATIVE CHANGES: Multilevel degenerative disc disease with facet arthropathy. SOFT TISSUES/RETROPERITONEUM: Diverticulosis. Atherosclerotic changes of aorta. Large stool throughout the colon. An avidly enhancing dilated left ovarian vessel is noted. 1. Age-indeterminate compression deformity of L4 with less than 20% height loss and no significant retropulsion. 2. Osteopenic skeleton with multilevel degenerative changes. Xr Chest Portable    Result Date: 4/6/2019  EXAMINATION: SINGLE XRAY VIEW OF THE CHEST 4/6/2019 5:27 am COMPARISON: 04/05/2019 HISTORY: ORDERING SYSTEM PROVIDED HISTORY: Concern for aspiration TECHNOLOGIST PROVIDED HISTORY: Concern for aspiration FINDINGS: Cardiomediastinal silhouette and pulmonary vasculature are within normal limits. There is no focal airspace consolidation. No evidence of pneumothorax or pleural effusion on this limited supine study. No acute osseous abnormality. No acute intrathoracic process. Xr Chest Portable    Result Date: 4/5/2019  EXAMINATION: SINGLE XRAY VIEW OF THE CHEST 4/5/2019 3:24 pm COMPARISON: None. HISTORY: ORDERING SYSTEM PROVIDED HISTORY: trauma TECHNOLOGIST PROVIDED HISTORY: trauma FINDINGS: Slight increase in interstitial markings. The lungs are without acute focal process. There is no effusion or pneumothorax. The cardiomediastinal silhouette is without acute process. The osseous structures are without acute process. No fracture or pneumothorax. Increased interstitial markings     Cta Neck W Contrast    Result Date: 4/5/2019  EXAMINATION: CTA OF THE HEAD WITH CONTRAST; CTA OF THE NECK 4/5/2019 3:32 pm: TECHNIQUE: CTA of the head/brain was performed with the administration of intravenous contrast. Multiplanar reformatted images are provided for review. MIP images are provided for review. Dose modulation, iterative reconstruction, and/or weight based adjustment of the mA/kV was utilized to reduce the radiation dose to as low as reasonably achievable.; CTA of the neck was performed with the administration of intravenous contrast. Multiplanar reformatted images are provided for review. MIP images are provided for review. Stenosis of the internal carotid arteries measured using NASCET criteria. Dose modulation, iterative reconstruction, and/or weight based adjustment of the mA/kV was utilized to reduce the radiation dose to as low as reasonably achievable. COMPARISON: None.  HISTORY: ORDERING SYSTEM PROVIDED HISTORY: SAH, ?hemorrhagic stroke TECHNOLOGIST PROVIDED HISTORY: ; ORDERING SYSTEM PROVIDED HISTORY: SAH, ?hemorrhagic stroke TECHNOLOGIST PROVIDED HISTORY: Ordering Physician Provided Reason for Exam: head bleed/ found down Acuity: Unknown FINDINGS: CTA NECK: AORTIC ARCH/ARCH VESSELS: There dose to as low as reasonably achievable. COMPARISON: None HISTORY: ORDERING SYSTEM PROVIDED HISTORY: trauma TECHNOLOGIST PROVIDED HISTORY: Ordering Physician Provided Reason for Exam: found down Acuity: Unknown FINDINGS: Chest: Mediastinum:  Thoracic aorta and central portion of the pulmonary artery opacify normally. The ascending thoracic aorta is of normal caliber. Heart size is normal. There is no pericardial effusion. No mediastinal or hilar lymph nodes exceed the CT criteria for abnormal enlargement. Lungs/pleura: Clear Soft Tissues/Bones: No fracture identified Abdomen/Pelvis: Organs: The abdominal wall appears normal. Liver appears somewhat nodular. Spleen is enlarged. Gastroesophageal varices are noted. Pancreas appears normal.  The adrenals are normal.  The gallbladder surgically absent. . Kidneys appear normal. The bladder appears normal. GI/Bowel: The stomach,small bowel, and colon appear normal. Appendix is not identified. Pelvis: Uterus appears atrophic. Peritoneum/Retroperitoneum: The abdominal aorta and iliac arteries are normal in caliber. There is no pathologic adenopathy. There is calcified plaque along the aorta and its branches. Bones/Soft Tissues: Normal     No acute traumatic sequelae. Cirrhosis, splenomegaly, and esophageal varices consistent with portal venous hypertension. Cta Head W Contrast    Result Date: 4/5/2019  EXAMINATION: CTA OF THE HEAD WITH CONTRAST; CTA OF THE NECK 4/5/2019 3:32 pm: TECHNIQUE: CTA of the head/brain was performed with the administration of intravenous contrast. Multiplanar reformatted images are provided for review. MIP images are provided for review. Dose modulation, iterative reconstruction, and/or weight based adjustment of the mA/kV was utilized to reduce the radiation dose to as low as reasonably achievable.; CTA of the neck was performed with the administration of intravenous contrast. Multiplanar reformatted images are provided for review.   MIP images are provided for review. Stenosis of the internal carotid arteries measured using NASCET criteria. Dose modulation, iterative reconstruction, and/or weight based adjustment of the mA/kV was utilized to reduce the radiation dose to as low as reasonably achievable. COMPARISON: None. HISTORY: ORDERING SYSTEM PROVIDED HISTORY: SAH, ?hemorrhagic stroke TECHNOLOGIST PROVIDED HISTORY: ; ORDERING SYSTEM PROVIDED HISTORY: SAH, ?hemorrhagic stroke TECHNOLOGIST PROVIDED HISTORY: Ordering Physician Provided Reason for Exam: head bleed/ found down Acuity: Unknown FINDINGS: CTA NECK: AORTIC ARCH/ARCH VESSELS: There is a normal branch pattern of the aortic arch. No significant stenosis is seen of the innominate artery or subclavian arteries. CAROTID ARTERIES: The common carotid arteries are normal in appearance without evidence of a flow limiting stenosis. The internal carotid arteries are normal in appearance without evidence of a flow limiting stenosis by NASCET criteria. No dissection or arterial injury is seen. VERTEBRAL ARTERIES: The vertebral arteries both arise from the subclavian arteries and are normal in caliber without evidence of flow limiting stenosis or dissection. SOFT TISSUES: The lung apices are clear. No cervical or superior mediastinal lymphadenopathy. The visualized portion of the larynx and pharynx appear unremarkable. The parotid, submandibular and thyroid glands demonstrate no acute abnormality. BONES: The visualized osseous structures appear unremarkable. CTA HEAD: ANTERIOR CIRCULATION: The internal carotid arteries are normal in course and caliber without focal stenosis. The anterior cerebral and middle cerebral arteries demonstrate no focal stenosis. POSTERIOR CIRCULATION: The posterior cerebral arteries demonstrate no focal stenosis. The vertebral and basilar arteries appear unremarkable.  BRAIN: There is redemonstration of an intraparenchymal hemorrhage within the left thalamic region as well as intraventricular hemorrhagic products. Unremarkable CTA of the head and neck. Critical results were called by Dr. Arturo Burks to Dr. Marcie Rebolledo on 4/5/2019 at 16:12.           ASSESSMENT & PLAN:    This is a 66 y.o. 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 - Right EVD placed 4/5/19   . Plan :     - Labs, Radiologic studies and notes were reviewed   - Repeat CT head indicates evolving Left  IPH with secondary  SAH, IVH of lateral 3rd, 4th ventricles. Unchanged from yesterday.    -No Operative Neurosurgical interventions planned at this time   -Continue EVD care , drainage output and ICP monitoring .    -Continue Neuro assessment per unit protocol.   - Hold all antiplatelets and anticoagulants  - Remaining medical care as per trauma. Eagle Ramon .  CNP  Neurosurgery    Cell : Cell [phone]  pager [phone]

## 2019-04-06 NOTE — PROGRESS NOTES
Trauma Tertiary Survey    Admit Date: 4/5/2019  Hospital day 0    [facility]     No past medical history on file. Scheduled Meds:   ondansetron  4 mg Intravenous Once    sodium chloride flush  10 mL Intravenous 2 times per day    famotidine (PEPCID) injection  20 mg Intravenous Daily    fentanNYL  100 mcg Intravenous Once    chlorhexidine  15 mL Mouth/Throat BID    fentanNYL  25 mcg Intravenous Once     Continuous Infusions:   niCARdipine 2.5 mg/hr (04/06/19 0033)     PRN Meds:sodium chloride flush, magnesium hydroxide, ondansetron, oxyCODONE, labetalol, hydrALAZINE    Subjective:     Patient evaluated at the bedside. She is status post EVD placement. She is on BiPAP, minimally responsive. She does open her eyes to verbal stimulation. She does localize pain. She does not have any verbal response. She has not been complaining of pain and has not had any significant new symptoms such as nausea or vomiting.     Objective:     Patient Vitals for the past 8 hrs:   BP Temp Temp src Pulse Resp SpO2   04/06/19 0113 -- -- -- 104 21 100 %   04/06/19 0032 (!) 127/50 -- -- 106 19 99 %   04/06/19 0017 (!) 119/39 -- -- 91 20 93 %   04/06/19 0002 (!) 108/36 97 °F (36.1 °C) Oral 88 20 97 %   04/05/19 2347 (!) 111/43 -- -- 88 18 98 %   04/05/19 2302 -- -- -- 104 16 100 %   04/05/19 2247 (!) 122/44 -- -- 92 14 100 %   04/05/19 2232 (!) 123/41 -- -- 95 17 100 %   04/05/19 2217 (!) 141/46 -- -- 104 20 100 %   04/05/19 2202 (!) 133/37 -- -- 94 19 100 %   04/05/19 2147 (!) 134/36 -- -- 91 15 99 %   04/05/19 2132 (!) 144/39 -- -- 102 16 100 %   04/05/19 2117 -- -- -- 89 15 100 %   04/05/19 2102 (!) 125/35 -- -- 90 17 100 %   04/05/19 2047 (!) 126/42 -- -- 87 15 100 %   04/05/19 2032 (!) 122/36 -- -- 89 17 100 %   04/05/19 2017 (!) 117/53 -- -- 93 19 100 %   04/05/19 2002 (!) 132/39 97.5 °F (36.4 °C) -- 93 16 100 %   04/05/19 1947 (!) 114/34 -- -- 93 19 100 %   04/05/19 1933 (!) 124/36 -- -- 89 16 --   04/05/19 1918 (!) 116/40 -- -- 84 18 100 %   04/05/19 1903 (!) 114/33 -- -- 81 17 --   04/05/19 1900 (!) 113/33 -- -- 85 17 --   04/05/19 1848 (!) 141/44 -- -- 86 22 --   04/05/19 1833 (!) 151/62 -- -- 84 21 --   04/05/19 1829 -- -- -- -- 17 98 %   04/05/19 1815 (!) 174/66 -- -- 85 25 --       No intake/output data recorded. No intake/output data recorded. Radiology: Images reviewed    PHYSICAL EXAM:   GCS:  3 - Opens eyes to loud noise or command   4 - Moves part of body but does not remove noxious stimulus  1 - Makes no noise    Pupil size:  Left 3 mm Right 3 mm  Pupil reaction: Yes  Wiggles fingers: Left No Right No   Hand grasp:   Left patient unable to participate   Right patient unable to participate  Wiggles toes: Left patient unable to participate Right patient unable to participate  Plantar flexion: Left normal  Right normal    Gen. Appearance: Elderly appearing female patient with obvious bruising to her right face, on BiPAP, minimally interactive or responsive. HEENT: Contusions over the right face; there is R periorbital edema and ecchymosis. Pupils equal and reactive to light. No raccoon eyes. No arguello sign. No nasal deformity. No rhinorrhea or epistaxis. Oropharynx clear and moist; no evidence of intraoral trauma. Neck: No tenderness on palpation of cervical spine. Chest: Atraumatic in appearance  Pulmonary: Lungs clear to auscultation bilaterally. Equal air entry right left. Cardiovascular:  Heart sounds normal, no murmurs. Abdomen: Atraumatic in appearance. Soft, nontender, nondistended. Bowel sounds normal. No palpable masses. Pelvis: Stable. Neurology: GCS 8. Extremities: No obvious deformities. Radial and dorsalis pedis pulses 2+ bilaterally.      Spine:     Spine Tenderness ROM   Cervical 0 /10 Unable to assess 2/2 patient's condition   Thoracic 0 /10 Unable to assess 2/2 patient's condition   Lumbar 0 /10 Unable to assess 2/2 patient's condition     Musculoskeletal    Joint Tenderness Swelling ROM   Right shoulder present absent Unable to assess 2/2 patient's condition   Left shoulder absent absent Unable to assess 2/2 patient's condition   Right elbow present absent Unable to assess 2/2 patient's condition   Left elbow absent absent Unable to assess 2/2 patient's condition   Right wrist present absent Unable to assess 2/2 patient's condition   Left wrist absent absent Unable to assess 2/2 patient's condition   Right hand grasp absent absent Unable to assess 2/2 patient's condition   Left hand grasp absent absent Unable to assess 2/2 patient's condition   Right hip absent absent Unable to assess 2/2 patient's condition   Left hip absent absent Unable to assess 2/2 patient's condition   Right knee absent absent Unable to assess 2/2 patient's condition   Left knee absent absent Unable to assess 2/2 patient's condition   Right ankle absent absent Unable to assess 2/2 patient's condition   Left ankle absent absent Unable to assess 2/2 patient's condition   Right foot absent absent Unable to assess 2/2 patient's condition   Left foot absent absent Unable to assess 2/2 patient's condition           CONSULTS: Neuro Critical Care (Primary), neurosurgery    PROCEDURES: sp EVD placement by Neurosurgery    INJURIES:        Patient Active Problem List   Diagnosis    Intracranial bleed ([facility] [address]    Altered mental status         Assessment/Plan:     -XR R shoulder, humerus, elbow, rad/ulna unremarkable  -No new injuries identified on tertiary examination  -Remaining medical care as per neuro critical care service  -Trauma Surgery will sign off at this time    No further interventions or investigations indicated from a trauma surgery standpoint. Trauma surgery service will sign off at this time. Please call for any questions or concerns.     Arin Aguilar MD  04/06/19              Trauma Attending Attestation      I have reviewed the above GCS note(s) and confirmed the key elements of the medical history and physical exam. I have seen and examined the pt. I have discussed the findings, established the care plan and recommendations with Resident, GCS RN, bedside nurse.         Oni Ordoñez DO  4/7/2019  6:00 PM

## 2019-04-07 LAB
ABSOLUTE EOS #: 0.11 K/UL (ref 0–0.44)
ABSOLUTE IMMATURE GRANULOCYTE: <0.03 K/UL (ref 0–0.3)
ABSOLUTE LYMPH #: 1.41 K/UL (ref 1.1–3.7)
ABSOLUTE MONO #: 0.89 K/UL (ref 0.1–1.2)
ANION GAP SERPL CALCULATED.3IONS-SCNC: 15 MMOL/L (ref 9–17)
BASOPHILS # BLD: 0 % (ref 0–2)
BASOPHILS ABSOLUTE: 0.03 K/UL (ref 0–0.2)
BUN BLDV-MCNC: 40 MG/DL (ref 8–23)
BUN/CREAT BLD: ABNORMAL (ref 9–20)
CALCIUM SERPL-MCNC: 8.6 MG/DL (ref 8.6–10.4)
CHLORIDE BLD-SCNC: 109 MMOL/L (ref 98–107)
CO2: 19 MMOL/L (ref 20–31)
CREAT SERPL-MCNC: 0.5 MG/DL (ref 0.5–0.9)
DIFFERENTIAL TYPE: ABNORMAL
EOSINOPHILS RELATIVE PERCENT: 1 % (ref 1–4)
GFR AFRICAN AMERICAN: >60 ML/MIN
GFR NON-AFRICAN AMERICAN: >60 ML/MIN
GFR SERPL CREATININE-BSD FRML MDRD: ABNORMAL ML/MIN/{1.73_M2}
GFR SERPL CREATININE-BSD FRML MDRD: ABNORMAL ML/MIN/{1.73_M2}
GLUCOSE BLD-MCNC: 111 MG/DL (ref 70–99)
HCT VFR BLD CALC: 33.1 % (ref 36.3–47.1)
HEMOGLOBIN: 10.5 G/DL (ref 11.9–15.1)
IMMATURE GRANULOCYTES: 0 %
LYMPHOCYTES # BLD: 17 % (ref 24–43)
MCH RBC QN AUTO: 28.8 PG (ref 25.2–33.5)
MCHC RBC AUTO-ENTMCNC: 31.7 G/DL (ref 28.4–34.8)
MCV RBC AUTO: 90.9 FL (ref 82.6–102.9)
MONOCYTES # BLD: 11 % (ref 3–12)
NRBC AUTOMATED: 0 PER 100 WBC
PDW BLD-RTO: 15 % (ref 11.8–14.4)
PLATELET # BLD: ABNORMAL K/UL (ref 138–453)
PLATELET ESTIMATE: ABNORMAL
PLATELET, FLUORESCENCE: 135 K/UL (ref 138–453)
PLATELET, IMMATURE FRACTION: 3.5 % (ref 1.1–10.3)
PMV BLD AUTO: ABNORMAL FL (ref 8.1–13.5)
POTASSIUM SERPL-SCNC: 3.6 MMOL/L (ref 3.7–5.3)
RBC # BLD: 3.64 M/UL (ref 3.95–5.11)
RBC # BLD: ABNORMAL 10*6/UL
SEG NEUTROPHILS: 71 % (ref 36–65)
SEGMENTED NEUTROPHILS ABSOLUTE COUNT: 6.04 K/UL (ref 1.5–8.1)
SODIUM BLD-SCNC: 143 MMOL/L (ref 135–144)
TROPONIN INTERP: ABNORMAL
TROPONIN T: ABNORMAL NG/ML
TROPONIN, HIGH SENSITIVITY: 108 NG/L (ref 0–14)
WBC # BLD: 8.5 K/UL (ref 3.5–11.3)
WBC # BLD: ABNORMAL 10*3/UL

## 2019-04-07 PROCEDURE — 94762 N-INVAS EAR/PLS OXIMTRY CONT: CPT

## 2019-04-07 PROCEDURE — 85025 COMPLETE CBC W/AUTO DIFF WBC: CPT

## 2019-04-07 PROCEDURE — 2000000003 HC NEURO ICU R&B

## 2019-04-07 PROCEDURE — 85055 RETICULATED PLATELET ASSAY: CPT

## 2019-04-07 PROCEDURE — 99291 CRITICAL CARE FIRST HOUR: CPT | Performed by: PSYCHIATRY & NEUROLOGY

## 2019-04-07 PROCEDURE — APPSS45 APP SPLIT SHARED TIME 31-45 MINUTES: Performed by: NURSE PRACTITIONER

## 2019-04-07 PROCEDURE — 2500000003 HC RX 250 WO HCPCS: Performed by: NURSE PRACTITIONER

## 2019-04-07 PROCEDURE — 99222 1ST HOSP IP/OBS MODERATE 55: CPT | Performed by: FAMILY MEDICINE

## 2019-04-07 PROCEDURE — 84484 ASSAY OF TROPONIN QUANT: CPT

## 2019-04-07 PROCEDURE — 2580000003 HC RX 258: Performed by: EMERGENCY MEDICINE

## 2019-04-07 PROCEDURE — 6360000002 HC RX W HCPCS: Performed by: EMERGENCY MEDICINE

## 2019-04-07 PROCEDURE — 2500000003 HC RX 250 WO HCPCS: Performed by: EMERGENCY MEDICINE

## 2019-04-07 PROCEDURE — 6360000002 HC RX W HCPCS: Performed by: STUDENT IN AN ORGANIZED HEALTH CARE EDUCATION/TRAINING PROGRAM

## 2019-04-07 PROCEDURE — 92610 EVALUATE SWALLOWING FUNCTION: CPT

## 2019-04-07 PROCEDURE — 36415 COLL VENOUS BLD VENIPUNCTURE: CPT

## 2019-04-07 PROCEDURE — 80048 BASIC METABOLIC PNL TOTAL CA: CPT

## 2019-04-07 PROCEDURE — 2700000000 HC OXYGEN THERAPY PER DAY

## 2019-04-07 PROCEDURE — 6370000000 HC RX 637 (ALT 250 FOR IP): Performed by: STUDENT IN AN ORGANIZED HEALTH CARE EDUCATION/TRAINING PROGRAM

## 2019-04-07 RX ORDER — FENTANYL CITRATE 50 UG/ML
12.5 INJECTION, SOLUTION INTRAMUSCULAR; INTRAVENOUS
Status: DISCONTINUED | OUTPATIENT
Start: 2019-04-07 | End: 2019-04-15

## 2019-04-07 RX ORDER — METOPROLOL TARTRATE 5 MG/5ML
5 INJECTION INTRAVENOUS EVERY 6 HOURS
Status: DISCONTINUED | OUTPATIENT
Start: 2019-04-07 | End: 2019-04-09

## 2019-04-07 RX ADMIN — METOPROLOL TARTRATE 5 MG: 1 INJECTION, SOLUTION INTRAVENOUS at 21:24

## 2019-04-07 RX ADMIN — FENTANYL CITRATE 12.5 MCG: 50 INJECTION, SOLUTION INTRAMUSCULAR; INTRAVENOUS at 15:58

## 2019-04-07 RX ADMIN — FAMOTIDINE 20 MG: 10 INJECTION, SOLUTION INTRAVENOUS at 12:31

## 2019-04-07 RX ADMIN — METOPROLOL TARTRATE 5 MG: 1 INJECTION, SOLUTION INTRAVENOUS at 12:31

## 2019-04-07 RX ADMIN — SODIUM CHLORIDE: 9 INJECTION, SOLUTION INTRAVENOUS at 11:05

## 2019-04-07 RX ADMIN — METOPROLOL TARTRATE 5 MG: 1 INJECTION, SOLUTION INTRAVENOUS at 16:53

## 2019-04-07 RX ADMIN — Medication 15 ML: at 21:25

## 2019-04-07 RX ADMIN — HYDRALAZINE HYDROCHLORIDE 5 MG: 20 INJECTION INTRAMUSCULAR; INTRAVENOUS at 11:47

## 2019-04-07 RX ADMIN — FENTANYL CITRATE 25 MCG: 50 INJECTION, SOLUTION INTRAMUSCULAR; INTRAVENOUS at 00:06

## 2019-04-07 RX ADMIN — FENTANYL CITRATE 12.5 MCG: 50 INJECTION, SOLUTION INTRAMUSCULAR; INTRAVENOUS at 11:52

## 2019-04-07 RX ADMIN — Medication 10 ML: at 21:25

## 2019-04-07 ASSESSMENT — PAIN SCALES - GENERAL
PAINLEVEL_OUTOF10: 7
PAINLEVEL_OUTOF10: 7

## 2019-04-07 ASSESSMENT — PULMONARY FUNCTION TESTS: PIF_VALUE: 23

## 2019-04-07 NOTE — PROGRESS NOTES
[facility]  Speech Language Pathology    Date: 4/7/2019  Patient Name: Manav Avendano  YOB: 1940   AGE: 66 y.o. MRN: [id number]        Patient Not Appropriate for Speech Therapy Evaluation     Due to:  [] Testing  [] Hemodialysis  [] Cancelled by RN  [] Surgery   [] Intubation/Sedation/Pain Medication  [] Medical instability  [x] Other: Full ST evaluation not complete this date, as patient minimally alert & responsive. ST to continue to follow. Next scheduled treatment: 4/8/19; as medically appropriate    Completed by:  Ronen Landers M.S. CF-SLP

## 2019-04-07 NOTE — CONSULTS
Palliative Care Inpatient Consult    NAME:  Justice Seaman  MEDICAL RECORD NUMBER:  [id number]  AGE: 66 y.o. GENDER: female  : 1940  TODAY'S DATE:  2019    Reasons for Consultation:    Symptom and/or pain management  Provision of information regarding PC and/or hospice philosophies  Complex, time-intensive communication and interdisciplinary psychosocial support  Clarification of goals of care and/or assistance with difficult decision-making  Guidance in regards to resources and transition(s)    Members of PC team contributing to this consultation are :  Dr. Otis Nieto palliative care attending  History of Present Illness     The patient is a 66 y.o. Non-/non  female who presents with Fall; Altered Mental Status; and Trauma      Referred to Palliative Care by   ?x Physician   ? Nursing  ? Family Request   ? Other:       She was admitted to the neuro critical care service for Intracranial bleed ([facility] [address] [I62.9]. Her hospital course has been associated with <principal problem not specified>. The patient has a complicated medical history and has been hospitalized since 2019  3:09 PM. The patient initially present at as trauma alert. Patient was found down by son in the bathroom for unknown period of time. Patient had altered mental status changes on the scene. Upon arrival in the ER and patient's GCS 11, was minimally responsive and drowsy. CT head showed intraparenchymal hemorrhage in left pelvic region with extension into ventricles and cerebral aqueduct. Patient's vitals were stable in the ER, labs were normal except CK 1046 and myoglobin 726, troponins 359 ---> 223 cardiology was consulted and patient started on Cardene drip, EVD was placed. Patient has history of liver cirrhosis and hepatitis C infection and esophageal varices. Palliative care consulted for goals of care.     Active Hospital Problems    Diagnosis Date Noted    Intracranial bleed ([facility] [address] [I62.9] 2019    Altered mental status [R41.82]        PAST MEDICAL HISTORY  No past medical history on file. PAST SURGICAL HISTORY  No past surgical history on file. SOCIAL HISTORY  Social History     Tobacco Use    Smoking status: Not on file   Substance Use Topics    Alcohol use: Not on file    Drug use: Not on file       ALLERGIES  No Known Allergies      MEDICATIONS  Current Medications    metoprolol  5 mg Intravenous Q6H    ondansetron  4 mg Intravenous Once    sodium chloride flush  10 mL Intravenous 2 times per day    famotidine (PEPCID) injection  20 mg Intravenous Daily    fentanNYL  100 mcg Intravenous Once    chlorhexidine  15 mL Mouth/Throat BID     sodium chloride flush, magnesium hydroxide, ondansetron, oxyCODONE, labetalol, hydrALAZINE  IV Drips/Infusions   sodium chloride 30 mL/hr at 04/06/19 1002     Home Medications  No current facility-administered medications on file prior to encounter. No current outpatient medications on file prior to encounter. Data         BP (!) 137/48   Pulse 109   Temp 98.5 °F (36.9 °C) (Oral)   Resp 18   SpO2 98%     Wt Readings from Last 3 Encounters:   No data found for Wt        Code Status: Limited     ADVANCED CARE PLANNING:  Patient has capacity for medical decisions: no  Health Care Power of : no  Living Will: no     Personal, Social, and Family History  Marital Status: single  Living situation:alone  Does patient understand diagnosis/treatment? no  Does caregiver understand diagnosis/treatment? not asked      Assessment        REVIEW OF SYSTEMS    ROS unable to be done due to altered mental status changes    PHYSICAL ASSESSMENT:  Constitutional: Alert, awake and not oriented to person, place, and time  Head: Normocephalic and atraumatic. Eyes: EOM are normal. Pupils are equal, round , right periorbital ecchymosis/edema present  Neck: Normal range of motion. Neck supple. No tracheal deviation present.    Cardiovascular: Normal rate and regular rhythm, S1, S2, no murmur   Pulmonary/Chest: Effort normal and breath sounds normal. No rales or wheezes. Abdomen: Soft. No tenderness, not distended, no ascites, no organomegaly   Musculoskeletal: Normal range of motion. No edema lower ext. Neurological: right upper flacid, drift present right upper/lower extremity  Skin: Normal turgor, no bleeding, no bruising     Palliative Performance Scale:  ___60%  Ambulation reduced; Significant disease; Can't do hobbies/housework; intake normal or reduced; occasional assist; LOC full/confusion  ___50%  Mainly sit/lie; Extensive disease; Can't do any work; Considerable assist; intake normal or reduced; LOC full/confusion  ___40%  Mainly in bed; Extensive disease; Mainly assist; intake normal or reduced; LOC full/confusion   __x_30%  Bed Bound; Extensive disease; Total care; intake reduced; LOCfull/confusion  ___20%  Bed Bound; Extensive disease; Total care; intake minimal; Drowsy/coma  ___10%  Bed Bound; Extensive disease;  Total care; Mouth care only; Drowsy/coma  ___0       Death      Plan      Palliative Interaction:  - The patient was seen today and patient was awake, not oriented to person place or time   - No family member was present in patient's room  - Patient's current medical conditions were discussed with neuro critical care nurse practitioner ZHANE [facility] informed me that the patient is improving clinically although she failed swallow study done today    - I discussed with Jet regarding patient's current medical conditions and it seemed appropriate to see how the patient does clinically and have a repeat swallow study after 1 or 2 days    - I also discussed with Jet regarding patient's code status clarification  - Celia informed that the patient has 2 living sons and there is no POA paperwork for health for the patient    - I offered comfort and emotional support for the patient    Education/support to staff  Education/support to family  Education/support to patient  Discharge planning/helping to coordinate care  Communications with primary service  Caregiver support/education  Code status clarified: Full Code  Code status clarified: [facility]  Code status clarified: DNRCCA  Other major issues     Principle Problem/Diagnosis:  Acute CVA    Additional Assessments:   Active Problems:    Intracranial bleed (HCC)    Altered mental status    1- Symptom management/ pain control     Pain Assessment:  Pain is controlled with current analgesics. Medication(s) being used: narcotic analgesics including fentanyl IV , oxycodone . Anxiety:  none                          Dyspnea:  none                          Fatigue:  none     We feel the patient symptoms are being controlled. her current regimen is reviewed by myself and discussed with the staff. 2- Goals of care evaluation   The patient goals of care are improve or maintain function/quality of life, accomplish a particular personal goal, spiritual needs, strengthening relationships, preserve independence/autonomy/control and support for family/caregiver   Goals of care discussed with:    ? Patient independently    ? Patient and Family    ? Family or Healthcare DPOA independently    ?x Unable to discuss with patient, family/DPOA not present    3- Code Status  Limited    4- Other recommendations   - We will continue to provide comfort and support to the patient and the family  Please call with any palliative questions or concerns. Palliative Care Team is available via perfect serve or via phone. Palliative Care will continue to follow Ms. Maya's care as needed. Thank you for allowing Palliative Care to participate in the care of Ms. Maya . This note has been dictated by dragon, typing errors may be a possibility.   Time spent in seeing the patient and discussing goals of care, advanced directives and code status was more than 60 minutes      Electronically signed by   Mabel Tony MD  Palliative Care Team  on 4/7/2019 at 10:31 AM    Palliative care office: [phone]

## 2019-04-07 NOTE — CARE COORDINATION
Case Management Initial Discharge Plan  [place],             Met with:family member patient to discuss discharge plans. Information verified: address, contacts, phone number, , insurance Yes  PCP: Jennifer Caraballo MD  Date of last visit:     Insurance Provider: Juli Zhong    Discharge Planning    Living Arrangements:      Support Systems:  Children, Family Members    Home has 0 stories  0 stairs to climb to get into front door, 0stairs to climb to reach second floor  Location of bedroom/bathroom in home 0    Patient able to perform ADL's:Assisted    Current Services (outpatient & in home)   DME equipment:   DME provider:     Pharmacy:   Potential Assistance Purchasing Medications:  No  Does patient want to participate in local refill/ meds to beds program?  No    Potential Assistance Needed:  N/A    Patient agreeable to home care: No  [place] of choice provided:  n/a    Prior SNF/Rehab Placement and Facility:   Agreeable to SNF/Rehab: No  [place] of choice provided: n/a   Evaluation: n/a    Expected Discharge date:     Patient expects to be discharged to:  Hospice vs SNF vs Home w/HC  Follow Up Appointment: Best Day/ Time:      Transportation provider: Family  Transportation arrangements needed for discharge: No    Readmission Risk              Risk of Unplanned Readmission:        11             Does patient have a readmission risk score greater than 14?: No  If yes, follow-up appointment must be made within 7 days of discharge. Discharge Plan: Hospice vs SNF vs Home w/HC. Palliative Care suggest waiting to see how the patient progresses. Following as POC develops.            Electronically signed by Chema Mariee RN on 19 at 3:12 PM

## 2019-04-07 NOTE — PROGRESS NOTES
Speech Language Pathology  [address]   BEDSIDE SWALLOW EVALUATION    NAME: Louis Sylvester  : 1940  MRN: [id number]    ADMISSION DATE: 2019  ADMITTING DIAGNOSIS: has Intracranial bleed (HCC) and Altered mental status on their problem list.    Recent Chest Xray of Chest: (  19  )    Impression   No acute intrathoracic process.               Date of Eval: 2019  Evaluating Therapist: Giancarlo Elise    Current Diet level:  Current Diet : NPO  Current Liquid Diet : NPO      Primary Complaint: Per chart, the patient is a 66 y.o. female presented as Trauma Alert. Pt found down by son. Last known well 1800 19. Reported altered mentation on scene. Pt found in bathroom and suspected to be down overnight. GCS 11 on arrival. Per family, pt has hx of hepatitis C w/ cirrhosis. Followed by [address]. Pain:  Pain Assessment  Patient Currently in Pain: No    Reason for Referral  Louis Sylvester was referred for a bedside swallow evaluation to assess the efficiency of her swallow function, identify signs and symptoms of aspiration and make recommendations regarding safe dietary consistencies, effective compensatory strategies, and safe eating environment. Impression  Recommend pt remain NPO. Pt presents with moderate-severe oral and pharyngeal stage dysphagia characterized by decreased labial seal and +oral loss of every PO trial and +immediate gasp and eructation following x1 trial nectar thick liquid by spoon, respectively. Pt lethargic & decreased alertness throughout BSSE. As appropriate, ST to follow up to provide reassessment for diet upgrade and O/M treatment program for dysphagia. Results & recommendations reported to RN. Dysphagia Diagnosis: Moderate to severe oral stage dysphagia; Moderate to severe pharyngeal stage dysphagia  Dysphagia Outcome Severity Scale: Level 2: Moderate Severe dysphagia- Maximum assistance or maximum use of strategies with partial PO only     Treatment Plan  Requires SLP Intervention: Yes  Duration/Frequency of Treatment: 3-5x per week  D/C Recommendations: Further therapy required at discharge. Recommended Diet and Intervention  Diet Solids Recommendation: NPO  Liquid Consistency Recommendation: NPO  Recommended Form of Meds: Via alternative means of nutrition  Therapeutic Interventions: Patient/Family education; Laryngeal exercises; Eliz;Pharyngeal exercises; Tongue base strengthening;Oral motor exercises;Mendelsohn    Treatment/Goals  Short-term Goals  Goal 1: Pt will complete O/M exercise program for dysphagia. Goal 2: Pt will tolerate repeat BSSE as awake and alert in 3-5 days to assess for diet upgrade. General  Chart Reviewed: Yes  Behavior/Cognition: Cooperative;Pleasant mood; Lethargic  Temperature Spikes Noted: No  Respiratory Status: Room air  O2 Device: None (Room air)  Communication Observation: Non-verbal  Follows Directions: Simple  Dentition: Some missing teeth  Patient Positioning: Upright in bed  Baseline Vocal Quality: Normal  Volitional Swallow: Absent  Consistencies Administered: Puree;Nectar - teaspoon       Vision/Hearing  Vision  Vision: Within Functional Limits  Hearing  Hearing: Within functional limits    Oral Motor Deficits  Oral/Motor  Oral Motor: Exceptions to [facility]  Labial Strength: Reduced  Labial Sensation: Reduced  Labial Coordination: Reduced    Oral Phase Dysfunction  Oral Phase  Oral Phase: Exceptions  Oral Phase  Oral Phase - Comment: Pt with decreased labial seal with +oral loss. Required max cues to open mouth for PO trials. Indicators of Pharyngeal Phase Dysfunction   Pharyngeal Phase  Pharyngeal Phase: Exceptions  Pharyngeal Phase   Pharyngeal: Pt with +immediate gasp & eructation following 1x trial nectar thick liquid by spoon.     Prognosis  Prognosis  Prognosis for safe diet advancement: guarded  Individuals consulted  Consulted and agree with results and recommendations: Patient;RN    Education  Patient Education: yes  Patient Education Response: Needs reinforcement         Therapy Time  SLP Individual Minutes  Time In: 4447  Time Out: 1071  Minutes: Earl Navarro M.S. CF-SLP    4/7/2019 10:45 AM

## 2019-04-07 NOTE — PROGRESS NOTES
Daily Progress Note  Neuro Critical Care    Patient Name: Daryll Cowden  Patient : 1940  Room/Bed: [address] [place] [address]  Allergies: No Known Allergies  Problem List:   Patient Active Problem List   Diagnosis    Intracranial bleed ([address]    Altered mental status            INTERVAL HISTORY    Initial Presentation (Admitted ):  Patient was found down, LKW ~ 24 hours prior to arrival.  Hx of hepatitis C w/ cirrhosis, esophageal varices. Found to have acute IVH, acute ICH.      ICH score: 1    Hospital Course:   : EVD placed    Last 24h:    No acute events overnight. Improved alertness. Cardene gtt weaned off around midnight, -140s. EVD output 27ml/24hr. Required PRN fentanyl for pain overnight. Spontaneously moving left upper and lower extremity, No Movement in Right upper, right lower spo moved foot. Not following commands, not answering questions appropriately. Likely family meeting with palliative care in [facility] 1-2 days.     CURRENT MEDICATIONS:  SCHEDULED MEDICATIONS:   ondansetron  4 mg Intravenous Once    sodium chloride flush  10 mL Intravenous 2 times per day    famotidine (PEPCID) injection  20 mg Intravenous Daily    fentanNYL  100 mcg Intravenous Once    chlorhexidine  15 mL Mouth/Throat BID     CONTINUOUS INFUSIONS:   sodium chloride 30 mL/hr at 19 1002    niCARdipine Stopped (19 0003)     PRN MEDICATIONS:   sodium chloride flush, magnesium hydroxide, ondansetron, oxyCODONE, labetalol, hydrALAZINE    VITALS:  Temperature Range: Temp: 98.5 °F (36.9 °C) Temp  Av.5 °F (36.9 °C)  Min: 98.1 °F (36.7 °C)  Max: 99.3 °F (37.4 °C)  BP Range: Systolic (69UJG), CRF:088 , Min:107 , PJE:404     Diastolic (99ZPA), WBX:35, Min:26, Max:60    Pulse Range: Pulse  Av.6  Min: 90  Max: 120  Respiration Range: Resp  Av.5  Min: 11  Max: 28  Current Pulse Ox: SpO2: 100 %  24HR Pulse Ox Range: SpO2  Av.3 %  Min: 92 %  Max: 100 %  Patient Vitals for the past 12 hrs:   BP Temp 134/45 -- -- 106 17 99 %   04/06/19 1933 (!) 145/40 -- -- 110 20 100 %   04/06/19 1926 -- -- -- -- 19 100 %   04/06/19 1918 (!) 134/30 -- -- 100 18 100 %   04/06/19 1903 (!) 121/33 99.3 °F (37.4 °C) -- 110 13 100 %     There is no height or weight on file to calculate BMI.  []<16 Severe malnutrition  []16-16.99 Moderate malnutrition  []17-18.49 Mild malnutrition  []18.5-24.9 Normal  []25-29.9 Overweight (not obese)  []30-34.9 Obese class 1 (Low Risk)  []35-39.9 Obese class 2 (Moderate Risk)  []?40 Obese class 3 (High Risk)    RECENT LABS:   Lab Results   Component Value Date    WBC 10.5 04/06/2019    HGB 11.4 (L) 04/06/2019    HCT 35.5 (L) 04/06/2019     04/06/2019    ALT 24 04/05/2019    AST 62 (H) 04/05/2019     04/06/2019    K 3.8 04/06/2019     (H) 04/06/2019    CREATININE 0.64 04/06/2019    BUN 33 (H) 04/06/2019    CO2 20 04/06/2019    INR 1.2 04/05/2019     24 HOUR INTAKE/OUTPUT:    Intake/Output Summary (Last 24 hours) at 4/7/2019 4623  Last data filed at 4/7/2019 0600  Gross per 24 hour   Intake --   Output 31 ml   Net -31 ml       RADIOLOGY:   Xr Pelvis (1-2 Views)    Result Date: 4/5/2019  EXAMINATION: SINGLE XRAY VIEW OF THE PELVIS 4/5/2019 3:23 pm COMPARISON: None. HISTORY: ORDERING SYSTEM PROVIDED HISTORY: trauma TECHNOLOGIST PROVIDED HISTORY: trauma Initial evaluation. FINDINGS: The osseous structures appear osteopenic. No convincing acute abnormality seen of the bony pelvis. Degenerative changes of the lumbar spine, sacroiliac joints and hips bilaterally. Vascular calcifications are noted. Osteopenia without convincing acute abnormality of the bony pelvis. Xr Shoulder Right (min 2 Views)    Result Date: 4/6/2019  EXAMINATION: 3 XRAY VIEWS OF THE RIGHT SHOULDER; AP AND LATERAL XRAY VIEWS OF THE RIGHT FOREARM; 3 XRAY VIEWS OF THE RIGHT ELBOW 4/6/2019 3:10 am COMPARISON: None.  HISTORY: ORDERING SYSTEM PROVIDED HISTORY: trauma TECHNOLOGIST PROVIDED HISTORY: trauma FINDINGS: Right shoulder: Three views of the right shoulder. No acute fracture or dislocation. Soft tissue swelling about the shoulder. Normal alignment of the glenohumeral and acromioclavicular joints. No aggressive skeletal lesion. Visualized lung is clear. Visualized ribs are intact. Osteopenia. Right elbow: Frontal, lateral and oblique views. Soft tissue swelling about the elbow. No radiographic evidence of an elbow joint effusion. No acute fracture or malalignment. Mild ulnohumeral compartment DJD. Osteopenia. Right forearm: Frontal and lateral views. Soft tissue swelling about the forearm. No acute fracture or malalignment. Mild-to-moderate DJD in the wrist.  Osteopenia. No acute fracture in the right shoulder, right elbow or right forearm. Osteopenia. Mild DJD in the right elbow and mild-to-moderate DJD in the right wrist.     Xr Elbow Right (min 3 Views)    Result Date: 4/6/2019  EXAMINATION: 3 XRAY VIEWS OF THE RIGHT SHOULDER; AP AND LATERAL XRAY VIEWS OF THE RIGHT FOREARM; 3 XRAY VIEWS OF THE RIGHT ELBOW 4/6/2019 3:10 am COMPARISON: None. HISTORY: ORDERING SYSTEM PROVIDED HISTORY: trauma TECHNOLOGIST PROVIDED HISTORY: trauma FINDINGS: Right shoulder: Three views of the right shoulder. No acute fracture or dislocation. Soft tissue swelling about the shoulder. Normal alignment of the glenohumeral and acromioclavicular joints. No aggressive skeletal lesion. Visualized lung is clear. Visualized ribs are intact. Osteopenia. Right elbow: Frontal, lateral and oblique views. Soft tissue swelling about the elbow. No radiographic evidence of an elbow joint effusion. No acute fracture or malalignment. Mild ulnohumeral compartment DJD. Osteopenia. Right forearm: Frontal and lateral views. Soft tissue swelling about the forearm. No acute fracture or malalignment. Mild-to-moderate DJD in the wrist.  Osteopenia. No acute fracture in the right shoulder, right elbow or right forearm. Osteopenia. external ventricular drainage device. Tip terminates within the frontal horn left lateral ventricle near the caudal thalamic groove. Interval pneumocephalus identified consistent with interval placement. Redemonstration of the left thalamic intraparenchymal hemorrhage measuring 3.6 x 2.5 cm in size with evidence of subarachnoid decompression/subarachnoid extension. Evidence of blood products within the lateral 3rd and 4th ventricle identified. Evidence of blood products identified overlying both frontal and parietal lobes and along the interhemispheric falx new from the prior examination but may be related to redistribution of subarachnoid blood products. No shift of midline structures. Basal cisterns are patent. Mild generalized involutional change with chronic small vessel ischemic disease. Intracranial vascular calcifications. . ORBITS: The visualized portion of the orbits demonstrate no acute abnormality. SINUSES: Mild ethmoid sinus mucosal thickening. SOFT TISSUES/SKULL:  No depressed skull fracture. Edema identified overlying the right temporalis muscle may be related to prior trauma or surgery. Interval placement of right frontal approach EVD. Evolving left thalamic intraparenchymal hemorrhage with secondary subarachnoid extension and blood products within the lateral 3rd and 4th ventricles. The blood products identified overlying both frontal lobes parietal lobes and along the interhemispheric falx could be related to placement by EVD versus redistribution of the previously noted subarachnoid blood products. Ct Head Wo Contrast    Result Date: 4/5/2019  EXAMINATION: CT OF THE HEAD WITHOUT CONTRAST  4/5/2019 3:19 pm TECHNIQUE: CT of the head was performed without the administration of intravenous contrast. Dose modulation, iterative reconstruction, and/or weight based adjustment of the mA/kV was utilized to reduce the radiation dose to as low as reasonably achievable. COMPARISON: None. HISTORY: ORDERING SYSTEM PROVIDED HISTORY: trauma TECHNOLOGIST PROVIDED HISTORY: FINDINGS: BRAIN/VENTRICLES: There is an intraparenchymal hemorrhage within the left thalamic region measuring 3.2 x 2.1 x 3.6 cm. There is adjacent vasogenic edema. There are hemorrhagic products within the lateral ventricles bilaterally as well as the 3rd ventricle, cerebral aqueduct, and 4th ventricle. There is minimal rightward midline shift. There is age-appropriate cerebral volume loss. The infratentorial structures are otherwise unremarkable. ORBITS: The visualized portion of the orbits demonstrate no acute abnormality. SINUSES: The visualized paranasal sinuses and mastoid air cells demonstrate no acute abnormality. SOFT TISSUES/SKULL:  There is right-sided soft tissue swelling involving the right frontal, parietal, and temporal region. There is no definite acute osseous abnormalities. Intraparenchymal hemorrhage in the left thalamic region with adjacent vasogenic edema. Hemorrhagic products within the lateral ventricles, 3rd ventricle, cerebral aqueduct, and 4th ventricle. Minimal rightward midline shift. Critical results were called by Dr. Paula Simpson to Dr. Xiomy Jimenez on 4/5/2019 at 16:02. Ct Facial Bones Wo Contrast    Result Date: 4/5/2019  EXAMINATION: CT OF THE FACE WITHOUT CONTRAST  4/5/2019 3:24 pm TECHNIQUE: CT of the face was performed without the administration of intravenous contrast. Multiplanar reformatted images are provided for review. Dose modulation, iterative reconstruction, and/or weight based adjustment of the mA/kV was utilized to reduce the radiation dose to as low as reasonably achievable. COMPARISON: None HISTORY: ORDERING SYSTEM PROVIDED HISTORY: trauma; significant R facial bruising FINDINGS: FACIAL BONES:  The maxilla, pterygoid plates and zygomatic arches are intact. The mandible is intact. The mandibular condyles are normally situated.   The nasal bones and maxillary nasal processes are intact. ORBITS:  The globes appear intact. The extraocular muscles, optic nerve sheath complexes and lacrimal glands appear unremarkable. No retrobulbar hematoma or mass is seen. The orbital walls and rims are intact. SINUSES/MASTOIDS:  The paranasal sinuses and mastoid air cells are well aerated. No acute fracture is seen. SOFT TISSUES:  Preseptal swelling of the right face extending superiorly along the supraorbital ridge into the right frontal scalp. Asymmetric swelling of the lateral face on the right compared with the left. No fluid collection is identified. No air or unexpected radiopaque foreign bodies. Soft tissue swelling/edema along the upper neck circumferentially right greater than left. The visualized intracranial contents show acute left thalamic intraparenchymal blood products and intraventricular blood products. 1. Bruising of the face without fracture, radiopaque foreign body, or superficial fluid collection. 2. Acute left thalamic hemorrhage with intraventricular extension. This is previously called to Dr. Shivam Jeffers by Dr. Krysta Storey at  on 4/5/19. Ct Cervical Spine Wo Contrast    Result Date: 4/5/2019  EXAMINATION: CT OF THE CERVICAL SPINE WITHOUT CONTRAST 4/5/2019 3:19 pm TECHNIQUE: CT of the cervical spine was performed without the administration of intravenous contrast. Multiplanar reformatted images are provided for review. Dose modulation, iterative reconstruction, and/or weight based adjustment of the mA/kV was utilized to reduce the radiation dose to as low as reasonably achievable. COMPARISON: None. HISTORY: ORDERING SYSTEM PROVIDED HISTORY: trauma FINDINGS: BONES/ALIGNMENT: There is no evidence of an acute cervical spine fracture. There is normal alignment of the cervical spine except moderate dextroconvex scoliosis and reversal of the normal lordotic curvature. .  Decompressive laminectomies at C4 and C5.   Posterior fusion rods and pedicular screws at C3, 4, 5 and 6 on the right and C5 and 6 on the left. DEGENERATIVE CHANGES: Multilevel disc space narrowing. SOFT TISSUES: There is no prevertebral soft tissue swelling. No acute abnormality of the cervical spine. Posterior interbody fixation as above. Ct Thoracic Spine Wo Contrast    Result Date: 4/5/2019  EXAMINATION: CT OF THE THORACIC SPINE WITHOUT CONTRAST  4/5/2019 3:18 pm: TECHNIQUE: CT of the thoracic spine was performed without the administration of intravenous contrast. Multiplanar reformatted images are provided for review. Dose modulation, iterative reconstruction, and/or weight based adjustment of the mA/kV was utilized to reduce the radiation dose to as low as reasonably achievable. COMPARISON: None. HISTORY: ORDERING SYSTEM PROVIDED HISTORY: trauma FINDINGS: BONES/ALIGNMENT: There is normal alignment of the spine. The vertebral body heights are maintained. No osseous destructive lesion is seen. DEGENERATIVE CHANGES: No gross spinal canal stenosis or bony neural foraminal narrowing of the thoracic spine. SOFT TISSUES: No paraspinal mass is seen. Unremarkable CT of the thoracic spine. Ct Lumbar Spine Wo Contrast    Result Date: 4/5/2019  EXAMINATION: CT OF THE LUMBAR SPINE WITHOUT CONTRAST  4/5/2019 TECHNIQUE: CT of the lumbar spine was performed without the administration of intravenous contrast. Multiplanar reformatted images are provided for review. Dose modulation, iterative reconstruction, and/or weight based adjustment of the mA/kV was utilized to reduce the radiation dose to as low as reasonably achievable. COMPARISON: None HISTORY: ORDERING SYSTEM PROVIDED HISTORY: trauma TECHNOLOGIST PROVIDED HISTORY: trauma FINDINGS: BONES/ALIGNMENT: There is minimal compression deformity of L4 vertebral body with less than 20% height loss/wedging anteriorly. Multilevel degenerative disc disease is evident. The facets are intact. The spinous processes are intact. Lamina and pedicles are intact. There is evidence of prior posterior decompression. DEGENERATIVE CHANGES: Multilevel degenerative disc disease with facet arthropathy. SOFT TISSUES/RETROPERITONEUM: Diverticulosis. Atherosclerotic changes of aorta. Large stool throughout the colon. An avidly enhancing dilated left ovarian vessel is noted. 1. Age-indeterminate compression deformity of L4 with less than 20% height loss and no significant retropulsion. 2. Osteopenic skeleton with multilevel degenerative changes. Xr Chest Portable    Result Date: 4/6/2019  EXAMINATION: SINGLE XRAY VIEW OF THE CHEST 4/6/2019 5:27 am COMPARISON: 04/05/2019 HISTORY: ORDERING SYSTEM PROVIDED HISTORY: Concern for aspiration TECHNOLOGIST PROVIDED HISTORY: Concern for aspiration FINDINGS: Cardiomediastinal silhouette and pulmonary vasculature are within normal limits. There is no focal airspace consolidation. No evidence of pneumothorax or pleural effusion on this limited supine study. No acute osseous abnormality. No acute intrathoracic process. Xr Chest Portable    Result Date: 4/5/2019  EXAMINATION: SINGLE XRAY VIEW OF THE CHEST 4/5/2019 3:24 pm COMPARISON: None. HISTORY: ORDERING SYSTEM PROVIDED HISTORY: trauma TECHNOLOGIST PROVIDED HISTORY: trauma FINDINGS: Slight increase in interstitial markings. The lungs are without acute focal process. There is no effusion or pneumothorax. The cardiomediastinal silhouette is without acute process. The osseous structures are without acute process. No fracture or pneumothorax. Increased interstitial markings     Cta Neck W Contrast    Result Date: 4/5/2019  EXAMINATION: CTA OF THE HEAD WITH CONTRAST; CTA OF THE NECK 4/5/2019 3:32 pm: TECHNIQUE: CTA of the head/brain was performed with the administration of intravenous contrast. Multiplanar reformatted images are provided for review. MIP images are provided for review.  Dose modulation, iterative reconstruction, and/or weight based adjustment of the mA/kV was utilized to reduce the radiation dose to as low as reasonably achievable.; CTA of the neck was performed with the administration of intravenous contrast. Multiplanar reformatted images are provided for review. MIP images are provided for review. Stenosis of the internal carotid arteries measured using NASCET criteria. Dose modulation, iterative reconstruction, and/or weight based adjustment of the mA/kV was utilized to reduce the radiation dose to as low as reasonably achievable. COMPARISON: None. HISTORY: ORDERING SYSTEM PROVIDED HISTORY: SAH, ?hemorrhagic stroke TECHNOLOGIST PROVIDED HISTORY: ; ORDERING SYSTEM PROVIDED HISTORY: SAH, ?hemorrhagic stroke TECHNOLOGIST PROVIDED HISTORY: Ordering Physician Provided Reason for Exam: head bleed/ found down Acuity: Unknown FINDINGS: CTA NECK: AORTIC ARCH/ARCH VESSELS: There is a normal branch pattern of the aortic arch. No significant stenosis is seen of the innominate artery or subclavian arteries. CAROTID ARTERIES: The common carotid arteries are normal in appearance without evidence of a flow limiting stenosis. The internal carotid arteries are normal in appearance without evidence of a flow limiting stenosis by NASCET criteria. No dissection or arterial injury is seen. VERTEBRAL ARTERIES: The vertebral arteries both arise from the subclavian arteries and are normal in caliber without evidence of flow limiting stenosis or dissection. SOFT TISSUES: The lung apices are clear. No cervical or superior mediastinal lymphadenopathy. The visualized portion of the larynx and pharynx appear unremarkable. The parotid, submandibular and thyroid glands demonstrate no acute abnormality. BONES: The visualized osseous structures appear unremarkable. CTA HEAD: ANTERIOR CIRCULATION: The internal carotid arteries are normal in course and caliber without focal stenosis. The anterior cerebral and middle cerebral arteries demonstrate no focal stenosis.  POSTERIOR CIRCULATION: The There is calcified plaque along the aorta and its branches. Bones/Soft Tissues: Normal     No acute traumatic sequelae. Cirrhosis, splenomegaly, and esophageal varices consistent with portal venous hypertension. Cta Head W Contrast    Result Date: 4/5/2019  EXAMINATION: CTA OF THE HEAD WITH CONTRAST; CTA OF THE NECK 4/5/2019 3:32 pm: TECHNIQUE: CTA of the head/brain was performed with the administration of intravenous contrast. Multiplanar reformatted images are provided for review. MIP images are provided for review. Dose modulation, iterative reconstruction, and/or weight based adjustment of the mA/kV was utilized to reduce the radiation dose to as low as reasonably achievable.; CTA of the neck was performed with the administration of intravenous contrast. Multiplanar reformatted images are provided for review. MIP images are provided for review. Stenosis of the internal carotid arteries measured using NASCET criteria. Dose modulation, iterative reconstruction, and/or weight based adjustment of the mA/kV was utilized to reduce the radiation dose to as low as reasonably achievable. COMPARISON: None. HISTORY: ORDERING SYSTEM PROVIDED HISTORY: SAH, ?hemorrhagic stroke TECHNOLOGIST PROVIDED HISTORY: ; ORDERING SYSTEM PROVIDED HISTORY: SAH, ?hemorrhagic stroke TECHNOLOGIST PROVIDED HISTORY: Ordering Physician Provided Reason for Exam: head bleed/ found down Acuity: Unknown FINDINGS: CTA NECK: AORTIC ARCH/ARCH VESSELS: There is a normal branch pattern of the aortic arch. No significant stenosis is seen of the innominate artery or subclavian arteries. CAROTID ARTERIES: The common carotid arteries are normal in appearance without evidence of a flow limiting stenosis. The internal carotid arteries are normal in appearance without evidence of a flow limiting stenosis by NASCET criteria. No dissection or arterial injury is seen.  VERTEBRAL ARTERIES: The vertebral arteries both arise from the subclavian arteries and are normal in caliber without evidence of flow limiting stenosis or dissection. SOFT TISSUES: The lung apices are clear. No cervical or superior mediastinal lymphadenopathy. The visualized portion of the larynx and pharynx appear unremarkable. The parotid, submandibular and thyroid glands demonstrate no acute abnormality. BONES: The visualized osseous structures appear unremarkable. CTA HEAD: ANTERIOR CIRCULATION: The internal carotid arteries are normal in course and caliber without focal stenosis. The anterior cerebral and middle cerebral arteries demonstrate no focal stenosis. POSTERIOR CIRCULATION: The posterior cerebral arteries demonstrate no focal stenosis. The vertebral and basilar arteries appear unremarkable. BRAIN: There is redemonstration of an intraparenchymal hemorrhage within the left thalamic region as well as intraventricular hemorrhagic products. Unremarkable CTA of the head and neck. Critical results were called by Dr. Aleksandra Serrano to Dr. Tyronne Dakin on 4/5/2019 at 16:12. Labs and Images reviewed with:  [] Dr. Bebo Dang. Latoya    [] Dr. Mitali Coates  [x] Dr. Isabelle Wallace  [] There are no new interval images to review.      PHYSICAL EXAM       CONSTITUTIONAL:  Awake, spo eye opening, not following commands, unable to answer questions appropriately,    HEAD:  Right periorbital/forehead ecchymosis   EYES:  PERRL, left gaze   ENT:  moist mucous membranes   NECK:  supple, symmetric   LUNGS:  Equal air entry bilaterally   CARDIOVASCULAR:  normal s1 / s2, RRR, distal pulses intact   ABDOMEN:  Soft, no rigidity   NEUROLOGIC:  Mental Status:  Not oriented to date, Not oriented to person and Not oriented to place,awake             Cranial Nerves:    Pupils: ERRL    Motor Exam:    Drift:  present - Right upper/lower, Left lower  Tone:  Right upper flacid      Sensory:      Deep Tendon Reflexes:    Right Knee:  2+  Left Knee:  2+    Clonus:  absent       DRAINS:   EVD: 27cc/24hr, ICP 1-10  ASSESSMENT AND PLAN:     Yanelis Richards is a 78F admitted on 4/5 after being found down, LKW ~ 24 hours prior to arrival.  Hx of hepatitis C w/ cirrhosis, esophageal varices. Found to have acute IVH, acute ICH.        NEUROLOGIC:  - Imaging reviewed. Repeat CT head indicates evolving L IPH w/ SAH, IVH of lateral 3rd, 4th ventricles. -EVD placed 4/5. ICP 1-10 mmHg. Total EVD output 27cc/24h  - AEDs: Not indicated at this time  - Goal SBP < 140/90  - Neuro checks per protocol     CARDIOVASCULAR:  - Goal SBP < 140/90  - Elevated troponin on admission at 359. EKG on admission indicates lateral T wave changes. - troponin trending down 359->223->108  - Cards consult: Likely type 2 MI, no acute intervention   - Continue telemetry     PULMONARY:  - Placed on Vapotherm, Sp02 stable.     RENAL/FLUID/ELECTROLYTE:  - BUN 40/ Creatinine 0.50  - Urine output 250cc out overnight  - IVF: NS @ 30 ml/hr  - CK trending down. 1,046->442->267  - Replace electrolytes PRN  - Daily BMP     GI/NUTRITION:  -Hep C w/ cirrhosis:  Mildly elevated AST, Alk phos. Borderline decreased albumin. NUTRITION:  Diet NPO Effective Now  - Bowel regimen: Milk of Mag PRN  - GI prophylaxis: Pepcid 20 mg qd     ID:  - Tmax 37.4  - WBC 8.5  - Continue to monitor for fevers  - Daily CBC     HEME:   - H&H  10.5/33.1  - Platelets 589  - Daily CBC     ENDOCRINE:  - Continue to monitor blood glucose, goal <180        OTHER:  - PT/OT/ST   - Code Status: DNR-CCA, plan for family/palliative care meeting     PROPHYLAXIS:  Stress ulcer: H2 blocker     DVT PROPHYLAXIS:  - SCD  - No chemoprophylaxis anticoagulation at this time. EVD removed today, consider DVY ppx tomorrow if not palliative      DISPOSITION:  [x] To remain ICU:   [] OK for out of ICU from Neuro Critical Care standpoint    We will continue to follow along. For any changes in exam or patient status please contact Neuro Critical Care.       Soha Yoo,   Neuro Critical Care  Pager [phone]  4/7/2019     6:21 AM

## 2019-04-08 LAB
ABSOLUTE EOS #: 0.11 K/UL (ref 0–0.44)
ABSOLUTE IMMATURE GRANULOCYTE: 0.06 K/UL (ref 0–0.3)
ABSOLUTE LYMPH #: 0.79 K/UL (ref 1.1–3.7)
ABSOLUTE MONO #: 0.5 K/UL (ref 0.1–1.2)
AMMONIA: 72 UMOL/L (ref 11–51)
ANION GAP SERPL CALCULATED.3IONS-SCNC: 17 MMOL/L (ref 9–17)
BASOPHILS # BLD: 1 % (ref 0–2)
BASOPHILS ABSOLUTE: 0.03 K/UL (ref 0–0.2)
BUN BLDV-MCNC: 36 MG/DL (ref 8–23)
BUN/CREAT BLD: ABNORMAL (ref 9–20)
CALCIUM SERPL-MCNC: 8.6 MG/DL (ref 8.6–10.4)
CHLORIDE BLD-SCNC: 112 MMOL/L (ref 98–107)
CO2: 16 MMOL/L (ref 20–31)
CREAT SERPL-MCNC: 0.49 MG/DL (ref 0.5–0.9)
DIFFERENTIAL TYPE: ABNORMAL
EOSINOPHILS RELATIVE PERCENT: 2 % (ref 1–4)
GFR AFRICAN AMERICAN: >60 ML/MIN
GFR NON-AFRICAN AMERICAN: >60 ML/MIN
GFR SERPL CREATININE-BSD FRML MDRD: ABNORMAL ML/MIN/{1.73_M2}
GFR SERPL CREATININE-BSD FRML MDRD: ABNORMAL ML/MIN/{1.73_M2}
GLUCOSE BLD-MCNC: 100 MG/DL (ref 70–99)
HCT VFR BLD CALC: 34.3 % (ref 36.3–47.1)
HEMOGLOBIN: 10.4 G/DL (ref 11.9–15.1)
IMMATURE GRANULOCYTES: 1 %
LV EF: 55 %
LVEF MODALITY: NORMAL
LYMPHOCYTES # BLD: 14 % (ref 24–43)
MCH RBC QN AUTO: 28.9 PG (ref 25.2–33.5)
MCHC RBC AUTO-ENTMCNC: 30.3 G/DL (ref 28.4–34.8)
MCV RBC AUTO: 95.3 FL (ref 82.6–102.9)
MONOCYTES # BLD: 9 % (ref 3–12)
NRBC AUTOMATED: 0 PER 100 WBC
PDW BLD-RTO: 15.3 % (ref 11.8–14.4)
PLATELET # BLD: 97 K/UL (ref 138–453)
PLATELET ESTIMATE: ABNORMAL
PMV BLD AUTO: 11 FL (ref 8.1–13.5)
POTASSIUM SERPL-SCNC: 3.3 MMOL/L (ref 3.7–5.3)
RBC # BLD: 3.6 M/UL (ref 3.95–5.11)
RBC # BLD: ABNORMAL 10*6/UL
SEG NEUTROPHILS: 73 % (ref 36–65)
SEGMENTED NEUTROPHILS ABSOLUTE COUNT: 4.28 K/UL (ref 1.5–8.1)
SODIUM BLD-SCNC: 145 MMOL/L (ref 135–144)
WBC # BLD: 5.8 K/UL (ref 3.5–11.3)
WBC # BLD: ABNORMAL 10*3/UL

## 2019-04-08 PROCEDURE — 6360000002 HC RX W HCPCS: Performed by: STUDENT IN AN ORGANIZED HEALTH CARE EDUCATION/TRAINING PROGRAM

## 2019-04-08 PROCEDURE — 99497 ADVNCD CARE PLAN 30 MIN: CPT | Performed by: FAMILY MEDICINE

## 2019-04-08 PROCEDURE — 93306 TTE W/DOPPLER COMPLETE: CPT

## 2019-04-08 PROCEDURE — 6370000000 HC RX 637 (ALT 250 FOR IP): Performed by: STUDENT IN AN ORGANIZED HEALTH CARE EDUCATION/TRAINING PROGRAM

## 2019-04-08 PROCEDURE — APPNB15 APP NON BILLABLE TIME 0-15 MINS: Performed by: REGISTERED NURSE

## 2019-04-08 PROCEDURE — 2700000000 HC OXYGEN THERAPY PER DAY

## 2019-04-08 PROCEDURE — 99291 CRITICAL CARE FIRST HOUR: CPT | Performed by: PSYCHIATRY & NEUROLOGY

## 2019-04-08 PROCEDURE — 51701 INSERT BLADDER CATHETER: CPT

## 2019-04-08 PROCEDURE — 94660 CPAP INITIATION&MGMT: CPT

## 2019-04-08 PROCEDURE — 76937 US GUIDE VASCULAR ACCESS: CPT

## 2019-04-08 PROCEDURE — 94762 N-INVAS EAR/PLS OXIMTRY CONT: CPT

## 2019-04-08 PROCEDURE — 85025 COMPLETE CBC W/AUTO DIFF WBC: CPT

## 2019-04-08 PROCEDURE — 6360000002 HC RX W HCPCS: Performed by: EMERGENCY MEDICINE

## 2019-04-08 PROCEDURE — 99498 ADVNCD CARE PLAN ADDL 30 MIN: CPT | Performed by: FAMILY MEDICINE

## 2019-04-08 PROCEDURE — 2000000003 HC NEURO ICU R&B

## 2019-04-08 PROCEDURE — 51798 US URINE CAPACITY MEASURE: CPT

## 2019-04-08 PROCEDURE — 92526 ORAL FUNCTION THERAPY: CPT

## 2019-04-08 PROCEDURE — 6370000000 HC RX 637 (ALT 250 FOR IP): Performed by: EMERGENCY MEDICINE

## 2019-04-08 PROCEDURE — 92523 SPEECH SOUND LANG COMPREHEN: CPT

## 2019-04-08 PROCEDURE — 2500000003 HC RX 250 WO HCPCS: Performed by: EMERGENCY MEDICINE

## 2019-04-08 PROCEDURE — 80048 BASIC METABOLIC PNL TOTAL CA: CPT

## 2019-04-08 PROCEDURE — 36415 COLL VENOUS BLD VENIPUNCTURE: CPT

## 2019-04-08 PROCEDURE — 94003 VENT MGMT INPAT SUBQ DAY: CPT

## 2019-04-08 PROCEDURE — 82140 ASSAY OF AMMONIA: CPT

## 2019-04-08 PROCEDURE — 2580000003 HC RX 258: Performed by: EMERGENCY MEDICINE

## 2019-04-08 PROCEDURE — 2500000003 HC RX 250 WO HCPCS: Performed by: NURSE PRACTITIONER

## 2019-04-08 RX ORDER — ISOSORBIDE MONONITRATE 30 MG/1
30 TABLET, EXTENDED RELEASE ORAL DAILY
Status: DISCONTINUED | OUTPATIENT
Start: 2019-04-08 | End: 2019-04-11

## 2019-04-08 RX ADMIN — FAMOTIDINE 20 MG: 10 INJECTION, SOLUTION INTRAVENOUS at 08:38

## 2019-04-08 RX ADMIN — HYDRALAZINE HYDROCHLORIDE 5 MG: 20 INJECTION INTRAMUSCULAR; INTRAVENOUS at 01:04

## 2019-04-08 RX ADMIN — Medication 15 ML: at 22:27

## 2019-04-08 RX ADMIN — FENTANYL CITRATE 12.5 MCG: 50 INJECTION, SOLUTION INTRAMUSCULAR; INTRAVENOUS at 10:44

## 2019-04-08 RX ADMIN — METOPROLOL TARTRATE 5 MG: 1 INJECTION, SOLUTION INTRAVENOUS at 22:27

## 2019-04-08 RX ADMIN — FENTANYL CITRATE 12.5 MCG: 50 INJECTION, SOLUTION INTRAMUSCULAR; INTRAVENOUS at 16:21

## 2019-04-08 RX ADMIN — Medication 10 MG: at 02:45

## 2019-04-08 RX ADMIN — Medication 10 ML: at 08:38

## 2019-04-08 RX ADMIN — ISOSORBIDE MONONITRATE 30 MG: 30 TABLET ORAL at 14:36

## 2019-04-08 RX ADMIN — METOPROLOL TARTRATE 5 MG: 1 INJECTION, SOLUTION INTRAVENOUS at 04:04

## 2019-04-08 RX ADMIN — FENTANYL CITRATE 12.5 MCG: 50 INJECTION, SOLUTION INTRAMUSCULAR; INTRAVENOUS at 00:01

## 2019-04-08 RX ADMIN — METOPROLOL TARTRATE 5 MG: 1 INJECTION, SOLUTION INTRAVENOUS at 10:44

## 2019-04-08 RX ADMIN — METOPROLOL TARTRATE 5 MG: 1 INJECTION, SOLUTION INTRAVENOUS at 16:38

## 2019-04-08 RX ADMIN — Medication 10 MG: at 06:51

## 2019-04-08 ASSESSMENT — PULMONARY FUNCTION TESTS
PIF_VALUE: 21
PIF_VALUE: 19

## 2019-04-08 ASSESSMENT — PAIN SCALES - WONG BAKER
WONGBAKER_NUMERICALRESPONSE: 0

## 2019-04-08 ASSESSMENT — PAIN SCALES - GENERAL
PAINLEVEL_OUTOF10: 5
PAINLEVEL_OUTOF10: 1
PAINLEVEL_OUTOF10: 0
PAINLEVEL_OUTOF10: 6
PAINLEVEL_OUTOF10: 5

## 2019-04-08 ASSESSMENT — PAIN DESCRIPTION - PAIN TYPE: TYPE: ACUTE PAIN

## 2019-04-08 ASSESSMENT — PAIN DESCRIPTION - LOCATION: LOCATION: BACK;HIP

## 2019-04-08 ASSESSMENT — PAIN DESCRIPTION - DESCRIPTORS: DESCRIPTORS: ACHING;CONSTANT

## 2019-04-08 NOTE — PROGRESS NOTES
Daily Progress Note  Neuro Critical Care    Patient Name: Faisal Grullon  Patient : 1940  Room/Bed: [address] [place] [address]  Allergies: No Known Allergies  Problem List:   Patient Active Problem List   Diagnosis    Intracranial hemorrhage ([address]    Altered mental status    Encounter for palliative care            INTERVAL HISTORY    Initial Presentation (Admitted ):  Patient was found down, LKW ~ 24 hours prior to arrival.  Hx of hepatitis C w/ cirrhosis, esophageal varices. Found to have acute IVH, acute ICH.      ICH score: 1    Hospital Course:   : EVD placed    :  Cardene gtt weaned off around midnight, -140s. EVD output 27ml/24hr. Required PRN fentanyl for pain overnight. Likely family meeting with palliative care in next 1-2 days. Last 24 Hr:    Placed on BiPAP overnight due to accessory muscle usage. Sp02 stable, maintaining secretions. BP stable, off Cardene at this time. Ongoing mproving mentation.       CURRENT MEDICATIONS:  SCHEDULED MEDICATIONS:   metoprolol  5 mg Intravenous Q6H    ondansetron  4 mg Intravenous Once    sodium chloride flush  10 mL Intravenous 2 times per day    famotidine (PEPCID) injection  20 mg Intravenous Daily    fentanNYL  100 mcg Intravenous Once    chlorhexidine  15 mL Mouth/Throat BID     CONTINUOUS INFUSIONS:   sodium chloride 30 mL/hr at 19 1105     PRN MEDICATIONS:   fentanNYL, sodium chloride flush, magnesium hydroxide, ondansetron, oxyCODONE, labetalol, hydrALAZINE    VITALS:  Temperature Range: Temp: 97.7 °F (36.5 °C) Temp  Av.1 °F (36.7 °C)  Min: 97.7 °F (36.5 °C)  Max: 98.7 °F (37.1 °C)  BP Range: Systolic (03QDT), YPN:836 , Min:114 , VIRA:605     Diastolic (16JLT), CFP:66, Min:31, Max:111    Pulse Range: Pulse  Av.1  Min: 77  Max: 113  Respiration Range: Resp  Av.7  Min: 13  Max: 28  Current Pulse Ox: SpO2: 100 %  24HR Pulse Ox Range: SpO2  Av.1 %  Min: 95 %  Max: 100 %  Patient Vitals for the past 12 hrs:   BP Temp Temp src Pulse Resp SpO2   04/08/19 0602 (!) 149/67 -- -- 105 18 100 %   04/08/19 0502 (!) 140/47 -- -- 85 17 100 %   04/08/19 0428 -- -- -- 77 19 100 %   04/08/19 0402 (!) 148/46 97.7 °F (36.5 °C) Oral 79 17 100 %   04/08/19 0302 (!) 141/51 -- -- 80 16 100 %   04/08/19 0202 (!) 147/47 -- -- 90 18 99 %   04/08/19 0102 (!) 152/50 -- -- 91 17 100 %   04/08/19 0002 (!) 142/40 97.8 °F (36.6 °C) Oral 89 26 100 %   04/07/19 2355 -- -- -- 86 23 99 %   04/07/19 2317 -- -- -- -- 25 95 %   04/07/19 2302 (!) 140/46 -- -- 89 28 100 %   04/07/19 2202 (!) 138/49 -- -- 85 19 99 %   04/07/19 2102 (!) 129/43 -- -- 87 13 99 %   04/07/19 2036 -- -- -- -- 18 100 %   04/07/19 2002 (!) 129/54 98.7 °F (37.1 °C) Oral 104 13 99 %     There is no height or weight on file to calculate BMI.  []<16 Severe malnutrition  []16-16.99 Moderate malnutrition  []17-18.49 Mild malnutrition  []18.5-24.9 Normal  []25-29.9 Overweight (not obese)  []30-34.9 Obese class 1 (Low Risk)  []35-39.9 Obese class 2 (Moderate Risk)  []?40 Obese class 3 (High Risk)    RECENT LABS:   Lab Results   Component Value Date    WBC 5.8 04/08/2019    HGB 10.4 (L) 04/08/2019    HCT 34.3 (L) 04/08/2019    PLT 97 (L) 04/08/2019    ALT 24 04/05/2019    AST 62 (H) 04/05/2019     (H) 04/08/2019    K 3.3 (L) 04/08/2019     (H) 04/08/2019    CREATININE 0.49 (L) 04/08/2019    BUN 36 (H) 04/08/2019    CO2 16 (L) 04/08/2019    INR 1.2 04/05/2019     24 HOUR INTAKE/OUTPUT:    Intake/Output Summary (Last 24 hours) at 4/8/2019 0718  Last data filed at 4/8/2019 0655  Gross per 24 hour   Intake 337 ml   Output 950 ml   Net -613 ml       RADIOLOGY:   Xr Pelvis (1-2 Views)    Result Date: 4/5/2019  EXAMINATION: SINGLE XRAY VIEW OF THE PELVIS 4/5/2019 3:23 pm COMPARISON: None. HISTORY: ORDERING SYSTEM PROVIDED HISTORY: trauma TECHNOLOGIST PROVIDED HISTORY: trauma Initial evaluation. FINDINGS: The osseous structures appear osteopenic.   No convincing acute abnormality seen of the bony pelvis. Degenerative changes of the lumbar spine, sacroiliac joints and hips bilaterally. Vascular calcifications are noted. Osteopenia without convincing acute abnormality of the bony pelvis. Xr Shoulder Right (min 2 Views)    Result Date: 4/6/2019  EXAMINATION: 3 XRAY VIEWS OF THE RIGHT SHOULDER; AP AND LATERAL XRAY VIEWS OF THE RIGHT FOREARM; 3 XRAY VIEWS OF THE RIGHT ELBOW 4/6/2019 3:10 am COMPARISON: None. HISTORY: ORDERING SYSTEM PROVIDED HISTORY: trauma TECHNOLOGIST PROVIDED HISTORY: trauma FINDINGS: Right shoulder: Three views of the right shoulder. No acute fracture or dislocation. Soft tissue swelling about the shoulder. Normal alignment of the glenohumeral and acromioclavicular joints. No aggressive skeletal lesion. Visualized lung is clear. Visualized ribs are intact. Osteopenia. Right elbow: Frontal, lateral and oblique views. Soft tissue swelling about the elbow. No radiographic evidence of an elbow joint effusion. No acute fracture or malalignment. Mild ulnohumeral compartment DJD. Osteopenia. Right forearm: Frontal and lateral views. Soft tissue swelling about the forearm. No acute fracture or malalignment. Mild-to-moderate DJD in the wrist.  Osteopenia. No acute fracture in the right shoulder, right elbow or right forearm. Osteopenia. Mild DJD in the right elbow and mild-to-moderate DJD in the right wrist.     Xr Elbow Right (min 3 Views)    Result Date: 4/6/2019  EXAMINATION: 3 XRAY VIEWS OF THE RIGHT SHOULDER; AP AND LATERAL XRAY VIEWS OF THE RIGHT FOREARM; 3 XRAY VIEWS OF THE RIGHT ELBOW 4/6/2019 3:10 am COMPARISON: None. HISTORY: ORDERING SYSTEM PROVIDED HISTORY: trauma TECHNOLOGIST PROVIDED HISTORY: trauma FINDINGS: Right shoulder: Three views of the right shoulder. No acute fracture or dislocation. Soft tissue swelling about the shoulder. Normal alignment of the glenohumeral and acromioclavicular joints. No aggressive skeletal lesion.   Visualized lung is administration of intravenous contrast. Dose modulation, iterative reconstruction, and/or weight based adjustment of the mA/kV was utilized to reduce the radiation dose to as low as reasonably achievable. COMPARISON: CT head 04/05/2029 HISTORY: ORDERING SYSTEM PROVIDED HISTORY: f/u [facility] s/p EVD placement TECHNOLOGIST PROVIDED HISTORY: Ordering Physician Provided Reason for Exam: EVD placement follow up Acuity: Unknown Type of Exam: Subsequent/Follow-up FINDINGS: BRAIN/VENTRICLES: Interval placement of right frontal approach external ventricular drainage device. Tip terminates within the frontal horn left lateral ventricle near the caudal thalamic groove. Interval pneumocephalus identified consistent with interval placement. Redemonstration of the left thalamic intraparenchymal hemorrhage measuring 3.6 x 2.5 cm in size with evidence of subarachnoid decompression/subarachnoid extension. Evidence of blood products within the lateral 3rd and 4th ventricle identified. Evidence of blood products identified overlying both frontal and parietal lobes and along the interhemispheric falx new from the prior examination but may be related to redistribution of subarachnoid blood products. No shift of midline structures. Basal cisterns are patent. Mild generalized involutional change with chronic small vessel ischemic disease. Intracranial vascular calcifications. . ORBITS: The visualized portion of the orbits demonstrate no acute abnormality. SINUSES: Mild ethmoid sinus mucosal thickening. SOFT TISSUES/SKULL:  No depressed skull fracture. Edema identified overlying the right temporalis muscle may be related to prior trauma or surgery. Interval placement of right frontal approach EVD. Evolving left thalamic intraparenchymal hemorrhage with secondary subarachnoid extension and blood products within the lateral 3rd and 4th ventricles.  The blood products identified overlying both frontal lobes parietal lobes and along the interhemispheric falx could be related to placement by EVD versus redistribution of the previously noted subarachnoid blood products. Ct Head Wo Contrast    Result Date: 4/5/2019  EXAMINATION: CT OF THE HEAD WITHOUT CONTRAST  4/5/2019 3:19 pm TECHNIQUE: CT of the head was performed without the administration of intravenous contrast. Dose modulation, iterative reconstruction, and/or weight based adjustment of the mA/kV was utilized to reduce the radiation dose to as low as reasonably achievable. COMPARISON: None. HISTORY: ORDERING SYSTEM PROVIDED HISTORY: trauma TECHNOLOGIST PROVIDED HISTORY: FINDINGS: BRAIN/VENTRICLES: There is an intraparenchymal hemorrhage within the left thalamic region measuring 3.2 x 2.1 x 3.6 cm. There is adjacent vasogenic edema. There are hemorrhagic products within the lateral ventricles bilaterally as well as the 3rd ventricle, cerebral aqueduct, and 4th ventricle. There is minimal rightward midline shift. There is age-appropriate cerebral volume loss. The infratentorial structures are otherwise unremarkable. ORBITS: The visualized portion of the orbits demonstrate no acute abnormality. SINUSES: The visualized paranasal sinuses and mastoid air cells demonstrate no acute abnormality. SOFT TISSUES/SKULL:  There is right-sided soft tissue swelling involving the right frontal, parietal, and temporal region. There is no definite acute osseous abnormalities. Intraparenchymal hemorrhage in the left thalamic region with adjacent vasogenic edema. Hemorrhagic products within the lateral ventricles, 3rd ventricle, cerebral aqueduct, and 4th ventricle. Minimal rightward midline shift. Critical results were called by Dr. Lawyer Kim to Dr. Jose Cross on 4/5/2019 at 16:02.      Ct Facial Bones Wo Contrast    Result Date: 4/5/2019  EXAMINATION: CT OF THE FACE WITHOUT CONTRAST  4/5/2019 3:24 pm TECHNIQUE: CT of the face was performed without the administration of intravenous contrast. Multiplanar reformatted images are provided for review. Dose modulation, iterative reconstruction, and/or weight based adjustment of the mA/kV was utilized to reduce the radiation dose to as low as reasonably achievable. COMPARISON: None HISTORY: ORDERING SYSTEM PROVIDED HISTORY: trauma; significant R facial bruising FINDINGS: FACIAL BONES:  The maxilla, pterygoid plates and zygomatic arches are intact. The mandible is intact. The mandibular condyles are normally situated. The nasal bones and maxillary nasal processes are intact. ORBITS:  The globes appear intact. The extraocular muscles, optic nerve sheath complexes and lacrimal glands appear unremarkable. No retrobulbar hematoma or mass is seen. The orbital walls and rims are intact. SINUSES/MASTOIDS:  The paranasal sinuses and mastoid air cells are well aerated. No acute fracture is seen. SOFT TISSUES:  Preseptal swelling of the right face extending superiorly along the supraorbital ridge into the right frontal scalp. Asymmetric swelling of the lateral face on the right compared with the left. No fluid collection is identified. No air or unexpected radiopaque foreign bodies. Soft tissue swelling/edema along the upper neck circumferentially right greater than left. The visualized intracranial contents show acute left thalamic intraparenchymal blood products and intraventricular blood products. 1. Bruising of the face without fracture, radiopaque foreign body, or superficial fluid collection. 2. Acute left thalamic hemorrhage with intraventricular extension. This is previously called to Dr. Rut Armendariz by Dr. Jarad Santos at  on 4/5/19. Ct Cervical Spine Wo Contrast    Result Date: 4/5/2019  EXAMINATION: CT OF THE CERVICAL SPINE WITHOUT CONTRAST 4/5/2019 3:19 pm TECHNIQUE: CT of the cervical spine was performed without the administration of intravenous contrast. Multiplanar reformatted images are provided for review.  Dose modulation, iterative reconstruction, and/or weight based adjustment of the mA/kV was utilized to reduce the radiation dose to as low as reasonably achievable. COMPARISON: None. HISTORY: ORDERING SYSTEM PROVIDED HISTORY: trauma FINDINGS: BONES/ALIGNMENT: There is no evidence of an acute cervical spine fracture. There is normal alignment of the cervical spine except moderate dextroconvex scoliosis and reversal of the normal lordotic curvature. .  Decompressive laminectomies at C4 and C5. Posterior fusion rods and pedicular screws at C3, 4, 5 and 6 on the right and C5 and 6 on the left. DEGENERATIVE CHANGES: Multilevel disc space narrowing. SOFT TISSUES: There is no prevertebral soft tissue swelling. No acute abnormality of the cervical spine. Posterior interbody fixation as above. Ct Thoracic Spine Wo Contrast    Result Date: 4/5/2019  EXAMINATION: CT OF THE THORACIC SPINE WITHOUT CONTRAST  4/5/2019 3:18 pm: TECHNIQUE: CT of the thoracic spine was performed without the administration of intravenous contrast. Multiplanar reformatted images are provided for review. Dose modulation, iterative reconstruction, and/or weight based adjustment of the mA/kV was utilized to reduce the radiation dose to as low as reasonably achievable. COMPARISON: None. HISTORY: ORDERING SYSTEM PROVIDED HISTORY: trauma FINDINGS: BONES/ALIGNMENT: There is normal alignment of the spine. The vertebral body heights are maintained. No osseous destructive lesion is seen. DEGENERATIVE CHANGES: No gross spinal canal stenosis or bony neural foraminal narrowing of the thoracic spine. SOFT TISSUES: No paraspinal mass is seen. Unremarkable CT of the thoracic spine. Ct Lumbar Spine Wo Contrast    Result Date: 4/5/2019  EXAMINATION: CT OF THE LUMBAR SPINE WITHOUT CONTRAST  4/5/2019 TECHNIQUE: CT of the lumbar spine was performed without the administration of intravenous contrast. Multiplanar reformatted images are provided for review.  Dose modulation, iterative reconstruction, and/or weight based adjustment of the mA/kV was utilized to reduce the radiation dose to as low as reasonably achievable. COMPARISON: None HISTORY: ORDERING SYSTEM PROVIDED HISTORY: trauma TECHNOLOGIST PROVIDED HISTORY: trauma FINDINGS: BONES/ALIGNMENT: There is minimal compression deformity of L4 vertebral body with less than 20% height loss/wedging anteriorly. Multilevel degenerative disc disease is evident. The facets are intact. The spinous processes are intact. Lamina and pedicles are intact. There is evidence of prior posterior decompression. DEGENERATIVE CHANGES: Multilevel degenerative disc disease with facet arthropathy. SOFT TISSUES/RETROPERITONEUM: Diverticulosis. Atherosclerotic changes of aorta. Large stool throughout the colon. An avidly enhancing dilated left ovarian vessel is noted. 1. Age-indeterminate compression deformity of L4 with less than 20% height loss and no significant retropulsion. 2. Osteopenic skeleton with multilevel degenerative changes. Xr Chest Portable    Result Date: 4/6/2019  EXAMINATION: SINGLE XRAY VIEW OF THE CHEST 4/6/2019 5:27 am COMPARISON: 04/05/2019 HISTORY: ORDERING SYSTEM PROVIDED HISTORY: Concern for aspiration TECHNOLOGIST PROVIDED HISTORY: Concern for aspiration FINDINGS: Cardiomediastinal silhouette and pulmonary vasculature are within normal limits. There is no focal airspace consolidation. No evidence of pneumothorax or pleural effusion on this limited supine study. No acute osseous abnormality. No acute intrathoracic process. Xr Chest Portable    Result Date: 4/5/2019  EXAMINATION: SINGLE XRAY VIEW OF THE CHEST 4/5/2019 3:24 pm COMPARISON: None. HISTORY: ORDERING SYSTEM PROVIDED HISTORY: trauma TECHNOLOGIST PROVIDED HISTORY: trauma FINDINGS: Slight increase in interstitial markings. The lungs are without acute focal process. There is no effusion or pneumothorax. The cardiomediastinal silhouette is without acute process. ARCH/ARCH VESSELS: There is a normal branch pattern of the aortic arch. No significant stenosis is seen of the innominate artery or subclavian arteries. CAROTID ARTERIES: The common carotid arteries are normal in appearance without evidence of a flow limiting stenosis. The internal carotid arteries are normal in appearance without evidence of a flow limiting stenosis by NASCET criteria. No dissection or arterial injury is seen. VERTEBRAL ARTERIES: The vertebral arteries both arise from the subclavian arteries and are normal in caliber without evidence of flow limiting stenosis or dissection. SOFT TISSUES: The lung apices are clear. No cervical or superior mediastinal lymphadenopathy. The visualized portion of the larynx and pharynx appear unremarkable. The parotid, submandibular and thyroid glands demonstrate no acute abnormality. BONES: The visualized osseous structures appear unremarkable. CTA HEAD: ANTERIOR CIRCULATION: The internal carotid arteries are normal in course and caliber without focal stenosis. The anterior cerebral and middle cerebral arteries demonstrate no focal stenosis. POSTERIOR CIRCULATION: The posterior cerebral arteries demonstrate no focal stenosis. The vertebral and basilar arteries appear unremarkable. BRAIN: There is redemonstration of an intraparenchymal hemorrhage within the left thalamic region as well as intraventricular hemorrhagic products. Unremarkable CTA of the head and neck. Critical results were called by Dr. Michela Flores to Dr. Carline Ayala on 4/5/2019 at 16:12. Labs and Images reviewed with:  [x] Dr. Patricia Almeida. Latoya    [] Dr. Jazmin Saba  [] Dr. Usama Millan  [] There are no new interval images to review.      PHYSICAL EXAM       CONSTITUTIONAL:  Awake, opens eyes spontaneously, intermittently following commands, unable to answer questions appropriately,    HEAD:  Right periorbital/forehead ecchymosis   EYES:  PERRL, left gaze   ENT:  moist mucous Pepcid 20 mg qd     ID:  - Tmax 37.1  - WBC 5.8  - Continue to monitor for fevers  - Daily CBC     HEME:   - H&H  10.4/34.3  - Platelets 97  - Daily CBC     ENDOCRINE:  - Continue to monitor blood glucose, goal <180     OTHER:  - PT/OT/ST   - Code Status: DNR-CCA, plan for family/palliative care meeting to discuss goals of care.      PROPHYLAXIS:  Stress ulcer: H2 blocker     DVT PROPHYLAXIS:  - SCD  - No chemoprophylaxis anticoagulation at this time. EVD removed 4/7. Plan to start Lovenox 40 mg SQ day 3-5 s/p EVD removal.     DISPOSITION:  [x] To remain ICU: Close neurologic monitoring   [] OK for out of ICU from Neuro Critical Care standpoint    We will continue to follow along. For any changes in exam or patient status please contact Neuro Critical Care.       Sudhakar Bloom MD  Neuro Critical Care  Pager [phone]  4/8/2019     7:18 AM

## 2019-04-08 NOTE — PROGRESS NOTES
Neurosurgery JAGJIT/Resident    Daily Progress Note     4/8/2019  10:48 AM    Chart reviewed. No acute events overnight. More awake today. Does not follow commands. Vitals:    04/08/19 0602 04/08/19 0702 04/08/19 0805 04/08/19 0905   BP: (!) 149/67 (!) 144/52 (!) 147/42 (!) 141/54   Pulse: 105 80 93 81   Resp: 18 15 21 24   Temp:   97 °F (36.1 °C)    TempSrc:   Oral    SpO2: 100% 100%         PE:   Awake, does not verbalize and does not follow commands  CVS: normal s2/s1  Resp: equal air entry bilaterally   Abd: soft  Motor RUE flaccid        Lab Results   Component Value Date    WBC 5.8 04/08/2019    HGB 10.4 (L) 04/08/2019    HCT 34.3 (L) 04/08/2019    PLT 97 (L) 04/08/2019    ALT 24 04/05/2019    AST 62 (H) 04/05/2019     (H) 04/08/2019    K 3.3 (L) 04/08/2019     (H) 04/08/2019    CREATININE 0.49 (L) 04/08/2019    BUN 36 (H) 04/08/2019    CO2 16 (L) 04/08/2019    INR 1.2 04/05/2019         A/P  66 y.o. female who presents with left thalamic IPH with intraventricular extension. EVD placed 4/5  EVD removed 4/7    - no further NS interventions   - medical care per [facility] and palliative care      Please contact neurosurgery with any changes in patients neurologic status.        Estelle Batres CNP  NS pager [phone]  4/8/19  10:48 AM

## 2019-04-08 NOTE — PROGRESS NOTES
Speech Language Pathology  Facility/Department: [address]  Initial Speech/Language Assessment    NAME: Hafsa Phillips  : 1940   MRN: [id number]  ADMISSION DATE: 2019  ADMITTING DIAGNOSIS: has Intracranial hemorrhage ([address] Altered mental status; and Encounter for palliative care on their problem list.    Date of Eval: 2019   Evaluating Therapist: CARLOS Young    RECENT RESULTS  CT OF HEAD: (  19  )    Impression   Interval placement of right frontal approach EVD.       Evolving left thalamic intraparenchymal hemorrhage with secondary   subarachnoid extension and blood products within the lateral 3rd and 4th   ventricles.       The blood products identified overlying both frontal lobes parietal lobes and   along the interhemispheric falx could be related to placement by EVD versus   redistribution of the previously noted subarachnoid blood products.               Primary Complaint: Per chart, The patient is a 66 y. o. female presented as Trauma Alert.  Pt found down by son. Last known well 1800 19.  Reported altered mentation on scene. Pt found in bathroom and suspected to be down overnight. GCS 11 on arrival. Per family, pt has hx of hepatitis C w/ cirrhosis.  Followed by [facility].       Pain:  Pain Assessment  Pain Assessment: Faces  Pain Level: 5  Pa-Baker Pain Rating: No hurt  Response to Pain Intervention: Asleep with RR greater than 10, Patient Satisfied    Assessment: Pt presented with moderate receptive language deficits characterized by difficulty with 1-step commands, identifying body arts and objects, and answering yes/no questions. Pt with moderate-severe expressive language deficits characterized by difficulty with stating biographical information and confrontation naming tasks. No dysarthria or oral motor deficits noted. ST to follow up to address noted deficits. Verbal education provided to pt & family, and all verbalized understanding. Recommendations:  Requires SLP Intervention: Yes  Duration/Frequency of Treatment: 3-5x per week  D/C Recommendations: Further therapy recommended at discharge. The patient should be able to tolerate at least three hours of therapy per day over 5 days or 15 hours over 7 days. Plan:   Goals:  Short-term Goals  Goal 1: Pt will state biographical information with 90% accuracy. Goal 2: Pt will follow 1-step commands with 90% accuracy. Goal 3: Pt will identify body parts and common objects with 90% accuracy. Goal 4: Pt will answer yes/no questins with 90% accuracy. Goal 5: Pt will complete confrontational naming tasks with 90% accuracy. Patient/family involved in developing goals and treatment plan: yes    Subjective:   Previous level of function and limitations:  General  Chart Reviewed: Yes  Family / Caregiver Present: Yes(son; daughter in law)     Vision  Vision: Within Functional Limits  Hearing  Hearing: Within functional limits           Objective:     Oral/Motor  Oral Motor: Within functional limits    Auditory Comprehension  Comprehension: Exceptions  Yes/No Questions: Mild(4/5)  One Step Basic Commands: Moderate(1/4)  Conversation: Mild  Interfering Components: Working memory     Expression  Primary Mode of Expression: Verbal    Verbal Expression  Verbal Expression: Exceptions to functional limits  Automatic Speech: Mild(Counted from 1-10 and stated days of the week when provided first word (e.g. one; Sunday))  Confrontation: Moderate(3/5; named \"phone\" for flashlight & unable to state the word \"straw\")  Divergent: To be assessed in therapy  Responsive: Moderate(0/2; increased to 2/2 with max semantic & phonemic cues)  Conversation: Mild     Motor Speech  Motor Speech:  Within Functional Limits       Cognition:      Orientation  Overall Orientation Status: Impaired  Orientation Level: Disoriented X4;Unable to assess      Prognosis:  Speech Therapy Prognosis  Prognosis: Fair  Individuals consulted  Consulted and agree with results and recommendations: Patient; Family member  Family member consulted: son; daughter-in-law    Education:  Patient Education: yes  Patient Education Response: Verbalizes understanding         Therapy Time:   Individual Concurrent Group Co-treatment   Time In 1140         Time Out 1200         Minutes 20                 Krysta Nick M.S. CF-SLP    4/8/2019 1:12 PM

## 2019-04-08 NOTE — PLAN OF CARE
Problem: Risk for Impaired Skin Integrity  Goal: Tissue integrity - skin and mucous membranes  Description  Structural intactness and normal physiological function of skin and  mucous membranes. Outcome: Ongoing  Note:   Skin assessment complete. Interventions in place. See doc flowsheet for interventions initiated. Pt turned q 2hrs.  Will continue to monitor for additional needs and skin breakdown

## 2019-04-08 NOTE — PATIENT CARE CONFERENCE
Palliative Care Family Conference    Patient:  Rafa Wheatley  Room: 7091/5399-54    Attended By: Dr. Ruth Knight care attending, patient's son Edin and his wife Sandy Ledbetter and his wife Wes Doty    Reason for meeting:Routine meeting  Discuss goals of care  Treatment options/plans  Provide clinical updates and answer questions  Share information and provider education for the family  Listen to patient/family concerns  Assess family understanding, concerns, and coping  Communicate update after a long length of stay  Build trust  Provide emotional support to the family  Other major care decisions     Conference Summary:  - The meeting was held near the patient's bedside in the neuro ICU on the 5th floor    - The meeting was conducted by me and attended by patient's son Edin and his wife Radha [place] and Madan Mae and his wife Wes Soren    - I discussed patient's current medical conditions with the family and explained to them that patient was clinically making improvements    - I told the family that patient had passed her swallow study done today morning and could take regular diet with thin liquids    - I explained to the family that presently the patient needed PT/OT therapy to regain strength    - I discussed with the family regarding further goals of care for the patient    - I explained to the family that patient can be discharged to a nursing home facility where she can have PT/OT therapy    - The family had questions regarding hospice care and explained to them about the levels of hospice care    - I explained to the family that presently patient needed to regain strength at a nursing home and later if need to be the patient can have hospice care even at the nursing home facility    - I explained the different types of code status to the family and everyone stated that they did not want the patient to have CPR/chest compressions to be done and also did not want her to be intubated if her breathing stopped     - I told the family that patient's code status will be changed to DNR CCA with no intubation and they stated agreement with it    - I explained to the family that patient's  will help them in the discharge plan to the nursing home    - I offered comfort and emotional support to the patient and family      Conclusion/Plan:  - Patient's code status changed to DNR CCA with no intubation in the Epic    - We will continue to provide comfort and support to the patient and family    The total time spent in face-to-face family meeting discussing goals of care, code status and hospice care was more than 60 minutes  The note has been dictated by dragon, typing errors may be a possibility.      Electronically signed by   Edmundo Gonzalez MD  Palliative Care Team  on 4/8/2019 at 2:00 PM

## 2019-04-08 NOTE — PROGRESS NOTES
Physical Therapy  DATE: 2019    NAME: Tc Hensley  MRN: [id number]   : 1940    Patient not seen this date for Physical Therapy due to:  [] Blood transfusion in progress  [] Hemodialysis  []  Patient Declined  [] Spine Precautions   [] Strict Bedrest  [] Surgery/ Procedure  [] Testing      [x] Other: Per RN, hold PT until family discusses further plans for the pt. [] PT being discontinued at this time. Patient independent. No further needs. [] PT being discontinued at this time as the patient has been transferred to palliative care. No further needs.     Bud Sahu, SPT  The above documentation was reviewed and accepted by aPt Núñez Physical Therapist.

## 2019-04-08 NOTE — CARE COORDINATION
Transition planning:  SNF/Hospice  Palliative following for pt's progression. Current activity order for Bedrest/will need therapy evaluations once bedrest discontinued. Pt's son Garima Due, awaiting paperwork.

## 2019-04-08 NOTE — DISCHARGE INSTR - COC
Continuity of Care Form    Patient Name: Peyton Armstrong   :  1940  MRN:  [id number]    [facility] date:  2019  Discharge date: 2019 ***    Code Status Order: Limited   Advance Directives:     Admitting Physician:  Ирина Pelletier MD  PCP: Nikole Ferguson MD    Discharging Nurse: [facility] Unit/Room#: 5668/1916-20  Discharging Unit Phone Number: [phone]    Emergency Contact:   Extended Emergency Contact Information  Primary Emergency Contact: [facility] Phone: [phone]  Relation: Child  Secondary Emergency Contact: [address] [address] [employer] Phone: [phone]  Relation: Child    Past Surgical History:  No past surgical history on file. Immunization History: There is no immunization history on file for this patient. Active Problems:  Patient Active Problem List   Diagnosis Code    Intracranial hemorrhage (HCC) I62.9    Altered mental status R41.82    Encounter for palliative care Z51.5       Isolation/Infection:   Isolation          No Isolation            Nurse Assessment:  Last Vital Signs: BP (!) 144/52   Pulse 80   Temp 97.7 °F (36.5 °C) (Oral)   Resp 15   SpO2 100%     Last documented pain score (0-10 scale): Pain Level: 6  Last Weight:   Wt Readings from Last 1 Encounters:   No data found for Wt     Mental [id number]:  disoriented, alert and expressive and receptive aphasia    IV Access:  - None    Nursing Mobility/ADLs:  Walking   Dependent  Transfer  Dependent  Bathing  Dependent  Dressing  Dependent  Toileting  Dependent  Feeding  Dependent  Med Admin  Dependent  Med Delivery   whole    Wound Care Documentation and Therapy:        Elimination:  Continence:   · Bowel: No  · Bladder: No st cath PRN  Urinary Catheter: None   Colostomy/Ileostomy/Ileal Conduit: No       Date of Last BM:2019 takes lactose for 3 stools daily. adjust the dose per stools ***    Intake/Output Summary (Last 24 hours) at 2019 0729  Last data filed at 2019 6090  Gross per 24 hour   Intake 337 ml   Output 950 ml   Net -613 ml     I/O last 3 completed shifts: In: 337 [I.V.:337]  Out: 950 [Urine:950]    Safety Concerns: At Risk for Falls and Aspiration Risk    Impairments/Disabilities:      Speech    Nutrition Therapy:  Current Nutrition Therapy:   - Oral Diet:  Dysphagia 1 pureed  - Oral Nutrition Supplement:  Standard  three times a day  - ***    Routes of Feeding: Oral  Liquids: Thin Liquids  Daily Fluid Restriction: no  Last Modified Barium Swallow with Video (Video Swallowing Test): not done    Treatments at the Time of Hospital Discharge:   Respiratory Treatments: ***  Oxygen Therapy:  is not on home oxygen therapy. Ventilator:    - No ventilator support    Rehab Therapies: Physical Therapy, Occupational Therapy and Speech/Language Therapy  Weight Bearing Status/Restrictions: No weight bearing restirctions  Other Medical Equipment (for information only, NOT a DME order):  {EQUIPMENT:[id number]}  Other Treatments: ***    Patient's personal belongings (please select all that are sent with patient):  Glasses    RN SIGNATURE:  Electronically signed by Raad Sanders RN on 4/18/19 at 10:16 AM    CASE MANAGEMENT/SOCIAL WORK SECTION    Inpatient Status Date: ***    Readmission Risk Assessment Score:  Readmission Risk              Risk of Unplanned Readmission:        11           Discharging to Facility/ Agency   · Name: [facility]  · Address:  [facility] Dustin Keen, [address] [place] [address]  · Phone:  [phone]  · Fax:  [phone]    Dialysis Facility (if applicable)   · Name:  · Address:  · Dialysis Schedule:  · Phone:  · Fax:    / signature: Electronically signed by Avinash Ortega RN on 4/18/19 at 9:01 AM    PHYSICIAN SECTION    Prognosis: Fair    Condition at Discharge: Stable    Rehab Potential (if transferring to Rehab):  Fair    Recommended Labs or Other Treatments After Discharge: Patient to resume meds as per GI and f/u with GI for ascities  Patient to continue Vimapat 100 mg bid  Patient to f/u with PCP in one week     Activity: activity as tolerated  Diet: Dysphagia puree diet        Physician Certification: I certify the above information and transfer of Beauty Pontiff  is necessary for the continuing treatment of the diagnosis listed and that she requires [place] for less 30 days.      Update Admission H&P: No change in H&P    PHYSICIAN SIGNATURE:  Electronically signed by Yunier Corona MD on 4/18/19 at 11:33 AM

## 2019-04-08 NOTE — PROGRESS NOTES
Patient taken off bipap. Observed while washing her face and and the saturation remained 98% and higher. Reported to RN that  A nasal cannula was at bedside if needed. HFNC at bedside as well.

## 2019-04-08 NOTE — PROGRESS NOTES
[facility]  Progress Note                   Date:   4/8/2019  Patient name: Dedra Plaza  Date of admission:  4/5/2019  3:09 PM  MRN:   [id number]  YOB: 1940  PCP: Rosalie Reyes MD    Reason for Admission: Intracranial bleed ([facility] [address] [I62.9]    Subjective:       Clinical Changes /Abnormalities: Patient seen & examined at bedside resting quietly. Extubated and on RA. Tele reviewed - SR/ST occasionally. Noted palliative care team following. No new cardiac issues/concerns. Echo pending    Review of Systems    Medications:   Scheduled Meds:   metoprolol  5 mg Intravenous Q6H    ondansetron  4 mg Intravenous Once    sodium chloride flush  10 mL Intravenous 2 times per day    famotidine (PEPCID) injection  20 mg Intravenous Daily    fentanNYL  100 mcg Intravenous Once    chlorhexidine  15 mL Mouth/Throat BID     Continuous Infusions:   sodium chloride 30 mL/hr at 04/07/19 1105     CBC:   Recent Labs     04/06/19  1201 04/07/19  0557 04/08/19  0407   WBC 10.5 8.5 5.8   HGB 11.4* 10.5* 10.4*    See Reflexed IPF Result 97*     BMP:    Recent Labs     04/06/19  0541 04/07/19  0557 04/08/19  0407    143 145*   K 3.8 3.6* 3.3*   * 109* 112*   CO2 20 19* 16*   BUN 33* 40* 36*   CREATININE 0.64 0.50 0.49*   GLUCOSE 120* 111* 100*     Hepatic:  Recent Labs     04/05/19  2031   AST 62*   ALT 24   BILITOT 1.03   ALKPHOS 107*     Troponin:   Recent Labs     04/05/19  1515 04/06/19  0541 04/07/19  0557   TROPHS 359* 223* 108*     BNP: No results for input(s): BNP in the last 72 hours. Lipids: No results for input(s): CHOL, HDL in the last 72 hours.     Invalid input(s): LDLCALCU  INR:   Recent Labs     04/05/19  1515   INR 1.2       Objective:   Vitals: BP (!) 141/54   Pulse 81   Temp 97 °F (36.1 °C) (Oral)   Resp 24   SpO2 100%   General appearance: alert and cooperative with exam. [place]. Able to answer no when questioned about chest pain but limited verbal responses noted  HEENT: Head: Normocephalic, no lesions, without obvious abnormality. Neck:no JVD, trachea midline, no adenopathy  Lungs: Clear to auscultation throughout. On RA currently without distress  Heart: Regular rate and rhythm, s1/s2 auscultated, no murmurs. Tele SR 86  Abdomen: soft, non-tender, bowel sounds active  Extremities: no edema  Neurologic: not done        Assessment / Acute Cardiac Problems:   1. 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 Right EVD placement and removal  3. Acute resp failure s/p extubation  4. 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 HTN      Patient Active Problem List:     Intracranial hemorrhage ([facility] [address]     Altered mental status     Encounter for palliative care      Plan of Treatment:   1. Await echo. Given degree of neuro-surg issues at this time no plans for cardiac intervention. Continue BP control per neuro recommendations.      Electronically signed by CHENTE Pang CNP on 4/8/2019 at 9:57 AM  [id number] [address]  [phone]

## 2019-04-08 NOTE — CARE COORDINATION
Transition planning:  Pt passed swallow study and is improving clinically. Pt has new activity orders and bedrest discontinued. Awaiting PT/OT evaluations. Writer met with pt's son Taya Richard and Osvaldo Parada initially and provided SNF list.  Pt's other son Hans Damian later arrived with his spouse Artur and SNF list also provided to them. Pt's sons have a difference of opinions on SNF choice, as they do not live near each other and, per Taya Richard, have a contentious relationship. Voices were raised by Taya Richard and he left the unit for a short break. Hans Damian is pt's POA and paperwork supporting this is in pt's chart. Hans Damian chose [address] [place] [address] hesitantly agreed to a referral there. Pt retired from [employer]. The family is exploring pt's eventual need for ECF if pt continues to be unsafe to return home. Referral faxed to [facility]. Awaiting if accepted and pre-cert will be needed. Pt's son and Nikia Catherine requested new SNF referrals. Hans Damian stated that he did not want his brother Taya Richard to know of the new referrals nor be involved in the decision making process. The referrals requested are located in [place], [place] and include: [address] attempted to find provider list in-network for [facility]. Anticipate that only [facility] SNFs are in-network. [address] website provider search not functioning at this time. Writer also attempted to call [address] for [employer] and they are closed for the day. Writer will re-attempt provider search Tuesday morning during [facility] hours.

## 2019-04-08 NOTE — PROGRESS NOTES
Pt destats to 79% on High Flow, RN to bedside. Pt tachypneic, RR 30's, shallow breathing with accessory muscle. PT returns to 99%, HOB elevated. Respiratory called and at bedside, placed on BI-Pap. Will continue to closely monitor. [address] [place] [address] Dr Diego Persons notified at this time. No new orders. Will Continue to monitor.

## 2019-04-09 LAB
ABSOLUTE EOS #: 0.11 K/UL (ref 0–0.44)
ABSOLUTE IMMATURE GRANULOCYTE: 0.11 K/UL (ref 0–0.3)
ABSOLUTE LYMPH #: 0.66 K/UL (ref 1.1–3.7)
ABSOLUTE MONO #: 0.55 K/UL (ref 0.1–1.2)
ANION GAP SERPL CALCULATED.3IONS-SCNC: 11 MMOL/L (ref 9–17)
BASOPHILS # BLD: 0 % (ref 0–2)
BASOPHILS ABSOLUTE: 0 K/UL (ref 0–0.2)
BUN BLDV-MCNC: 27 MG/DL (ref 8–23)
BUN/CREAT BLD: ABNORMAL (ref 9–20)
CALCIUM SERPL-MCNC: 8.5 MG/DL (ref 8.6–10.4)
CHLORIDE BLD-SCNC: 112 MMOL/L (ref 98–107)
CO2: 18 MMOL/L (ref 20–31)
CREAT SERPL-MCNC: 0.39 MG/DL (ref 0.5–0.9)
DIFFERENTIAL TYPE: ABNORMAL
EOSINOPHILS RELATIVE PERCENT: 2 % (ref 1–4)
GFR AFRICAN AMERICAN: >60 ML/MIN
GFR NON-AFRICAN AMERICAN: >60 ML/MIN
GFR SERPL CREATININE-BSD FRML MDRD: ABNORMAL ML/MIN/{1.73_M2}
GFR SERPL CREATININE-BSD FRML MDRD: ABNORMAL ML/MIN/{1.73_M2}
GLUCOSE BLD-MCNC: 114 MG/DL (ref 70–99)
HCT VFR BLD CALC: 33 % (ref 36.3–47.1)
HEMOGLOBIN: 10.4 G/DL (ref 11.9–15.1)
IMMATURE GRANULOCYTES: 2 %
LYMPHOCYTES # BLD: 12 % (ref 24–43)
MCH RBC QN AUTO: 29.5 PG (ref 25.2–33.5)
MCHC RBC AUTO-ENTMCNC: 31.5 G/DL (ref 28.4–34.8)
MCV RBC AUTO: 93.5 FL (ref 82.6–102.9)
MONOCYTES # BLD: 10 % (ref 3–12)
MORPHOLOGY: ABNORMAL
NRBC AUTOMATED: 0 PER 100 WBC
PDW BLD-RTO: 15.1 % (ref 11.8–14.4)
PLATELET # BLD: ABNORMAL K/UL (ref 138–453)
PLATELET ESTIMATE: ABNORMAL
PLATELET, FLUORESCENCE: 90 K/UL (ref 138–453)
PLATELET, IMMATURE FRACTION: 3.7 % (ref 1.1–10.3)
PMV BLD AUTO: ABNORMAL FL (ref 8.1–13.5)
POTASSIUM SERPL-SCNC: 3.2 MMOL/L (ref 3.7–5.3)
RBC # BLD: 3.53 M/UL (ref 3.95–5.11)
RBC # BLD: ABNORMAL 10*6/UL
SEG NEUTROPHILS: 74 % (ref 36–65)
SEGMENTED NEUTROPHILS ABSOLUTE COUNT: 4.07 K/UL (ref 1.5–8.1)
SODIUM BLD-SCNC: 141 MMOL/L (ref 135–144)
WBC # BLD: 5.5 K/UL (ref 3.5–11.3)
WBC # BLD: ABNORMAL 10*3/UL

## 2019-04-09 PROCEDURE — 97162 PT EVAL MOD COMPLEX 30 MIN: CPT

## 2019-04-09 PROCEDURE — 51798 US URINE CAPACITY MEASURE: CPT

## 2019-04-09 PROCEDURE — 2060000000 HC ICU INTERMEDIATE R&B

## 2019-04-09 PROCEDURE — 2580000003 HC RX 258: Performed by: STUDENT IN AN ORGANIZED HEALTH CARE EDUCATION/TRAINING PROGRAM

## 2019-04-09 PROCEDURE — 36415 COLL VENOUS BLD VENIPUNCTURE: CPT

## 2019-04-09 PROCEDURE — 97530 THERAPEUTIC ACTIVITIES: CPT

## 2019-04-09 PROCEDURE — 6360000002 HC RX W HCPCS: Performed by: EMERGENCY MEDICINE

## 2019-04-09 PROCEDURE — 97166 OT EVAL MOD COMPLEX 45 MIN: CPT

## 2019-04-09 PROCEDURE — 2580000003 HC RX 258: Performed by: EMERGENCY MEDICINE

## 2019-04-09 PROCEDURE — 80048 BASIC METABOLIC PNL TOTAL CA: CPT

## 2019-04-09 PROCEDURE — 6370000000 HC RX 637 (ALT 250 FOR IP): Performed by: PSYCHIATRY & NEUROLOGY

## 2019-04-09 PROCEDURE — 2500000003 HC RX 250 WO HCPCS: Performed by: NURSE PRACTITIONER

## 2019-04-09 PROCEDURE — 6370000000 HC RX 637 (ALT 250 FOR IP): Performed by: STUDENT IN AN ORGANIZED HEALTH CARE EDUCATION/TRAINING PROGRAM

## 2019-04-09 PROCEDURE — 85055 RETICULATED PLATELET ASSAY: CPT

## 2019-04-09 PROCEDURE — 85025 COMPLETE CBC W/AUTO DIFF WBC: CPT

## 2019-04-09 PROCEDURE — 92507 TX SP LANG VOICE COMM INDIV: CPT

## 2019-04-09 PROCEDURE — 94762 N-INVAS EAR/PLS OXIMTRY CONT: CPT

## 2019-04-09 PROCEDURE — 6370000000 HC RX 637 (ALT 250 FOR IP): Performed by: EMERGENCY MEDICINE

## 2019-04-09 PROCEDURE — 6360000002 HC RX W HCPCS: Performed by: PSYCHIATRY & NEUROLOGY

## 2019-04-09 PROCEDURE — 2500000003 HC RX 250 WO HCPCS: Performed by: EMERGENCY MEDICINE

## 2019-04-09 RX ORDER — PROPRANOLOL HYDROCHLORIDE 20 MG/1
20 TABLET ORAL 2 TIMES DAILY
Status: ON HOLD | COMMUNITY
End: 2019-04-18 | Stop reason: HOSPADM

## 2019-04-09 RX ORDER — TRAZODONE HYDROCHLORIDE 50 MG/1
50 TABLET ORAL NIGHTLY
COMMUNITY

## 2019-04-09 RX ORDER — FUROSEMIDE 20 MG/1
20 TABLET ORAL 2 TIMES DAILY
Status: ON HOLD | COMMUNITY
End: 2019-04-18 | Stop reason: HOSPADM

## 2019-04-09 RX ORDER — GABAPENTIN 300 MG/1
300 CAPSULE ORAL 3 TIMES DAILY
COMMUNITY

## 2019-04-09 RX ORDER — M-VIT,TX,IRON,MINS/CALC/FOLIC 27MG-0.4MG
1 TABLET ORAL DAILY
COMMUNITY

## 2019-04-09 RX ORDER — SPIRONOLACTONE 25 MG/1
25 TABLET ORAL DAILY
Status: ON HOLD | COMMUNITY
End: 2019-04-18 | Stop reason: HOSPADM

## 2019-04-09 RX ORDER — POTASSIUM CITRATE 10 MEQ/1
10 TABLET, EXTENDED RELEASE ORAL
Status: COMPLETED | OUTPATIENT
Start: 2019-04-09 | End: 2019-04-09

## 2019-04-09 RX ORDER — PROPRANOLOL HYDROCHLORIDE 40 MG/1
40 TABLET ORAL 2 TIMES DAILY
Status: DISCONTINUED | OUTPATIENT
Start: 2019-04-09 | End: 2019-04-15

## 2019-04-09 RX ORDER — 0.9 % SODIUM CHLORIDE 0.9 %
250 INTRAVENOUS SOLUTION INTRAVENOUS ONCE
Status: COMPLETED | OUTPATIENT
Start: 2019-04-09 | End: 2019-04-09

## 2019-04-09 RX ADMIN — Medication 10 ML: at 20:16

## 2019-04-09 RX ADMIN — ENOXAPARIN SODIUM 40 MG: 40 INJECTION SUBCUTANEOUS at 11:18

## 2019-04-09 RX ADMIN — FAMOTIDINE 20 MG: 10 INJECTION, SOLUTION INTRAVENOUS at 08:20

## 2019-04-09 RX ADMIN — Medication 10 MG: at 05:17

## 2019-04-09 RX ADMIN — Medication 15 ML: at 20:16

## 2019-04-09 RX ADMIN — PROPRANOLOL HYDROCHLORIDE 40 MG: 40 TABLET ORAL at 20:16

## 2019-04-09 RX ADMIN — METOPROLOL TARTRATE 5 MG: 1 INJECTION, SOLUTION INTRAVENOUS at 04:03

## 2019-04-09 RX ADMIN — HYDRALAZINE HYDROCHLORIDE 5 MG: 20 INJECTION INTRAMUSCULAR; INTRAVENOUS at 01:59

## 2019-04-09 RX ADMIN — SODIUM CHLORIDE 250 ML: 9 INJECTION, SOLUTION INTRAVENOUS at 00:48

## 2019-04-09 RX ADMIN — ISOSORBIDE MONONITRATE 30 MG: 30 TABLET ORAL at 08:21

## 2019-04-09 RX ADMIN — PROPRANOLOL HYDROCHLORIDE 40 MG: 40 TABLET ORAL at 13:11

## 2019-04-09 RX ADMIN — POTASSIUM CITRATE 10 MEQ: 10 TABLET ORAL at 17:41

## 2019-04-09 RX ADMIN — POTASSIUM CITRATE 10 MEQ: 10 TABLET ORAL at 09:57

## 2019-04-09 RX ADMIN — Medication 15 ML: at 08:19

## 2019-04-09 RX ADMIN — Medication 10 MG: at 06:30

## 2019-04-09 RX ADMIN — POTASSIUM CITRATE 10 MEQ: 10 TABLET ORAL at 11:18

## 2019-04-09 ASSESSMENT — PAIN SCALES - GENERAL
PAINLEVEL_OUTOF10: 0

## 2019-04-09 NOTE — PROGRESS NOTES
Daily Progress Note  Neuro Critical Care    Patient Name: Kathie Cárdenas  Patient : 1940  Room/Bed: [facility]/0522-01  Allergies: No Known Allergies  Problem List:   Patient Active Problem List   Diagnosis    Intracranial hemorrhage ([facility] [address]    Altered mental status    Encounter for palliative care    Acute encephalopathy    History of esophageal varices with bleeding            INTERVAL HISTORY    Initial Presentation (Admitted ):  Patient was found down, LKW ~ 24 hours prior to arrival.  Hx of hepatitis C w/ cirrhosis, esophageal varices. Found to have acute IVH, acute ICH.      ICH score: 1    Hospital Course:   : EVD placed    :  Cardene gtt weaned off around midnight, -140s. EVD output 27ml/24hr. Required PRN fentanyl for pain overnight. Likely family meeting with palliative care in next 1-2 days. : Placed on BiPAP overnight due to accessory muscle usage. Sp02 stable, maintaining secretions. BP stable, off Cardene at this time. Ongoing mproving mentation. Last 24 Hr:  Borderline hypertension overnight. Started Imdur yesterday. Lopressor, hydralazine, labetalol given PRN. On room air overnight w/ stable Sp02. Single episode of elevated temp at 38.0. Yesterday had difficulty urinating w/ nursing unable to insert straight cath. Subsequently voided w/o difficulty.         CURRENT MEDICATIONS:  SCHEDULED MEDICATIONS:   isosorbide mononitrate  30 mg Oral Daily    metoprolol  5 mg Intravenous Q6H    ondansetron  4 mg Intravenous Once    sodium chloride flush  10 mL Intravenous 2 times per day    famotidine (PEPCID) injection  20 mg Intravenous Daily    fentanNYL  100 mcg Intravenous Once    chlorhexidine  15 mL Mouth/Throat BID     CONTINUOUS INFUSIONS:   sodium chloride 75 mL/hr at 19 0048     PRN MEDICATIONS:   fentanNYL, sodium chloride flush, magnesium hydroxide, ondansetron, oxyCODONE, labetalol, hydrALAZINE    VITALS:  Temperature Range: Temp: 99 °F (37.2 °C) Temp  Av.2 °F (36.8 °C)  Min: 97 °F (36.1 °C)  Max: 100.4 °F (38 °C)  BP Range: Systolic (01ZDN), TQZ:109 , Min:114 , MPK:178     Diastolic (98OVJ), IMR:66, Min:38, Max:73    Pulse Range: Pulse  Av  Min: 80  Max: 98  Respiration Range: Resp  Av.6  Min: 18  Max: 26  Current Pulse Ox: SpO2: 99 %  24HR Pulse Ox Range: SpO2  Av %  Min: 95 %  Max: 100 %  Patient Vitals for the past 12 hrs:   BP Temp Temp src Pulse Resp SpO2   19 0744 (!) 155/45 99 °F (37.2 °C) -- 86 21 99 %   19 0647 (!) 143/50 -- -- 90 24 100 %   19 0602 (!) 151/57 -- -- 88 22 99 %   19 0502 (!) 166/62 -- -- 90 21 98 %   19 0402 (!) 155/71 97.5 °F (36.4 °C) Oral 98 24 99 %   19 0302 (!) 143/55 -- -- 95 19 99 %   19 0157 (!) 158/66 -- -- 91 20 98 %   19 0117 (!) 164/73 -- -- 94 24 98 %   19 0002 138/68 100.4 °F (38 °C) Oral 89 20 96 %   19 2351 -- -- -- -- 21 97 %   19 2302 138/65 -- -- 89 18 96 %   19 2202 135/60 -- -- 91 20 98 %   19 210 (!) 140/62 -- -- 93 23 98 %   19 (!) 145/46 98.6 °F (37 °C) Oral 89 19 95 %     There is no height or weight on file to calculate BMI.  []<16 Severe malnutrition  []16-16.99 Moderate malnutrition  []17-18.49 Mild malnutrition  []18.5-24.9 Normal  []25-29.9 Overweight (not obese)  []30-34.9 Obese class 1 (Low Risk)  []35-39.9 Obese class 2 (Moderate Risk)  []?40 Obese class 3 (High Risk)    RECENT LABS:   Lab Results   Component Value Date    WBC 5.5 2019    HGB 10.4 (L) 2019    HCT 33.0 (L) 2019    PLT See Reflexed IPF Result 2019    ALT 24 2019    AST 62 (H) 2019     2019    K 3.2 (L) 2019     (H) 2019    CREATININE 0.39 (L) 2019    BUN 27 (H) 2019    CO2 18 (L) 2019    INR 1.2 2019     24 HOUR INTAKE/OUTPUT:    Intake/Output Summary (Last 24 hours) at 2019 0743  Last data filed at 2019 0536  Gross per 24 hour Intake 1051.04 ml   Output 775 ml   Net 276.04 ml       RADIOLOGY:   Xr Pelvis (1-2 Views)    Result Date: 4/5/2019  EXAMINATION: SINGLE XRAY VIEW OF THE PELVIS 4/5/2019 3:23 pm COMPARISON: None. HISTORY: ORDERING SYSTEM PROVIDED HISTORY: trauma TECHNOLOGIST PROVIDED HISTORY: trauma Initial evaluation. FINDINGS: The osseous structures appear osteopenic. No convincing acute abnormality seen of the bony pelvis. Degenerative changes of the lumbar spine, sacroiliac joints and hips bilaterally. Vascular calcifications are noted. Osteopenia without convincing acute abnormality of the bony pelvis. Xr Shoulder Right (min 2 Views)    Result Date: 4/6/2019  EXAMINATION: 3 XRAY VIEWS OF THE RIGHT SHOULDER; AP AND LATERAL XRAY VIEWS OF THE RIGHT FOREARM; 3 XRAY VIEWS OF THE RIGHT ELBOW 4/6/2019 3:10 am COMPARISON: None. HISTORY: ORDERING SYSTEM PROVIDED HISTORY: trauma TECHNOLOGIST PROVIDED HISTORY: trauma FINDINGS: Right shoulder: Three views of the right shoulder. No acute fracture or dislocation. Soft tissue swelling about the shoulder. Normal alignment of the glenohumeral and acromioclavicular joints. No aggressive skeletal lesion. Visualized lung is clear. Visualized ribs are intact. Osteopenia. Right elbow: Frontal, lateral and oblique views. Soft tissue swelling about the elbow. No radiographic evidence of an elbow joint effusion. No acute fracture or malalignment. Mild ulnohumeral compartment DJD. Osteopenia. Right forearm: Frontal and lateral views. Soft tissue swelling about the forearm. No acute fracture or malalignment. Mild-to-moderate DJD in the wrist.  Osteopenia. No acute fracture in the right shoulder, right elbow or right forearm. Osteopenia.  Mild DJD in the right elbow and mild-to-moderate DJD in the right wrist.     Xr Elbow Right (min 3 Views)    Result Date: 4/6/2019  EXAMINATION: 3 XRAY VIEWS OF THE RIGHT SHOULDER; AP AND LATERAL XRAY VIEWS OF THE RIGHT FOREARM; 3 XRAY fracture. Edema identified overlying the right temporalis muscle may be related to prior trauma or surgery. Interval placement of right frontal approach EVD. Evolving left thalamic intraparenchymal hemorrhage with secondary subarachnoid extension and blood products within the lateral 3rd and 4th ventricles. The blood products identified overlying both frontal lobes parietal lobes and along the interhemispheric falx could be related to placement by EVD versus redistribution of the previously noted subarachnoid blood products. Ct Head Wo Contrast    Result Date: 4/5/2019  EXAMINATION: CT OF THE HEAD WITHOUT CONTRAST  4/5/2019 3:19 pm TECHNIQUE: CT of the head was performed without the administration of intravenous contrast. Dose modulation, iterative reconstruction, and/or weight based adjustment of the mA/kV was utilized to reduce the radiation dose to as low as reasonably achievable. COMPARISON: None. HISTORY: ORDERING SYSTEM PROVIDED HISTORY: trauma TECHNOLOGIST PROVIDED HISTORY: FINDINGS: BRAIN/VENTRICLES: There is an intraparenchymal hemorrhage within the left thalamic region measuring 3.2 x 2.1 x 3.6 cm. There is adjacent vasogenic edema. There are hemorrhagic products within the lateral ventricles bilaterally as well as the 3rd ventricle, cerebral aqueduct, and 4th ventricle. There is minimal rightward midline shift. There is age-appropriate cerebral volume loss. The infratentorial structures are otherwise unremarkable. ORBITS: The visualized portion of the orbits demonstrate no acute abnormality. SINUSES: The visualized paranasal sinuses and mastoid air cells demonstrate no acute abnormality. SOFT TISSUES/SKULL:  There is right-sided soft tissue swelling involving the right frontal, parietal, and temporal region. There is no definite acute osseous abnormalities. Intraparenchymal hemorrhage in the left thalamic region with adjacent vasogenic edema.  Hemorrhagic products within the lateral ventricles, 3rd ventricle, cerebral aqueduct, and 4th ventricle. Minimal rightward midline shift. Critical results were called by Dr. Royal Hurst to Dr. Marlene Powers on 4/5/2019 at 16:02. Ct Facial Bones Wo Contrast    Result Date: 4/5/2019  EXAMINATION: CT OF THE FACE WITHOUT CONTRAST  4/5/2019 3:24 pm TECHNIQUE: CT of the face was performed without the administration of intravenous contrast. Multiplanar reformatted images are provided for review. Dose modulation, iterative reconstruction, and/or weight based adjustment of the mA/kV was utilized to reduce the radiation dose to as low as reasonably achievable. COMPARISON: None HISTORY: ORDERING SYSTEM PROVIDED HISTORY: trauma; significant R facial bruising FINDINGS: FACIAL BONES:  The maxilla, pterygoid plates and zygomatic arches are intact. The mandible is intact. The mandibular condyles are normally situated. The nasal bones and maxillary nasal processes are intact. ORBITS:  The globes appear intact. The extraocular muscles, optic nerve sheath complexes and lacrimal glands appear unremarkable. No retrobulbar hematoma or mass is seen. The orbital walls and rims are intact. SINUSES/MASTOIDS:  The paranasal sinuses and mastoid air cells are well aerated. No acute fracture is seen. SOFT TISSUES:  Preseptal swelling of the right face extending superiorly along the supraorbital ridge into the right frontal scalp. Asymmetric swelling of the lateral face on the right compared with the left. No fluid collection is identified. No air or unexpected radiopaque foreign bodies. Soft tissue swelling/edema along the upper neck circumferentially right greater than left. The visualized intracranial contents show acute left thalamic intraparenchymal blood products and intraventricular blood products. 1. Bruising of the face without fracture, radiopaque foreign body, or superficial fluid collection.  2. Acute left thalamic hemorrhage with intraventricular extension. This is previously called to Dr. Shanae Frazier by Dr. Andreina Leigh at  on 4/5/19. Ct Cervical Spine Wo Contrast    Result Date: 4/5/2019  EXAMINATION: CT OF THE CERVICAL SPINE WITHOUT CONTRAST 4/5/2019 3:19 pm TECHNIQUE: CT of the cervical spine was performed without the administration of intravenous contrast. Multiplanar reformatted images are provided for review. Dose modulation, iterative reconstruction, and/or weight based adjustment of the mA/kV was utilized to reduce the radiation dose to as low as reasonably achievable. COMPARISON: None. HISTORY: ORDERING SYSTEM PROVIDED HISTORY: trauma FINDINGS: BONES/ALIGNMENT: There is no evidence of an acute cervical spine fracture. There is normal alignment of the cervical spine except moderate dextroconvex scoliosis and reversal of the normal lordotic curvature. .  Decompressive laminectomies at C4 and C5. Posterior fusion rods and pedicular screws at C3, 4, 5 and 6 on the right and C5 and 6 on the left. DEGENERATIVE CHANGES: Multilevel disc space narrowing. SOFT TISSUES: There is no prevertebral soft tissue swelling. No acute abnormality of the cervical spine. Posterior interbody fixation as above. Ct Thoracic Spine Wo Contrast    Result Date: 4/5/2019  EXAMINATION: CT OF THE THORACIC SPINE WITHOUT CONTRAST  4/5/2019 3:18 pm: TECHNIQUE: CT of the thoracic spine was performed without the administration of intravenous contrast. Multiplanar reformatted images are provided for review. Dose modulation, iterative reconstruction, and/or weight based adjustment of the mA/kV was utilized to reduce the radiation dose to as low as reasonably achievable. COMPARISON: None. HISTORY: ORDERING SYSTEM PROVIDED HISTORY: trauma FINDINGS: BONES/ALIGNMENT: There is normal alignment of the spine. The vertebral body heights are maintained. No osseous destructive lesion is seen.  DEGENERATIVE CHANGES: No gross spinal canal stenosis or bony neural foraminal narrowing of the thoracic spine. SOFT TISSUES: No paraspinal mass is seen. Unremarkable CT of the thoracic spine. Ct Lumbar Spine Wo Contrast    Result Date: 4/5/2019  EXAMINATION: CT OF THE LUMBAR SPINE WITHOUT CONTRAST  4/5/2019 TECHNIQUE: CT of the lumbar spine was performed without the administration of intravenous contrast. Multiplanar reformatted images are provided for review. Dose modulation, iterative reconstruction, and/or weight based adjustment of the mA/kV was utilized to reduce the radiation dose to as low as reasonably achievable. COMPARISON: None HISTORY: ORDERING SYSTEM PROVIDED HISTORY: trauma TECHNOLOGIST PROVIDED HISTORY: trauma FINDINGS: BONES/ALIGNMENT: There is minimal compression deformity of L4 vertebral body with less than 20% height loss/wedging anteriorly. Multilevel degenerative disc disease is evident. The facets are intact. The spinous processes are intact. Lamina and pedicles are intact. There is evidence of prior posterior decompression. DEGENERATIVE CHANGES: Multilevel degenerative disc disease with facet arthropathy. SOFT TISSUES/RETROPERITONEUM: Diverticulosis. Atherosclerotic changes of aorta. Large stool throughout the colon. An avidly enhancing dilated left ovarian vessel is noted. 1. Age-indeterminate compression deformity of L4 with less than 20% height loss and no significant retropulsion. 2. Osteopenic skeleton with multilevel degenerative changes. Xr Chest Portable    Result Date: 4/6/2019  EXAMINATION: SINGLE XRAY VIEW OF THE CHEST 4/6/2019 5:27 am COMPARISON: 04/05/2019 HISTORY: ORDERING SYSTEM PROVIDED HISTORY: Concern for aspiration TECHNOLOGIST PROVIDED HISTORY: Concern for aspiration FINDINGS: Cardiomediastinal silhouette and pulmonary vasculature are within normal limits. There is no focal airspace consolidation. No evidence of pneumothorax or pleural effusion on this limited supine study. No acute osseous abnormality. No acute intrathoracic process. Xr Chest Portable    Result Date: 4/5/2019  EXAMINATION: SINGLE XRAY VIEW OF THE CHEST 4/5/2019 3:24 pm COMPARISON: None. HISTORY: ORDERING SYSTEM PROVIDED HISTORY: trauma TECHNOLOGIST PROVIDED HISTORY: trauma FINDINGS: Slight increase in interstitial markings. The lungs are without acute focal process. There is no effusion or pneumothorax. The cardiomediastinal silhouette is without acute process. The osseous structures are without acute process. No fracture or pneumothorax. Increased interstitial markings     Cta Neck W Contrast    Result Date: 4/5/2019  EXAMINATION: CTA OF THE HEAD WITH CONTRAST; CTA OF THE NECK 4/5/2019 3:32 pm: TECHNIQUE: CTA of the head/brain was performed with the administration of intravenous contrast. Multiplanar reformatted images are provided for review. MIP images are provided for review. Dose modulation, iterative reconstruction, and/or weight based adjustment of the mA/kV was utilized to reduce the radiation dose to as low as reasonably achievable.; CTA of the neck was performed with the administration of intravenous contrast. Multiplanar reformatted images are provided for review. MIP images are provided for review. Stenosis of the internal carotid arteries measured using NASCET criteria. Dose modulation, iterative reconstruction, and/or weight based adjustment of the mA/kV was utilized to reduce the radiation dose to as low as reasonably achievable. COMPARISON: None. HISTORY: ORDERING SYSTEM PROVIDED HISTORY: SAH, ?hemorrhagic stroke TECHNOLOGIST PROVIDED HISTORY: ; ORDERING SYSTEM PROVIDED HISTORY: SAH, ?hemorrhagic stroke TECHNOLOGIST PROVIDED HISTORY: Ordering Physician Provided Reason for Exam: head bleed/ found down Acuity: Unknown FINDINGS: CTA NECK: AORTIC ARCH/ARCH VESSELS: There is a normal branch pattern of the aortic arch. No significant stenosis is seen of the innominate artery or subclavian arteries.  CAROTID ARTERIES: The common carotid arteries are normal in down Acuity: Unknown FINDINGS: Chest: Mediastinum:  Thoracic aorta and central portion of the pulmonary artery opacify normally. The ascending thoracic aorta is of normal caliber. Heart size is normal. There is no pericardial effusion. No mediastinal or hilar lymph nodes exceed the CT criteria for abnormal enlargement. Lungs/pleura: Clear Soft Tissues/Bones: No fracture identified Abdomen/Pelvis: Organs: The abdominal wall appears normal. Liver appears somewhat nodular. Spleen is enlarged. Gastroesophageal varices are noted. Pancreas appears normal.  The adrenals are normal.  The gallbladder surgically absent. . Kidneys appear normal. The bladder appears normal. GI/Bowel: The stomach,small bowel, and colon appear normal. Appendix is not identified. Pelvis: Uterus appears atrophic. Peritoneum/Retroperitoneum: The abdominal aorta and iliac arteries are normal in caliber. There is no pathologic adenopathy. There is calcified plaque along the aorta and its branches. Bones/Soft Tissues: Normal     No acute traumatic sequelae. Cirrhosis, splenomegaly, and esophageal varices consistent with portal venous hypertension. Cta Head W Contrast    Result Date: 4/5/2019  EXAMINATION: CTA OF THE HEAD WITH CONTRAST; CTA OF THE NECK 4/5/2019 3:32 pm: TECHNIQUE: CTA of the head/brain was performed with the administration of intravenous contrast. Multiplanar reformatted images are provided for review. MIP images are provided for review. Dose modulation, iterative reconstruction, and/or weight based adjustment of the mA/kV was utilized to reduce the radiation dose to as low as reasonably achievable.; CTA of the neck was performed with the administration of intravenous contrast. Multiplanar reformatted images are provided for review. MIP images are provided for review. Stenosis of the internal carotid arteries measured using NASCET criteria.  Dose modulation, iterative reconstruction, and/or weight based adjustment of the mA/kV was utilized to reduce the radiation dose to as low as reasonably achievable. COMPARISON: None. HISTORY: ORDERING SYSTEM PROVIDED HISTORY: SAH, ?hemorrhagic stroke TECHNOLOGIST PROVIDED HISTORY: ; ORDERING SYSTEM PROVIDED HISTORY: SAH, ?hemorrhagic stroke TECHNOLOGIST PROVIDED HISTORY: Ordering Physician Provided Reason for Exam: head bleed/ found down Acuity: Unknown FINDINGS: CTA NECK: AORTIC ARCH/ARCH VESSELS: There is a normal branch pattern of the aortic arch. No significant stenosis is seen of the innominate artery or subclavian arteries. CAROTID ARTERIES: The common carotid arteries are normal in appearance without evidence of a flow limiting stenosis. The internal carotid arteries are normal in appearance without evidence of a flow limiting stenosis by NASCET criteria. No dissection or arterial injury is seen. VERTEBRAL ARTERIES: The vertebral arteries both arise from the subclavian arteries and are normal in caliber without evidence of flow limiting stenosis or dissection. SOFT TISSUES: The lung apices are clear. No cervical or superior mediastinal lymphadenopathy. The visualized portion of the larynx and pharynx appear unremarkable. The parotid, submandibular and thyroid glands demonstrate no acute abnormality. BONES: The visualized osseous structures appear unremarkable. CTA HEAD: ANTERIOR CIRCULATION: The internal carotid arteries are normal in course and caliber without focal stenosis. The anterior cerebral and middle cerebral arteries demonstrate no focal stenosis. POSTERIOR CIRCULATION: The posterior cerebral arteries demonstrate no focal stenosis. The vertebral and basilar arteries appear unremarkable. BRAIN: There is redemonstration of an intraparenchymal hemorrhage within the left thalamic region as well as intraventricular hemorrhagic products. Unremarkable CTA of the head and neck. Critical results were called by Dr. Amador Kramer to Dr. Herb Dalton on 4/5/2019 at 16:12.        Labs and Images reviewed with:  [x] Dr. Bebo Dang. Latoya    [] Dr. Mitali Coates  [] Dr. Isabelle Wallace  [] There are no new interval images to review. PHYSICAL EXAM       CONSTITUTIONAL:  Awake, alert, following commands, mild aphasia, responding to questioning     HEAD:  Right periorbital/forehead ecchymosis   EYES:  PERRLA, slight left gaze preference     ENT:  moist mucous membranes   NECK:  supple, symmetric   LUNGS:  Equal air entry bilaterally   CARDIOVASCULAR:  normal s1 / s2, RRR, distal pulses intact   ABDOMEN:  Soft, no rigidity   NEUROLOGIC:  Mental Status:  Awake, alert, participating in exam           Cranial Nerves:    Pupils: Equal round, reactive to light    Motor Exam:    Drift:  present - R upper extremity has drift with interval improvement in strength to anti-gravity. RLE shows interval improvement with anti-gravity. LUE, LLE unchanged without drift. Tone:  Right upper, lower improved tone active resistance    Sensory:  Diminished sensation of right upper, lower extremity. Intact on left. Deep Tendon Reflexes:    Right biceps 1+, R patella:  1+  Left biceps 2+, patella:  2+    Clonus:  absent       DRAINS:   No drains to monitor at this time. ASSESSMENT AND PLAN:     Mark Byrne is a 78F admitted on 4/5 after being found down, LKW ~ 24 hours prior to arrival.  Hx of hepatitis C w/ cirrhosis, esophageal varices. Found to have acute IVH, acute ICH.        NEUROLOGIC:  - Imaging reviewed. Repeat CT head indicates evolving L IPH w/ SAH, IVH of lateral 3rd, 4th ventricles. -EVD placed 4/5. Removed 4/7.   - AEDs: Not indicated at this time  - Goal SBP < 140/90  - Neuro checks per protocol     CARDIOVASCULAR:  - Goal SBP < 140/90, pt borderline HTN overnight with -166, avg 143  - Elevated troponin on admission at 359. EKG on admission indicates lateral T wave changes.   - troponin trending down 359->223->108  - Cards consult: Likely type 2 MI, no acute intervention    - Echocardiogram completed. Mild sclerosis of aortic valve w/o stenosis or regurgitation. Mild/moderate mitral regurg, mild tricuspid regurg, mild pulmonary HTN, mild pulmonic insufficiency   - Started scheduled Imdur 30 mg yesterday for BP management. Will start propranolol 40 mg BID for additional blood pressure management. Appreciate input from Cardiology. - Will discuss home medications w/ family. - Continue telemetry     PULMONARY:  - On room air overnight, Sp02 stable.      RENAL/FLUID/ELECTROLYTE:  - BUN 36/ Creatinine 0.49  - Urine output 950cc out overnight  - IVF: NS @ 30 ml/hr  - Mild hypokalemia, hyperchloremia this morning. CO2 mildly decreased. Potassium citrate 10 meq x 3 doses started. - Replace electrolytes PRN  - US of IVC to evaluate fluid status. Will titrate fluids accordingly.   - Daily BMP     GI/NUTRITION:  -Hep C w/ cirrhosis:  Mildly elevated AST, Alk phos. Borderline decreased albumin.  - Mild hyperammonemia at 72. Will repeat in AM.  Consider lactulose if ammonia persist despite adequate stool output. NUTRITION:  - General diet  - Bowel regimen: Milk of Mag PRN  - GI prophylaxis: Pepcid 20 mg qd     ID:  - Tmax 38.0  - WBC 5.5  - Continue to monitor for fevers  - Daily CBC     HEME:   - H&H  10.4/33.0  - Platelets 90, stable from 97 yesterday  - Daily CBC     ENDOCRINE:  - Continue to monitor blood glucose, goal <180     OTHER:  - PT/OT/ST   - Code Status: DNR-CCA, plan to transfer to step-down this afternoon.       PROPHYLAXIS:  Stress ulcer: H2 blocker     DVT PROPHYLAXIS:  - SCD  - Start Lovenox 40 mg SQ today. DISPOSITION:  [] To remain ICU: Close neurologic monitoring   [x] OK for out of ICU from Neuro Critical Care standpoint. Will consult Neuro/Medicine for transfer. We will continue to follow along. For any changes in exam or patient status please contact Neuro Critical Care.       Ronda Watts MD  Neuro Critical Care  Pager [phone]  4/9/2019 7:49 AM

## 2019-04-09 NOTE — PROGRESS NOTES
Speech Language Pathology  Speech Language Pathology  Katalina Yeager    Speech Language Treatment Note    Date: 4/9/2019  Patients Name: Linda Andres  MRN: [id number]  Diagnosis:   Patient Active Problem List   Diagnosis Code    Intrac[facility] ([facility] I62.9    Altered mental status R41.82    Encounter for palliative care Z51.5    Acute encephalopathy G93.40    History of esophageal varices with bleeding Z87.19       Pain: 0/10    Speech and Language Treatment  Treatment time: 10:10-10:40    Subjective: [x] Alert [x] Cooperative     [] Confused     [] Agitated    [] Lethargic      Objective/Assessment:    Auditory Comprehension: Answering Yes/No Questions: 7/10 increased to 10/10 with mod verbal cues. Following 1-unit commands: 5/10 increased to 10/10 with mod-max verbal/visual cues. Verbal Expression: Biographical Info: 1/4 increased to 2/4 with max verbal cues. Picture ID: 3/5 increased to 5/5 with min-mod verbal/phonemic cues. Further therapy recommended at discharge. The patient should be able to tolerate at least three hours of therapy per day over 5 days or 15 hours over 7 days. Plan:  [x] Continue ST services    [] Discharge from ST:      Discharge recommendations: [] Inpatient Rehab   [] [place]   [] Outpatient Therapy  [] Follow up at trauma clinic   [x] Other: therapy      Completed by Zita Patterson,  Clinician  Co-signed by Oliver Sarabia A.CCC/SLP

## 2019-04-09 NOTE — PROGRESS NOTES
Activity Restriction  Other position/activity restrictions: Up with assist, DNR-CCA  Vision/Hearing  Vision: Visual tracking assessed in lateral plane, pt unable to follow finger when instructed to keep head straight, uncertain ability to follow instruction. Peripheral testing WNL in a lateral plane. Vision Exceptions: Wears glasses for reading  Hearing: Within functional limits     Subjective  General  Chart Reviewed: Yes  Patient assessed for rehabilitation services?: Yes  Response To Previous Treatment: Not applicable  Family / Caregiver Present: Yes(Son arrived at end of session)  Follows Commands: Impaired(AMS, able to follow most 1 step commands. )  Subjective  Subjective: Pt and RN agreeable to PT  Pain Screening  Patient Currently in Pain: Denies  Vital Signs  Patient Currently in Pain: Denies       Orientation  Orientation  Overall Orientation Status: Impaired(A&O x3 with cueing d/t altered ability to think of the answers. )  Social/Functional History  Social/Functional History  Lives With: Alone  Type of Home: House  Home Layout: Two level  Home Access: Stairs to enter with rails  Entrance Stairs - Number of Steps: 5  Entrance Stairs - Rails: Both  ADL Assistance: Independent  Homemaking Assistance: Independent  Homemaking Responsibilities: No  Ambulation Assistance: Independent  Transfer Assistance: Independent  Active : No  Patient's  Info: Son/family perform all  Mode of Transportation: Family  Occupation: Retired  Additional Comments: SOn provided all social hx at end of eval as pt is a poor historian, son Nicole London wants pt to go to short-term care and then eventually Long-term care  Cognition   Cognition  Overall Cognitive Status: Exceptions  Cognition Comment: Pt presents with AMS, increased time to answer complex questions. Able to respond with a few words to answer.      Objective    PROM RLE (degrees)  RLE PROM: WFL(Increased tone)  AROM RLE (degrees)  RLE AROM: Exceptions(Inability to move RLE within full functional range actively upon command, however with increased cueing and tactile assistance, pt was able to hold at roughly mid range knee extension actively. )  AROM LLE (degrees)  LLE AROM : WFL  PROM RUE (degrees)  RUE PROM: WFL(Increased tone. ~90 flex/abd)  AROM RUE (degrees)  RUE AROM : Exceptions(Pt unable to reach functional range with RUE, and requires increased verbal/tactile cueing to move RUE. Able to achieve ~45 flexion acitvely. )  AROM LUE (degrees)  LUE AROM : WFL  Strength RLE  Strength RLE: Exception  Comment: Pt has difficulty understand cueing for MMT of RLE. With increased cueing and through functional observation, pt is at least 3-/5  Strength LLE  Strength LLE: WFL  Comment: Grossly 4-/5  Strength RUE  Strength RUE: Exception  Comment: Unable to assess secondary to decreased ROM and ability to move on command. Unable to hold against gravity when positioned. Strength LUE  Strength LUE: WFL  Comment: Grossly 4-/5  Motor Control  Gross Motor?: Exceptions  Comments: Ability to complete finger-nose LUE. Inability to complete with RUE however able to demonstrate attempt with AROM, lacking precision to nose and finger. Sensation  Overall Sensation Status: Impaired(pt denies n/t. Uncertain ability to follow instruction to report n/t. Ability to detect light touch bilateral UE/LE, however unable to detect which digit is being touched on RUE. Bed mobility  Rolling to Right: Maximum assistance  Supine to Sit: Maximum assistance;2 Person assistance  Sit to Supine: Maximum assistance;2 Person assistance  Comment: Pt MaxA +2 assist for most bed mobility tasks. Once in sidelying position, pt is able to maintain herself using bedrail with CGA. Transfers  Sit to Stand: Moderate Assistance;2 Person Assistance;Minimal Assistance  Comment: Pt completed sit<>stand x1 to RW with ModA +2 with inability to hold herself up and R knee buckeling present.  Pt completed sit<>stand to [employer] with Lisa +2 and able to maintain stand for ~4 min CGA. Pt demonstrates R side lean with ability to maintain balance, unable to fully correct posture with cueing. Remains steady. Able to complete mini squat x3 with CGA. Ambulation  Ambulation?: No     Balance  Sitting - Static: Fair  Sitting - Dynamic: Fair;-  Standing - Static: Fair  Standing - Dynamic: Fair;-  Comments: Balance assessed sitting EOB and standing in [employer]. Plan   Plan  Times per week: 5-6x/week  Current Treatment Recommendations: Strengthening, ROM, Balance Training, Functional Mobility Training, Transfer Training, Safety Education & Training, Endurance Training, Patient/Caregiver Education & Training  Safety Devices  Type of devices: Left in bed, Nurse notified, Gait belt, Call light within reach  Restraints  Initially in place: No      AM-PAC Score  AM-PAC Inpatient Mobility Raw Score : 8  AM-PAC Inpatient T-Scale Score : 28.52  Mobility Inpatient CMS 0-100% Score: 86.62  Mobility Inpatient CMS G-Code Modifier : CM          Goals  Short term goals  Time Frame for Short term goals: 15  Short term goal 1: Pt to demonstrate good sitting/standing static/dynamic balance   Short term goal 2: Pt to complete bed mobility tasks with Lisa  Short term goal 3: Pt to transfer using RW with Lisa  Short term goal 4: Pt to tolerate 30 min activity to assist with increasing strength and endurance       Therapy Time   Individual Concurrent Group Co-treatment   Time In 0910         Time Out 0945         Minutes 35         Timed Code Treatment Minutes: [address], SPT  This treatment/evaluation completed by signing SPT. Signing PT agrees with treatment and documentation.

## 2019-04-09 NOTE — PROGRESS NOTES
Occupational Therapy   Occupational Therapy Initial Assessment  Date: 2019   Patient Name: Karel Gerber  MRN: [id number]     : 1940    Date of Service: 2019    Discharge Recommendations: Further therapy recommended at discharge. The patient should be able to tolerate at least three hours of therapy per day over 5 days or 15 hours over 7 days. Assessment   Performance deficits / Impairments: Decreased functional mobility ; Decreased ADL status; Decreased balance;Decreased sensation;Decreased endurance;Decreased cognition;Decreased high-level IADLs  Prognosis: Good  Decision Making: Medium Complexity  Patient Education: Safety awareness, OTPOC, transfer tech's-fair return  REQUIRES OT FOLLOW UP: Yes  Activity Tolerance  Activity Tolerance: Patient limited by fatigue;Treatment limited secondary to decreased cognition  Safety Devices  Safety Devices in place: Yes  Type of devices: All fall risk precautions in place; Left in bed;Nurse notified;Gait belt(RN in room upon exit)  Restraints  Initially in place: No        Patient Diagnosis(es): The primary encounter diagnosis was Altered mental status, unspecified altered mental status type. A diagnosis of Intracranial hemorrhage ([facility] was also pertinent to this visit. has no past medical history on file. has no past surgical history on file. Restrictions  Restrictions/Precautions  Restrictions/Precautions: General Precautions, Fall Risk  Required Braces or Orthoses?: No  Position Activity Restriction  Other position/activity restrictions:  Up with assist, DNR-CCA    Subjective   General  Patient assessed for rehabilitation services?: Yes  Family / Caregiver Present: Yes(Son arrived at end of session)  Diagnosis: R EVD-removed, L thalamic IPH, Hep C  Social/Functional History  Social/Functional History  Lives With: Alone  Type of Home: House  Home Layout: Two level  Home Access: Stairs to enter with rails  Entrance Stairs - Number of Steps: assistance(Once on side pt maintained position wih mod I while holding side-rail)  Supine to Sit: Maximum assistance;2 Person assistance  Sit to Supine: Maximum assistance;2 Person assistance  Scooting: Maximal assistance  Transfers  Sit to stand:  Moderate assistance  Stand to sit: Moderate assistance     Cognition  Overall Cognitive Status: Exceptions  Cognition Comment: Pt repeats \"Yes\" often, confused at times, slow to respond to questions        Sensation  Overall Sensation Status: Impaired  Light Touch: Partial deficits in the RLE;Partial deficits in the RUE(Difficult to assess d/t pt's cognition, obvious sensation differences between L/R)      LUE AROM (degrees)  LUE AROM : WFL  RUE AROM (degrees)  RUE AROM : Exceptions  R Shoulder Flexion 0-180: Limited to 50 degrees this date  R Elbow Flexion 0-145: Limited to 120 degrees and slow speed noted while reaching for nose upon command  LUE Strength  Gross LUE Strength: WFL  L Hand Grasp: 5/5  L Hand Release: 5/5  RUE Strength  Gross RUE Strength: Exceptions to [facility] Shoulder Flex: 3-/5  R Elbow Flex: 3-/5  R Elbow Ext: 3-/5  R Hand Grasp: 3/5  R Hand Release: 3/5      Plan   Plan  Times per week: 4-5x  Current Treatment Recommendations: Balance Training, Functional Mobility Training, Endurance Training, Home Management Training, Equipment Evaluation, Education, & procurement, Patient/Caregiver Education & Training, Self-Care / ADL, Safety Education & Training, Strengthening, Neuromuscular Re-education, Cognitive Reorientation     AM-[facility] Inpatient Daily Activity Raw Score: 13  AM-PAC Inpatient ADL T-Scale Score : 32.03  ADL Inpatient CMS 0-100% Score: 63.03  ADL Inpatient CMS G-Code Modifier : CL    Goals  Short term goals  Time Frame for Short term goals: Pt will by discharge   Short term goal 1: demo ADL UB/LB dressing/bathing activity seated with setup and min A/min vc's  Short term goal 2: demo standing during func activity for 15 min with LRD and supervision only  Short term goal 3: demo good safety awareness during func mob around room with LRD and mod A  Short term goal 4: demo 3-step func activity with 3 vc's to address cognitive deficits  Short term goal 5: demo supine<>sit transfer to EOB with mod Ax1     Therapy Time   Individual Concurrent Group Co-treatment   Time In 0910         Time Out 0945         Minutes [facility], OTR/L

## 2019-04-09 NOTE — PROGRESS NOTES
21   Ht 5' 5\" (1.651 m)   SpO2 99%   General appearance: alert and cooperative with exam. Merceda Horseman to answer no when questioned about chest pain but limited verbal responses noted  HEENT: Head: Normocephalic, no lesions, without obvious abnormality. Neck:no JVD, trachea midline, no adenopathy  Lungs: Clear to auscultation throughout. On RA currently without distress  Heart: Regular rate and rhythm, s1/s2 auscultated, no murmurs. Tele SR 86  Abdomen: soft, non-tender, bowel sounds active  Extremities: no edema  Neurologic: not done    Echo 4/8/19  Summary  Mild senile sclerosis of the aortic valve with no stenosis or regurgitation  seen. Mild to moderate mitral regurgitation. Mild tricuspid regurgitation. Mild pulmonary hypertension. Mild pulmonic insufficiency. Left ventricle is normal in size. Global left ventricular systolic function  is normal. Estimated ejection fraction is 55 % . Grade I (mild) left ventricular diastolic dysfunction. Assessment / Acute Cardiac Problems:   1. 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 Right EVD placement and removal  3. Acute resp failure s/p extubation  4. 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 HTN      Patient Active Problem List:     Intracranial hemorrhage ([address]     Altered mental status     Encounter for palliative care      Plan of Treatment:   1. Echo reviewed as above. No further plans for cardiac work-up/intervention at this time. Continue BP control per neuro recommendations. 2. Will sign off. . Please call with further questions/concerns.  Thank you    Electronically signed by CHENTE Dorsey CNP on 4/9/2019 at 10:44 AM  [address]  [phone]

## 2019-04-09 NOTE — PROGRESS NOTES
Neuro Critical Care Sign Out to Internal Medicine      Date and time: 4/9/2019 1:25 PM  Patient's name: [address] Record Number: [id number]  Patient's account/billing number: [de-identified]  Patient's YOB: 1940  Age: 66 y.o. Date of Admission: 4/5/2019  3:09 PM  Length of stay during current admission: 4    Primary Care Physician: Wu Lilly MD    Code Status: DNR-CCA    Mode of physician to physician communication:        [x] Via telephone   [] In person     Date and time of sign-out: 4/9/2019 1:25 PM    Accepting Internal Medicine Resident: Dr. Tamara Jordan    Accepting Medicine Team: IM Team 3    Accepting Team's Attending: Dr. Harley Tariq    Patient's current ICU Bed:  [id number]    Patient's assigned bed on floor:  TBD        [] Med-Surg Monitored [x] Step-down       [] Psychiatry ICU       [] Psych floor     Reason for ICU admission:     Intraparenchymal hemorrhage in left thalamic region    ICU course summary:     Initial Presentation (Admitted 4/5):  Patient was found down, LKW ~ 24 hours prior to arrival.  Hx of hepatitis C w/ cirrhosis, esophageal varices. S/p EGD, banding of esophageal varices May 2017. Hepatitis C suspected secondary to previous blood transfusions. Trauma alert called and pt evaluated by Trauma Surgery in ED. Found to have acute IVH, acute ICH. Neuro-critical care, Neurosurgery consulted. Admitted to Neuro ICU under Neuro-critical care.      Hospital Course:   4/5:   EVD placed. Repeat CT head obtained and showed expected evolution of IPH. Pt maintaining airway. Hi-quang NC placed and vitals stable. 4/7:    Cardene gtt weaned off around midnight, -140s. EVD output 27ml/24hr. Required PRN fentanyl for pain overnight. Likely family meeting with palliative care in next 1-2 days. 4/8:   Placed on BiPAP overnight due to accessory muscle usage. Sp02 stable, maintaining secretions. BP stable, Cardene stopped. Mentation gradually improveing.     4/9:  Borderline hypertension overnight. Started Imdur yesterday. Lopressor, hydralazine, labetalol given PRN. On room air overnight w/ stable Sp02. Single episode of elevated temp at 38.0. Pt otherwise afebrile. Home medications reconciled. Started on Propranolol. Procedures during patient's ICU stay:     EVD placement 4/5 - Removed 4/7     Current Vitals:     BP (!) 123/55   Pulse 87   Temp 98.7 °F (37.1 °C) (Axillary)   Resp 23   Ht 5' 5\" (1.651 m)   Wt 110 lb 1.6 oz (49.9 kg)   SpO2 98%   BMI 18.32 kg/m²       Cultures:     Blood cultures:                 [x] None drawn      [] Negative             []  Positive (Details:  )  Urine Culture:                   [x] None drawn      [] Negative             []  Positive (Details:  )  Sputum Culture:               [x] None drawn       [] Negative             []  Positive (Details:  )   Endotracheal aspirate:     [x] None drawn       [] Negative             []  Positive (Details:  )       Consults:     1. Neurosurgery  2. Neurology  3. Cardiology  4. Trauma Surgery    Assessment:     Patient Active Problem List    Diagnosis Date Noted    Acute encephalopathy     History of esophageal varices with bleeding     Encounter for palliative care     Intracranial hemorrhage ([facility] 04/05/2019    Altered mental status        Additional assessment:    1. S/p EGD w/ banding of esophogeal varicies     Recommended Follow-up:     NEUROLOGIC:  - Imaging reviewed.  Repeat CT head indicates evolving L IPH w/ SAH, IVH of lateral 3rd, 4th ventricles.    -EVD placed 4/5. Removed 4/7.   - AEDs: Not indicated at this time  - Goal SBP < 140/90  - Neuro checks per protocol     CARDIOVASCULAR:  - Goal SBP < 140/90, pt borderline HTN overnight with -166, avg 143  - Elevated troponin on admission at 359.  EKG on admission indicates lateral T wave changes. - troponin trending down 359->223->108  - Cards consult: Likely type 2 MI, no acute intervention    - Echocardiogram completed.

## 2019-04-09 NOTE — PROGRESS NOTES
Pt has voided 50ml since the start of shift. Bladder scanner shows 88ml in bladder. Notified Dr. Karimi Asp, orders received.

## 2019-04-09 NOTE — CARE COORDINATION
Jessica RN Case Management to contact [facility] for provider list of in-network SNFs. Pt's son Beata Figueroa requested new referral to [facility] at [address]. Referral faxed via [employer]. Onsite visit today and pt accepted at [facility]. Pre-cert started.     Social faxed to [facility]

## 2019-04-10 LAB
ABSOLUTE EOS #: 0.21 K/UL (ref 0–0.44)
ABSOLUTE IMMATURE GRANULOCYTE: <0.03 K/UL (ref 0–0.3)
ABSOLUTE LYMPH #: 0.91 K/UL (ref 1.1–3.7)
ABSOLUTE MONO #: 0.53 K/UL (ref 0.1–1.2)
AMMONIA: 125 UMOL/L (ref 11–51)
ANION GAP SERPL CALCULATED.3IONS-SCNC: 9 MMOL/L (ref 9–17)
BASOPHILS # BLD: 0 % (ref 0–2)
BASOPHILS ABSOLUTE: <0.03 K/UL (ref 0–0.2)
BUN BLDV-MCNC: 14 MG/DL (ref 8–23)
BUN/CREAT BLD: ABNORMAL (ref 9–20)
CALCIUM SERPL-MCNC: 8.2 MG/DL (ref 8.6–10.4)
CHLORIDE BLD-SCNC: 108 MMOL/L (ref 98–107)
CO2: 19 MMOL/L (ref 20–31)
CREAT SERPL-MCNC: 0.33 MG/DL (ref 0.5–0.9)
DIFFERENTIAL TYPE: ABNORMAL
EOSINOPHILS RELATIVE PERCENT: 4 % (ref 1–4)
GFR AFRICAN AMERICAN: >60 ML/MIN
GFR NON-AFRICAN AMERICAN: >60 ML/MIN
GFR SERPL CREATININE-BSD FRML MDRD: ABNORMAL ML/MIN/{1.73_M2}
GFR SERPL CREATININE-BSD FRML MDRD: ABNORMAL ML/MIN/{1.73_M2}
GLUCOSE BLD-MCNC: 111 MG/DL (ref 70–99)
HCT VFR BLD CALC: 32 % (ref 36.3–47.1)
HEMOGLOBIN: 10.3 G/DL (ref 11.9–15.1)
IMMATURE GRANULOCYTES: 0 %
LYMPHOCYTES # BLD: 19 % (ref 24–43)
MCH RBC QN AUTO: 29.8 PG (ref 25.2–33.5)
MCHC RBC AUTO-ENTMCNC: 32.2 G/DL (ref 28.4–34.8)
MCV RBC AUTO: 92.5 FL (ref 82.6–102.9)
MONOCYTES # BLD: 11 % (ref 3–12)
NRBC AUTOMATED: 0 PER 100 WBC
PDW BLD-RTO: 15.6 % (ref 11.8–14.4)
PLATELET # BLD: ABNORMAL K/UL (ref 138–453)
PLATELET ESTIMATE: ABNORMAL
PLATELET, FLUORESCENCE: 82 K/UL (ref 138–453)
PLATELET, IMMATURE FRACTION: 5.1 % (ref 1.1–10.3)
PMV BLD AUTO: ABNORMAL FL (ref 8.1–13.5)
POTASSIUM SERPL-SCNC: 3 MMOL/L (ref 3.7–5.3)
RBC # BLD: 3.46 M/UL (ref 3.95–5.11)
RBC # BLD: ABNORMAL 10*6/UL
SEG NEUTROPHILS: 64 % (ref 36–65)
SEGMENTED NEUTROPHILS ABSOLUTE COUNT: 3.05 K/UL (ref 1.5–8.1)
SODIUM BLD-SCNC: 136 MMOL/L (ref 135–144)
WBC # BLD: 4.7 K/UL (ref 3.5–11.3)
WBC # BLD: ABNORMAL 10*3/UL

## 2019-04-10 PROCEDURE — 6370000000 HC RX 637 (ALT 250 FOR IP): Performed by: STUDENT IN AN ORGANIZED HEALTH CARE EDUCATION/TRAINING PROGRAM

## 2019-04-10 PROCEDURE — 92507 TX SP LANG VOICE COMM INDIV: CPT

## 2019-04-10 PROCEDURE — 6370000000 HC RX 637 (ALT 250 FOR IP): Performed by: EMERGENCY MEDICINE

## 2019-04-10 PROCEDURE — 97110 THERAPEUTIC EXERCISES: CPT

## 2019-04-10 PROCEDURE — 85025 COMPLETE CBC W/AUTO DIFF WBC: CPT

## 2019-04-10 PROCEDURE — 6370000000 HC RX 637 (ALT 250 FOR IP): Performed by: PSYCHIATRY & NEUROLOGY

## 2019-04-10 PROCEDURE — 82140 ASSAY OF AMMONIA: CPT

## 2019-04-10 PROCEDURE — 80048 BASIC METABOLIC PNL TOTAL CA: CPT

## 2019-04-10 PROCEDURE — 36415 COLL VENOUS BLD VENIPUNCTURE: CPT

## 2019-04-10 PROCEDURE — 2060000000 HC ICU INTERMEDIATE R&B

## 2019-04-10 PROCEDURE — 2500000003 HC RX 250 WO HCPCS: Performed by: EMERGENCY MEDICINE

## 2019-04-10 PROCEDURE — 6360000002 HC RX W HCPCS: Performed by: EMERGENCY MEDICINE

## 2019-04-10 PROCEDURE — 97530 THERAPEUTIC ACTIVITIES: CPT

## 2019-04-10 PROCEDURE — 99233 SBSQ HOSP IP/OBS HIGH 50: CPT | Performed by: INTERNAL MEDICINE

## 2019-04-10 PROCEDURE — 85055 RETICULATED PLATELET ASSAY: CPT

## 2019-04-10 PROCEDURE — 2580000003 HC RX 258: Performed by: EMERGENCY MEDICINE

## 2019-04-10 PROCEDURE — 6360000002 HC RX W HCPCS: Performed by: PSYCHIATRY & NEUROLOGY

## 2019-04-10 RX ORDER — LACTULOSE 10 G/15ML
20 SOLUTION ORAL 3 TIMES DAILY
Status: DISCONTINUED | OUTPATIENT
Start: 2019-04-10 | End: 2019-04-18 | Stop reason: HOSPADM

## 2019-04-10 RX ORDER — POTASSIUM CHLORIDE 1.5 G/1.77G
40 POWDER, FOR SOLUTION ORAL 2 TIMES DAILY
Status: COMPLETED | OUTPATIENT
Start: 2019-04-10 | End: 2019-04-11

## 2019-04-10 RX ORDER — POTASSIUM CHLORIDE 7.45 MG/ML
10 INJECTION INTRAVENOUS PRN
Status: DISCONTINUED | OUTPATIENT
Start: 2019-04-10 | End: 2019-04-18 | Stop reason: HOSPADM

## 2019-04-10 RX ORDER — POTASSIUM CHLORIDE 20 MEQ/1
40 TABLET, EXTENDED RELEASE ORAL PRN
Status: DISCONTINUED | OUTPATIENT
Start: 2019-04-10 | End: 2019-04-18 | Stop reason: HOSPADM

## 2019-04-10 RX ADMIN — Medication 15 ML: at 20:15

## 2019-04-10 RX ADMIN — HYDRALAZINE HYDROCHLORIDE 5 MG: 20 INJECTION INTRAMUSCULAR; INTRAVENOUS at 20:24

## 2019-04-10 RX ADMIN — ENOXAPARIN SODIUM 40 MG: 40 INJECTION SUBCUTANEOUS at 10:00

## 2019-04-10 RX ADMIN — PROPRANOLOL HYDROCHLORIDE 40 MG: 40 TABLET ORAL at 10:00

## 2019-04-10 RX ADMIN — Medication 15 ML: at 10:00

## 2019-04-10 RX ADMIN — LACTULOSE 20 G: 10 SOLUTION ORAL at 10:00

## 2019-04-10 RX ADMIN — POTASSIUM CHLORIDE 40 MEQ: 1.5 POWDER, FOR SOLUTION ORAL at 20:14

## 2019-04-10 RX ADMIN — HYDRALAZINE HYDROCHLORIDE 5 MG: 20 INJECTION INTRAMUSCULAR; INTRAVENOUS at 23:48

## 2019-04-10 RX ADMIN — LACTULOSE 20 G: 10 SOLUTION ORAL at 20:15

## 2019-04-10 RX ADMIN — PROPRANOLOL HYDROCHLORIDE 40 MG: 40 TABLET ORAL at 20:15

## 2019-04-10 RX ADMIN — FAMOTIDINE 20 MG: 10 INJECTION, SOLUTION INTRAVENOUS at 10:00

## 2019-04-10 RX ADMIN — ISOSORBIDE MONONITRATE 30 MG: 30 TABLET ORAL at 10:00

## 2019-04-10 RX ADMIN — POTASSIUM CHLORIDE 40 MEQ: 1.5 POWDER, FOR SOLUTION ORAL at 10:00

## 2019-04-10 RX ADMIN — HYDRALAZINE HYDROCHLORIDE 5 MG: 20 INJECTION INTRAMUSCULAR; INTRAVENOUS at 03:59

## 2019-04-10 RX ADMIN — Medication 10 ML: at 10:01

## 2019-04-10 RX ADMIN — LACTULOSE 20 G: 10 SOLUTION ORAL at 15:13

## 2019-04-10 RX ADMIN — Medication 10 ML: at 20:15

## 2019-04-10 ASSESSMENT — PAIN SCALES - GENERAL
PAINLEVEL_OUTOF10: 0
PAINLEVEL_OUTOF10: 0

## 2019-04-10 NOTE — PLAN OF CARE
PATIENT REFUSES TO WEAR BIPAP     [x] Risks and benefits explained to patient   [x] Patient refuses to wear Bipap stating \"too uncomfortable. \"  [x] Patient verbalizes understanding of information presented.

## 2019-04-10 NOTE — PROGRESS NOTES
elevated blood pressure with PRN medication. She had one episode of elevated temperature is 38.0, but was otherwise afebrile. She was switched to propranolol for blood pressure recent history of esophageal varices. She was evaluated by speech and passed bedside swallow study. She also worked with PT and OT. She was transferred to stepdown status. Physical Exam:  Vitals: BP (!) 127/43   Pulse 79   Temp 99 °F (37.2 °C) (Oral)   Resp 24   Ht 5' 5\" (1.651 m)   Wt 110 lb 1.6 oz (49.9 kg)   SpO2 95%   BMI 18.32 kg/m²   24 hour intake/output:    Intake/Output Summary (Last 24 hours) at 4/9/2019 2205  Last data filed at 4/9/2019 1800  Gross per 24 hour   Intake 1394 ml   Output 575 ml   Net 819 ml     Last 3 weights: Wt Readings from Last 3 Encounters:   04/09/19 110 lb 1.6 oz (49.9 kg)       General appearance: alert and cooperative with exam, dysphasic  HEENT: right periorbital ecchymosis, dressing over right fronto-parietal region  Neck: no adenopathy, no JVD, supple, symmetrical, trachea midline, thyroid not enlarged, symmetric, no tenderness/mass/nodules and    Lungs: clear to auscultation bilaterally  Heart: regular rate and rhythm, S1, S2 normal, no murmur, click, rub or gallop  Abdomen: soft, non-tender; bowel sounds normal; no masses,  no organomegaly  Extremities: extremities normal, atraumatic, no cyanosis or edema  Neurologic: Mental status: Alert, dysphasic, right hemiparesis.     Medications:Current Inpatient  Scheduled Meds:   propranolol  40 mg Oral BID    enoxaparin  40 mg Subcutaneous Daily    isosorbide mononitrate  30 mg Oral Daily    ondansetron  4 mg Intravenous Once    sodium chloride flush  10 mL Intravenous 2 times per day    famotidine (PEPCID) injection  20 mg Intravenous Daily    fentanNYL  100 mcg Intravenous Once    chlorhexidine  15 mL Mouth/Throat BID     Continuous Infusions:   sodium chloride 75 mL/hr at 04/09/19 0048     PRN Meds:fentanNYL, sodium chloride flush, therapy. 2. Liver cirrhosis with esophageal varices. s/p esophageal banding. Ammonia increased. Start Lactulose. Continue propranolol. 3. Hypokalemia. Replace potassium as needed. 4. Essential hypertension. BP fairly controlled on propranolol 40 mg by mouth twice a day and Imdur 30 mg po daily  5. Hepatitis C.  6. DVT prophylaxis. SC lovenox 40 mg daily. 7. Ancillary services. PT/OT eval and treatment ongoing. Discharge planning. Accepted at [facility]. At precert stage. Will need outpatient PT/OT.         Darlene Davidson MD  PGY-1 Resident  Internal Medicine  [facility]             4/9/2019, 10:05 PM

## 2019-04-10 NOTE — PLAN OF CARE
Problem: HEMODYNAMIC STATUS  Goal: Patient has stable vital signs and fluid balance  Outcome: Met This Shift   Neuro assessment completed, fall precautions in place, aspirations precautions in place, assess for barriers in communication and mobility, interventions to assist in communication and mobility in place, encouraged to call for assistance, adaptive devices used as needed, assess emotional state and support offered, encouraged patient to communicate by available means, and support systems included in patient care. Problem: Falls - Risk of:  Goal: Will remain free from falls  Description  Will remain free from falls  Outcome: Met This Shift  Goal: Absence of physical injury  Description  Absence of physical injury  Outcome: Met This Shift   Fall assessment preformed. Bed in low locked position with call light and tray table within reach. Education given. Will continue to monitor. Problem: Risk for Impaired Skin Integrity  Goal: Tissue integrity - skin and mucous membranes  Description  Structural intactness and normal physiological function of skin and  mucous membranes.   Outcome: Met This Shift     Problem: Neurological  Goal: Maximum potential motor/sensory/cognitive function  Outcome: Met This Shift

## 2019-04-10 NOTE — PROGRESS NOTES
Physical Therapy  [address]  Daily [address] Note  NAME: Suma Bush  : 1940  MRN: [id number]    Date of Service: 4/10/2019    Discharge Recommendations:    Further therapy recommended at discharge. PT Equipment Recommendations  Equipment Needed: No    Patient Diagnosis(es): The primary encounter diagnosis was Altered mental status, unspecified altered mental status type. A diagnosis of Intracranial hemorrhage ([address] was also pertinent to this visit. has no past medical history on file. has no past surgical history on file. Restrictions  Restrictions/Precautions  Restrictions/Precautions: General Precautions, Fall Risk  Required Braces or Orthoses?: No  Position Activity Restriction  Other position/activity restrictions: Up with assist, DNR-CCA  Subjective   General  Response To Previous Treatment: Patient with no complaints from previous session. Family / Caregiver Present: No  Subjective  Subjective: RN and pt agreeable to PT. Resting in bed upon arrival  General Comment  Comments: Aphasic, only verbalizes  \"yea\"  Pain Screening  Patient Currently in Pain: Yes  Vital Signs  Patient Currently in Pain: Yes       Orientation  Orientation  Overall Orientation Status: Impaired  Cognition      Objective   Bed mobility  Rolling to Left: Moderate assistance  Supine to Sit: Moderate assistance  Scooting: Maximal assistance  Transfers  Sit to Stand: Minimal Assistance;2 Person Assistance  Stand to sit: Minimal Assistance;2 Person Assistance  Bed to Chair: Dependent/Total(esha steady to chair)  Comment: Transfers in esha steady with heavy R lateral lean  Ambulation  Ambulation?: No     Balance  Posture: Fair  Sitting - Static: Fair;+  Sitting - Dynamic: Fair;+  Standing - Static: Fair;-  Standing - Dynamic: Fair;-  Comments: Balance assessed sitting EOB and standing in [employer].      Exercise  Supine ankle pumps, 3 way hip x 10, AAROM                       Assessment   Body structures, Functions, Activity limitations: Decreased functional mobility ; Decreased endurance;Decreased ROM; Decreased strength;Decreased balance  Assessment: Sit to stand [employer] using Lisa+2 for safety, as pt demo heavy R lateral lean, with inability to correct.     Prognosis: Fair  REQUIRES PT FOLLOW UP: Yes  Activity Tolerance  Activity Tolerance: Patient Tolerated treatment well;Patient limited by fatigue     Goals  Short term goals  Time Frame for Short term goals: 14  Short term goal 1: Pt to demonstrate good sitting/standing static/dynamic balance   Short term goal 2: Pt to complete bed mobility tasks with Lisa  Short term goal 3: Pt to transfer using RW with Lisa  Short term goal 4: Pt to tolerate 30 min activity to assist with increasing strength and endurance    Plan    Plan  Times per week: 5-6x/week  Times per day: Daily  Current Treatment Recommendations: Strengthening, ROM, Balance Training, Functional Mobility Training, Transfer Training, Safety Education & Training, Endurance Training, Patient/Caregiver Education & Training  Safety Devices  Type of devices: Nurse notified, Gait belt, Call light within reach, Left in chair, Chair alarm in place  Restraints  Initially in place: No     Therapy Time   Individual Concurrent Group Co-treatment   Time In 0827         Time Out 0856         Minutes Juan Jose 399, PTA

## 2019-04-11 ENCOUNTER — APPOINTMENT (OUTPATIENT)
Dept: GENERAL RADIOLOGY | Age: 79
DRG: 023 | End: 2019-04-11
Payer: COMMERCIAL

## 2019-04-11 ENCOUNTER — APPOINTMENT (OUTPATIENT)
Dept: CT IMAGING | Age: 79
DRG: 023 | End: 2019-04-11
Payer: COMMERCIAL

## 2019-04-11 LAB
-: NORMAL
ABSOLUTE EOS #: 0.11 K/UL (ref 0–0.44)
ABSOLUTE IMMATURE GRANULOCYTE: 0.06 K/UL (ref 0–0.3)
ABSOLUTE LYMPH #: 1.32 K/UL (ref 1.1–3.7)
ABSOLUTE MONO #: 1.06 K/UL (ref 0.1–1.2)
AMMONIA: 45 UMOL/L (ref 11–51)
AMORPHOUS: NORMAL
ANION GAP SERPL CALCULATED.3IONS-SCNC: 12 MMOL/L (ref 9–17)
BACTERIA: NORMAL
BASOPHILS # BLD: 0 % (ref 0–2)
BASOPHILS ABSOLUTE: 0.03 K/UL (ref 0–0.2)
BILIRUBIN URINE: NEGATIVE
BUN BLDV-MCNC: 13 MG/DL (ref 8–23)
BUN/CREAT BLD: ABNORMAL (ref 9–20)
CALCIUM SERPL-MCNC: 8.8 MG/DL (ref 8.6–10.4)
CASTS UA: NORMAL /LPF (ref 0–8)
CHLORIDE BLD-SCNC: 108 MMOL/L (ref 98–107)
CO2: 17 MMOL/L (ref 20–31)
COLOR: YELLOW
COMMENT UA: ABNORMAL
CREAT SERPL-MCNC: 0.4 MG/DL (ref 0.5–0.9)
CRYSTALS, UA: NORMAL /HPF
DIFFERENTIAL TYPE: ABNORMAL
EOSINOPHILS RELATIVE PERCENT: 1 % (ref 1–4)
EPITHELIAL CELLS UA: NORMAL /HPF (ref 0–5)
GFR AFRICAN AMERICAN: >60 ML/MIN
GFR NON-AFRICAN AMERICAN: >60 ML/MIN
GFR SERPL CREATININE-BSD FRML MDRD: ABNORMAL ML/MIN/{1.73_M2}
GFR SERPL CREATININE-BSD FRML MDRD: ABNORMAL ML/MIN/{1.73_M2}
GLUCOSE BLD-MCNC: 115 MG/DL (ref 70–99)
GLUCOSE URINE: NEGATIVE
HCT VFR BLD CALC: 36.2 % (ref 36.3–47.1)
HCT VFR BLD CALC: 36.4 % (ref 36.3–47.1)
HEMOGLOBIN: 11.6 G/DL (ref 11.9–15.1)
HEMOGLOBIN: 11.6 G/DL (ref 11.9–15.1)
IMMATURE GRANULOCYTES: 1 %
KETONES, URINE: ABNORMAL
LACTIC ACID, WHOLE BLOOD: 1.7 MMOL/L (ref 0.7–2.1)
LEUKOCYTE ESTERASE, URINE: ABNORMAL
LYMPHOCYTES # BLD: 12 % (ref 24–43)
MAGNESIUM: 1.8 MG/DL (ref 1.6–2.6)
MCH RBC QN AUTO: 29.7 PG (ref 25.2–33.5)
MCHC RBC AUTO-ENTMCNC: 32 G/DL (ref 28.4–34.8)
MCV RBC AUTO: 92.6 FL (ref 82.6–102.9)
MONOCYTES # BLD: 10 % (ref 3–12)
MUCUS: NORMAL
NITRITE, URINE: POSITIVE
NRBC AUTOMATED: 0 PER 100 WBC
OTHER OBSERVATIONS UA: NORMAL
PDW BLD-RTO: 16.4 % (ref 11.8–14.4)
PH UA: 5 (ref 5–8)
PLATELET # BLD: 146 K/UL (ref 138–453)
PLATELET ESTIMATE: ABNORMAL
PMV BLD AUTO: 10.9 FL (ref 8.1–13.5)
POTASSIUM SERPL-SCNC: 3.7 MMOL/L (ref 3.7–5.3)
PROCALCITONIN: 0.09 NG/ML
PROTEIN UA: ABNORMAL
RBC # BLD: 3.91 M/UL (ref 3.95–5.11)
RBC # BLD: ABNORMAL 10*6/UL
RBC UA: NORMAL /HPF (ref 0–4)
RENAL EPITHELIAL, UA: NORMAL /HPF
SEG NEUTROPHILS: 77 % (ref 36–65)
SEGMENTED NEUTROPHILS ABSOLUTE COUNT: 8.49 K/UL (ref 1.5–8.1)
SODIUM BLD-SCNC: 137 MMOL/L (ref 135–144)
SPECIFIC GRAVITY UA: 1.02 (ref 1–1.03)
TRICHOMONAS: NORMAL
TURBIDITY: ABNORMAL
URINE HGB: ABNORMAL
UROBILINOGEN, URINE: NORMAL
WBC # BLD: 11.1 K/UL (ref 3.5–11.3)
WBC # BLD: ABNORMAL 10*3/UL
WBC UA: NORMAL /HPF (ref 0–5)
YEAST: NORMAL

## 2019-04-11 PROCEDURE — APPNB60 APP NON BILLABLE TIME 46-60 MINS: Performed by: NURSE PRACTITIONER

## 2019-04-11 PROCEDURE — 81001 URINALYSIS AUTO W/SCOPE: CPT

## 2019-04-11 PROCEDURE — 87040 BLOOD CULTURE FOR BACTERIA: CPT

## 2019-04-11 PROCEDURE — 85018 HEMOGLOBIN: CPT

## 2019-04-11 PROCEDURE — 6370000000 HC RX 637 (ALT 250 FOR IP): Performed by: STUDENT IN AN ORGANIZED HEALTH CARE EDUCATION/TRAINING PROGRAM

## 2019-04-11 PROCEDURE — 6360000002 HC RX W HCPCS: Performed by: PSYCHIATRY & NEUROLOGY

## 2019-04-11 PROCEDURE — 6360000002 HC RX W HCPCS: Performed by: STUDENT IN AN ORGANIZED HEALTH CARE EDUCATION/TRAINING PROGRAM

## 2019-04-11 PROCEDURE — 85014 HEMATOCRIT: CPT

## 2019-04-11 PROCEDURE — 6370000000 HC RX 637 (ALT 250 FOR IP): Performed by: PSYCHIATRY & NEUROLOGY

## 2019-04-11 PROCEDURE — 2000000003 HC NEURO ICU R&B

## 2019-04-11 PROCEDURE — 80048 BASIC METABOLIC PNL TOTAL CA: CPT

## 2019-04-11 PROCEDURE — 82140 ASSAY OF AMMONIA: CPT

## 2019-04-11 PROCEDURE — 95819 EEG AWAKE AND ASLEEP: CPT | Performed by: PSYCHIATRY & NEUROLOGY

## 2019-04-11 PROCEDURE — 2580000003 HC RX 258: Performed by: NURSE PRACTITIONER

## 2019-04-11 PROCEDURE — 99291 CRITICAL CARE FIRST HOUR: CPT | Performed by: PSYCHIATRY & NEUROLOGY

## 2019-04-11 PROCEDURE — 97110 THERAPEUTIC EXERCISES: CPT

## 2019-04-11 PROCEDURE — 83605 ASSAY OF LACTIC ACID: CPT

## 2019-04-11 PROCEDURE — 94762 N-INVAS EAR/PLS OXIMTRY CONT: CPT

## 2019-04-11 PROCEDURE — 36415 COLL VENOUS BLD VENIPUNCTURE: CPT

## 2019-04-11 PROCEDURE — 85025 COMPLETE CBC W/AUTO DIFF WBC: CPT

## 2019-04-11 PROCEDURE — 71045 X-RAY EXAM CHEST 1 VIEW: CPT

## 2019-04-11 PROCEDURE — 70450 CT HEAD/BRAIN W/O DYE: CPT

## 2019-04-11 PROCEDURE — 83735 ASSAY OF MAGNESIUM: CPT

## 2019-04-11 PROCEDURE — 95819 EEG AWAKE AND ASLEEP: CPT

## 2019-04-11 PROCEDURE — 2500000003 HC RX 250 WO HCPCS: Performed by: EMERGENCY MEDICINE

## 2019-04-11 PROCEDURE — 97530 THERAPEUTIC ACTIVITIES: CPT

## 2019-04-11 PROCEDURE — 84145 PROCALCITONIN (PCT): CPT

## 2019-04-11 PROCEDURE — 6360000002 HC RX W HCPCS: Performed by: NURSE PRACTITIONER

## 2019-04-11 PROCEDURE — 2580000003 HC RX 258: Performed by: STUDENT IN AN ORGANIZED HEALTH CARE EDUCATION/TRAINING PROGRAM

## 2019-04-11 PROCEDURE — 2580000003 HC RX 258: Performed by: EMERGENCY MEDICINE

## 2019-04-11 PROCEDURE — 6370000000 HC RX 637 (ALT 250 FOR IP): Performed by: NURSE PRACTITIONER

## 2019-04-11 PROCEDURE — 99233 SBSQ HOSP IP/OBS HIGH 50: CPT | Performed by: INTERNAL MEDICINE

## 2019-04-11 PROCEDURE — 6360000002 HC RX W HCPCS: Performed by: EMERGENCY MEDICINE

## 2019-04-11 RX ORDER — ACETAMINOPHEN 650 MG/1
650 SUPPOSITORY RECTAL EVERY 4 HOURS PRN
Status: DISCONTINUED | OUTPATIENT
Start: 2019-04-11 | End: 2019-04-11

## 2019-04-11 RX ORDER — ACETAMINOPHEN 650 MG/1
650 SUPPOSITORY RECTAL EVERY 4 HOURS PRN
Status: DISCONTINUED | OUTPATIENT
Start: 2019-04-11 | End: 2019-04-12

## 2019-04-11 RX ORDER — LABETALOL HYDROCHLORIDE 5 MG/ML
10 INJECTION, SOLUTION INTRAVENOUS
Status: DISCONTINUED | OUTPATIENT
Start: 2019-04-11 | End: 2019-04-18 | Stop reason: HOSPADM

## 2019-04-11 RX ORDER — HYDRALAZINE HYDROCHLORIDE 20 MG/ML
5 INJECTION INTRAMUSCULAR; INTRAVENOUS
Status: DISCONTINUED | OUTPATIENT
Start: 2019-04-11 | End: 2019-04-18 | Stop reason: HOSPADM

## 2019-04-11 RX ORDER — AMLODIPINE BESYLATE 10 MG/1
10 TABLET ORAL DAILY
Status: DISCONTINUED | OUTPATIENT
Start: 2019-04-11 | End: 2019-04-18 | Stop reason: HOSPADM

## 2019-04-11 RX ORDER — 0.9 % SODIUM CHLORIDE 0.9 %
1000 INTRAVENOUS SOLUTION INTRAVENOUS ONCE
Status: COMPLETED | OUTPATIENT
Start: 2019-04-11 | End: 2019-04-11

## 2019-04-11 RX ADMIN — SODIUM CHLORIDE 1000 ML: 9 INJECTION, SOLUTION INTRAVENOUS at 04:38

## 2019-04-11 RX ADMIN — Medication 10 MG: at 18:10

## 2019-04-11 RX ADMIN — PIPERACILLIN SODIUM,TAZOBACTAM SODIUM 3.38 G: 3; .375 INJECTION, POWDER, FOR SOLUTION INTRAVENOUS at 09:15

## 2019-04-11 RX ADMIN — HYDRALAZINE HYDROCHLORIDE 5 MG: 20 INJECTION INTRAMUSCULAR; INTRAVENOUS at 23:08

## 2019-04-11 RX ADMIN — Medication 15 ML: at 09:31

## 2019-04-11 RX ADMIN — Medication 10 MG: at 12:07

## 2019-04-11 RX ADMIN — PROPRANOLOL HYDROCHLORIDE 40 MG: 40 TABLET ORAL at 09:13

## 2019-04-11 RX ADMIN — CEFTRIAXONE SODIUM 1 G: 1 INJECTION, POWDER, FOR SOLUTION INTRAMUSCULAR; INTRAVENOUS at 15:59

## 2019-04-11 RX ADMIN — Medication 10 ML: at 21:01

## 2019-04-11 RX ADMIN — LACTULOSE 20 G: 10 SOLUTION ORAL at 09:13

## 2019-04-11 RX ADMIN — Medication 10 MG: at 05:26

## 2019-04-11 RX ADMIN — FENTANYL CITRATE 12.5 MCG: 50 INJECTION, SOLUTION INTRAMUSCULAR; INTRAVENOUS at 01:42

## 2019-04-11 RX ADMIN — POTASSIUM CHLORIDE 40 MEQ: 1.5 POWDER, FOR SOLUTION ORAL at 09:13

## 2019-04-11 RX ADMIN — HYDRALAZINE HYDROCHLORIDE 5 MG: 20 INJECTION INTRAMUSCULAR; INTRAVENOUS at 13:00

## 2019-04-11 RX ADMIN — ENOXAPARIN SODIUM 40 MG: 40 INJECTION SUBCUTANEOUS at 09:14

## 2019-04-11 RX ADMIN — FAMOTIDINE 20 MG: 10 INJECTION, SOLUTION INTRAVENOUS at 09:14

## 2019-04-11 RX ADMIN — LACTULOSE 20 G: 10 SOLUTION ORAL at 21:00

## 2019-04-11 RX ADMIN — PROPRANOLOL HYDROCHLORIDE 40 MG: 40 TABLET ORAL at 21:00

## 2019-04-11 RX ADMIN — Medication 15 ML: at 20:57

## 2019-04-11 RX ADMIN — ACETAMINOPHEN 650 MG: 650 SUPPOSITORY RECTAL at 16:30

## 2019-04-11 ASSESSMENT — PAIN SCALES - GENERAL: PAINLEVEL_OUTOF10: 7

## 2019-04-11 NOTE — H&P
Neuro ICU History & Physical    Patient Name: Manav Avendano  Patient : 1940  Room/Bed: 22/0522-01  Allergies: No Known Allergies  Problem List:   Patient Active Problem List   Diagnosis    Intracranial hemorrhage ([facility] [address]    Altered mental status    Encounter for palliative care    Acute encephalopathy    History of esophageal varices with bleeding       CHIEF COMPLAINT     Unresponsive    HPI    History Obtained From: EMR    The patient is a 66 y.o. female with a history of Hepatitis C suspected to be from previous blood transfusion, cirrhosis, and esophageal varices s/p banding May 2017 who initially presented as a trauma alert on 19 after being found down at home. LKW was ~24h prior to arrival.  CT Head revealed left thalamic IPH with intraventricular extension into lateral, 3rd and 4th ventricles. Trauma survey otherwise unremarkable except age indeterminate L2 compression fracture. Initially evaluated by Trauma, but IPH thought to be spontaneous rather than traumatic. Neuro Critical Care and Neurosurgery consulted. Admitted to the Neuro ICU. EVD placed at bedside. ICH score: 1    Hospital Course:   :  Cardene gtt weaned off around midnight, -140s. EVD output 27ml/24hr. Required PRN fentanyl for pain overnight. :  Placed on BiPAP overnight due to accessory muscle use.  Sp02 stable, maintaining secretions.  BP stable, Cardene stopped. Mentation gradually improving.  EVD pulled out accidentally. :  Borderline hypertension overnight.  Started Imdur yesterday. Lopressor, hydralazine, labetalol given PRN.  On room air overnight w/ stable Sp02.  Single episode of elevated temp at 38.0. Pt otherwise afebrile. Home medications reconciled. Started on Propranolol. Doing well sitting at side of bed working with therapy, more verbal with improved speech. Transferred to Stepdown status under Internal Medicine.     Last 24h:  Patient had been doing well until overnight when nursing noted progressive worsening in mentation. This morning patient found to be difficult to arouse and non-responsive; not answer questions or following commands. Repeat CT Head stable. Febrile overnight, Tmax 102F. CXR with trace left infusion, no infectious process. Blood cultures sent. UA positive nitrite, moderate LE. Start on Rocephin for UTI. On exam, patient opens eyes to noxious stimuli. Right gaze preference noted, able to overcome; looks to left when you call her name. Does not answer questions or follow commands. RUE spastic and drifts to bed, no effort against gravity. Withdraws to pain bilateral lower extremities. STAT LTME ordered to r/o seizures. Ammonia 45 (down from 125 with Lactulose). Admitted to ICU From: Stepdown status  Reason for ICU Admission: Worsening mentation       PATIENT HISTORY   Past Medical History:    No past medical history on file. Past Surgical History:    No past surgical history on file.     Social History:   Social History     Socioeconomic History    Marital status:      Spouse name: Not on file    Number of children: Not on file    Years of education: Not on file    Highest education level: Not on file   Occupational History    Not on file   Social Needs    Financial resource strain: Not on file    Food insecurity:     Worry: Not on file     Inability: Not on file    Transportation needs:     Medical: Not on file     Non-medical: Not on file   Tobacco Use    Smoking status: Not on file   Substance and Sexual Activity    Alcohol use: Not on file    Drug use: Not on file    Sexual activity: Not on file   Lifestyle    Physical activity:     Days per week: Not on file     Minutes per session: Not on file    Stress: Not on file   Relationships    Social connections:     Talks on phone: Not on file     Gets together: Not on file     Attends [employer]ism service: Not on file     Active member of club or organization: Not on file Attends meetings of clubs or organizations: Not on file     Relationship status: Not on file    Intimate partner violence:     Fear of current or ex partner: Not on file     Emotionally abused: Not on file     Physically abused: Not on file     Forced sexual activity: Not on file   Other Topics Concern    Not on file   Social History Narrative    Not on file       Family History:   No family history on file. Allergies:    Patient has no known allergies. Medications Prior to Admission:    Medications Prior to Admission: Multiple Vitamins-Minerals (THERAPEUTIC MULTIVITAMIN-MINERALS) tablet, Take 1 tablet by mouth daily  PANTOPRAZOLE SODIUM PO, Take 20 mg by mouth  gabapentin (NEURONTIN) 300 MG capsule, Take 300 mg by mouth 3 times daily.   propranolol (INDERAL) 20 MG tablet, Take 20 mg by mouth 2 times daily  traZODone (DESYREL) 50 MG tablet, Take 50 mg by mouth nightly  furosemide (LASIX) 20 MG tablet, Take 20 mg by mouth 2 times daily  spironolactone (ALDACTONE) 25 MG tablet, Take 25 mg by mouth daily    Current Medications:  Current Facility-Administered Medications: acetaminophen (TYLENOL) suppository 650 mg, 650 mg, Rectal, Q4H PRN  amLODIPine (NORVASC) tablet 10 mg, 10 mg, Oral, Daily  potassium chloride (KLOR-CON M) extended release tablet 40 mEq, 40 mEq, Oral, PRN **OR** potassium bicarb-citric acid (EFFER-K) effervescent tablet 40 mEq, 40 mEq, Oral, PRN **OR** potassium chloride 10 mEq/100 mL IVPB (Peripheral Line), 10 mEq, Intravenous, PRN  lactulose (CHRONULAC) 10 GM/15ML solution 20 g, 20 g, Oral, TID  propranolol (INDERAL) tablet 40 mg, 40 mg, Oral, BID  enoxaparin (LOVENOX) injection 40 mg, 40 mg, Subcutaneous, Daily  fentaNYL (SUBLIMAZE) injection 12.5 mcg, 12.5 mcg, Intravenous, Q2H PRN  0.9 % sodium chloride infusion, , Intravenous, Continuous  ondansetron (ZOFRAN) injection 4 mg, 4 mg, Intravenous, Once  sodium chloride flush 0.9 % injection 10 mL, 10 mL, Intravenous, 2 times per day  sodium chloride flush 0.9 % injection 10 mL, 10 mL, Intravenous, PRN  magnesium hydroxide (MILK OF MAGNESIA) 400 MG/5ML suspension 30 mL, 30 mL, Oral, Daily PRN  ondansetron (ZOFRAN) injection 4 mg, 4 mg, Intravenous, Q6H PRN  oxyCODONE (ROXICODONE) immediate release tablet 5 mg, 5 mg, Oral, Q6H PRN  famotidine (PEPCID) injection 20 mg, 20 mg, Intravenous, Daily  labetalol (NORMODYNE;TRANDATE) injection 10 mg, 10 mg, Intravenous, Q10 Min PRN  hydrALAZINE (APRESOLINE) injection 5 mg, 5 mg, Intravenous, Q6H PRN  chlorhexidine (PERIDEX) 0.12 % solution 15 mL, 15 mL, Mouth/Throat, BID    REVIEW OF SYSTEMS     Unable to obtain ROS due to patient status. PHYSICAL EXAM:     BP (!) 184/73   Pulse 83   Temp 100.2 °F (37.9 °C) (Axillary)   Resp 25   Ht 5' 5\" (1.651 m)   Wt 110 lb 1.6 oz (49.9 kg)   SpO2 95%   BMI 18.32 kg/m²     Estimated body mass index is 18.32 kg/m² as calculated from the following:    Height as of this encounter: 5' 5\" (1.651 m). Weight as of this encounter: 110 lb 1.6 oz (49.9 kg).  []<16 Severe malnutrition  []16-16.99 Moderate malnutrition  []17-18.49 Mild malnutrition  [x]18.5-24.9 Normal  []25-29.9 Overweight (not obese)  []30-34.9 Obese class 1 (Low Risk)  []35-39.9 Obese class 2 (Moderate Risk)  []?40 Obese class 3 (High Risk)    PHYSICAL EXAM:  CONSTITUTIONAL:  Alert. Groaning. Does not answer questions or follow commands. HEAD:  normocephalic, atraumatic    EYES:  PERRLA. Right gaze preference, able to cross to left with verbal stimuli   ENT:  moist mucous membranes   LUNGS:  Equal air entry bilaterally, clear   CARDIOVASCULAR:  normal s1 / s2, RRR   ABDOMEN:  Soft, no rigidity, normal bowel sounds   NECK supple, symmetric   EXTREMITIES Normal ROM with no deformities   NEUROLOGIC:  Mental Status:  Alert. Right gaze preference. Groaning. Does not answer questions or follow commands.              Cranial Nerves:    III: Pupils:  equal, round, reactive to light  III,IV,VI: Extra Ocular Movements: abnormal - right gaze preference, able to cross midline to left  VII: Facial strength: right facial droop    Motor Exam:    Mild spacticity to right upper extremity, unable to resist gravity, drifts to bed. Able to resist gravity with left upper extremity when passively raised. Withdraw to pain bilateral lower extremities. SKIN No obvious ecchymosis, rashes, or lesions        LABS AND IMAGING:     RECENT LABS:  CBC with Differential:    Lab Results   Component Value Date    WBC 11.1 04/11/2019    RBC 3.91 04/11/2019    HGB 11.6 04/11/2019    HGB 11.6 04/11/2019    HCT 36.2 04/11/2019    HCT 36.4 04/11/2019     04/11/2019    MCV 92.6 04/11/2019    MCH 29.7 04/11/2019    MCHC 32.0 04/11/2019    RDW 16.4 04/11/2019    LYMPHOPCT 12 04/11/2019    MONOPCT 10 04/11/2019    BASOPCT 0 04/11/2019    MONOSABS 1.06 04/11/2019    LYMPHSABS 1.32 04/11/2019    EOSABS 0.11 04/11/2019    BASOSABS 0.03 04/11/2019    DIFFTYPE NOT REPORTED 04/11/2019     BMP:    Lab Results   Component Value Date     04/11/2019    K 3.7 04/11/2019     04/11/2019    CO2 17 04/11/2019    BUN 13 04/11/2019    LABALBU 3.4 04/05/2019    CREATININE 0.40 04/11/2019    CALCIUM 8.8 04/11/2019    GFRAA >60 04/11/2019    LABGLOM >60 04/11/2019    GLUCOSE 115 04/11/2019       RADIOLOGY:   Xr Pelvis (1-2 Views)    Result Date: 4/5/2019  EXAMINATION: SINGLE XRAY VIEW OF THE PELVIS 4/5/2019 3:23 pm COMPARISON: None. HISTORY: ORDERING SYSTEM PROVIDED HISTORY: trauma TECHNOLOGIST PROVIDED HISTORY: trauma Initial evaluation. FINDINGS: The osseous structures appear osteopenic. No convincing acute abnormality seen of the bony pelvis. Degenerative changes of the lumbar spine, sacroiliac joints and hips bilaterally. Vascular calcifications are noted. Osteopenia without convincing acute abnormality of the bony pelvis.      Xr Shoulder Right (min 2 Views)    Result Date: 4/6/2019  EXAMINATION: 3 XRAY VIEWS OF THE RIGHT SHOULDER; AP AND LATERAL XRAY VIEWS OF THE RIGHT FOREARM; 3 XRAY VIEWS OF THE RIGHT ELBOW 4/6/2019 3:10 am COMPARISON: None. HISTORY: ORDERING SYSTEM PROVIDED HISTORY: trauma TECHNOLOGIST PROVIDED HISTORY: trauma FINDINGS: Right shoulder: Three views of the right shoulder. No acute fracture or dislocation. Soft tissue swelling about the shoulder. Normal alignment of the glenohumeral and acromioclavicular joints. No aggressive skeletal lesion. Visualized lung is clear. Visualized ribs are intact. Osteopenia. Right elbow: Frontal, lateral and oblique views. Soft tissue swelling about the elbow. No radiographic evidence of an elbow joint effusion. No acute fracture or malalignment. Mild ulnohumeral compartment DJD. Osteopenia. Right forearm: Frontal and lateral views. Soft tissue swelling about the forearm. No acute fracture or malalignment. Mild-to-moderate DJD in the wrist.  Osteopenia. No acute fracture in the right shoulder, right elbow or right forearm. Osteopenia. Mild DJD in the right elbow and mild-to-moderate DJD in the right wrist.     Xr Elbow Right (min 3 Views)    Result Date: 4/6/2019  EXAMINATION: 3 XRAY VIEWS OF THE RIGHT SHOULDER; AP AND LATERAL XRAY VIEWS OF THE RIGHT FOREARM; 3 XRAY VIEWS OF THE RIGHT ELBOW 4/6/2019 3:10 am COMPARISON: None. HISTORY: ORDERING SYSTEM PROVIDED HISTORY: trauma TECHNOLOGIST PROVIDED HISTORY: trauma FINDINGS: Right shoulder: Three views of the right shoulder. No acute fracture or dislocation. Soft tissue swelling about the shoulder. Normal alignment of the glenohumeral and acromioclavicular joints. No aggressive skeletal lesion. Visualized lung is clear. Visualized ribs are intact. Osteopenia. Right elbow: Frontal, lateral and oblique views. Soft tissue swelling about the elbow. No radiographic evidence of an elbow joint effusion. No acute fracture or malalignment. Mild ulnohumeral compartment DJD. Osteopenia. Right forearm: Frontal and lateral views. PROVIDED HISTORY: Ordering Physician Provided Reason for Exam: EVD placement follow up Acuity: Unknown Type of Exam: Subsequent/Follow-up FINDINGS: BRAIN/VENTRICLES: Interval placement of right frontal approach external ventricular drainage device. Tip terminates within the frontal horn left lateral ventricle near the caudal thalamic groove. Interval pneumocephalus identified consistent with interval placement. Redemonstration of the left thalamic intraparenchymal hemorrhage measuring 3.6 x 2.5 cm in size with evidence of subarachnoid decompression/subarachnoid extension. Evidence of blood products within the lateral 3rd and 4th ventricle identified. Evidence of blood products identified overlying both frontal and parietal lobes and along the interhemispheric falx new from the prior examination but may be related to redistribution of subarachnoid blood products. No shift of midline structures. Basal cisterns are patent. Mild generalized involutional change with chronic small vessel ischemic disease. Intracranial vascular calcifications. . ORBITS: The visualized portion of the orbits demonstrate no acute abnormality. SINUSES: Mild ethmoid sinus mucosal thickening. SOFT TISSUES/SKULL:  No depressed skull fracture. Edema identified overlying the right temporalis muscle may be related to prior trauma or surgery. Interval placement of right frontal approach EVD. Evolving left thalamic intraparenchymal hemorrhage with secondary subarachnoid extension and blood products within the lateral 3rd and 4th ventricles. The blood products identified overlying both frontal lobes parietal lobes and along the interhemispheric falx could be related to placement by EVD versus redistribution of the previously noted subarachnoid blood products.      Ct Head Wo Contrast    Result Date: 4/5/2019  EXAMINATION: CT OF THE HEAD WITHOUT CONTRAST  4/5/2019 3:19 pm TECHNIQUE: CT of the head was performed without the administration of intravenous contrast. Dose modulation, iterative reconstruction, and/or weight based adjustment of the mA/kV was utilized to reduce the radiation dose to as low as reasonably achievable. COMPARISON: None. HISTORY: ORDERING SYSTEM PROVIDED HISTORY: trauma TECHNOLOGIST PROVIDED HISTORY: FINDINGS: BRAIN/VENTRICLES: There is an intraparenchymal hemorrhage within the left thalamic region measuring 3.2 x 2.1 x 3.6 cm. There is adjacent vasogenic edema. There are hemorrhagic products within the lateral ventricles bilaterally as well as the 3rd ventricle, cerebral aqueduct, and 4th ventricle. There is minimal rightward midline shift. There is age-appropriate cerebral volume loss. The infratentorial structures are otherwise unremarkable. ORBITS: The visualized portion of the orbits demonstrate no acute abnormality. SINUSES: The visualized paranasal sinuses and mastoid air cells demonstrate no acute abnormality. SOFT TISSUES/SKULL:  There is right-sided soft tissue swelling involving the right frontal, parietal, and temporal region. There is no definite acute osseous abnormalities. Intraparenchymal hemorrhage in the left thalamic region with adjacent vasogenic edema. Hemorrhagic products within the lateral ventricles, 3rd ventricle, cerebral aqueduct, and 4th ventricle. Minimal rightward midline shift. Critical results were called by Dr. Keli Mitchell to Dr. Jolyn Krabbe on 4/5/2019 at 16:02. Ct Facial Bones Wo Contrast    Result Date: 4/5/2019  EXAMINATION: CT OF THE FACE WITHOUT CONTRAST  4/5/2019 3:24 pm TECHNIQUE: CT of the face was performed without the administration of intravenous contrast. Multiplanar reformatted images are provided for review. Dose modulation, iterative reconstruction, and/or weight based adjustment of the mA/kV was utilized to reduce the radiation dose to as low as reasonably achievable.  COMPARISON: None HISTORY: ORDERING SYSTEM PROVIDED HISTORY: trauma; significant R facial bruising FINDINGS: FACIAL BONES:  The maxilla, pterygoid plates and zygomatic arches are intact. The mandible is intact. The mandibular condyles are normally situated. The nasal bones and maxillary nasal processes are intact. ORBITS:  The globes appear intact. The extraocular muscles, optic nerve sheath complexes and lacrimal glands appear unremarkable. No retrobulbar hematoma or mass is seen. The orbital walls and rims are intact. SINUSES/MASTOIDS:  The paranasal sinuses and mastoid air cells are well aerated. No acute fracture is seen. SOFT TISSUES:  Preseptal swelling of the right face extending superiorly along the supraorbital ridge into the right frontal scalp. Asymmetric swelling of the lateral face on the right compared with the left. No fluid collection is identified. No air or unexpected radiopaque foreign bodies. Soft tissue swelling/edema along the upper neck circumferentially right greater than left. The visualized intracranial contents show acute left thalamic intraparenchymal blood products and intraventricular blood products. 1. Bruising of the face without fracture, radiopaque foreign body, or superficial fluid collection. 2. Acute left thalamic hemorrhage with intraventricular extension. This is previously called to Dr. Chani Viveros by Dr. Aloha Kehr at  on 4/5/19. Ct Cervical Spine Wo Contrast    Result Date: 4/5/2019  EXAMINATION: CT OF THE CERVICAL SPINE WITHOUT CONTRAST 4/5/2019 3:19 pm TECHNIQUE: CT of the cervical spine was performed without the administration of intravenous contrast. Multiplanar reformatted images are provided for review. Dose modulation, iterative reconstruction, and/or weight based adjustment of the mA/kV was utilized to reduce the radiation dose to as low as reasonably achievable. COMPARISON: None. HISTORY: ORDERING SYSTEM PROVIDED HISTORY: trauma FINDINGS: BONES/ALIGNMENT: There is no evidence of an acute cervical spine fracture.  There is normal alignment of the cervical spine except moderate dextroconvex scoliosis and reversal of the normal lordotic curvature. .  Decompressive laminectomies at C4 and C5. Posterior fusion rods and pedicular screws at C3, 4, 5 and 6 on the right and C5 and 6 on the left. DEGENERATIVE CHANGES: Multilevel disc space narrowing. SOFT TISSUES: There is no prevertebral soft tissue swelling. No acute abnormality of the cervical spine. Posterior interbody fixation as above. Ct Thoracic Spine Wo Contrast    Result Date: 4/5/2019  EXAMINATION: CT OF THE THORACIC SPINE WITHOUT CONTRAST  4/5/2019 3:18 pm: TECHNIQUE: CT of the thoracic spine was performed without the administration of intravenous contrast. Multiplanar reformatted images are provided for review. Dose modulation, iterative reconstruction, and/or weight based adjustment of the mA/kV was utilized to reduce the radiation dose to as low as reasonably achievable. COMPARISON: None. HISTORY: ORDERING SYSTEM PROVIDED HISTORY: trauma FINDINGS: BONES/ALIGNMENT: There is normal alignment of the spine. The vertebral body heights are maintained. No osseous destructive lesion is seen. DEGENERATIVE CHANGES: No gross spinal canal stenosis or bony neural foraminal narrowing of the thoracic spine. SOFT TISSUES: No paraspinal mass is seen. Unremarkable CT of the thoracic spine. Ct Lumbar Spine Wo Contrast    Result Date: 4/5/2019  EXAMINATION: CT OF THE LUMBAR SPINE WITHOUT CONTRAST  4/5/2019 TECHNIQUE: CT of the lumbar spine was performed without the administration of intravenous contrast. Multiplanar reformatted images are provided for review. Dose modulation, iterative reconstruction, and/or weight based adjustment of the mA/kV was utilized to reduce the radiation dose to as low as reasonably achievable.  COMPARISON: None HISTORY: ORDERING SYSTEM PROVIDED HISTORY: trauma TECHNOLOGIST PROVIDED HISTORY: trauma FINDINGS: BONES/ALIGNMENT: There is minimal compression deformity of L4 vertebral body with less than 20% height loss/wedging anteriorly. Multilevel degenerative disc disease is evident. The facets are intact. The spinous processes are intact. Lamina and pedicles are intact. There is evidence of prior posterior decompression. DEGENERATIVE CHANGES: Multilevel degenerative disc disease with facet arthropathy. SOFT TISSUES/RETROPERITONEUM: Diverticulosis. Atherosclerotic changes of aorta. Large stool throughout the colon. An avidly enhancing dilated left ovarian vessel is noted. 1. Age-indeterminate compression deformity of L4 with less than 20% height loss and no significant retropulsion. 2. Osteopenic skeleton with multilevel degenerative changes. Xr Chest Portable    Result Date: 4/6/2019  EXAMINATION: SINGLE XRAY VIEW OF THE CHEST 4/6/2019 5:27 am COMPARISON: 04/05/2019 HISTORY: ORDERING SYSTEM PROVIDED HISTORY: Concern for aspiration TECHNOLOGIST PROVIDED HISTORY: Concern for aspiration FINDINGS: Cardiomediastinal silhouette and pulmonary vasculature are within normal limits. There is no focal airspace consolidation. No evidence of pneumothorax or pleural effusion on this limited supine study. No acute osseous abnormality. No acute intrathoracic process. Xr Chest Portable    Result Date: 4/5/2019  EXAMINATION: SINGLE XRAY VIEW OF THE CHEST 4/5/2019 3:24 pm COMPARISON: None. HISTORY: ORDERING SYSTEM PROVIDED HISTORY: trauma TECHNOLOGIST PROVIDED HISTORY: trauma FINDINGS: Slight increase in interstitial markings. The lungs are without acute focal process. There is no effusion or pneumothorax. The cardiomediastinal silhouette is without acute process. The osseous structures are without acute process. No fracture or pneumothorax.  Increased interstitial markings     Cta Neck W Contrast    Result Date: 4/5/2019  EXAMINATION: CTA OF THE HEAD WITH CONTRAST; CTA OF THE NECK 4/5/2019 3:32 pm: TECHNIQUE: CTA of the head/brain was performed with the administration of CIRCULATION: The internal carotid arteries are normal in course and caliber without focal stenosis. The anterior cerebral and middle cerebral arteries demonstrate no focal stenosis. POSTERIOR CIRCULATION: The posterior cerebral arteries demonstrate no focal stenosis. The vertebral and basilar arteries appear unremarkable. BRAIN: There is redemonstration of an intraparenchymal hemorrhage within the left thalamic region as well as intraventricular hemorrhagic products. Unremarkable CTA of the head and neck. Critical results were called by Dr. Kassandra Meza to Dr. Carole Salas on 4/5/2019 at 16:12.     Ct Chest Abdomen Pelvis W Contrast    Result Date: 4/5/2019  EXAMINATION: CT OF THE CHEST, ABDOMEN, AND PELVIS WITH CONTRAST 4/5/2019 3:19 pm TECHNIQUE: CT of the chest, abdomen and pelvis was performed with the administration of intravenous contrast. Multiplanar reformatted images are provided for review. Dose modulation, iterative reconstruction, and/or weight based adjustment of the mA/kV was utilized to reduce the radiation dose to as low as reasonably achievable. COMPARISON: None HISTORY: ORDERING SYSTEM PROVIDED HISTORY: trauma TECHNOLOGIST PROVIDED HISTORY: Ordering Physician Provided Reason for Exam: found down Acuity: Unknown FINDINGS: Chest: Mediastinum:  Thoracic aorta and central portion of the pulmonary artery opacify normally. The ascending thoracic aorta is of normal caliber. Heart size is normal. There is no pericardial effusion. No mediastinal or hilar lymph nodes exceed the CT criteria for abnormal enlargement. Lungs/pleura: Clear Soft Tissues/Bones: No fracture identified Abdomen/Pelvis: Organs: The abdominal wall appears normal. Liver appears somewhat nodular. Spleen is enlarged. Gastroesophageal varices are noted. Pancreas appears normal.  The adrenals are normal.  The gallbladder surgically absent. . Kidneys appear normal. The bladder appears normal. GI/Bowel:  The stomach,small bowel, and colon appear normal. Appendix is not identified. Pelvis: Uterus appears atrophic. Peritoneum/Retroperitoneum: The abdominal aorta and iliac arteries are normal in caliber. There is no pathologic adenopathy. There is calcified plaque along the aorta and its branches. Bones/Soft Tissues: Normal     No acute traumatic sequelae. Cirrhosis, splenomegaly, and esophageal varices consistent with portal venous hypertension. Cta Head W Contrast    Result Date: 4/5/2019  EXAMINATION: CTA OF THE HEAD WITH CONTRAST; CTA OF THE NECK 4/5/2019 3:32 pm: TECHNIQUE: CTA of the head/brain was performed with the administration of intravenous contrast. Multiplanar reformatted images are provided for review. MIP images are provided for review. Dose modulation, iterative reconstruction, and/or weight based adjustment of the mA/kV was utilized to reduce the radiation dose to as low as reasonably achievable.; CTA of the neck was performed with the administration of intravenous contrast. Multiplanar reformatted images are provided for review. MIP images are provided for review. Stenosis of the internal carotid arteries measured using NASCET criteria. Dose modulation, iterative reconstruction, and/or weight based adjustment of the mA/kV was utilized to reduce the radiation dose to as low as reasonably achievable. COMPARISON: None. HISTORY: ORDERING SYSTEM PROVIDED HISTORY: SAH, ?hemorrhagic stroke TECHNOLOGIST PROVIDED HISTORY: ; ORDERING SYSTEM PROVIDED HISTORY: SAH, ?hemorrhagic stroke TECHNOLOGIST PROVIDED HISTORY: Ordering Physician Provided Reason for Exam: head bleed/ found down Acuity: Unknown FINDINGS: CTA NECK: AORTIC ARCH/ARCH VESSELS: There is a normal branch pattern of the aortic arch. No significant stenosis is seen of the innominate artery or subclavian arteries. CAROTID ARTERIES: The common carotid arteries are normal in appearance without evidence of a flow limiting stenosis.  The internal carotid arteries are normal in appearance without evidence of a flow limiting stenosis by NASCET criteria. No dissection or arterial injury is seen. VERTEBRAL ARTERIES: The vertebral arteries both arise from the subclavian arteries and are normal in caliber without evidence of flow limiting stenosis or dissection. SOFT TISSUES: The lung apices are clear. No cervical or superior mediastinal lymphadenopathy. The visualized portion of the larynx and pharynx appear unremarkable. The parotid, submandibular and thyroid glands demonstrate no acute abnormality. BONES: The visualized osseous structures appear unremarkable. CTA HEAD: ANTERIOR CIRCULATION: The internal carotid arteries are normal in course and caliber without focal stenosis. The anterior cerebral and middle cerebral arteries demonstrate no focal stenosis. POSTERIOR CIRCULATION: The posterior cerebral arteries demonstrate no focal stenosis. The vertebral and basilar arteries appear unremarkable. BRAIN: There is redemonstration of an intraparenchymal hemorrhage within the left thalamic region as well as intraventricular hemorrhagic products. Unremarkable CTA of the head and neck. Critical results were called by Dr. Fatoumata Mcdonald to Dr. Kajal Mathias on 4/5/2019 at 16:12. Labs and Images reviewed with:    [x] Graham Kaur MD    [] Deloris De La Cruz MD  [] Christ Diaz MD  --[] there are no new interval images to review. ASSESSMENT AND PLAN:       This is a 66 y.o. female with a history of  Hepatitis C suspected to be from previous blood transfusion, cirrhosis, and esophageal varices s/p banding May 2017 who initially presented as a trauma alert on 4/5/19 after being found down at home. LKW was ~24h prior to arrival.  CT Head revealed left thalamic IPH with intraventricular extension into lateral, 3rd and 4th ventricles. EVD placed at bedside on admit, removed 4/8. Patient exam had been improving and she was transferred to [facility] status 4/9.   Transferred back to Neuro ICU and Neuro Critical Care due to altered mentation, fevers. Seizures vs UTI vs CSF infection.     NEUROLOGIC:  - Altered mentation/ encephalopathy   - Left thalamic IPH with associated edema and intraventricular hemorrhage, minimal shift  - No vascular abnormality on CTA Head/Neck  - EVD placed at bedside 4/7, removed 4/9  - Clinical exam had been improving until overnight when patient mentation progressively declined  - Repeat CT Head showed stable IPH in left thalamus, improvement of IVH  - STAT LTME to evaluate for seizure activity due to right gaze preference  - Goal SBP<160  - Neuro checks per protocol    CARDIOVASCULAR:  - Goal SBP<160  - Norvasc 10mg QD, Propranolol 40mg BID (unable to take PO)  - PRN Hydralazine/Labetalol  - Restart Cardene as needed while unable to take PO  - Recent Echo 4/5/19 EF 73%, grade I diastolic dysfunction, mild pulmonary HTN  - Continue telemetry    PULMONARY:  - Maintaining O2 sats on nasal canula  - CXR with trace left effusion  - Maintaining airway, continue to monitor closely    RENAL/FLUID/ELECTROLYTE:  - Normal renal functioning  - BUN 13/ Creatinine 0.40  - Monitor urine output  - Hold maintenance IVF  - Replace electrolytes PRN  - Daily BMP    GI/NUTRITION:  NUTRITION:  DIET GENERAL;   - History of Hepatitis C, thought to be secondary to blood transfusions  - Cirrhosis  - Hyperammonemia improved, 45  - Continue Lactulose 20g TID; titrate to three BM per day  - Bowel regimen: Milk of magnesia PRN  - GI prophylaxis: Pepcid    ID:  - Fever overnight, Tmax 102F  - No leukocytosis, WBC 11.1 (trended up from 4.7)  - UA positive nitrite, moderate LE; suggestive of UTI  - Start Rocephin for UTI  - Consider CSF infection r/t recent EVD  - Blood cultures pending  - US abdomen to eval for ascites in setting of cirrhosis, fevers, mild distention   - Continue to monitor for fevers  - Daily CBC    HEME:   - H&H 11.6/36.2, stable  - Platelets 604  - Daily CBC    ENDOCRINE:  - Continue to monitor blood glucose, goal <180  - Glucose well controlled    OTHER:  - PT/OT/ST  - Code Status: DNR-CCA, no intubation    PROPHYLAXIS:  Stress ulcer: H2 blocker    DVT PROPHYLAXIS:  - SCD sleeves - Thigh High   - Lovenox    DISPOSITION: Transfer back to ICU status.         CHENTE Allison - [facility]  Neuro Critical Care Service   Pager [phone]  4/11/2019     1:46 PM

## 2019-04-11 NOTE — PROGRESS NOTES
Occupational Therapy Not Seen Note    DATE: 2019  Name: Suma Bush  : 1940  MRN: [id number]    Patient not available for Occupational Therapy due to: Attempt this am at 10:35 but pt very lethargic/drowsy and not staying awake so writer ended the session. RN reports pt has been more sleepy this am compared to yesterday. Pt did have PT session before writer arrived to provide an OT tx.     Next Scheduled Treatment: 19    Electronically signed by JASMYNE Harris on 2019 at 10:43 AM

## 2019-04-11 NOTE — PROGRESS NOTES
Signed out to Neuro crititical care  Patient not appropriate for transfer out of ICU at this time    May re consult medicine as appropriate     Rajwinder Burden MD PGY 3  Internal Medicine Resident    [facility]   2:57 PM  4/11/19

## 2019-04-11 NOTE — PROGRESS NOTES
[facility]  Internal Medicine Residency Program  Inpatient Daily Progress Note  ______________________________________________________________________________    Patient: Moe Santos  YOB: 1940   MRN: [id number]    Acct: [de-identified]     Admit date: 4/5/2019  Today's date: 04/11/19  Number of days in the hospital: 6       Admitting Diagnosis: Intracranial hemorrhage [facility])  Chief complaint: found down at home, altered mental status. Subjective:   Pt seen and Chart reviewed. Early this morning, patient reported to be more lethargic. CT head WO contrast ordered and showed interval improvement. CXR, urine and blood cultures ordered as well. Patient started on IV antibiotics. Objective:   Vital Sign:  BP (!) 179/68   Pulse 83   Temp 100.2 °F (37.9 °C) (Axillary)   Resp 25   Ht 5' 5\" (1.651 m)   Wt 110 lb 1.6 oz (49.9 kg)   SpO2 95%   BMI 18.32 kg/m²       Physical Exam:  General appearance: stuporous, responds to sternal rub and making incomprehensible sounds. HEENT: resolving right periorbital ecchymosis  Neck: no adenopathy, no JVD, supple, symmetrical, trachea midline, thyroid not enlarged, symmetric, no tenderness/mass/nodules and    Lungs: clear to auscultation bilaterally  Heart: regular rate and rhythm, S1, S2 normal, no murmur, click, rub or gallop  Abdomen: soft, non-tender; bowel sounds normal; no masses,  no organomegaly  Extremities: extremities normal, atraumatic, no cyanosis or edema  Neurologic: Mental status: stuporous,  right hemiparesis.         Medications:  Scheduled Medications   piperacillin-tazobactam  3.375 g Intravenous Q8H    vancomycin (VANCOCIN) intermittent dosing (placeholder)   Other RX Placeholder    vancomycin  500 mg Intravenous Q12H    amLODIPine  10 mg Oral Daily    lactulose  20 g Oral TID    propranolol  40 mg Oral BID    enoxaparin  40 mg Subcutaneous Daily    ondansetron  4 mg Intravenous Once    sodium chloride flush  10 mL Intravenous 2 times per day    famotidine (PEPCID) injection  20 mg Intravenous Daily    chlorhexidine  15 mL Mouth/Throat BID       PRN Medications    acetaminophen 650 mg Q4H PRN   potassium chloride 40 mEq PRN   Or     potassium alternative oral replacement 40 mEq PRN   Or     potassium chloride 10 mEq PRN   fentanNYL 12.5 mcg Q2H PRN   sodium chloride flush 10 mL PRN   magnesium hydroxide 30 mL Daily PRN   ondansetron 4 mg Q6H PRN   oxyCODONE 5 mg Q6H PRN   labetalol 10 mg Q10 Min PRN   hydrALAZINE 5 mg Q6H PRN       Diagnostic Labs and Imaging:  CBC:  Recent Labs     04/09/19  0426 04/10/19  0415 04/11/19  0431   WBC 5.5 4.7 11.1   HGB 10.4* 10.3* 11.6*  11.6*   PLT See Reflexed IPF Result See Reflexed IPF Result 146     BMP:   Recent Labs     04/09/19  0426 04/10/19  0415 04/11/19  0431    136 137   K 3.2* 3.0* 3.7   * 108* 108*   CO2 18* 19* 17*   BUN 27* 14 13   CREATININE 0.39* 0.33* 0.40*   GLUCOSE 114* 111* 115*     Hepatic: No results for input(s): AST, ALT, ALB, BILITOT, ALKPHOS in the last 72 hours. Assessment and Plan:   1. 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 s/p EVD (discontinued on 4/7/19). Management per neurosurgery. Continue speech and physical therapy. 2. Acute encephalopathy. Etiology not yet apparent. Follow blood and urine cultures, UA.  3. Liver cirrhosis with esophageal varices. s/p esophageal banding. Ammonia increased. Started Lactulose. Continue propranolol. 4. Hypokalemia. Replace potassium as needed. 5. Essential hypertension. BP fairly controlled on propranolol 40 mg by mouth twice a day and Imdur 30 mg po daily  6. Hepatitis C.  7. DVT prophylaxis. SC lovenox 40 mg daily. 8. Ancillary services. PT/OT eval and treatment ongoing.     Discharge planning. Accepted at [facility]. At precert stage.  However, in light of recent developments, patient will likely

## 2019-04-11 NOTE — PROGRESS NOTES
Physical Therapy  [address]  Daily Treatment Note  NAME: Madeleine Moeller  : 1940  MRN: [id number]    Date of Service: 2019    Discharge Recommendations:    Further therapy recommended at discharge. PT Equipment Recommendations  Equipment Needed: No    Patient Diagnosis(es): The primary encounter diagnosis was Altered mental status, unspecified altered mental status type. A diagnosis of Intracranial hemorrhage ([address] was also pertinent to this visit. has no past medical history on file. has no past surgical history on file. Restrictions  Restrictions/Precautions  Restrictions/Precautions: General Precautions, Fall Risk  Required Braces or Orthoses?: No  Position Activity Restriction  Other position/activity restrictions: Up with assist, DNR-CCA  Subjective   General  Response To Previous Treatment: Patient with no complaints from previous session. Family / Caregiver Present: No  Subjective  Subjective: RN and pt agreeable to PT. Lethargic in bed upon arrival  General Comment  Comments: Aphasic, only verbalizes  \"yea\" at times  Pain Screening  Patient Currently in Pain: Denies  Vital Signs  Patient Currently in Pain: Denies       Orientation  Orientation  Overall Orientation Status: Impaired  Cognition      Objective   Bed mobility  Rolling to Left: Moderate assistance  Rolling to Right: Moderate assistance  Supine to Sit: Moderate assistance  Sit to Supine: Maximum assistance  Scooting: Maximal assistance  Transfers  Sit to Stand: Maximum Assistance  Stand to sit: Maximum Assistance  Comment: Attempted sit to stand transfers in esha steady x 3 trials, however unable to fully stand, despite MAX A x1  Ambulation  Ambulation?: No     Balance  Posture: Fair(Significant kyphoticc posture with forward head)  Sitting - Static: Fair  Sitting - Dynamic: Fair;-(Pt sat EOB x 20 min total, ranging from CGA to MOD A to maintain due to R lateral and post lean.  ABle to somewhat correct with cues , for short time)    Exercise  LE PROM/AAROM in all planes x 10  R UE PROM in all planes x 10                       Assessment   Body structures, Functions, Activity limitations: Decreased functional mobility ; Decreased endurance;Decreased ROM; Decreased strength;Decreased balance  Assessment: Pt demo increased lethargy this date, decreased tolerance to standign in esha steady  Prognosis: Fair  REQUIRES PT FOLLOW UP: Yes  Activity Tolerance  Activity Tolerance: Patient Tolerated treatment well;Patient limited by fatigue     Goals  Short term goals  Time Frame for Short term goals: 14  Short term goal 1: Pt to demonstrate good sitting/standing static/dynamic balance   Short term goal 2: Pt to complete bed mobility tasks with Lisa  Short term goal 3: Pt to transfer using RW with Lisa  Short term goal 4: Pt to tolerate 30 min activity to assist with increasing strength and endurance    Plan    Plan  Times per week: 5-6x/week  Times per day: Daily  Current Treatment Recommendations: Strengthening, ROM, Balance Training, Functional Mobility Training, Transfer Training, Safety Education & Training, Endurance Training, Patient/Caregiver Education & Training  Safety Devices  Type of devices: Nurse notified, Gait belt, Call light within reach, Left in bed  Restraints  Initially in place: No     Therapy Time   Individual Concurrent Group Co-treatment   Time In 0824         Time Out 0904         Minutes 40                 [place], [place]

## 2019-04-11 NOTE — PROGRESS NOTES
Pt more lethargic than previous assessment. Pin point pupils. RR in upper 20's. Moaning in sleep. Internal med resident notified and examined pt at bedside. Awaiting further orders. Will continue to monitor.

## 2019-04-11 NOTE — PROGRESS NOTES
Senior note     Chief complaint/reason for consultation:   [facility]    Patient evaluated and found to have progressively decline in mentation  Vitals show hypertension with temperature 102   Open eyes with rigorous stimuli  Withdraw to pain on all extremity except right upper extremity      Impression and Plan:     1. Concern for aspiration and non convulsive seizure in setting of recent bleed  2. Elevate head of bed   3. Add Norvasc 10mg stop Imdur. Continue Labetelol PRN FOR sbp>160  4. Start Vancomycin and Zosyn. Pending xray if aspiration is ore probable d/c vancomycin   5. Replace K  6. Get EEG Stat and AED depending on EEG  7.  Will prefer to continue ICU care for now    Angela Kaplan MD PGY 3  Internal Medicine Resident   [place]

## 2019-04-11 NOTE — PLAN OF CARE
Problem: Falls - Risk of:  Goal: Will remain free from falls  Description  Will remain free from falls  4/11/2019 1721 by Roselyn Joe RN  Outcome: Met This Shift  Fall precautions in place. Bed in lowest position, wheels locked, bed alarm in place and activated. Non-skid socks on patient. Fall risk ID on patient, call light within reach. Environment free of clutter and adequate lighting provided. Patient is encouraged to call out before getting out of bed and for any other needs. Will continue to monitor. Roselyn Joe RN    Problem: Risk for Impaired Skin Integrity  Goal: Tissue integrity - skin and mucous membranes  Description  Structural intactness and normal physiological function of skin and  mucous membranes. 4/11/2019 1721 by Roselyn Joe RN  Outcome: Ongoing  Skin assessment performed during this shift. No new skin breakdown noted. Patient is reminded to turn every 2 hours. Will continue to monitor.

## 2019-04-11 NOTE — PROGRESS NOTES
[address] [facility]  Speech Language Pathology    Date: 4/11/2019  Patient Name: Dedra Plaza  YOB: 1940   AGE: 66 y.o. MRN: [id number]        Patient Not Available for Speech Therapy     Due to:  [] Testing  [] Hemodialysis  [] Cancelled by RN  [] Surgery   [] Intubation/Sedation/Pain Medication  [] Medical instability  [x] Other: Attempted 2x. Pt lethargic, would not wake when prompted with max verbal/tactile cues. Next scheduled treatment: 04/12/2019    Completed by Mirza Coker,  Clinician  Co-signed by Sybil Brito A.CCC/SLP

## 2019-04-12 ENCOUNTER — APPOINTMENT (OUTPATIENT)
Dept: ULTRASOUND IMAGING | Age: 79
DRG: 023 | End: 2019-04-12
Payer: COMMERCIAL

## 2019-04-12 LAB
ABSOLUTE EOS #: 0.03 K/UL (ref 0–0.44)
ABSOLUTE IMMATURE GRANULOCYTE: 0.08 K/UL (ref 0–0.3)
ABSOLUTE LYMPH #: 0.98 K/UL (ref 1.1–3.7)
ABSOLUTE MONO #: 1.01 K/UL (ref 0.1–1.2)
AFP: 2.1 UG/L
ALBUMIN FLUID: 0.4 G/DL
ALBUMIN SERPL-MCNC: 3.4 G/DL (ref 3.5–5.2)
ALBUMIN/GLOBULIN RATIO: 1 (ref 1–2.5)
ALP BLD-CCNC: 81 U/L (ref 35–104)
ALT SERPL-CCNC: 20 U/L (ref 5–33)
AMMONIA: 52 UMOL/L (ref 11–51)
AMYLASE FLUID: 26 U/L
ANION GAP SERPL CALCULATED.3IONS-SCNC: 15 MMOL/L (ref 9–17)
AST SERPL-CCNC: 28 U/L
BASOPHILS # BLD: 0 % (ref 0–2)
BASOPHILS ABSOLUTE: <0.03 K/UL (ref 0–0.2)
BILIRUB SERPL-MCNC: 1.05 MG/DL (ref 0.3–1.2)
BILIRUBIN DIRECT: 0.29 MG/DL
BILIRUBIN, INDIRECT: 0.76 MG/DL (ref 0–1)
BUN BLDV-MCNC: 18 MG/DL (ref 8–23)
BUN/CREAT BLD: ABNORMAL (ref 9–20)
CALCIUM SERPL-MCNC: 8.5 MG/DL (ref 8.6–10.4)
CARCINOEMBRYONIC ANTIGEN: 3.7 NG/ML
CHLORIDE BLD-SCNC: 108 MMOL/L (ref 98–107)
CO2: 15 MMOL/L (ref 20–31)
CREAT SERPL-MCNC: 0.36 MG/DL (ref 0.5–0.9)
DIFFERENTIAL TYPE: ABNORMAL
DIRECT EXAM: NORMAL
EOSINOPHILS RELATIVE PERCENT: 0 % (ref 1–4)
GFR AFRICAN AMERICAN: >60 ML/MIN
GFR NON-AFRICAN AMERICAN: >60 ML/MIN
GFR SERPL CREATININE-BSD FRML MDRD: ABNORMAL ML/MIN/{1.73_M2}
GFR SERPL CREATININE-BSD FRML MDRD: ABNORMAL ML/MIN/{1.73_M2}
GLOBULIN: ABNORMAL G/DL (ref 1.5–3.8)
GLUCOSE BLD-MCNC: 110 MG/DL (ref 70–99)
HCT VFR BLD CALC: 34.4 % (ref 36.3–47.1)
HEMOGLOBIN: 11.1 G/DL (ref 11.9–15.1)
IMMATURE GRANULOCYTES: 1 %
INR BLD: 1.3
LACTATE DEHYDROGENASE, FLUID: 29 U/L
LACTIC ACID, WHOLE BLOOD: 1.8 MMOL/L (ref 0.7–2.1)
LYMPHOCYTES # BLD: 9 % (ref 24–43)
Lab: NORMAL
MCH RBC QN AUTO: 29.2 PG (ref 25.2–33.5)
MCHC RBC AUTO-ENTMCNC: 32.3 G/DL (ref 28.4–34.8)
MCV RBC AUTO: 90.5 FL (ref 82.6–102.9)
MONOCYTES # BLD: 9 % (ref 3–12)
NRBC AUTOMATED: 0 PER 100 WBC
PDW BLD-RTO: 16.5 % (ref 11.8–14.4)
PLATELET # BLD: 131 K/UL (ref 138–453)
PLATELET ESTIMATE: ABNORMAL
PMV BLD AUTO: 10.8 FL (ref 8.1–13.5)
POTASSIUM SERPL-SCNC: 3.3 MMOL/L (ref 3.7–5.3)
PROCALCITONIN: 0.08 NG/ML
PROTHROMBIN TIME: 13.7 SEC (ref 9–12)
RBC # BLD: 3.8 M/UL (ref 3.95–5.11)
RBC # BLD: ABNORMAL 10*6/UL
SEG NEUTROPHILS: 81 % (ref 36–65)
SEGMENTED NEUTROPHILS ABSOLUTE COUNT: 9.03 K/UL (ref 1.5–8.1)
SODIUM BLD-SCNC: 138 MMOL/L (ref 135–144)
SPECIMEN DESCRIPTION: NORMAL
SPECIMEN TYPE: NORMAL
TOTAL PROTEIN: 6.7 G/DL (ref 6.4–8.3)
TOTAL PROTEIN: 6.9 G/DL (ref 6.4–8.3)
WBC # BLD: 11.2 K/UL (ref 3.5–11.3)
WBC # BLD: ABNORMAL 10*3/UL

## 2019-04-12 PROCEDURE — 87522 HEPATITIS C REVRS TRNSCRPJ: CPT

## 2019-04-12 PROCEDURE — 88305 TISSUE EXAM BY PATHOLOGIST: CPT

## 2019-04-12 PROCEDURE — 85025 COMPLETE CBC W/AUTO DIFF WBC: CPT

## 2019-04-12 PROCEDURE — 2580000003 HC RX 258: Performed by: NURSE PRACTITIONER

## 2019-04-12 PROCEDURE — 95951 HC EEG MONITORING VIDEO RECORDING: CPT

## 2019-04-12 PROCEDURE — 82105 ALPHA-FETOPROTEIN SERUM: CPT

## 2019-04-12 PROCEDURE — 6370000000 HC RX 637 (ALT 250 FOR IP): Performed by: STUDENT IN AN ORGANIZED HEALTH CARE EDUCATION/TRAINING PROGRAM

## 2019-04-12 PROCEDURE — 2000000003 HC NEURO ICU R&B

## 2019-04-12 PROCEDURE — 83516 IMMUNOASSAY NONANTIBODY: CPT

## 2019-04-12 PROCEDURE — C9254 INJECTION, LACOSAMIDE: HCPCS | Performed by: PSYCHIATRY & NEUROLOGY

## 2019-04-12 PROCEDURE — 83605 ASSAY OF LACTIC ACID: CPT

## 2019-04-12 PROCEDURE — 83615 LACTATE (LD) (LDH) ENZYME: CPT

## 2019-04-12 PROCEDURE — 76705 ECHO EXAM OF ABDOMEN: CPT

## 2019-04-12 PROCEDURE — 2580000003 HC RX 258: Performed by: PSYCHIATRY & NEUROLOGY

## 2019-04-12 PROCEDURE — 95951 PR EEG MONITORING/VIDEORECORD: CPT | Performed by: PSYCHIATRY & NEUROLOGY

## 2019-04-12 PROCEDURE — 87075 CULTR BACTERIA EXCEPT BLOOD: CPT

## 2019-04-12 PROCEDURE — 82150 ASSAY OF AMYLASE: CPT

## 2019-04-12 PROCEDURE — 87070 CULTURE OTHR SPECIMN AEROBIC: CPT

## 2019-04-12 PROCEDURE — 6360000002 HC RX W HCPCS: Performed by: STUDENT IN AN ORGANIZED HEALTH CARE EDUCATION/TRAINING PROGRAM

## 2019-04-12 PROCEDURE — 6370000000 HC RX 637 (ALT 250 FOR IP): Performed by: PSYCHIATRY & NEUROLOGY

## 2019-04-12 PROCEDURE — C1729 CATH, DRAINAGE: HCPCS

## 2019-04-12 PROCEDURE — 86694 HERPES SIMPLEX NES ANTBDY: CPT

## 2019-04-12 PROCEDURE — 86038 ANTINUCLEAR ANTIBODIES: CPT

## 2019-04-12 PROCEDURE — 0W9G3ZX DRAINAGE OF PERITONEAL CAVITY, PERCUTANEOUS APPROACH, DIAGNOSTIC: ICD-10-PCS | Performed by: RADIOLOGY

## 2019-04-12 PROCEDURE — 82140 ASSAY OF AMMONIA: CPT

## 2019-04-12 PROCEDURE — 86663 EPSTEIN-BARR ANTIBODY: CPT

## 2019-04-12 PROCEDURE — 86645 CMV ANTIBODY IGM: CPT

## 2019-04-12 PROCEDURE — 82378 CARCINOEMBRYONIC ANTIGEN: CPT

## 2019-04-12 PROCEDURE — 84155 ASSAY OF PROTEIN SERUM: CPT

## 2019-04-12 PROCEDURE — 86644 CMV ANTIBODY: CPT

## 2019-04-12 PROCEDURE — 84157 ASSAY OF PROTEIN OTHER: CPT

## 2019-04-12 PROCEDURE — 87205 SMEAR GRAM STAIN: CPT

## 2019-04-12 PROCEDURE — 84145 PROCALCITONIN (PCT): CPT

## 2019-04-12 PROCEDURE — 2500000003 HC RX 250 WO HCPCS: Performed by: EMERGENCY MEDICINE

## 2019-04-12 PROCEDURE — 84478 ASSAY OF TRIGLYCERIDES: CPT

## 2019-04-12 PROCEDURE — 80076 HEPATIC FUNCTION PANEL: CPT

## 2019-04-12 PROCEDURE — 2500000003 HC RX 250 WO HCPCS: Performed by: NURSE PRACTITIONER

## 2019-04-12 PROCEDURE — 6360000002 HC RX W HCPCS: Performed by: PSYCHIATRY & NEUROLOGY

## 2019-04-12 PROCEDURE — APPNB45 APP NON BILLABLE 31-45 MINUTES: Performed by: NURSE PRACTITIONER

## 2019-04-12 PROCEDURE — 80048 BASIC METABOLIC PNL TOTAL CA: CPT

## 2019-04-12 PROCEDURE — 86664 EPSTEIN-BARR NUCLEAR ANTIGEN: CPT

## 2019-04-12 PROCEDURE — 88112 CYTOPATH CELL ENHANCE TECH: CPT

## 2019-04-12 PROCEDURE — 94762 N-INVAS EAR/PLS OXIMTRY CONT: CPT

## 2019-04-12 PROCEDURE — 82042 OTHER SOURCE ALBUMIN QUAN EA: CPT

## 2019-04-12 PROCEDURE — 6360000002 HC RX W HCPCS: Performed by: NURSE PRACTITIONER

## 2019-04-12 PROCEDURE — 97110 THERAPEUTIC EXERCISES: CPT

## 2019-04-12 PROCEDURE — 49083 ABD PARACENTESIS W/IMAGING: CPT

## 2019-04-12 PROCEDURE — 99233 SBSQ HOSP IP/OBS HIGH 50: CPT | Performed by: PSYCHIATRY & NEUROLOGY

## 2019-04-12 PROCEDURE — 89051 BODY FLUID CELL COUNT: CPT

## 2019-04-12 PROCEDURE — 36415 COLL VENOUS BLD VENIPUNCTURE: CPT

## 2019-04-12 PROCEDURE — 2580000003 HC RX 258: Performed by: EMERGENCY MEDICINE

## 2019-04-12 PROCEDURE — 85610 PROTHROMBIN TIME: CPT

## 2019-04-12 PROCEDURE — 80074 ACUTE HEPATITIS PANEL: CPT

## 2019-04-12 PROCEDURE — 87086 URINE CULTURE/COLONY COUNT: CPT

## 2019-04-12 PROCEDURE — 86665 EPSTEIN-BARR CAPSID VCA: CPT

## 2019-04-12 PROCEDURE — 99222 1ST HOSP IP/OBS MODERATE 55: CPT | Performed by: NURSE PRACTITIONER

## 2019-04-12 PROCEDURE — 97530 THERAPEUTIC ACTIVITIES: CPT

## 2019-04-12 PROCEDURE — 2709999900 HC NON-CHARGEABLE SUPPLY

## 2019-04-12 RX ORDER — POTASSIUM CHLORIDE 1.5 G/1.77G
60 POWDER, FOR SOLUTION ORAL ONCE
Status: DISCONTINUED | OUTPATIENT
Start: 2019-04-12 | End: 2019-04-15

## 2019-04-12 RX ADMIN — Medication 10 MG: at 05:04

## 2019-04-12 RX ADMIN — LACTULOSE 20 G: 10 SOLUTION ORAL at 20:56

## 2019-04-12 RX ADMIN — AMLODIPINE BESYLATE 10 MG: 10 TABLET ORAL at 10:04

## 2019-04-12 RX ADMIN — Medication 10 MG: at 10:05

## 2019-04-12 RX ADMIN — PROPRANOLOL HYDROCHLORIDE 40 MG: 40 TABLET ORAL at 10:05

## 2019-04-12 RX ADMIN — POTASSIUM CHLORIDE 40 MEQ: 20 TABLET, EXTENDED RELEASE ORAL at 20:59

## 2019-04-12 RX ADMIN — FAMOTIDINE 20 MG: 10 INJECTION, SOLUTION INTRAVENOUS at 14:00

## 2019-04-12 RX ADMIN — Medication 10 ML: at 20:56

## 2019-04-12 RX ADMIN — Medication 15 ML: at 22:08

## 2019-04-12 RX ADMIN — CEFTRIAXONE SODIUM 1 G: 1 INJECTION, POWDER, FOR SOLUTION INTRAMUSCULAR; INTRAVENOUS at 18:19

## 2019-04-12 RX ADMIN — PROPRANOLOL HYDROCHLORIDE 40 MG: 40 TABLET ORAL at 20:56

## 2019-04-12 RX ADMIN — HYDRALAZINE HYDROCHLORIDE 5 MG: 20 INJECTION INTRAMUSCULAR; INTRAVENOUS at 02:06

## 2019-04-12 RX ADMIN — LACTULOSE 20 G: 10 SOLUTION ORAL at 14:00

## 2019-04-12 RX ADMIN — FENTANYL CITRATE 12.5 MCG: 50 INJECTION, SOLUTION INTRAMUSCULAR; INTRAVENOUS at 02:10

## 2019-04-12 RX ADMIN — ENOXAPARIN SODIUM 40 MG: 40 INJECTION SUBCUTANEOUS at 10:05

## 2019-04-12 RX ADMIN — DEXTROSE MONOHYDRATE 100 MG: 50 INJECTION, SOLUTION INTRAVENOUS at 22:34

## 2019-04-12 RX ADMIN — FENTANYL CITRATE 12.5 MCG: 50 INJECTION, SOLUTION INTRAMUSCULAR; INTRAVENOUS at 21:22

## 2019-04-12 RX ADMIN — DEXTROSE MONOHYDRATE 200 MG: 50 INJECTION, SOLUTION INTRAVENOUS at 14:00

## 2019-04-12 RX ADMIN — Medication 15 ML: at 10:06

## 2019-04-12 RX ADMIN — LACTULOSE 20 G: 10 SOLUTION ORAL at 10:05

## 2019-04-12 ASSESSMENT — PAIN SCALES - GENERAL: PAINLEVEL_OUTOF10: 6

## 2019-04-12 NOTE — PROGRESS NOTES
Medicine. 4/10: Worsening mentation; Internal Medicine transferred patient back to Neuro ICU and Neuro Critical Care. Repeat CT Head stable. Febrile overnight, Tmax 102F. CXR with trace left infusion, no infectious process. Blood cultures sent. UA positive nitrite, moderate LE. Started on Rocephin for UTI. STAT LTME ordered to r/o seizures. Ammonia 45 (down from 125 with Lactulose). Last 24h:   Nursing reports mild improvement in mentation overnight. Yesterday evening they report she was answering yes/no questions appropriately, wiggling toes to command. Patient was able to eat some applesauce and state her son's name. On exam this morning, patient is more alert but remains altered. Does not answer orientation questions or follow commands. Right upper extremity spasticity. Moves left upper extremity spontaneously. Withdraws to pain in bilateral lower extremities. Throughout exam this morning, patient noted to have left foot rhythmic tapping. No direct correlation on LTME.  LTME showing right and left mid temporal sharp waves conferred an increase risk for focal onset seizures. Given 200mg IV Vimpat then continued on 100mg BID.     CURRENT MEDICATIONS:  SCHEDULED MEDICATIONS:   potassium chloride  60 mEq Oral Once    amLODIPine  10 mg Oral Daily    cefTRIAXone (ROCEPHIN) IV  1 g Intravenous Q24H    lactulose  20 g Oral TID    propranolol  40 mg Oral BID    enoxaparin  40 mg Subcutaneous Daily    sodium chloride flush  10 mL Intravenous 2 times per day    famotidine (PEPCID) injection  20 mg Intravenous Daily    chlorhexidine  15 mL Mouth/Throat BID     CONTINUOUS INFUSIONS:   niCARdipine Stopped (04/11/19 2058)    sodium chloride 50 mL/hr at 04/11/19 2057     PRN MEDICATIONS:   acetaminophen, hydrALAZINE, labetalol, potassium chloride **OR** potassium alternative oral replacement **OR** potassium chloride, fentanNYL, sodium chloride flush, magnesium hydroxide, ondansetron, oxyCODONE    VITALS:  Temperature Range: Temp: 98.6 °F (37 °C) Temp  Av.2 °F (37.9 °C)  Min: 97.8 °F (36.6 °C)  Max: 102.8 °F (39.3 °C)  BP Range: Systolic (47XNG), GEORGE:404 , Min:131 , OGH:853     Diastolic (95JCR), VRS:65, Min:41, Max:80    Pulse Range: Pulse  Av.4  Min: 70  Max: 103  Respiration Range: Resp  Av.1  Min: 21  Max: 29  Current Pulse Ox: SpO2: 97 %  24HR Pulse Ox Range: SpO2  Av.5 %  Min: 95 %  Max: 98 %  Patient Vitals for the past 12 hrs:   BP Temp Temp src Pulse Resp SpO2   19 0702 (!) 158/54 -- -- 78 26 97 %   19 0602 (!) 131/53 -- -- 74 25 97 %   19 0553 (!) 149/48 -- -- 76 28 97 %   19 0502 (!) 166/56 -- -- 79 29 97 %   19 0407 (!) 159/43 98.6 °F (37 °C) Oral 76 29 97 %   19 0402 (!) 164/57 -- -- 77 26 97 %   19 0302 (!) 144/46 -- -- 74 26 97 %   19 0245 (!) 145/41 -- -- 79 26 96 %   19 0202 (!) 173/53 -- -- 75 24 95 %   19 0103 (!) 149/71 -- -- 80 25 96 %   19 0002 (!) 157/80 97.8 °F (36.6 °C) Oral 87 25 96 %   19 2357 (!) 162/67 -- -- 89 25 96 %   19 2302 (!) 167/52 -- -- 80 24 96 %   19 2202 (!) 145/46 -- -- 70 22 97 %   19 2102 (!) 151/51 -- -- 74 22 97 %   19 (!) 152/58 99.9 °F (37.7 °C) Oral 80 22 98 %     Estimated body mass index is 18.32 kg/m² as calculated from the following:    Height as of this encounter: 5' 5\" (1.651 m).     Weight as of this encounter: 110 lb 1.6 oz (49.9 kg).  []<16 Severe malnutrition  []16-16.99 Moderate malnutrition  []17-18.49 Mild malnutrition  [x]18.5-24.9 Normal  []25-29.9 Overweight (not obese)  []30-34.9 Obese class 1 (Low Risk)  []35-39.9 Obese class 2 (Moderate Risk)  []?40 Obese class 3 (High Risk)    RECENT LABS:   Lab Results   Component Value Date    WBC 11.2 2019    HGB 11.1 (L) 2019    HCT 34.4 (L) 2019     (L) 2019    ALT 24 2019    AST 62 (H) 2019     2019    K 3.3 (L) 04/12/2019     (H) 04/12/2019    CREATININE 0.36 (L) 04/12/2019    BUN 18 04/12/2019    CO2 15 (L) 04/12/2019    INR 1.2 04/05/2019     24 HOUR INTAKE/OUTPUT:    Intake/Output Summary (Last 24 hours) at 4/12/2019 0731  Last data filed at 4/12/2019 6950  Gross per 24 hour   Intake 4517 ml   Output --   Net 4517 ml       RADIOLOGY:   Xr Pelvis (1-2 Views)    Result Date: 4/5/2019  EXAMINATION: SINGLE XRAY VIEW OF THE PELVIS 4/5/2019 3:23 pm COMPARISON: None. HISTORY: ORDERING SYSTEM PROVIDED HISTORY: trauma TECHNOLOGIST PROVIDED HISTORY: trauma Initial evaluation. FINDINGS: The osseous structures appear osteopenic. No convincing acute abnormality seen of the bony pelvis. Degenerative changes of the lumbar spine, sacroiliac joints and hips bilaterally. Vascular calcifications are noted. Osteopenia without convincing acute abnormality of the bony pelvis. Xr Shoulder Right (min 2 Views)    Result Date: 4/6/2019  EXAMINATION: 3 XRAY VIEWS OF THE RIGHT SHOULDER; AP AND LATERAL XRAY VIEWS OF THE RIGHT FOREARM; 3 XRAY VIEWS OF THE RIGHT ELBOW 4/6/2019 3:10 am COMPARISON: None. HISTORY: ORDERING SYSTEM PROVIDED HISTORY: trauma TECHNOLOGIST PROVIDED HISTORY: trauma FINDINGS: Right shoulder: Three views of the right shoulder. No acute fracture or dislocation. Soft tissue swelling about the shoulder. Normal alignment of the glenohumeral and acromioclavicular joints. No aggressive skeletal lesion. Visualized lung is clear. Visualized ribs are intact. Osteopenia. Right elbow: Frontal, lateral and oblique views. Soft tissue swelling about the elbow. No radiographic evidence of an elbow joint effusion. No acute fracture or malalignment. Mild ulnohumeral compartment DJD. Osteopenia. Right forearm: Frontal and lateral views. Soft tissue swelling about the forearm. No acute fracture or malalignment. Mild-to-moderate DJD in the wrist.  Osteopenia.      No acute fracture in the right shoulder, right elbow tissue swelling about the elbow. No radiographic evidence of an elbow joint effusion. No acute fracture or malalignment. Mild ulnohumeral compartment DJD. Osteopenia. Right forearm: Frontal and lateral views. Soft tissue swelling about the forearm. No acute fracture or malalignment. Mild-to-moderate DJD in the wrist.  Osteopenia. No acute fracture in the right shoulder, right elbow or right forearm. Osteopenia. Mild DJD in the right elbow and mild-to-moderate DJD in the right wrist.     Ct Head Wo Contrast    Result Date: 4/11/2019  EXAMINATION: CT OF THE HEAD WITHOUT CONTRAST  4/11/2019 5:04 am TECHNIQUE: CT of the head was performed without the administration of intravenous contrast. Dose modulation, iterative reconstruction, and/or weight based adjustment of the mA/kV was utilized to reduce the radiation dose to as low as reasonably achievable. COMPARISON: April 5, 2019. HISTORY: ORDERING SYSTEM PROVIDED HISTORY: ENCEPHALOPATHY TECHNOLOGIST PROVIDED HISTORY: Ordering Physician Provided Reason for Exam: follow up Acuity: Unknown Type of Exam: Unknown FINDINGS: BRAIN/VENTRICLES: Stable size and appearance of the hyperdense intraparenchymal hematoma centered in the left thalamus with intraventricular extension. Similar appearing surrounding vasogenic edema with mass effect on the left lateral and 3rd ventricles. Interval improved intraventricular hemorrhage with now only a small amount of hemorrhage layering in the occipital horns of the lateral ventricles. Previously noted right frontal approach ventricular drainage catheter has been removed. Small amount of hyperdense blood along the catheter tract. No progressive hydrocephalus. Previous pneumocephalus has resolved. No significant midline shift. The basal cisterns are grossly patent. No large territorial infarct. Atherosclerosis of the intracranial vertebral and internal carotid arteries. Mild generalized cerebral and cerebellar volume loss. ORBITS: The visualized portion of the orbits demonstrate no acute abnormality. SINUSES: The visualized paranasal sinuses and mastoid air cells demonstrate no acute abnormality. SOFT TISSUES/SKULL:  No acute abnormality of the visualized skull or soft tissues. Stable size and appearance of the intraparenchymal hematoma centered in the left thalamus with mild surrounding vasogenic edema and mass effect. Compared to CT head done April 5, 2019 there is improvement of previously noted intraventricular hemorrhage with no on the a small amount of hemorrhage layering in the occipital horns of the lateral ventricles. Interval removal of the right frontal approach ventricular drainage catheter. No progressive hydrocephalus. Trace blood along the catheter tract. Ct Head Wo Contrast    Result Date: 4/6/2019  EXAMINATION: CT OF THE HEAD WITHOUT CONTRAST  4/5/2019 11:11 pm TECHNIQUE: CT of the head was performed without the administration of intravenous contrast. Dose modulation, iterative reconstruction, and/or weight based adjustment of the mA/kV was utilized to reduce the radiation dose to as low as reasonably achievable. COMPARISON: CT head 04/05/2029 HISTORY: ORDERING SYSTEM PROVIDED HISTORY: f/u [facility] s/p EVD placement TECHNOLOGIST PROVIDED HISTORY: Ordering Physician Provided Reason for Exam: EVD placement follow up Acuity: Unknown Type of Exam: Subsequent/Follow-up FINDINGS: BRAIN/VENTRICLES: Interval placement of right frontal approach external ventricular drainage device. Tip terminates within the frontal horn left lateral ventricle near the caudal thalamic groove. Interval pneumocephalus identified consistent with interval placement. Redemonstration of the left thalamic intraparenchymal hemorrhage measuring 3.6 x 2.5 cm in size with evidence of subarachnoid decompression/subarachnoid extension. Evidence of blood products within the lateral 3rd and 4th ventricle identified.   Evidence of blood products infratentorial structures are otherwise unremarkable. ORBITS: The visualized portion of the orbits demonstrate no acute abnormality. SINUSES: The visualized paranasal sinuses and mastoid air cells demonstrate no acute abnormality. SOFT TISSUES/SKULL:  There is right-sided soft tissue swelling involving the right frontal, parietal, and temporal region. There is no definite acute osseous abnormalities. Intraparenchymal hemorrhage in the left thalamic region with adjacent vasogenic edema. Hemorrhagic products within the lateral ventricles, 3rd ventricle, cerebral aqueduct, and 4th ventricle. Minimal rightward midline shift. Critical results were called by Dr. Stephanie Griffin to Dr. Doreen Sharp on 4/5/2019 at 16:02. Ct Facial Bones Wo Contrast    Result Date: 4/5/2019  EXAMINATION: CT OF THE FACE WITHOUT CONTRAST  4/5/2019 3:24 pm TECHNIQUE: CT of the face was performed without the administration of intravenous contrast. Multiplanar reformatted images are provided for review. Dose modulation, iterative reconstruction, and/or weight based adjustment of the mA/kV was utilized to reduce the radiation dose to as low as reasonably achievable. COMPARISON: None HISTORY: ORDERING SYSTEM PROVIDED HISTORY: trauma; significant R facial bruising FINDINGS: FACIAL BONES:  The maxilla, pterygoid plates and zygomatic arches are intact. The mandible is intact. The mandibular condyles are normally situated. The nasal bones and maxillary nasal processes are intact. ORBITS:  The globes appear intact. The extraocular muscles, optic nerve sheath complexes and lacrimal glands appear unremarkable. No retrobulbar hematoma or mass is seen. The orbital walls and rims are intact. SINUSES/MASTOIDS:  The paranasal sinuses and mastoid air cells are well aerated. No acute fracture is seen. SOFT TISSUES:  Preseptal swelling of the right face extending superiorly along the supraorbital ridge into the right frontal scalp.   Asymmetric swelling of the lateral face on the right compared with the left. No fluid collection is identified. No air or unexpected radiopaque foreign bodies. Soft tissue swelling/edema along the upper neck circumferentially right greater than left. The visualized intracranial contents show acute left thalamic intraparenchymal blood products and intraventricular blood products. 1. Bruising of the face without fracture, radiopaque foreign body, or superficial fluid collection. 2. Acute left thalamic hemorrhage with intraventricular extension. This is previously called to Dr. Junie Garcia by Dr. Matthew Carter at  on 4/5/19. Ct Cervical Spine Wo Contrast    Result Date: 4/5/2019  EXAMINATION: CT OF THE CERVICAL SPINE WITHOUT CONTRAST 4/5/2019 3:19 pm TECHNIQUE: CT of the cervical spine was performed without the administration of intravenous contrast. Multiplanar reformatted images are provided for review. Dose modulation, iterative reconstruction, and/or weight based adjustment of the mA/kV was utilized to reduce the radiation dose to as low as reasonably achievable. COMPARISON: None. HISTORY: ORDERING SYSTEM PROVIDED HISTORY: trauma FINDINGS: BONES/ALIGNMENT: There is no evidence of an acute cervical spine fracture. There is normal alignment of the cervical spine except moderate dextroconvex scoliosis and reversal of the normal lordotic curvature. .  Decompressive laminectomies at C4 and C5. Posterior fusion rods and pedicular screws at C3, 4, 5 and 6 on the right and C5 and 6 on the left. DEGENERATIVE CHANGES: Multilevel disc space narrowing. SOFT TISSUES: There is no prevertebral soft tissue swelling. No acute abnormality of the cervical spine. Posterior interbody fixation as above.      Ct Thoracic Spine Wo Contrast    Result Date: 4/5/2019  EXAMINATION: CT OF THE THORACIC SPINE WITHOUT CONTRAST  4/5/2019 3:18 pm: TECHNIQUE: CT of the thoracic spine was performed without the administration of intravenous contrast. Multiplanar reformatted images are provided for review. Dose modulation, iterative reconstruction, and/or weight based adjustment of the mA/kV was utilized to reduce the radiation dose to as low as reasonably achievable. COMPARISON: None. HISTORY: ORDERING SYSTEM PROVIDED HISTORY: trauma FINDINGS: BONES/ALIGNMENT: There is normal alignment of the spine. The vertebral body heights are maintained. No osseous destructive lesion is seen. DEGENERATIVE CHANGES: No gross spinal canal stenosis or bony neural foraminal narrowing of the thoracic spine. SOFT TISSUES: No paraspinal mass is seen. Unremarkable CT of the thoracic spine. Ct Lumbar Spine Wo Contrast    Result Date: 4/5/2019  EXAMINATION: CT OF THE LUMBAR SPINE WITHOUT CONTRAST  4/5/2019 TECHNIQUE: CT of the lumbar spine was performed without the administration of intravenous contrast. Multiplanar reformatted images are provided for review. Dose modulation, iterative reconstruction, and/or weight based adjustment of the mA/kV was utilized to reduce the radiation dose to as low as reasonably achievable. COMPARISON: None HISTORY: ORDERING SYSTEM PROVIDED HISTORY: trauma TECHNOLOGIST PROVIDED HISTORY: trauma FINDINGS: BONES/ALIGNMENT: There is minimal compression deformity of L4 vertebral body with less than 20% height loss/wedging anteriorly. Multilevel degenerative disc disease is evident. The facets are intact. The spinous processes are intact. Lamina and pedicles are intact. There is evidence of prior posterior decompression. DEGENERATIVE CHANGES: Multilevel degenerative disc disease with facet arthropathy. SOFT TISSUES/RETROPERITONEUM: Diverticulosis. Atherosclerotic changes of aorta. Large stool throughout the colon. An avidly enhancing dilated left ovarian vessel is noted. 1. Age-indeterminate compression deformity of L4 with less than 20% height loss and no significant retropulsion. 2. Osteopenic skeleton with multilevel degenerative changes. Xr Chest Portable    Result Date: 4/11/2019  EXAMINATION: SINGLE XRAY VIEW OF THE CHEST 4/11/2019 6:48 am COMPARISON: April 6, 2019 HISTORY: ORDERING SYSTEM PROVIDED HISTORY: tachypnic, fever, rule out aspiration ? TECHNOLOGIST PROVIDED HISTORY: tachypnic, fever, rule out aspiration ? FINDINGS: Cardiac monitoring leads overlie the chest.  Borderline cardiomegaly. Trace left effusion. No pneumothorax. No airspace consolidation. Trace left effusion. Xr Chest Portable    Result Date: 4/6/2019  EXAMINATION: SINGLE XRAY VIEW OF THE CHEST 4/6/2019 5:27 am COMPARISON: 04/05/2019 HISTORY: ORDERING SYSTEM PROVIDED HISTORY: Concern for aspiration TECHNOLOGIST PROVIDED HISTORY: Concern for aspiration FINDINGS: Cardiomediastinal silhouette and pulmonary vasculature are within normal limits. There is no focal airspace consolidation. No evidence of pneumothorax or pleural effusion on this limited supine study. No acute osseous abnormality. No acute intrathoracic process. Xr Chest Portable    Result Date: 4/5/2019  EXAMINATION: SINGLE XRAY VIEW OF THE CHEST 4/5/2019 3:24 pm COMPARISON: None. HISTORY: ORDERING SYSTEM PROVIDED HISTORY: trauma TECHNOLOGIST PROVIDED HISTORY: trauma FINDINGS: Slight increase in interstitial markings. The lungs are without acute focal process. There is no effusion or pneumothorax. The cardiomediastinal silhouette is without acute process. The osseous structures are without acute process. No fracture or pneumothorax. Increased interstitial markings     Cta Neck W Contrast    Result Date: 4/5/2019  EXAMINATION: CTA OF THE HEAD WITH CONTRAST; CTA OF THE NECK 4/5/2019 3:32 pm: TECHNIQUE: CTA of the head/brain was performed with the administration of intravenous contrast. Multiplanar reformatted images are provided for review. MIP images are provided for review.  Dose modulation, iterative reconstruction, and/or weight based adjustment of the mA/kV was utilized to reduce the radiation dose to as low as reasonably achievable.; CTA of the neck was performed with the administration of intravenous contrast. Multiplanar reformatted images are provided for review. MIP images are provided for review. Stenosis of the internal carotid arteries measured using NASCET criteria. Dose modulation, iterative reconstruction, and/or weight based adjustment of the mA/kV was utilized to reduce the radiation dose to as low as reasonably achievable. COMPARISON: None. HISTORY: ORDERING SYSTEM PROVIDED HISTORY: SAH, ?hemorrhagic stroke TECHNOLOGIST PROVIDED HISTORY: ; ORDERING SYSTEM PROVIDED HISTORY: SAH, ?hemorrhagic stroke TECHNOLOGIST PROVIDED HISTORY: Ordering Physician Provided Reason for Exam: head bleed/ found down Acuity: Unknown FINDINGS: CTA NECK: AORTIC ARCH/ARCH VESSELS: There is a normal branch pattern of the aortic arch. No significant stenosis is seen of the innominate artery or subclavian arteries. CAROTID ARTERIES: The common carotid arteries are normal in appearance without evidence of a flow limiting stenosis. The internal carotid arteries are normal in appearance without evidence of a flow limiting stenosis by NASCET criteria. No dissection or arterial injury is seen. VERTEBRAL ARTERIES: The vertebral arteries both arise from the subclavian arteries and are normal in caliber without evidence of flow limiting stenosis or dissection. SOFT TISSUES: The lung apices are clear. No cervical or superior mediastinal lymphadenopathy. The visualized portion of the larynx and pharynx appear unremarkable. The parotid, submandibular and thyroid glands demonstrate no acute abnormality. BONES: The visualized osseous structures appear unremarkable. CTA HEAD: ANTERIOR CIRCULATION: The internal carotid arteries are normal in course and caliber without focal stenosis. The anterior cerebral and middle cerebral arteries demonstrate no focal stenosis.  POSTERIOR CIRCULATION: The posterior cerebral arteries demonstrate no focal stenosis. The vertebral and basilar arteries appear unremarkable. BRAIN: There is redemonstration of an intraparenchymal hemorrhage within the left thalamic region as well as intraventricular hemorrhagic products. Unremarkable CTA of the head and neck. Critical results were called by Dr. Cece Rose to Dr. Darion Light on 4/5/2019 at 16:12. Us Abdomen Limited    Result Date: 4/12/2019  EXAMINATION: RIGHT UPPER QUADRANT ULTRASOUND 4/12/2019 12:07 am COMPARISON: CT chest, abdomen and pelvis done April 5, 2019. HISTORY: ORDERING SYSTEM PROVIDED HISTORY: eval for ascites in setting of adominal distension, cirrhosis, leukocytosis TECHNOLOGIST PROVIDED HISTORY: eval for ascites in setting of adominal distension, cirrhosis, leukocytosis FINDINGS: Abdominal ultrasound was performed for evaluation of abdominal ascites. Small to moderate amount of ascites throughout the bilateral abdomen. Small amount of moderate abdominal ascites throughout the bilateral abdomen. Ct Chest Abdomen Pelvis W Contrast    Result Date: 4/5/2019  EXAMINATION: CT OF THE CHEST, ABDOMEN, AND PELVIS WITH CONTRAST 4/5/2019 3:19 pm TECHNIQUE: CT of the chest, abdomen and pelvis was performed with the administration of intravenous contrast. Multiplanar reformatted images are provided for review. Dose modulation, iterative reconstruction, and/or weight based adjustment of the mA/kV was utilized to reduce the radiation dose to as low as reasonably achievable. COMPARISON: None HISTORY: ORDERING SYSTEM PROVIDED HISTORY: trauma TECHNOLOGIST PROVIDED HISTORY: Ordering Physician Provided Reason for Exam: found down Acuity: Unknown FINDINGS: Chest: Mediastinum:  Thoracic aorta and central portion of the pulmonary artery opacify normally. The ascending thoracic aorta is of normal caliber. Heart size is normal. There is no pericardial effusion.  No mediastinal or hilar lymph nodes exceed the CT criteria for abnormal enlargement. Lungs/pleura: Clear Soft Tissues/Bones: No fracture identified Abdomen/Pelvis: Organs: The abdominal wall appears normal. Liver appears somewhat nodular. Spleen is enlarged. Gastroesophageal varices are noted. Pancreas appears normal.  The adrenals are normal.  The gallbladder surgically absent. . Kidneys appear normal. The bladder appears normal. GI/Bowel: The stomach,small bowel, and colon appear normal. Appendix is not identified. Pelvis: Uterus appears atrophic. Peritoneum/Retroperitoneum: The abdominal aorta and iliac arteries are normal in caliber. There is no pathologic adenopathy. There is calcified plaque along the aorta and its branches. Bones/Soft Tissues: Normal     No acute traumatic sequelae. Cirrhosis, splenomegaly, and esophageal varices consistent with portal venous hypertension. Cta Head W Contrast    Result Date: 4/5/2019  EXAMINATION: CTA OF THE HEAD WITH CONTRAST; CTA OF THE NECK 4/5/2019 3:32 pm: TECHNIQUE: CTA of the head/brain was performed with the administration of intravenous contrast. Multiplanar reformatted images are provided for review. MIP images are provided for review. Dose modulation, iterative reconstruction, and/or weight based adjustment of the mA/kV was utilized to reduce the radiation dose to as low as reasonably achievable.; CTA of the neck was performed with the administration of intravenous contrast. Multiplanar reformatted images are provided for review. MIP images are provided for review. Stenosis of the internal carotid arteries measured using NASCET criteria. Dose modulation, iterative reconstruction, and/or weight based adjustment of the mA/kV was utilized to reduce the radiation dose to as low as reasonably achievable. COMPARISON: None.  HISTORY: ORDERING SYSTEM PROVIDED HISTORY: SAH, ?hemorrhagic stroke TECHNOLOGIST PROVIDED HISTORY: ; ORDERING SYSTEM PROVIDED HISTORY: SAH, ?hemorrhagic stroke TECHNOLOGIST PROVIDED HISTORY: Ordering Physician Provided Reason for Exam: head bleed/ found down Acuity: Unknown FINDINGS: CTA NECK: AORTIC ARCH/ARCH VESSELS: There is a normal branch pattern of the aortic arch. No significant stenosis is seen of the innominate artery or subclavian arteries. CAROTID ARTERIES: The common carotid arteries are normal in appearance without evidence of a flow limiting stenosis. The internal carotid arteries are normal in appearance without evidence of a flow limiting stenosis by NASCET criteria. No dissection or arterial injury is seen. VERTEBRAL ARTERIES: The vertebral arteries both arise from the subclavian arteries and are normal in caliber without evidence of flow limiting stenosis or dissection. SOFT TISSUES: The lung apices are clear. No cervical or superior mediastinal lymphadenopathy. The visualized portion of the larynx and pharynx appear unremarkable. The parotid, submandibular and thyroid glands demonstrate no acute abnormality. BONES: The visualized osseous structures appear unremarkable. CTA HEAD: ANTERIOR CIRCULATION: The internal carotid arteries are normal in course and caliber without focal stenosis. The anterior cerebral and middle cerebral arteries demonstrate no focal stenosis. POSTERIOR CIRCULATION: The posterior cerebral arteries demonstrate no focal stenosis. The vertebral and basilar arteries appear unremarkable. BRAIN: There is redemonstration of an intraparenchymal hemorrhage within the left thalamic region as well as intraventricular hemorrhagic products. Unremarkable CTA of the head and neck. Critical results were called by Dr. Kassandra Meza to Dr. Carole Salas on 4/5/2019 at 16:12. Labs and Images reviewed with:  [x] Dr. Lesley Zambrano. Latoya    [] Dr. Annamaria Medina  [] Dr. Mendel Alamin  [] There are no new interval images to review. PHYSICAL EXAM       CONSTITUTIONAL:  Alert, eyes open. Mumbling. Does not answer orientation questions or follow commands.    HEAD:  normocephalic, atraumatic    EYES:  PERRL   ENT:  moist mucous membranes   NECK:  supple, symmetric   LUNGS:  Equal air entry bilaterally, clear   CARDIOVASCULAR:  normal s1 / s2, RRR, distal pulses intact   ABDOMEN:  Soft, mild distension   NEUROLOGIC:  Mental Status:  Alert, not answering questions or following commands. Cranial Nerves:    III: Pupils:  equal, round, reactive to light  III,IV,VI: Extra Ocular Movements: intact  VII: Facial strength: abnormal - right facial droop    Motor Exam:    Tone:  abnormal - right upper extremity spasticity    Moving left upper extremity spontaneously. Right upper extremity spastic, flicker to pain. Bilateral lower extremities withdraw to pain. DRAINS:  [x] There are no drains for Neuro Critical Care to monitor at this time. ASSESSMENT AND PLAN:       This is a 66 y.o. female with a history of  Hepatitis C suspected to be from previous blood transfusion, cirrhosis, and esophageal varices s/p banding May 2017 who initially presented as a trauma alert on 4/5/19 after being found down at home. LKW was ~24h prior to arrival.  CT Head revealed left thalamic IPH with intraventricular extension into lateral, 3rd and 4th ventricles. EVD placed at bedside on admit, removed 4/8. Patient exam had been improving and she was transferred to [facility] status 4/9.   Transferred back to Neuro ICU and Neuro Critical Care due to altered mentation, fevers.       NEUROLOGIC:  - Altered mentation/encephalopathy  - Left thalamic IPH with associated edema and intraventricular hemorrhage, minimal shift  - No vascular abnormality on CTA Head/Neck  - EVD placed at bedside 4/7, removed 4/9  - Clinical exam had been improving until overnight into 4/11 when patient mentation progressively declined  - Repeat CT Head 4/11 showed stable IPH in left thalamus, improvement of IVH, no worsening hydro  - LTME overnight showed right and left mid temporal sharp waves conferred an increase risk for focal glucose, goal <180  - Glucose well controlled     OTHER:  - PT/OT/ST  - Code Status: DNR-CCA, no intubation    DISPOSITION:  [x] To remain ICU for close neurological monitoring. We will continue to follow along. For any changes in exam or patient status please contact Neuro Critical Care.       CHENTE Allison - [facility]  Neuro Critical Care  Pager [phone]  4/12/2019     7:31 AM

## 2019-04-12 NOTE — PROGRESS NOTES
Physical Therapy  [address]  Daily [address] Note  NAME: Hafsa Phillips  : 1940  MRN: [id number]    Date of Service: 2019    Discharge Recommendations:   Further therapy recommended at discharge. PT Equipment Recommendations  Equipment Needed: No    Patient Diagnosis(es): The primary encounter diagnosis was Altered mental status, unspecified altered mental status type. A diagnosis of Intracranial hemorrhage ([address] was also pertinent to this visit. has no past medical history on file. has no past surgical history on file. Restrictions  Restrictions/Precautions  Restrictions/Precautions: General Precautions, Fall Risk  Required Braces or Orthoses?: No  Position Activity Restriction  Other position/activity restrictions: Up with assist, DNR-CCA  Subjective   General  Response To Previous Treatment: Patient with no complaints from previous session. Family / Caregiver Present: No  Subjective  Subjective: RN and pt agreeable to pt this date. Pt supine in bed upon therapist arrival.   General Comment  Comments: Aphasic, only verbalizes  \"yea\" at times  Follows commands: Impaired  Pain Screening  Patient Currently in Pain: MARYAN; pt appears comfortable w/no grimacing or moaning. Vital Signs  Patient Currently in Pain: Denies       Orientation  Orientation  Overall Orientation Status: Impaired; unable to fully assess d/t pt unable to answer questions. Objective   Bed mobility  Rolling to Left: Moderate assistance;2 Person assistance  Supine to Sit: Moderate assistance;2 Person assistance  Comment: Pt modA +2 for bed mobility  Transfers  Comment: Did not attempt sit<>stand this date d/t increased fatigue/unsteadiness after sitting EOB.  BP measured 178/65 before returning to supine   Ambulation  Ambulation?: No    Balance  Posture: Fair (demo significant kyphotic posture with forward head)  Pt seated EOB x10 min w/fair- static balance which worsened d/t increased fatigue before returning to supine. Demo post and L lateral lean. Exercises  Supine Exercises: Ankle Pumps,  Hip ABD/ADD, Hip IR/ER, SLR. Reps: x15  Upper extremity exercises: Bicep curl, shoulder flexion/extension, wrist flexion/extension, shoulder abduction/adduction. Reps: x15  Comments: All exercise performed AAROM. Pt w/increased movement w/L side compared to R. Assessment   Body structures, Functions, Activity limitations: Decreased functional mobility ; Decreased endurance;Decreased ROM; Decreased strength;Decreased balance  Assessment: Pt with increased fatigue this date; some unsteadiness w/sitting EOB x10min BP measured 178/65 before returning to supine. AAROM w/supine exercise; pt demo increased movement w/L side compared to R.    Prognosis: Fair  REQUIRES PT FOLLOW UP: Yes  Activity Tolerance  Activity Tolerance: Patient limited by fatigue;Patient Tolerated treatment well     Goals  Short term goals  Time Frame for Short term goals: 14  Short term goal 1: Pt to demonstrate good sitting/standing static/dynamic balance   Short term goal 2: Pt to complete bed mobility tasks with Lisa  Short term goal 3: Pt to transfer using RW with Lisa  Short term goal 4: Pt to tolerate 30 min activity to assist with increasing strength and endurance    Plan    Plan  Times per week: 5-6x/week  Times per day: Daily  Current Treatment Recommendations: Strengthening, ROM, Balance Training, Functional Mobility Training, Transfer Training, Safety Education & Training, Endurance Training, Patient/Caregiver Education & Training  Safety Devices  Type of devices: Nurse notified, Call light within reach, Left in bed, Bed alarm in place  Restraints  Initially in place: No     Therapy Time   Individual Concurrent Group Co-treatment   Time In 0852         Time Out 0928         Minutes [address], [place] Treatment performed by Student PTA under the supervision of co-signing PTA who agrees with all treatment and documentation.    Onetha Kristel, PTA

## 2019-04-12 NOTE — CARE COORDINATION
Transitional planning. Adelia Hodgson, Pharmacist asked CM to find out about prior auth for Vimpat, to help with planning of pt's course of treatment. Called [employer] and received a number to call for prior auth. [phone], informed that the pt has dual coverage. Talked to Romayne Brazen in pharmacy and informed that Vimpat 100mg, 2 X day would be covered under patient's plan. Talked to Noemi David and was informed that pt would not have a co-pay. Called Adelia Hodgson and informed him of coverage.

## 2019-04-12 NOTE — PROGRESS NOTES
Nutrition Assessment    Type and Reason for Visit: Initial(LOS)    Nutrition Recommendations:   - Continue current General diet. Encourage/monitor intakes as tolerated. - Will provide Ensure Enlive ONS with all meals. Nutrition Assessment: Pt seen based on length of stay. Admitted s/p fall. Passed swallow study on 4/8 for a regular diet with thin liquids. At time of visit pt sleeping - has been lethargic. Pt had some applesauce this morning. Noted recorded PO intakes of less than 50% of meals. Labs reviewed - K 3.3 mmol/L noted. Malnutrition Assessment:  · Malnutrition Status: Meets the criteria for moderate malnutrition  · Context: Acute illness or injury  · Findings of the 6 clinical characteristics of malnutrition (Minimum of 2 out of 6 clinical characteristics is required to make the diagnosis of moderate or severe Protein Calorie Malnutrition based on AND/ASPEN Guidelines):  1. Energy Intake-Less than or equal to 75% of estimated energy requirement, Greater than or equal to 7 days    2. Weight Loss-Unable to assess  3. Fat Loss-Mild subcutaneous fat loss, Orbital  4. Muscle Loss-Mild muscle mass loss, Temples (temporalis muscle), Interosseous  5. Fluid Accumulation-Mild fluid accumulation, Extremities, Ascites  6.   Strength-Not measured    Nutrition Risk Level: High    Nutrient Needs:  · Estimated Daily Total Kcal: 9252-8513 kcal/day  · Estimated Daily Protein (g): 55-75 gm pro/day    Nutrition Diagnosis:   · Problem: Inadequate oral intake  · Etiology: related to Current Condition, Appetite     Signs and symptoms:  as evidenced by Intake 0-25%, Intake 25-50%, need for oral supplements    Objective Information:  · Wound Type: (Areas of bruising and redness)  · Current Nutrition Therapies:  · Oral Diet Orders: General   · Oral Diet intake: 1-25%, 26-50%  · Oral Nutrition Supplement (ONS) Orders: None  · Anthropometric Measures:  · Ht: 5' 5\" (165.1 cm)   · Current Body Wt: 110 lb 1.6 oz

## 2019-04-12 NOTE — PROGRESS NOTES
[facility]  Speech Language Pathology    Date: 4/12/2019  Patient Name: Kael Marx  YOB: 1940   AGE: 66 y.o. MRN: [id number]        Patient Not Available for Speech Therapy     Due to:  [] Testing  [] Hemodialysis  [] Cancelled by RN  [] Surgery   [] Intubation/Sedation/Pain Medication  [] Medical instability  [x] Other: Pt lethargic, would not wake despite max verbal/tactile cues. Next scheduled treatment: 04/15/2019    Completed by Barbie Moran,  Clinician  Co-signed by Kelley Chavez A.CCC/SLP

## 2019-04-12 NOTE — PLAN OF CARE
Problem: Risk for Impaired Skin Integrity  Goal: Tissue integrity - skin and mucous membranes  Description  Structural intactness and normal physiological function of skin and  mucous membranes. 4/12/2019 0625 by Melanie Smith RN  Outcome: Ongoing  Note:   Skin assessment complete. Interventions in place. See doc flowsheet for interventions initiated. Pt turned every two hours to prevent skin breakdown. Dry linens under patient.   Will continue to monitor for additional needs and skin breakdown

## 2019-04-12 NOTE — BRIEF OP NOTE
Brief Postoperative Note for Paracentesis    Dedra Plaza  YOB: 1940  [id number]    Pre-operative Diagnosis: Ascites      Post-operative Diagnosis: Same    Procedure: Ultrasound guided Paracentesis     Anesthesia: 1% Lidocaine     Surgeons/Assistants: Denise Arthur PA-C    Complications: none    Specimens: were obtained    Findings: Ultrasound guided paracentesis performed. 700 mL light yellow fluid obtained from LLQ. Dressing applied. Vital signs were reviewed and were stable after the procedure.      Electronically signed by Denise Arthur PA-C on 4/12/2019 at 4:02 PM

## 2019-04-12 NOTE — PROCEDURES
LONG-TERM EEG-VIDEO MONITORING   CLINICAL NEUROPHYSIOLOGY LABORATORY  DEPARTMENT OF NEUROLOGY  [address]    Patient: Dedra Plaza  Age: 66 y.o. MRN: [id number]  Dates of recordin2019     Referring Physician: No ref. provider found  History: The patient is a 66 y.o. female who presented breakthrough seizure/encephalopathy . This long-term video-EEG monitoring study was performed in order to determine the nature of the patient's clinical events. The patient is on neuroactive medications.    Aleksandra Maya   Current Facility-Administered Medications   Medication Dose Route Frequency Provider Last Rate Last Dose    amLODIPine (NORVASC) tablet 10 mg  10 mg Oral Daily Jhoana Oropeza MD   Stopped at 19 1159    cefTRIAXone (ROCEPHIN) 1 g IVPB in 50 mL D5W minibag  1 g Intravenous Q24H Duayne Eans, APRN - CNP   Stopped at 19 1629    acetaminophen (TYLENOL) suppository 650 mg  650 mg Rectal Q4H PRN Duayne Eans, APRN - CNP   650 mg at 19 1630    niCARdipine (CARDENE) 20 mg in 0.9 % sodium chloride 200 mL infusion  5 mg/hr Intravenous Continuous Duayne Eans, APRN - CNP   Stopped at 19 2058    hydrALAZINE (APRESOLINE) injection 5 mg  5 mg Intravenous Q2H PRN Duayne Eans, APRN - CNP   5 mg at 19 0206    labetalol (NORMODYNE;TRANDATE) injection 10 mg  10 mg Intravenous Q1H PRN Duayne Eans, APRN - CNP   10 mg at 19 0504    potassium chloride (KLOR-CON M) extended release tablet 40 mEq  40 mEq Oral PRN Cande Sky MD        Or    potassium bicarb-citric acid (EFFER-K) effervescent tablet 40 mEq  40 mEq Oral PRN Cande Sky MD        Or    potassium chloride 10 mEq/100 mL IVPB (Peripheral Line)  10 mEq Intravenous PRN Cande Sky MD        lactulose (CHRONULAC) 10 GM/15ML solution 20 g  20 g Oral TID Cande Sky MD   20 g at 19 2100    propranolol (INDERAL) tablet 40 mg  40 mg Oral BID Michael Sanders MD   40 mg at 04/11/19 2100    enoxaparin (LOVENOX) injection 40 mg  40 mg Subcutaneous Daily Solomon Price MD   40 mg at 04/11/19 0914    fentaNYL (SUBLIMAZE) injection 12.5 mcg  12.5 mcg Intravenous Q2H PRN Schuyler AnuelDO   12.5 mcg at 04/12/19 0210    0.9 % sodium chloride infusion   Intravenous Continuous Familia President, APRN - CNP 50 mL/hr at 04/11/19 2057      sodium chloride flush 0.9 % injection 10 mL  10 mL Intravenous 2 times per day Rhiannon Sarah MD   10 mL at 04/11/19 2101    sodium chloride flush 0.9 % injection 10 mL  10 mL Intravenous PRN Rhiannon Sarah MD   10 mL at 04/06/19 3480    magnesium hydroxide (MILK OF MAGNESIA) 400 MG/5ML suspension 30 mL  30 mL Oral Daily PRN Rhiannon Sarah MD        ondansetron [facility]) injection 4 mg  4 mg Intravenous Q6H PRN Rhiannon Sarah MD        oxyCODONE (ROXICODONE) immediate release tablet 5 mg  5 mg Oral Q6H PRN Rhiannon Sarah MD        famotidine (PEPCID) injection 20 mg  20 mg Intravenous Daily Rhiannon Sarah MD   20 mg at 04/11/19 0914    chlorhexidine (PERIDEX) 0.12 % solution 15 mL  15 mL Mouth/Throat BID Sean Miller MD   15 mL at 04/11/19 2057     Technical Description: This is a 21 channel digital EEG recording with time-locked video. Electrodes were placed in accordance with the 10-20 International System of Electrode Placement. Single lead EKG monitoring was included. Baseline EEG Recording:  A formal baseline EEG recording was not obtained. Day 1 - 4/11/2019, starting at 20:08    Interictal EEG Samples:  EEG was obtained in intubated state. The background activity consisted of polymorphic 6-7 Hz of theta activity. Occasional left mid temporal sharp is a T3 and independent rear right temporal sharp waves at T4 were seen. During periods of behavioral sleep, rudimentary sleep spindles were seen. Intermittent EMG artifact was seen over bifrontal and temporal leads. The EKG channel revealed no abnormalities.     Ictal EEG Recording / Patient Events: During this period the patient had no events or seizures. Summary: During this day of recording no events were recorded. The interictal EEG was abnormal due to diffuse slowing in polymorphic theta frequency suggestive of mild to moderate encephalopathy. Independent right and left mid temporal sharp waves conferred an increased risk for focal onset seizures. Monitoring was continued in order to record the patients typical events. The EKG channel revealed no abnormalities. Day 2 - 4/12/2019, reviewed through 7:30 am    Interictal EEG Samples: Interictal EEG was unchanged from yesterday. Ictal EEG Recording / Patient Events: During this period the patient had no events or seizures. Summary: During this day of recording no events were recorded. The interictal EEG was abnormal due to diffuse slowing in polymorphic theta frequency suggestive of mild to moderate encephalopathy. Independent right and left mid temporal sharp waves conferred an increased risk for focal onset seizures. Monitoring was continued in order to record the patients typical events. The EKG channel revealed no abnormalities. Gerry Connolly MD  Diplomate, American Board of Psychiatry and Neurology  Diplomate, American Board of Clinical Neurophysiology  Diplomate, American Board of Epilepsy     Please note this is a preliminary report and updated on a daily basis. The final report will have a summary of behavior and electrographic findings with clinical correlation.

## 2019-04-13 LAB
ABSOLUTE EOS #: 0.04 K/UL (ref 0–0.44)
ABSOLUTE IMMATURE GRANULOCYTE: 0.1 K/UL (ref 0–0.3)
ABSOLUTE LYMPH #: 1.31 K/UL (ref 1.1–3.7)
ABSOLUTE MONO #: 1.48 K/UL (ref 0.1–1.2)
AMMONIA: 46 UMOL/L (ref 11–51)
ANION GAP SERPL CALCULATED.3IONS-SCNC: 12 MMOL/L (ref 9–17)
BASOPHILS # BLD: 0 % (ref 0–2)
BASOPHILS ABSOLUTE: <0.03 K/UL (ref 0–0.2)
BUN BLDV-MCNC: 24 MG/DL (ref 8–23)
BUN/CREAT BLD: ABNORMAL (ref 9–20)
CALCIUM SERPL-MCNC: 8.6 MG/DL (ref 8.6–10.4)
CHLORIDE BLD-SCNC: 109 MMOL/L (ref 98–107)
CO2: 15 MMOL/L (ref 20–31)
CREAT SERPL-MCNC: 0.55 MG/DL (ref 0.5–0.9)
CULTURE: NORMAL
DIFFERENTIAL TYPE: ABNORMAL
EOSINOPHILS RELATIVE PERCENT: 0 % (ref 1–4)
GFR AFRICAN AMERICAN: >60 ML/MIN
GFR NON-AFRICAN AMERICAN: >60 ML/MIN
GFR SERPL CREATININE-BSD FRML MDRD: ABNORMAL ML/MIN/{1.73_M2}
GFR SERPL CREATININE-BSD FRML MDRD: ABNORMAL ML/MIN/{1.73_M2}
GLUCOSE BLD-MCNC: 122 MG/DL (ref 70–99)
HCT VFR BLD CALC: 33.3 % (ref 36.3–47.1)
HEMOGLOBIN: 10.9 G/DL (ref 11.9–15.1)
IMMATURE GRANULOCYTES: 1 %
LYMPHOCYTES # BLD: 9 % (ref 24–43)
Lab: NORMAL
MCH RBC QN AUTO: 29.6 PG (ref 25.2–33.5)
MCHC RBC AUTO-ENTMCNC: 32.7 G/DL (ref 28.4–34.8)
MCV RBC AUTO: 90.5 FL (ref 82.6–102.9)
MONOCYTES # BLD: 11 % (ref 3–12)
NRBC AUTOMATED: 0.1 PER 100 WBC
PDW BLD-RTO: 17.1 % (ref 11.8–14.4)
PLATELET # BLD: 185 K/UL (ref 138–453)
PLATELET ESTIMATE: ABNORMAL
PMV BLD AUTO: 11 FL (ref 8.1–13.5)
POTASSIUM SERPL-SCNC: 3.6 MMOL/L (ref 3.7–5.3)
RBC # BLD: 3.68 M/UL (ref 3.95–5.11)
RBC # BLD: ABNORMAL 10*6/UL
SEG NEUTROPHILS: 79 % (ref 36–65)
SEGMENTED NEUTROPHILS ABSOLUTE COUNT: 11.1 K/UL (ref 1.5–8.1)
SODIUM BLD-SCNC: 136 MMOL/L (ref 135–144)
SPECIMEN DESCRIPTION: NORMAL
TROPONIN INTERP: ABNORMAL
TROPONIN INTERP: ABNORMAL
TROPONIN T: ABNORMAL NG/ML
TROPONIN T: ABNORMAL NG/ML
TROPONIN, HIGH SENSITIVITY: 30 NG/L (ref 0–14)
TROPONIN, HIGH SENSITIVITY: 33 NG/L (ref 0–14)
WBC # BLD: 14.1 K/UL (ref 3.5–11.3)
WBC # BLD: ABNORMAL 10*3/UL

## 2019-04-13 PROCEDURE — 99233 SBSQ HOSP IP/OBS HIGH 50: CPT | Performed by: INTERNAL MEDICINE

## 2019-04-13 PROCEDURE — 2580000003 HC RX 258: Performed by: NURSE PRACTITIONER

## 2019-04-13 PROCEDURE — 2500000003 HC RX 250 WO HCPCS: Performed by: EMERGENCY MEDICINE

## 2019-04-13 PROCEDURE — 84484 ASSAY OF TROPONIN QUANT: CPT

## 2019-04-13 PROCEDURE — C9254 INJECTION, LACOSAMIDE: HCPCS | Performed by: PSYCHIATRY & NEUROLOGY

## 2019-04-13 PROCEDURE — 6360000002 HC RX W HCPCS: Performed by: NURSE PRACTITIONER

## 2019-04-13 PROCEDURE — 85025 COMPLETE CBC W/AUTO DIFF WBC: CPT

## 2019-04-13 PROCEDURE — 82140 ASSAY OF AMMONIA: CPT

## 2019-04-13 PROCEDURE — 2580000003 HC RX 258: Performed by: EMERGENCY MEDICINE

## 2019-04-13 PROCEDURE — 6360000002 HC RX W HCPCS: Performed by: EMERGENCY MEDICINE

## 2019-04-13 PROCEDURE — 6370000000 HC RX 637 (ALT 250 FOR IP): Performed by: PSYCHIATRY & NEUROLOGY

## 2019-04-13 PROCEDURE — 51701 INSERT BLADDER CATHETER: CPT

## 2019-04-13 PROCEDURE — 80048 BASIC METABOLIC PNL TOTAL CA: CPT

## 2019-04-13 PROCEDURE — 6370000000 HC RX 637 (ALT 250 FOR IP): Performed by: STUDENT IN AN ORGANIZED HEALTH CARE EDUCATION/TRAINING PROGRAM

## 2019-04-13 PROCEDURE — 2580000003 HC RX 258: Performed by: PSYCHIATRY & NEUROLOGY

## 2019-04-13 PROCEDURE — 2000000003 HC NEURO ICU R&B

## 2019-04-13 PROCEDURE — 36415 COLL VENOUS BLD VENIPUNCTURE: CPT

## 2019-04-13 PROCEDURE — 6360000002 HC RX W HCPCS: Performed by: STUDENT IN AN ORGANIZED HEALTH CARE EDUCATION/TRAINING PROGRAM

## 2019-04-13 PROCEDURE — 6360000002 HC RX W HCPCS: Performed by: PSYCHIATRY & NEUROLOGY

## 2019-04-13 RX ADMIN — DEXTROSE MONOHYDRATE 100 MG: 50 INJECTION, SOLUTION INTRAVENOUS at 23:32

## 2019-04-13 RX ADMIN — FENTANYL CITRATE 12.5 MCG: 50 INJECTION, SOLUTION INTRAMUSCULAR; INTRAVENOUS at 23:49

## 2019-04-13 RX ADMIN — AMLODIPINE BESYLATE 10 MG: 10 TABLET ORAL at 14:36

## 2019-04-13 RX ADMIN — ENOXAPARIN SODIUM 40 MG: 40 INJECTION SUBCUTANEOUS at 10:02

## 2019-04-13 RX ADMIN — DEXTROSE MONOHYDRATE 100 MG: 50 INJECTION, SOLUTION INTRAVENOUS at 10:48

## 2019-04-13 RX ADMIN — FENTANYL CITRATE 12.5 MCG: 50 INJECTION, SOLUTION INTRAMUSCULAR; INTRAVENOUS at 04:33

## 2019-04-13 RX ADMIN — CEFTRIAXONE SODIUM 1 G: 1 INJECTION, POWDER, FOR SOLUTION INTRAMUSCULAR; INTRAVENOUS at 15:51

## 2019-04-13 RX ADMIN — PROPRANOLOL HYDROCHLORIDE 40 MG: 40 TABLET ORAL at 14:37

## 2019-04-13 RX ADMIN — PROPRANOLOL HYDROCHLORIDE 40 MG: 40 TABLET ORAL at 20:19

## 2019-04-13 RX ADMIN — Medication 10 ML: at 20:19

## 2019-04-13 RX ADMIN — Medication 15 ML: at 20:19

## 2019-04-13 RX ADMIN — FAMOTIDINE 20 MG: 10 INJECTION, SOLUTION INTRAVENOUS at 10:48

## 2019-04-13 RX ADMIN — THIAMINE HYDROCHLORIDE 500 MG: 100 INJECTION, SOLUTION INTRAMUSCULAR; INTRAVENOUS at 17:46

## 2019-04-13 ASSESSMENT — PAIN SCALES - GENERAL: PAINLEVEL_OUTOF10: 6

## 2019-04-13 NOTE — PLAN OF CARE
Problem: Risk for Impaired Skin Integrity  Goal: Tissue integrity - skin and mucous membranes  Description  Structural intactness and normal physiological function of skin and  mucous membranes. Note:   Skin assessment complete. Interventions in place. See doc flowsheet for interventions initiated. Patient turned and repositioned every two hours. Cream applied to areas to prevent skin breakdown.  Will continue to monitor for additional needs and skin breakdown

## 2019-04-13 NOTE — PLAN OF CARE
Problem: HEMODYNAMIC STATUS  Goal: Patient has stable vital signs and fluid balance  Outcome: Ongoing     Problem: ACTIVITY INTOLERANCE/IMPAIRED MOBILITY  Goal: Mobility/activity is maintained at optimum level for patient  Outcome: Ongoing     Problem: COMMUNICATION IMPAIRMENT  Goal: Ability to express needs and understand communication  Outcome: Ongoing     Problem: Falls - Risk of:  Goal: Will remain free from falls  Description  Will remain free from falls  Outcome: Ongoing  Goal: Absence of physical injury  Description  Absence of physical injury  4/13/2019 1817 by Marne Claude, RN  Outcome: Ongoing  4/13/2019 0621 by Rafaela Thakur RN  Outcome: Ongoing  Note:   No falls entire shift. Fall precautions in place. Bed alarm on. Continue to monitor. Problem: Risk for Impaired Skin Integrity  Goal: Tissue integrity - skin and mucous membranes  Description  Structural intactness and normal physiological function of skin and  mucous membranes. 4/13/2019 1817 by Marne Claude, RN  Outcome: Ongoing  4/13/2019 0621 by Rafaela Thakur RN  Note:   Skin assessment complete. Interventions in place. See doc flowsheet for interventions initiated. Patient turned and repositioned every two hours. Cream applied to areas to prevent skin breakdown.  Will continue to monitor for additional needs and skin breakdown      Problem: Nutrition  Goal: Optimal nutrition therapy  Outcome: Ongoing     Problem: Neurological  Goal: Maximum potential motor/sensory/cognitive function  Outcome: Ongoing     Problem: Musculor/Skeletal Functional Status  Goal: Highest potential functional level  Outcome: Ongoing

## 2019-04-13 NOTE — PROGRESS NOTES
Daily Progress Note  Neuro Critical Care    Patient Name: Kimberly Jaquez  Patient : 1940  Room/Bed: 22/0522-01  Allergies: No Known Allergies  Problem List:   Patient Active Problem List   Diagnosis    Intracranial hemorrhage ([facility] [address] [facility]    Altered mental status    Encounter for palliative care    Acute encephalopathy    History of esophageal varices with bleeding    Acute cystitis without hematuria    Seizure-like activity ([facility]       CHIEF COMPLAINT:     Unable to state; altered mental status     INTERVAL HISTORY    Initial Presentation (Admitted 19): The patient is a 66 y.o. female with a history of Hepatitis C suspected to be from previous blood transfusion, cirrhosis, and esophageal varices s/p banding May 2017 who initially presented as a trauma alert on 19 after being found down at home. LKW was ~24h prior to arrival.  CT Head revealed left thalamic IPH with intraventricular extension into lateral, 3rd and 4th ventricles. Trauma survey otherwise unremarkable except age indeterminate L2 compression fracture. Initially evaluated by Trauma, but IPH thought to be spontaneous rather than traumatic. Neuro Critical Care and Neurosurgery consulted. Admitted to the Neuro ICU. EVD placed at bedside.       ICH score: 1     Hospital Course:   :  Cardene gtt weaned off around midnight, -140s. EVD output 27ml/24hr. Required PRN fentanyl for pain overnight. :  Placed on BiPAP overnight due to accessory muscle use.  Sp02 stable, maintaining secretions.  BP stable, Cardene stopped.  Mentation gradually improving.  EVD pulled out accidentally. :  Borderline hypertension overnight.  Started Imdur yesterday.  Lopressor, hydralazine, labetalol given PRN.  On room air overnight w/ stable Sp02.  Single episode of elevated temp at 38.0. Pt otherwise afebrile.  Home medications reconciled.  Started on Propranolol.  Doing well sitting at side of bed working with therapy, more verbal with improved speech. Transferred to Stepdown status under Internal Medicine. 4/10: Worsening mentation; Internal Medicine transferred patient back to Neuro ICU and Neuro Critical Care. Repeat CT Head stable. Febrile overnight, Tmax 102F. CXR with trace left infusion, no infectious process. Blood cultures sent. UA positive nitrite, moderate LE. Started on Rocephin for UTI. STAT LTME ordered to r/o seizures. Ammonia 45 (down from 125 with Lactulose). 4/11: Hospitalist team uncomfortable managing patient due to [address], ICU team resumed care. CTH showing stable L thalamic IPH, interval improvement of intraventricular hem. CXR showing trace L effusion. Procalc 0.9. Blood cultures ordered by hospitalist team.  Ammonia 45. +UA. Rocephin. US Abdomen: small ascites  4/12: Left foot twitching concerning for focal seizures; not correlating with EEG seizure. L&R temporal sharp waves on LTME with increased risk for focal seizure. Vimpat 200mg x1 then 100mg BID. GI consult; paracentesis. Last 24h:   Pt underwent paracentesis with 700 ml taken out from LLQ. GI consulted and following. Continues Lactulose for hyperammonemia. EEG indicated risk of focal seizures, AEDs started.       CURRENT MEDICATIONS:  SCHEDULED MEDICATIONS:   potassium chloride  60 mEq Oral Once    lacosamide (VIMPAT) IVPB  100 mg Intravenous Q12H    lactulose enema   Rectal TID    amLODIPine  10 mg Oral Daily    cefTRIAXone (ROCEPHIN) IV  1 g Intravenous Q24H    lactulose  20 g Oral TID    propranolol  40 mg Oral BID    enoxaparin  40 mg Subcutaneous Daily    sodium chloride flush  10 mL Intravenous 2 times per day    famotidine (PEPCID) injection  20 mg Intravenous Daily    chlorhexidine  15 mL Mouth/Throat BID     CONTINUOUS INFUSIONS:   niCARdipine Stopped (04/11/19 2058)    sodium chloride 50 mL/hr at 04/11/19 2057     PRN MEDICATIONS:   hydrALAZINE, labetalol, potassium chloride **OR** potassium alternative oral replacement **OR** Osteopenia. No acute fracture in the right shoulder, right elbow or right forearm. Osteopenia. Mild DJD in the right elbow and mild-to-moderate DJD in the right wrist.     Xr Elbow Right (min 3 Views)    Result Date: 4/6/2019  EXAMINATION: 3 XRAY VIEWS OF THE RIGHT SHOULDER; AP AND LATERAL XRAY VIEWS OF THE RIGHT FOREARM; 3 XRAY VIEWS OF THE RIGHT ELBOW 4/6/2019 3:10 am COMPARISON: None. HISTORY: ORDERING SYSTEM PROVIDED HISTORY: trauma TECHNOLOGIST PROVIDED HISTORY: trauma FINDINGS: Right shoulder: Three views of the right shoulder. No acute fracture or dislocation. Soft tissue swelling about the shoulder. Normal alignment of the glenohumeral and acromioclavicular joints. No aggressive skeletal lesion. Visualized lung is clear. Visualized ribs are intact. Osteopenia. Right elbow: Frontal, lateral and oblique views. Soft tissue swelling about the elbow. No radiographic evidence of an elbow joint effusion. No acute fracture or malalignment. Mild ulnohumeral compartment DJD. Osteopenia. Right forearm: Frontal and lateral views. Soft tissue swelling about the forearm. No acute fracture or malalignment. Mild-to-moderate DJD in the wrist.  Osteopenia. No acute fracture in the right shoulder, right elbow or right forearm. Osteopenia. Mild DJD in the right elbow and mild-to-moderate DJD in the right wrist.     Xr Radius Ulna Right (2 Views)    Result Date: 4/6/2019  EXAMINATION: 3 XRAY VIEWS OF THE RIGHT SHOULDER; AP AND LATERAL XRAY VIEWS OF THE RIGHT FOREARM; 3 XRAY VIEWS OF THE RIGHT ELBOW 4/6/2019 3:10 am COMPARISON: None. HISTORY: ORDERING SYSTEM PROVIDED HISTORY: trauma TECHNOLOGIST PROVIDED HISTORY: trauma FINDINGS: Right shoulder: Three views of the right shoulder. No acute fracture or dislocation. Soft tissue swelling about the shoulder. Normal alignment of the glenohumeral and acromioclavicular joints. No aggressive skeletal lesion. Visualized lung is clear.   Visualized ribs are intact. Osteopenia. Right elbow: Frontal, lateral and oblique views. Soft tissue swelling about the elbow. No radiographic evidence of an elbow joint effusion. No acute fracture or malalignment. Mild ulnohumeral compartment DJD. Osteopenia. Right forearm: Frontal and lateral views. Soft tissue swelling about the forearm. No acute fracture or malalignment. Mild-to-moderate DJD in the wrist.  Osteopenia. No acute fracture in the right shoulder, right elbow or right forearm. Osteopenia. Mild DJD in the right elbow and mild-to-moderate DJD in the right wrist.     Ct Head Wo Contrast    Result Date: 4/11/2019  EXAMINATION: CT OF THE HEAD WITHOUT CONTRAST  4/11/2019 5:04 am TECHNIQUE: CT of the head was performed without the administration of intravenous contrast. Dose modulation, iterative reconstruction, and/or weight based adjustment of the mA/kV was utilized to reduce the radiation dose to as low as reasonably achievable. COMPARISON: April 5, 2019. HISTORY: ORDERING SYSTEM PROVIDED HISTORY: ENCEPHALOPATHY TECHNOLOGIST PROVIDED HISTORY: Ordering Physician Provided Reason for Exam: follow up Acuity: Unknown Type of Exam: Unknown FINDINGS: BRAIN/VENTRICLES: Stable size and appearance of the hyperdense intraparenchymal hematoma centered in the left thalamus with intraventricular extension. Similar appearing surrounding vasogenic edema with mass effect on the left lateral and 3rd ventricles. Interval improved intraventricular hemorrhage with now only a small amount of hemorrhage layering in the occipital horns of the lateral ventricles. Previously noted right frontal approach ventricular drainage catheter has been removed. Small amount of hyperdense blood along the catheter tract. No progressive hydrocephalus. Previous pneumocephalus has resolved. No significant midline shift. The basal cisterns are grossly patent. No large territorial infarct.  Atherosclerosis of the intracranial vertebral and internal carotid arteries. Mild generalized cerebral and cerebellar volume loss. ORBITS: The visualized portion of the orbits demonstrate no acute abnormality. SINUSES: The visualized paranasal sinuses and mastoid air cells demonstrate no acute abnormality. SOFT TISSUES/SKULL:  No acute abnormality of the visualized skull or soft tissues. Stable size and appearance of the intraparenchymal hematoma centered in the left thalamus with mild surrounding vasogenic edema and mass effect. Compared to CT head done April 5, 2019 there is improvement of previously noted intraventricular hemorrhage with no on the a small amount of hemorrhage layering in the occipital horns of the lateral ventricles. Interval removal of the right frontal approach ventricular drainage catheter. No progressive hydrocephalus. Trace blood along the catheter tract. Ct Head Wo Contrast    Result Date: 4/6/2019  EXAMINATION: CT OF THE HEAD WITHOUT CONTRAST  4/5/2019 11:11 pm TECHNIQUE: CT of the head was performed without the administration of intravenous contrast. Dose modulation, iterative reconstruction, and/or weight based adjustment of the mA/kV was utilized to reduce the radiation dose to as low as reasonably achievable. COMPARISON: CT head 04/05/2029 HISTORY: ORDERING SYSTEM PROVIDED HISTORY: f/u [facility] s/p EVD placement TECHNOLOGIST PROVIDED HISTORY: Ordering Physician Provided Reason for Exam: EVD placement follow up Acuity: Unknown Type of Exam: Subsequent/Follow-up FINDINGS: BRAIN/VENTRICLES: Interval placement of right frontal approach external ventricular drainage device. Tip terminates within the frontal horn left lateral ventricle near the caudal thalamic groove. Interval pneumocephalus identified consistent with interval placement. Redemonstration of the left thalamic intraparenchymal hemorrhage measuring 3.6 x 2.5 cm in size with evidence of subarachnoid decompression/subarachnoid extension.   Evidence of blood products within the the supraorbital ridge into the right frontal scalp. Asymmetric swelling of the lateral face on the right compared with the left. No fluid collection is identified. No air or unexpected radiopaque foreign bodies. Soft tissue swelling/edema along the upper neck circumferentially right greater than left. The visualized intracranial contents show acute left thalamic intraparenchymal blood products and intraventricular blood products. 1. Bruising of the face without fracture, radiopaque foreign body, or superficial fluid collection. 2. Acute left thalamic hemorrhage with intraventricular extension. This is previously called to Dr. Autumn Steiner by Dr. Mikey Montana at  on 4/5/19. Ct Cervical Spine Wo Contrast    Result Date: 4/5/2019  EXAMINATION: CT OF THE CERVICAL SPINE WITHOUT CONTRAST 4/5/2019 3:19 pm TECHNIQUE: CT of the cervical spine was performed without the administration of intravenous contrast. Multiplanar reformatted images are provided for review. Dose modulation, iterative reconstruction, and/or weight based adjustment of the mA/kV was utilized to reduce the radiation dose to as low as reasonably achievable. COMPARISON: None. HISTORY: ORDERING SYSTEM PROVIDED HISTORY: trauma FINDINGS: BONES/ALIGNMENT: There is no evidence of an acute cervical spine fracture. There is normal alignment of the cervical spine except moderate dextroconvex scoliosis and reversal of the normal lordotic curvature. .  Decompressive laminectomies at C4 and C5. Posterior fusion rods and pedicular screws at C3, 4, 5 and 6 on the right and C5 and 6 on the left. DEGENERATIVE CHANGES: Multilevel disc space narrowing. SOFT TISSUES: There is no prevertebral soft tissue swelling. No acute abnormality of the cervical spine. Posterior interbody fixation as above.      Ct Thoracic Spine Wo Contrast    Result Date: 4/5/2019  EXAMINATION: CT OF THE THORACIC SPINE WITHOUT CONTRAST  4/5/2019 3:18 pm: TECHNIQUE: CT of the thoracic spine was performed without the administration of intravenous contrast. Multiplanar reformatted images are provided for review. Dose modulation, iterative reconstruction, and/or weight based adjustment of the mA/kV was utilized to reduce the radiation dose to as low as reasonably achievable. COMPARISON: None. HISTORY: ORDERING SYSTEM PROVIDED HISTORY: trauma FINDINGS: BONES/ALIGNMENT: There is normal alignment of the spine. The vertebral body heights are maintained. No osseous destructive lesion is seen. DEGENERATIVE CHANGES: No gross spinal canal stenosis or bony neural foraminal narrowing of the thoracic spine. SOFT TISSUES: No paraspinal mass is seen. Unremarkable CT of the thoracic spine. Ct Lumbar Spine Wo Contrast    Result Date: 4/5/2019  EXAMINATION: CT OF THE LUMBAR SPINE WITHOUT CONTRAST  4/5/2019 TECHNIQUE: CT of the lumbar spine was performed without the administration of intravenous contrast. Multiplanar reformatted images are provided for review. Dose modulation, iterative reconstruction, and/or weight based adjustment of the mA/kV was utilized to reduce the radiation dose to as low as reasonably achievable. COMPARISON: None HISTORY: ORDERING SYSTEM PROVIDED HISTORY: trauma TECHNOLOGIST PROVIDED HISTORY: trauma FINDINGS: BONES/ALIGNMENT: There is minimal compression deformity of L4 vertebral body with less than 20% height loss/wedging anteriorly. Multilevel degenerative disc disease is evident. The facets are intact. The spinous processes are intact. Lamina and pedicles are intact. There is evidence of prior posterior decompression. DEGENERATIVE CHANGES: Multilevel degenerative disc disease with facet arthropathy. SOFT TISSUES/RETROPERITONEUM: Diverticulosis. Atherosclerotic changes of aorta. Large stool throughout the colon. An avidly enhancing dilated left ovarian vessel is noted.      1. Age-indeterminate compression deformity of L4 with less than 20% height loss and no significant retropulsion. 2. Osteopenic skeleton with multilevel degenerative changes. Xr Chest Portable    Result Date: 4/11/2019  EXAMINATION: SINGLE XRAY VIEW OF THE CHEST 4/11/2019 6:48 am COMPARISON: April 6, 2019 HISTORY: ORDERING SYSTEM PROVIDED HISTORY: tachypnic, fever, rule out aspiration ? TECHNOLOGIST PROVIDED HISTORY: tachypnic, fever, rule out aspiration ? FINDINGS: Cardiac monitoring leads overlie the chest.  Borderline cardiomegaly. Trace left effusion. No pneumothorax. No airspace consolidation. Trace left effusion. Xr Chest Portable    Result Date: 4/6/2019  EXAMINATION: SINGLE XRAY VIEW OF THE CHEST 4/6/2019 5:27 am COMPARISON: 04/05/2019 HISTORY: ORDERING SYSTEM PROVIDED HISTORY: Concern for aspiration TECHNOLOGIST PROVIDED HISTORY: Concern for aspiration FINDINGS: Cardiomediastinal silhouette and pulmonary vasculature are within normal limits. There is no focal airspace consolidation. No evidence of pneumothorax or pleural effusion on this limited supine study. No acute osseous abnormality. No acute intrathoracic process. Xr Chest Portable    Result Date: 4/5/2019  EXAMINATION: SINGLE XRAY VIEW OF THE CHEST 4/5/2019 3:24 pm COMPARISON: None. HISTORY: ORDERING SYSTEM PROVIDED HISTORY: trauma TECHNOLOGIST PROVIDED HISTORY: trauma FINDINGS: Slight increase in interstitial markings. The lungs are without acute focal process. There is no effusion or pneumothorax. The cardiomediastinal silhouette is without acute process. The osseous structures are without acute process. No fracture or pneumothorax. Increased interstitial markings     Cta Neck W Contrast    Result Date: 4/5/2019  EXAMINATION: CTA OF THE HEAD WITH CONTRAST; CTA OF THE NECK 4/5/2019 3:32 pm: TECHNIQUE: CTA of the head/brain was performed with the administration of intravenous contrast. Multiplanar reformatted images are provided for review. MIP images are provided for review.  Dose modulation, iterative reconstruction, and/or weight based adjustment of the mA/kV was utilized to reduce the radiation dose to as low as reasonably achievable.; CTA of the neck was performed with the administration of intravenous contrast. Multiplanar reformatted images are provided for review. MIP images are provided for review. Stenosis of the internal carotid arteries measured using NASCET criteria. Dose modulation, iterative reconstruction, and/or weight based adjustment of the mA/kV was utilized to reduce the radiation dose to as low as reasonably achievable. COMPARISON: None. HISTORY: ORDERING SYSTEM PROVIDED HISTORY: SAH, ?hemorrhagic stroke TECHNOLOGIST PROVIDED HISTORY: ; ORDERING SYSTEM PROVIDED HISTORY: SAH, ?hemorrhagic stroke TECHNOLOGIST PROVIDED HISTORY: Ordering Physician Provided Reason for Exam: head bleed/ found down Acuity: Unknown FINDINGS: CTA NECK: AORTIC ARCH/ARCH VESSELS: There is a normal branch pattern of the aortic arch. No significant stenosis is seen of the innominate artery or subclavian arteries. CAROTID ARTERIES: The common carotid arteries are normal in appearance without evidence of a flow limiting stenosis. The internal carotid arteries are normal in appearance without evidence of a flow limiting stenosis by NASCET criteria. No dissection or arterial injury is seen. VERTEBRAL ARTERIES: The vertebral arteries both arise from the subclavian arteries and are normal in caliber without evidence of flow limiting stenosis or dissection. SOFT TISSUES: The lung apices are clear. No cervical or superior mediastinal lymphadenopathy. The visualized portion of the larynx and pharynx appear unremarkable. The parotid, submandibular and thyroid glands demonstrate no acute abnormality. BONES: The visualized osseous structures appear unremarkable. CTA HEAD: ANTERIOR CIRCULATION: The internal carotid arteries are normal in course and caliber without focal stenosis.  The anterior cerebral and middle cerebral arteries demonstrate no focal stenosis. POSTERIOR CIRCULATION: The posterior cerebral arteries demonstrate no focal stenosis. The vertebral and basilar arteries appear unremarkable. BRAIN: There is redemonstration of an intraparenchymal hemorrhage within the left thalamic region as well as intraventricular hemorrhagic products. Unremarkable CTA of the head and neck. Critical results were called by Dr. Ade Mcrae to Dr. Joycelyn Zuniga on 4/5/2019 at 16:12. Us Abdomen Limited    Result Date: 4/12/2019  EXAMINATION: RIGHT UPPER QUADRANT ULTRASOUND 4/12/2019 12:07 am COMPARISON: CT chest, abdomen and pelvis done April 5, 2019. HISTORY: ORDERING SYSTEM PROVIDED HISTORY: eval for ascites in setting of adominal distension, cirrhosis, leukocytosis TECHNOLOGIST PROVIDED HISTORY: eval for ascites in setting of adominal distension, cirrhosis, leukocytosis FINDINGS: Abdominal ultrasound was performed for evaluation of abdominal ascites. Small to moderate amount of ascites throughout the bilateral abdomen. Small amount of moderate abdominal ascites throughout the bilateral abdomen. Ct Chest Abdomen Pelvis W Contrast    Result Date: 4/5/2019  EXAMINATION: CT OF THE CHEST, ABDOMEN, AND PELVIS WITH CONTRAST 4/5/2019 3:19 pm TECHNIQUE: CT of the chest, abdomen and pelvis was performed with the administration of intravenous contrast. Multiplanar reformatted images are provided for review. Dose modulation, iterative reconstruction, and/or weight based adjustment of the mA/kV was utilized to reduce the radiation dose to as low as reasonably achievable. COMPARISON: None HISTORY: ORDERING SYSTEM PROVIDED HISTORY: trauma TECHNOLOGIST PROVIDED HISTORY: Ordering Physician Provided Reason for Exam: found down Acuity: Unknown FINDINGS: Chest: Mediastinum:  Thoracic aorta and central portion of the pulmonary artery opacify normally. The ascending thoracic aorta is of normal caliber.  Heart size is normal. There is no pericardial effusion. No mediastinal or hilar lymph nodes exceed the CT criteria for abnormal enlargement. Lungs/pleura: Clear Soft Tissues/Bones: No fracture identified Abdomen/Pelvis: Organs: The abdominal wall appears normal. Liver appears somewhat nodular. Spleen is enlarged. Gastroesophageal varices are noted. Pancreas appears normal.  The adrenals are normal.  The gallbladder surgically absent. . Kidneys appear normal. The bladder appears normal. GI/Bowel: The stomach,small bowel, and colon appear normal. Appendix is not identified. Pelvis: Uterus appears atrophic. Peritoneum/Retroperitoneum: The abdominal aorta and iliac arteries are normal in caliber. There is no pathologic adenopathy. There is calcified plaque along the aorta and its branches. Bones/Soft Tissues: Normal     No acute traumatic sequelae. Cirrhosis, splenomegaly, and esophageal varices consistent with portal venous hypertension. Cta Head W Contrast    Result Date: 4/5/2019  EXAMINATION: CTA OF THE HEAD WITH CONTRAST; CTA OF THE NECK 4/5/2019 3:32 pm: TECHNIQUE: CTA of the head/brain was performed with the administration of intravenous contrast. Multiplanar reformatted images are provided for review. MIP images are provided for review. Dose modulation, iterative reconstruction, and/or weight based adjustment of the mA/kV was utilized to reduce the radiation dose to as low as reasonably achievable.; CTA of the neck was performed with the administration of intravenous contrast. Multiplanar reformatted images are provided for review. MIP images are provided for review. Stenosis of the internal carotid arteries measured using NASCET criteria. Dose modulation, iterative reconstruction, and/or weight based adjustment of the mA/kV was utilized to reduce the radiation dose to as low as reasonably achievable. COMPARISON: None.  HISTORY: ORDERING SYSTEM PROVIDED HISTORY: SAH, ?hemorrhagic stroke TECHNOLOGIST PROVIDED HISTORY: ; [address] [place] [address] HISTORY: SAH, ?hemorrhagic stroke TECHNOLOGIST PROVIDED HISTORY: Ordering Physician Provided Reason for Exam: head bleed/ found down Acuity: Unknown FINDINGS: CTA NECK: AORTIC ARCH/ARCH VESSELS: There is a normal branch pattern of the aortic arch. No significant stenosis is seen of the innominate artery or subclavian arteries. CAROTID ARTERIES: The common carotid arteries are normal in appearance without evidence of a flow limiting stenosis. The internal carotid arteries are normal in appearance without evidence of a flow limiting stenosis by NASCET criteria. No dissection or arterial injury is seen. VERTEBRAL ARTERIES: The vertebral arteries both arise from the subclavian arteries and are normal in caliber without evidence of flow limiting stenosis or dissection. SOFT TISSUES: The lung apices are clear. No cervical or superior mediastinal lymphadenopathy. The visualized portion of the larynx and pharynx appear unremarkable. The parotid, submandibular and thyroid glands demonstrate no acute abnormality. BONES: The visualized osseous structures appear unremarkable. CTA HEAD: ANTERIOR CIRCULATION: The internal carotid arteries are normal in course and caliber without focal stenosis. The anterior cerebral and middle cerebral arteries demonstrate no focal stenosis. POSTERIOR CIRCULATION: The posterior cerebral arteries demonstrate no focal stenosis. The vertebral and basilar arteries appear unremarkable. BRAIN: There is redemonstration of an intraparenchymal hemorrhage within the left thalamic region as well as intraventricular hemorrhagic products. Unremarkable CTA of the head and neck. Critical results were called by Dr. Sean Marroquin to Dr. Lexii Leal on 4/5/2019 at 16:12. Labs and Images reviewed with:  [x] Dr. Randall Ballesteros. Latoya    [] Dr. Derek Holcomb  [] Dr. Flower Bajwa  [] There are no new interval images to review. PHYSICAL EXAM       CONSTITUTIONAL:  Somnolent. Mumbling.   Does not answer orientation questions or follow commands. HEAD:  normocephalic, atraumatic    EYES:  PERRL   ENT:  moist mucous membranes   NECK:  supple, symmetric   LUNGS:  Equal air entry bilaterally, clear   CARDIOVASCULAR:  normal s1 / s2, RRR, distal pulses intact   ABDOMEN:  Soft, mild distension   NEUROLOGIC:  Mental Status:  Alert, not answering questions or following commands. Cranial Nerves:    III: Pupils:  equal, round, reactive to light  VII: Facial strength: abnormal - right facial droop    Motor Exam:    Tone:  abnormal - right upper extremity spasticity    Moving left upper extremity spontaneously. Right upper no withdrawal to pain. Bilateral lower extremities withdraw to pain. DRAINS:  [x] There are no drains for Neuro Critical Care to monitor at this time. ASSESSMENT AND PLAN:       This is a 66 y.o. female with a history of  Hepatitis C suspected to be from previous blood transfusion, cirrhosis, and esophageal varices s/p banding May 2017 who initially presented as a trauma alert on 4/5/19 after being found down at home. LKW was ~24h prior to arrival.  CT Head revealed left thalamic IPH with intraventricular extension into lateral, 3rd and 4th ventricles. EVD placed at bedside on admit, removed 4/8. Patient exam had been improving and she was transferred to [facility] status 4/9.   Transferred back to Neuro ICU and Neuro Critical Care due to altered mentation, fevers.       NEUROLOGIC:  - Altered mentation/encephalopathy  - Left thalamic IPH with associated edema and intraventricular hemorrhage, minimal shift  - No vascular abnormality on CTA Head/Neck  - EVD placed at bedside 4/7, removed 4/9  - Clinical exam had been improving until overnight into 4/11 when patient mentation progressively declined  - Repeat CT Head 4/11 showed stable IPH in left thalamus, improvement of IVH, no worsening hydro  - LTME overnight showed right and left mid temporal sharp waves   - Left foot blood glucose, goal <180  - Glucose well controlled     OTHER:  - PT/OT/ST  - Code Status: DNR-CCA, no intubation    DISPOSITION:  [x] To remain ICU for close neurological monitoring. We will continue to follow along. For any changes in exam or patient status please contact Neuro Critical Care.       Cyndi Porter MD  Neuro Critical Care  Pager [phone]  4/13/2019     6:58 AM

## 2019-04-13 NOTE — PROGRESS NOTES
THE [facility] [place] [facility] Progress Note    Karel Gerber is a 66 y.o. female patient. Hospitalization Day:8      Chief consult reason:   Ascites    Subjective:  Pt seen and examined. Pt moaning yes and no to pain. Abdomen is soft. Staff report 7 bms yesterday with lactulose. Slight mentation changes today-pt is not able to take meds/food. Unable to place NGT due to previous esophageal varices with banding. Pt had paracentesis completed yesterday with 700 ml of yellow fluid removed-No SBP (pt has been receiving Rocephin for UTI). SAAG 3.0 indicating ascites is secondary to cirrhosis. VITALS:  BP (!) 164/39   Pulse 80   Temp 98.6 °F (37 °C) (Oral)   Resp 30   Ht 5' 5\" (1.651 m)   Wt 110 lb 1.6 oz (49.9 kg)   SpO2 97%   BMI 18.32 kg/m²   TEMPERATURE:  Current - Temp: 98.6 °F (37 °C); Max - Temp  Av.1 °F (37.8 °C)  Min: 98.6 °F (37 °C)  Max: 102.7 °F (39.3 °C)    Physical Assessment:  General appearance:  frail  Mental Status:  MARYAN-pt non-verbal  Lungs: dm in bases  Heart:  regular rate and rhythm, no murmur  Abdomen:  soft, nontender, nondistended, normal bowel sounds, no masses, splenomegaly  Extremities:  no edema, redness, tenderness in the calves  Skin:  no gross lesions, rashes, induration    Data Review:  LABS and IMAGING:     CBC  Recent Labs     19   WBC 11.1 11.2 14.1*   HGB 11.6*  11.6* 11.1* 10.9*   MCV 92.6 90.5 90.5   RDW 16.4* 16.5* 17.1*    131* 185       ANEMIA STUDIES  No results for input(s): LABIRON, TIBC, FERRITIN, ILBXOSYO77, FOLATE, OCCULTBLD in the last 72 hours.     BMP  Recent Labs     19  04319    138 136   K 3.7 3.3* 3.6*   * 108* 109*   CO2 17* 15* 15*   BUN 13 18 24*   CREATININE 0.40* 0.36* 0.55   GLUCOSE 115* 110* 122*   CALCIUM 8.8 8.5* 8.6       LFTS  Recent Labs     19  1510   ALKPHOS 81   ALT 20   AST 28   BILITOT 1.05   BILIDIR 0.29   LABALBU 3.4* been receiving Rocephin for UTI). SAAG 3.0 indicating ascites is secondary to cirrhosis. -CEA 3.7, AFP 2.1     Ascites-most likely related to decompensated cirrhosis. Plan of care:  1. Cont with Lactulose enemas daily to produce 3-4 bm's daily  2. Given pt cannot take in oral meds, will monitor abdomen/presentation closely for s/s of fluid re-accumulation in case IV diuretics would be required. 3. Will follow up tomorrow on outstanding labs  4. Will cont to monitor    Thank you for allowing me to participate in the care of your patient. Please feel free to contact me with any questions or concerns.      Felisha Sharp, [address] [facility]  [phone]

## 2019-04-13 NOTE — PLAN OF CARE
Problem: Falls - Risk of:  Goal: Absence of physical injury  Description  Absence of physical injury  Outcome: Ongoing  Note:   No falls entire shift. Fall precautions in place. Bed alarm on. Continue to monitor.

## 2019-04-14 LAB
ABSOLUTE EOS #: 0.2 K/UL (ref 0–0.44)
ABSOLUTE IMMATURE GRANULOCYTE: 0.04 K/UL (ref 0–0.3)
ABSOLUTE LYMPH #: 1.16 K/UL (ref 1.1–3.7)
ABSOLUTE MONO #: 0.98 K/UL (ref 0.1–1.2)
ANION GAP SERPL CALCULATED.3IONS-SCNC: 11 MMOL/L (ref 9–17)
APPEARANCE FLUID: NORMAL
BASO FLUID: NORMAL %
BASOPHILS # BLD: 0 % (ref 0–2)
BASOPHILS ABSOLUTE: 0.03 K/UL (ref 0–0.2)
BUN BLDV-MCNC: 23 MG/DL (ref 8–23)
BUN/CREAT BLD: ABNORMAL (ref 9–20)
CALCIUM SERPL-MCNC: 8.2 MG/DL (ref 8.6–10.4)
CERULOPLASMIN: 29 MG/DL (ref 16–45)
CHLORIDE BLD-SCNC: 108 MMOL/L (ref 98–107)
CO2: 17 MMOL/L (ref 20–31)
COLOR FLUID: NORMAL
CREAT SERPL-MCNC: 0.44 MG/DL (ref 0.5–0.9)
CULTURE: NO GROWTH
DIFFERENTIAL TYPE: ABNORMAL
EOSINOPHIL FLUID: NORMAL %
EOSINOPHILS RELATIVE PERCENT: 2 % (ref 1–4)
FLUID DIFF COMMENT: NORMAL
GFR AFRICAN AMERICAN: >60 ML/MIN
GFR NON-AFRICAN AMERICAN: >60 ML/MIN
GFR SERPL CREATININE-BSD FRML MDRD: ABNORMAL ML/MIN/{1.73_M2}
GFR SERPL CREATININE-BSD FRML MDRD: ABNORMAL ML/MIN/{1.73_M2}
GLUCOSE BLD-MCNC: 104 MG/DL (ref 70–99)
HCT VFR BLD CALC: 31 % (ref 36.3–47.1)
HEMOGLOBIN: 9.9 G/DL (ref 11.9–15.1)
IMMATURE GRANULOCYTES: 1 %
LYMPHOCYTES # BLD: 14 % (ref 24–43)
LYMPHOCYTES, BODY FLUID: 18 %
Lab: NORMAL
MCH RBC QN AUTO: 30.1 PG (ref 25.2–33.5)
MCHC RBC AUTO-ENTMCNC: 31.9 G/DL (ref 28.4–34.8)
MCV RBC AUTO: 94.2 FL (ref 82.6–102.9)
MONOCYTE, FLUID: NORMAL %
MONOCYTES # BLD: 12 % (ref 3–12)
NEUTROPHIL, FLUID: 9 %
NRBC AUTOMATED: 0 PER 100 WBC
OTHER CELLS FLUID: NORMAL %
PDW BLD-RTO: 17.2 % (ref 11.8–14.4)
PLATELET # BLD: ABNORMAL K/UL (ref 138–453)
PLATELET ESTIMATE: ABNORMAL
PLATELET, FLUORESCENCE: 92 K/UL (ref 138–453)
PLATELET, IMMATURE FRACTION: 6.5 % (ref 1.1–10.3)
PMV BLD AUTO: ABNORMAL FL (ref 8.1–13.5)
POTASSIUM SERPL-SCNC: 3 MMOL/L (ref 3.7–5.3)
RBC # BLD: 3.29 M/UL (ref 3.95–5.11)
RBC # BLD: ABNORMAL 10*6/UL
RBC FLUID: <3000 /MM3
SEG NEUTROPHILS: 71 % (ref 36–65)
SEGMENTED NEUTROPHILS ABSOLUTE COUNT: 6.06 K/UL (ref 1.5–8.1)
SODIUM BLD-SCNC: 136 MMOL/L (ref 135–144)
SOURCE: NORMAL
SPECIMEN DESCRIPTION: NORMAL
SPECIMEN TYPE: NORMAL
TRIGLYCERIDES FLUID: 22 MG/DL
WBC # BLD: 8.5 K/UL (ref 3.5–11.3)
WBC # BLD: ABNORMAL 10*3/UL
WBC FLUID: 31 /MM3

## 2019-04-14 PROCEDURE — C9254 INJECTION, LACOSAMIDE: HCPCS | Performed by: PSYCHIATRY & NEUROLOGY

## 2019-04-14 PROCEDURE — 6370000000 HC RX 637 (ALT 250 FOR IP): Performed by: STUDENT IN AN ORGANIZED HEALTH CARE EDUCATION/TRAINING PROGRAM

## 2019-04-14 PROCEDURE — 6370000000 HC RX 637 (ALT 250 FOR IP): Performed by: EMERGENCY MEDICINE

## 2019-04-14 PROCEDURE — 99233 SBSQ HOSP IP/OBS HIGH 50: CPT | Performed by: INTERNAL MEDICINE

## 2019-04-14 PROCEDURE — 36415 COLL VENOUS BLD VENIPUNCTURE: CPT

## 2019-04-14 PROCEDURE — 6360000002 HC RX W HCPCS: Performed by: NURSE PRACTITIONER

## 2019-04-14 PROCEDURE — 6360000002 HC RX W HCPCS: Performed by: PSYCHIATRY & NEUROLOGY

## 2019-04-14 PROCEDURE — 6370000000 HC RX 637 (ALT 250 FOR IP): Performed by: NURSE PRACTITIONER

## 2019-04-14 PROCEDURE — 51701 INSERT BLADDER CATHETER: CPT

## 2019-04-14 PROCEDURE — 82104 ALPHA-1-ANTITRYPSIN PHENO: CPT

## 2019-04-14 PROCEDURE — 2580000003 HC RX 258: Performed by: EMERGENCY MEDICINE

## 2019-04-14 PROCEDURE — 2500000003 HC RX 250 WO HCPCS: Performed by: EMERGENCY MEDICINE

## 2019-04-14 PROCEDURE — 51798 US URINE CAPACITY MEASURE: CPT

## 2019-04-14 PROCEDURE — 2000000003 HC NEURO ICU R&B

## 2019-04-14 PROCEDURE — 80048 BASIC METABOLIC PNL TOTAL CA: CPT

## 2019-04-14 PROCEDURE — 6370000000 HC RX 637 (ALT 250 FOR IP): Performed by: PSYCHIATRY & NEUROLOGY

## 2019-04-14 PROCEDURE — 2580000003 HC RX 258: Performed by: NURSE PRACTITIONER

## 2019-04-14 PROCEDURE — 82103 ALPHA-1-ANTITRYPSIN TOTAL: CPT

## 2019-04-14 PROCEDURE — 85025 COMPLETE CBC W/AUTO DIFF WBC: CPT

## 2019-04-14 PROCEDURE — 82390 ASSAY OF CERULOPLASMIN: CPT

## 2019-04-14 PROCEDURE — 2580000003 HC RX 258: Performed by: PSYCHIATRY & NEUROLOGY

## 2019-04-14 PROCEDURE — 85055 RETICULATED PLATELET ASSAY: CPT

## 2019-04-14 PROCEDURE — 99233 SBSQ HOSP IP/OBS HIGH 50: CPT | Performed by: PSYCHIATRY & NEUROLOGY

## 2019-04-14 PROCEDURE — 94762 N-INVAS EAR/PLS OXIMTRY CONT: CPT

## 2019-04-14 RX ORDER — ACETAMINOPHEN 325 MG/1
650 TABLET ORAL EVERY 4 HOURS PRN
Status: DISCONTINUED | OUTPATIENT
Start: 2019-04-14 | End: 2019-04-15

## 2019-04-14 RX ORDER — THIAMINE HCL 100 MG
500 TABLET ORAL DAILY
Status: COMPLETED | OUTPATIENT
Start: 2019-04-14 | End: 2019-04-15

## 2019-04-14 RX ADMIN — AMLODIPINE BESYLATE 10 MG: 10 TABLET ORAL at 08:48

## 2019-04-14 RX ADMIN — ENOXAPARIN SODIUM 40 MG: 40 INJECTION SUBCUTANEOUS at 08:48

## 2019-04-14 RX ADMIN — DEXTROSE MONOHYDRATE 100 MG: 50 INJECTION, SOLUTION INTRAVENOUS at 10:13

## 2019-04-14 RX ADMIN — RIFAXIMIN 550 MG: 550 TABLET ORAL at 20:58

## 2019-04-14 RX ADMIN — FAMOTIDINE 20 MG: 10 INJECTION, SOLUTION INTRAVENOUS at 08:48

## 2019-04-14 RX ADMIN — PROPRANOLOL HYDROCHLORIDE 40 MG: 40 TABLET ORAL at 20:58

## 2019-04-14 RX ADMIN — CEFTRIAXONE SODIUM 1 G: 1 INJECTION, POWDER, FOR SOLUTION INTRAMUSCULAR; INTRAVENOUS at 14:11

## 2019-04-14 RX ADMIN — Medication 15 ML: at 08:50

## 2019-04-14 RX ADMIN — Medication 10 ML: at 08:50

## 2019-04-14 RX ADMIN — POTASSIUM CHLORIDE 40 MEQ: 20 TABLET, EXTENDED RELEASE ORAL at 08:48

## 2019-04-14 RX ADMIN — LACTULOSE 20 G: 10 SOLUTION ORAL at 08:48

## 2019-04-14 RX ADMIN — Medication 15 ML: at 20:59

## 2019-04-14 RX ADMIN — DEXTROSE MONOHYDRATE 100 MG: 50 INJECTION, SOLUTION INTRAVENOUS at 23:25

## 2019-04-14 RX ADMIN — Medication 500 MG: at 16:35

## 2019-04-14 RX ADMIN — PROPRANOLOL HYDROCHLORIDE 40 MG: 40 TABLET ORAL at 08:48

## 2019-04-14 RX ADMIN — ACETAMINOPHEN 650 MG: 325 TABLET ORAL at 00:26

## 2019-04-14 NOTE — PLAN OF CARE
Problem: HEMODYNAMIC STATUS  Goal: Patient has stable vital signs and fluid balance  4/14/2019 0521 by Ned Casey RN  Outcome: Met This Shift  Note:   Blood pressure remains within ordered parameters. Will continue to monitor. Problem: Risk for Impaired Skin Integrity  Goal: Tissue integrity - skin and mucous membranes  Description  Structural intactness and normal physiological function of skin and  mucous membranes. 4/14/2019 0521 by Ned Casey RN  Outcome: Met This Shift  Note:   Skin assessed for new breakdown and redness, patient turned every 2 hours, preventative mepilex remains in place, heels and elbows elevated off of bed into pillows.

## 2019-04-14 NOTE — PROGRESS NOTES
(PEPCID) injection  20 mg Intravenous Daily    chlorhexidine  15 mL Mouth/Throat BID     CONTINUOUS INFUSIONS:   niCARdipine Stopped (19)    sodium chloride 50 mL/hr at 19     PRN MEDICATIONS:   acetaminophen, hydrALAZINE, labetalol, potassium chloride **OR** potassium alternative oral replacement **OR** potassium chloride, fentanNYL, sodium chloride flush, magnesium hydroxide, ondansetron, oxyCODONE    VITALS:  Temperature Range: Temp: 98.1 °F (36.7 °C) Temp  Av.1 °F (37.3 °C)  Min: 97.4 °F (36.3 °C)  Max: 101.7 °F (38.7 °C)  BP Range: Systolic (45BRN), XZN:742 , Min:98 , AQE:923     Diastolic (67HDY), QZL:52, Min:34, Max:64    Pulse Range: Pulse  Av.7  Min: 65  Max: 82  Respiration Range: Resp  Av.1  Min: 20  Max: 34  Current Pulse Ox: SpO2: 98 %  24HR Pulse Ox Range: SpO2  Av.9 %  Min: 95 %  Max: 99 %  Patient Vitals for the past 12 hrs:   BP Temp Temp src Pulse Resp SpO2   19 0705 (!) 137/40 -- -- 65 25 98 %   19 0605 (!) 128/41 -- -- 70 24 99 %   19 0505 (!) 132/34 -- -- 68 27 97 %   19 0405 122/64 98.1 °F (36.7 °C) Axillary 69 26 98 %   19 0305 (!) 109/48 -- -- 66 24 96 %   19 0205 (!) 98/34 -- -- 68 23 96 %   19 0105 (!) 116/38 -- -- 70 23 95 %   19 0005 (!) 117/57 101.7 °F (38.7 °C) Axillary 75 28 97 %   19 2205 (!) 134/45 -- -- 75 24 98 %     Estimated body mass index is 18.32 kg/m² as calculated from the following:    Height as of this encounter: 5' 5\" (1.651 m).     Weight as of this encounter: 110 lb 1.6 oz (49.9 kg).  []<16 Severe malnutrition  []16-16.99 Moderate malnutrition  []17-18.49 Mild malnutrition  [x]18.5-24.9 Normal  []25-29.9 Overweight (not obese)  []30-34.9 Obese class 1 (Low Risk)  []35-39.9 Obese class 2 (Moderate Risk)  []?40 Obese class 3 (High Risk)    RECENT LABS:   Lab Results   Component Value Date    WBC 8.5 2019    HGB 9.9 (L) 2019    HCT 31.0 (L) 2019    PLT See Reflexed IPF Result 04/14/2019    ALT 20 04/12/2019    AST 28 04/12/2019     04/14/2019    K 3.0 (L) 04/14/2019     (H) 04/14/2019    CREATININE 0.44 (L) 04/14/2019    BUN 23 04/14/2019    CO2 17 (L) 04/14/2019    INR 1.3 04/12/2019     24 HOUR INTAKE/OUTPUT:    Intake/Output Summary (Last 24 hours) at 4/14/2019 0915  Last data filed at 4/14/2019 0526  Gross per 24 hour   Intake 1531.29 ml   Output 700 ml   Net 831.29 ml       RADIOLOGY:   Xr Pelvis (1-2 Views)    Result Date: 4/5/2019  EXAMINATION: SINGLE XRAY VIEW OF THE PELVIS 4/5/2019 3:23 pm COMPARISON: None. HISTORY: ORDERING SYSTEM PROVIDED HISTORY: trauma TECHNOLOGIST PROVIDED HISTORY: trauma Initial evaluation. FINDINGS: The osseous structures appear osteopenic. No convincing acute abnormality seen of the bony pelvis. Degenerative changes of the lumbar spine, sacroiliac joints and hips bilaterally. Vascular calcifications are noted. Osteopenia without convincing acute abnormality of the bony pelvis. Xr Shoulder Right (min 2 Views)    Result Date: 4/6/2019  EXAMINATION: 3 XRAY VIEWS OF THE RIGHT SHOULDER; AP AND LATERAL XRAY VIEWS OF THE RIGHT FOREARM; 3 XRAY VIEWS OF THE RIGHT ELBOW 4/6/2019 3:10 am COMPARISON: None. HISTORY: ORDERING SYSTEM PROVIDED HISTORY: trauma TECHNOLOGIST PROVIDED HISTORY: trauma FINDINGS: Right shoulder: Three views of the right shoulder. No acute fracture or dislocation. Soft tissue swelling about the shoulder. Normal alignment of the glenohumeral and acromioclavicular joints. No aggressive skeletal lesion. Visualized lung is clear. Visualized ribs are intact. Osteopenia. Right elbow: Frontal, lateral and oblique views. Soft tissue swelling about the elbow. No radiographic evidence of an elbow joint effusion. No acute fracture or malalignment. Mild ulnohumeral compartment DJD. Osteopenia. Right forearm: Frontal and lateral views. Soft tissue swelling about the forearm.   No acute fracture or malalignment. Mild-to-moderate DJD in the wrist.  Osteopenia. No acute fracture in the right shoulder, right elbow or right forearm. Osteopenia. Mild DJD in the right elbow and mild-to-moderate DJD in the right wrist.     Xr Elbow Right (min 3 Views)    Result Date: 4/6/2019  EXAMINATION: 3 XRAY VIEWS OF THE RIGHT SHOULDER; AP AND LATERAL XRAY VIEWS OF THE RIGHT FOREARM; 3 XRAY VIEWS OF THE RIGHT ELBOW 4/6/2019 3:10 am COMPARISON: None. HISTORY: ORDERING SYSTEM PROVIDED HISTORY: trauma TECHNOLOGIST PROVIDED HISTORY: trauma FINDINGS: Right shoulder: Three views of the right shoulder. No acute fracture or dislocation. Soft tissue swelling about the shoulder. Normal alignment of the glenohumeral and acromioclavicular joints. No aggressive skeletal lesion. Visualized lung is clear. Visualized ribs are intact. Osteopenia. Right elbow: Frontal, lateral and oblique views. Soft tissue swelling about the elbow. No radiographic evidence of an elbow joint effusion. No acute fracture or malalignment. Mild ulnohumeral compartment DJD. Osteopenia. Right forearm: Frontal and lateral views. Soft tissue swelling about the forearm. No acute fracture or malalignment. Mild-to-moderate DJD in the wrist.  Osteopenia. No acute fracture in the right shoulder, right elbow or right forearm. Osteopenia. Mild DJD in the right elbow and mild-to-moderate DJD in the right wrist.     Xr Radius Ulna Right (2 Views)    Result Date: 4/6/2019  EXAMINATION: 3 XRAY VIEWS OF THE RIGHT SHOULDER; AP AND LATERAL XRAY VIEWS OF THE RIGHT FOREARM; 3 XRAY VIEWS OF THE RIGHT ELBOW 4/6/2019 3:10 am COMPARISON: None. HISTORY: ORDERING SYSTEM PROVIDED HISTORY: trauma TECHNOLOGIST PROVIDED HISTORY: trauma FINDINGS: Right shoulder: Three views of the right shoulder. No acute fracture or dislocation. Soft tissue swelling about the shoulder. Normal alignment of the glenohumeral and acromioclavicular joints. No aggressive skeletal lesion. Visualized lung is clear. Visualized ribs are intact. Osteopenia. Right elbow: Frontal, lateral and oblique views. Soft tissue swelling about the elbow. No radiographic evidence of an elbow joint effusion. No acute fracture or malalignment. Mild ulnohumeral compartment DJD. Osteopenia. Right forearm: Frontal and lateral views. Soft tissue swelling about the forearm. No acute fracture or malalignment. Mild-to-moderate DJD in the wrist.  Osteopenia. No acute fracture in the right shoulder, right elbow or right forearm. Osteopenia. Mild DJD in the right elbow and mild-to-moderate DJD in the right wrist.     Ct Head Wo Contrast    Result Date: 4/11/2019  EXAMINATION: CT OF THE HEAD WITHOUT CONTRAST  4/11/2019 5:04 am TECHNIQUE: CT of the head was performed without the administration of intravenous contrast. Dose modulation, iterative reconstruction, and/or weight based adjustment of the mA/kV was utilized to reduce the radiation dose to as low as reasonably achievable. COMPARISON: April 5, 2019. HISTORY: ORDERING SYSTEM PROVIDED HISTORY: ENCEPHALOPATHY TECHNOLOGIST PROVIDED HISTORY: Ordering Physician Provided Reason for Exam: follow up Acuity: Unknown Type of Exam: Unknown FINDINGS: BRAIN/VENTRICLES: Stable size and appearance of the hyperdense intraparenchymal hematoma centered in the left thalamus with intraventricular extension. Similar appearing surrounding vasogenic edema with mass effect on the left lateral and 3rd ventricles. Interval improved intraventricular hemorrhage with now only a small amount of hemorrhage layering in the occipital horns of the lateral ventricles. Previously noted right frontal approach ventricular drainage catheter has been removed. Small amount of hyperdense blood along the catheter tract. No progressive hydrocephalus. Previous pneumocephalus has resolved. No significant midline shift. The basal cisterns are grossly patent. No large territorial infarct. Atherosclerosis of the intracranial vertebral and internal carotid arteries. Mild generalized cerebral and cerebellar volume loss. ORBITS: The visualized portion of the orbits demonstrate no acute abnormality. SINUSES: The visualized paranasal sinuses and mastoid air cells demonstrate no acute abnormality. SOFT TISSUES/SKULL:  No acute abnormality of the visualized skull or soft tissues. Stable size and appearance of the intraparenchymal hematoma centered in the left thalamus with mild surrounding vasogenic edema and mass effect. Compared to CT head done April 5, 2019 there is improvement of previously noted intraventricular hemorrhage with no on the a small amount of hemorrhage layering in the occipital horns of the lateral ventricles. Interval removal of the right frontal approach ventricular drainage catheter. No progressive hydrocephalus. Trace blood along the catheter tract. Ct Head Wo Contrast    Result Date: 4/6/2019  EXAMINATION: CT OF THE HEAD WITHOUT CONTRAST  4/5/2019 11:11 pm TECHNIQUE: CT of the head was performed without the administration of intravenous contrast. Dose modulation, iterative reconstruction, and/or weight based adjustment of the mA/kV was utilized to reduce the radiation dose to as low as reasonably achievable. COMPARISON: CT head 04/05/2029 HISTORY: ORDERING SYSTEM PROVIDED HISTORY: f/u [facility] s/p EVD placement TECHNOLOGIST PROVIDED HISTORY: Ordering Physician Provided Reason for Exam: EVD placement follow up Acuity: Unknown Type of Exam: Subsequent/Follow-up FINDINGS: BRAIN/VENTRICLES: Interval placement of right frontal approach external ventricular drainage device. Tip terminates within the frontal horn left lateral ventricle near the caudal thalamic groove. Interval pneumocephalus identified consistent with interval placement.   Redemonstration of the left thalamic intraparenchymal hemorrhage measuring 3.6 x 2.5 cm in size with evidence of subarachnoid TISSUES:  Preseptal swelling of the right face extending superiorly along the supraorbital ridge into the right frontal scalp. Asymmetric swelling of the lateral face on the right compared with the left. No fluid collection is identified. No air or unexpected radiopaque foreign bodies. Soft tissue swelling/edema along the upper neck circumferentially right greater than left. The visualized intracranial contents show acute left thalamic intraparenchymal blood products and intraventricular blood products. 1. Bruising of the face without fracture, radiopaque foreign body, or superficial fluid collection. 2. Acute left thalamic hemorrhage with intraventricular extension. This is previously called to Dr. Aravind Rolon by Dr. Jacqui Denney at  on 4/5/19. Ct Cervical Spine Wo Contrast    Result Date: 4/5/2019  EXAMINATION: CT OF THE CERVICAL SPINE WITHOUT CONTRAST 4/5/2019 3:19 pm TECHNIQUE: CT of the cervical spine was performed without the administration of intravenous contrast. Multiplanar reformatted images are provided for review. Dose modulation, iterative reconstruction, and/or weight based adjustment of the mA/kV was utilized to reduce the radiation dose to as low as reasonably achievable. COMPARISON: None. HISTORY: ORDERING SYSTEM PROVIDED HISTORY: trauma FINDINGS: BONES/ALIGNMENT: There is no evidence of an acute cervical spine fracture. There is normal alignment of the cervical spine except moderate dextroconvex scoliosis and reversal of the normal lordotic curvature. .  Decompressive laminectomies at C4 and C5. Posterior fusion rods and pedicular screws at C3, 4, 5 and 6 on the right and C5 and 6 on the left. DEGENERATIVE CHANGES: Multilevel disc space narrowing. SOFT TISSUES: There is no prevertebral soft tissue swelling. No acute abnormality of the cervical spine. Posterior interbody fixation as above.      Ct Thoracic Spine Wo Contrast    Result Date: 4/5/2019  EXAMINATION: CT OF THE THORACIC SPINE WITHOUT CONTRAST  4/5/2019 3:18 pm: TECHNIQUE: CT of the thoracic spine was performed without the administration of intravenous contrast. Multiplanar reformatted images are provided for review. Dose modulation, iterative reconstruction, and/or weight based adjustment of the mA/kV was utilized to reduce the radiation dose to as low as reasonably achievable. COMPARISON: None. HISTORY: ORDERING SYSTEM PROVIDED HISTORY: trauma FINDINGS: BONES/ALIGNMENT: There is normal alignment of the spine. The vertebral body heights are maintained. No osseous destructive lesion is seen. DEGENERATIVE CHANGES: No gross spinal canal stenosis or bony neural foraminal narrowing of the thoracic spine. SOFT TISSUES: No paraspinal mass is seen. Unremarkable CT of the thoracic spine. Ct Lumbar Spine Wo Contrast    Result Date: 4/5/2019  EXAMINATION: CT OF THE LUMBAR SPINE WITHOUT CONTRAST  4/5/2019 TECHNIQUE: CT of the lumbar spine was performed without the administration of intravenous contrast. Multiplanar reformatted images are provided for review. Dose modulation, iterative reconstruction, and/or weight based adjustment of the mA/kV was utilized to reduce the radiation dose to as low as reasonably achievable. COMPARISON: None HISTORY: ORDERING SYSTEM PROVIDED HISTORY: trauma TECHNOLOGIST PROVIDED HISTORY: trauma FINDINGS: BONES/ALIGNMENT: There is minimal compression deformity of L4 vertebral body with less than 20% height loss/wedging anteriorly. Multilevel degenerative disc disease is evident. The facets are intact. The spinous processes are intact. Lamina and pedicles are intact. There is evidence of prior posterior decompression. DEGENERATIVE CHANGES: Multilevel degenerative disc disease with facet arthropathy. SOFT TISSUES/RETROPERITONEUM: Diverticulosis. Atherosclerotic changes of aorta. Large stool throughout the colon. An avidly enhancing dilated left ovarian vessel is noted.      1. Age-indeterminate compression deformity of L4 with less than 20% height loss and no significant retropulsion. 2. Osteopenic skeleton with multilevel degenerative changes. Xr Chest Portable    Result Date: 4/11/2019  EXAMINATION: SINGLE XRAY VIEW OF THE CHEST 4/11/2019 6:48 am COMPARISON: April 6, 2019 HISTORY: ORDERING SYSTEM PROVIDED HISTORY: tachypnic, fever, rule out aspiration ? TECHNOLOGIST PROVIDED HISTORY: tachypnic, fever, rule out aspiration ? FINDINGS: Cardiac monitoring leads overlie the chest.  Borderline cardiomegaly. Trace left effusion. No pneumothorax. No airspace consolidation. Trace left effusion. Xr Chest Portable    Result Date: 4/6/2019  EXAMINATION: SINGLE XRAY VIEW OF THE CHEST 4/6/2019 5:27 am COMPARISON: 04/05/2019 HISTORY: ORDERING SYSTEM PROVIDED HISTORY: Concern for aspiration TECHNOLOGIST PROVIDED HISTORY: Concern for aspiration FINDINGS: Cardiomediastinal silhouette and pulmonary vasculature are within normal limits. There is no focal airspace consolidation. No evidence of pneumothorax or pleural effusion on this limited supine study. No acute osseous abnormality. No acute intrathoracic process. Xr Chest Portable    Result Date: 4/5/2019  EXAMINATION: SINGLE XRAY VIEW OF THE CHEST 4/5/2019 3:24 pm COMPARISON: None. HISTORY: ORDERING SYSTEM PROVIDED HISTORY: trauma TECHNOLOGIST PROVIDED HISTORY: trauma FINDINGS: Slight increase in interstitial markings. The lungs are without acute focal process. There is no effusion or pneumothorax. The cardiomediastinal silhouette is without acute process. The osseous structures are without acute process. No fracture or pneumothorax. Increased interstitial markings     Cta Neck W Contrast    Result Date: 4/5/2019  EXAMINATION: CTA OF THE HEAD WITH CONTRAST; CTA OF THE NECK 4/5/2019 3:32 pm: TECHNIQUE: CTA of the head/brain was performed with the administration of intravenous contrast. Multiplanar reformatted images are provided for review.   MIP images are provided for review. Dose modulation, iterative reconstruction, and/or weight based adjustment of the mA/kV was utilized to reduce the radiation dose to as low as reasonably achievable.; CTA of the neck was performed with the administration of intravenous contrast. Multiplanar reformatted images are provided for review. MIP images are provided for review. Stenosis of the internal carotid arteries measured using NASCET criteria. Dose modulation, iterative reconstruction, and/or weight based adjustment of the mA/kV was utilized to reduce the radiation dose to as low as reasonably achievable. COMPARISON: None. HISTORY: ORDERING SYSTEM PROVIDED HISTORY: SAH, ?hemorrhagic stroke TECHNOLOGIST PROVIDED HISTORY: ; ORDERING SYSTEM PROVIDED HISTORY: SAH, ?hemorrhagic stroke TECHNOLOGIST PROVIDED HISTORY: Ordering Physician Provided Reason for Exam: head bleed/ found down Acuity: Unknown FINDINGS: CTA NECK: AORTIC ARCH/ARCH VESSELS: There is a normal branch pattern of the aortic arch. No significant stenosis is seen of the innominate artery or subclavian arteries. CAROTID ARTERIES: The common carotid arteries are normal in appearance without evidence of a flow limiting stenosis. The internal carotid arteries are normal in appearance without evidence of a flow limiting stenosis by NASCET criteria. No dissection or arterial injury is seen. VERTEBRAL ARTERIES: The vertebral arteries both arise from the subclavian arteries and are normal in caliber without evidence of flow limiting stenosis or dissection. SOFT TISSUES: The lung apices are clear. No cervical or superior mediastinal lymphadenopathy. The visualized portion of the larynx and pharynx appear unremarkable. The parotid, submandibular and thyroid glands demonstrate no acute abnormality. BONES: The visualized osseous structures appear unremarkable. CTA HEAD: ANTERIOR CIRCULATION: The internal carotid arteries are normal in course and caliber without focal stenosis. Heart size is normal. There is no pericardial effusion. No mediastinal or hilar lymph nodes exceed the CT criteria for abnormal enlargement. Lungs/pleura: Clear Soft Tissues/Bones: No fracture identified Abdomen/Pelvis: Organs: The abdominal wall appears normal. Liver appears somewhat nodular. Spleen is enlarged. Gastroesophageal varices are noted. Pancreas appears normal.  The adrenals are normal.  The gallbladder surgically absent. . Kidneys appear normal. The bladder appears normal. GI/Bowel: The stomach,small bowel, and colon appear normal. Appendix is not identified. Pelvis: Uterus appears atrophic. Peritoneum/Retroperitoneum: The abdominal aorta and iliac arteries are normal in caliber. There is no pathologic adenopathy. There is calcified plaque along the aorta and its branches. Bones/Soft Tissues: Normal     No acute traumatic sequelae. Cirrhosis, splenomegaly, and esophageal varices consistent with portal venous hypertension. Cta Head W Contrast    Result Date: 4/5/2019  EXAMINATION: CTA OF THE HEAD WITH CONTRAST; CTA OF THE NECK 4/5/2019 3:32 pm: TECHNIQUE: CTA of the head/brain was performed with the administration of intravenous contrast. Multiplanar reformatted images are provided for review. MIP images are provided for review. Dose modulation, iterative reconstruction, and/or weight based adjustment of the mA/kV was utilized to reduce the radiation dose to as low as reasonably achievable.; CTA of the neck was performed with the administration of intravenous contrast. Multiplanar reformatted images are provided for review. MIP images are provided for review. Stenosis of the internal carotid arteries measured using NASCET criteria. Dose modulation, iterative reconstruction, and/or weight based adjustment of the mA/kV was utilized to reduce the radiation dose to as low as reasonably achievable. COMPARISON: None.  HISTORY: ORDERING SYSTEM PROVIDED HISTORY: SAH, ?hemorrhagic stroke TECHNOLOGIST PROVIDED HISTORY: ; ORDERING SYSTEM PROVIDED HISTORY: SAH, ?hemorrhagic stroke TECHNOLOGIST PROVIDED HISTORY: Ordering Physician Provided Reason for Exam: head bleed/ found down Acuity: Unknown FINDINGS: CTA NECK: AORTIC ARCH/ARCH VESSELS: There is a normal branch pattern of the aortic arch. No significant stenosis is seen of the innominate artery or subclavian arteries. CAROTID ARTERIES: The common carotid arteries are normal in appearance without evidence of a flow limiting stenosis. The internal carotid arteries are normal in appearance without evidence of a flow limiting stenosis by NASCET criteria. No dissection or arterial injury is seen. VERTEBRAL ARTERIES: The vertebral arteries both arise from the subclavian arteries and are normal in caliber without evidence of flow limiting stenosis or dissection. SOFT TISSUES: The lung apices are clear. No cervical or superior mediastinal lymphadenopathy. The visualized portion of the larynx and pharynx appear unremarkable. The parotid, submandibular and thyroid glands demonstrate no acute abnormality. BONES: The visualized osseous structures appear unremarkable. CTA HEAD: ANTERIOR CIRCULATION: The internal carotid arteries are normal in course and caliber without focal stenosis. The anterior cerebral and middle cerebral arteries demonstrate no focal stenosis. POSTERIOR CIRCULATION: The posterior cerebral arteries demonstrate no focal stenosis. The vertebral and basilar arteries appear unremarkable. BRAIN: There is redemonstration of an intraparenchymal hemorrhage within the left thalamic region as well as intraventricular hemorrhagic products. Unremarkable CTA of the head and neck. Critical results were called by Dr. Ingrid Price to Dr. Kobe Bryant on 4/5/2019 at 16:12. Labs and Images reviewed with:  [x] Dr. Rose Dunlap    [] Dr. Author Mora  [] Dr. Hamm November  [] There are no new interval images to review.      PHYSICAL EXAM hydro  - Loaded with Vimpat 200mg IVP x1 then 100mg BID (discussed with Case Management prior to starting med)  - Goal SBP<160  - Neuro checks per protocol     CARDIOVASCULAR:  - Goal SBP<160  - Norvasc 10mg QD, Propranolol 40mg BID (unable to take PO)  - PRN Hydralazine/Labetalol  - Restart Cardene as needed   - Recent Echo 4/5/19 EF 04%, grade I diastolic dysfunction, mild pulmonary HTN  - Continue telemetry     PULMONARY:  - Maintaining O2 sats on room air  - CXR with trace left effusion, no infiltrate/infectious process  - Maintaining airway, continue to monitor closely     RENAL/FLUID/ELECTROLYTE:  - Normal renal functioning  - BUN 23/ creatinine 0.44  - Monitor urine output; incontinent  - Total fluids 50cc/hr  - Hypokalemia, K 3.0. Continue hypokalemia protocol.   - Replace electrolytes PRN  - Daily BMP     GI/NUTRITION:  NUTRITION:  DIET GENERAL;   - History of Hepatitis C, thought to be secondary to blood transfusions  - Cirrhosis  - Hyperammonemia  - Continue Lactulose 20g TID; titrate to three BM per day  - US Abdomen showed small amount of moderate abdominal ascites throughout bilateral abdomen  - GI Consulted in setting of cirrhosis, hx esophageal varices, ascites  - IR performed paracentesis w/ 700 ml light yellow fluid obtained from LLQ. No bacteria on initial culture. WBC 31. Low concern for SBP at this time. Awaiting remainder of fluid analysis and culture. - Bowel regimen: Milk of magnesia PRN  - GI prophylaxis: Pepcid     ID:  - Fever last night around 38.7C  - WBC 8.5 trending down  - UA positive nitrite, moderate LE; suggestive of UTI  - Follow urine culture: No growth x 1 day   - Continue Rocephin for UTI  - Blood cultures with no growth   - Paracentesis fluid analysis shows WBC 31.    - Continue to monitor for fevers  - Daily CBC     HEME:   - H&H 9.9/31.0  - Platelets 92.  Monitor.   - Daily CBC     ENDOCRINE:  - Continue to monitor blood glucose, goal <180  - Glucose well controlled     OTHER:  - PT/OT/ST  - Code Status: DNR-CCA, no intubation    DISPOSITION:  [x] To remain ICU for close neurological monitoring. We will continue to follow along. For any changes in exam or patient status please contact Neuro Critical Care. Hayden Gentile MD  Neuro Critical Care  Pager [phone]  4/14/2019     9:15 AM               Neuro critical care:      Improved neuro exam through the day on 4/13, she started taking PO. Rocephin is continued for UTI, no leucocytosis today. Ammonia level normalized. Continue lactulose. No signs of SBP.    Lovenox sc   GI prophylaxis      ASHTYN Worthington MD

## 2019-04-14 NOTE — PLAN OF CARE
Problem: HEMODYNAMIC STATUS  Goal: Patient has stable vital signs and fluid balance  4/14/2019 1825 by Fabian Sheffield RN  Outcome: Ongoing  4/14/2019 0521 by Leonarda Tanner RN  Outcome: Met This Shift  Note:   Blood pressure remains within ordered parameters. Will continue to monitor. Problem: ACTIVITY INTOLERANCE/IMPAIRED MOBILITY  Goal: Mobility/activity is maintained at optimum level for patient  Outcome: Ongoing     Problem: COMMUNICATION IMPAIRMENT  Goal: Ability to express needs and understand communication  Outcome: Ongoing     Problem: Falls - Risk of:  Goal: Will remain free from falls  Description  Will remain free from falls  Outcome: Ongoing  Goal: Absence of physical injury  Description  Absence of physical injury  Outcome: Ongoing     Problem: Risk for Impaired Skin Integrity  Goal: Tissue integrity - skin and mucous membranes  Description  Structural intactness and normal physiological function of skin and  mucous membranes. 4/14/2019 1825 by Fabian Sheffield RN  Outcome: Ongoing  4/14/2019 0521 by Leonarda Tanner RN  Outcome: Met This Shift  Note:   Skin assessed for new breakdown and redness, patient turned every 2 hours, preventative mepilex remains in place, heels and elbows elevated off of bed into pillows.       Problem: Nutrition  Goal: Optimal nutrition therapy  Outcome: Ongoing     Problem: Neurological  Goal: Maximum potential motor/sensory/cognitive function  Outcome: Ongoing     Problem: Musculor/Skeletal Functional Status  Goal: Highest potential functional level  Outcome: Ongoing

## 2019-04-14 NOTE — PROGRESS NOTES
THE [facility] [place] [facility] Progress Note    Yovany Best is a 66 y.o. female patient. Hospitalization Day:9      Chief consult reason:   Ascites    Subjective:  Pt seen and examined. Pt much more alert today, up in chair and interacting with grandson. Pt mumbles answers and attempts to interact. Pt denies any pain. Staff report 5 BM in the last 24 hrs  Abdomen does feel distended more than yesterday and slightly firm. VITALS:  BP (!) 153/51   Pulse 69   Temp 97.7 °F (36.5 °C) (Axillary)   Resp 21   Ht 5' 5\" (1.651 m)   Wt 110 lb 1.6 oz (49.9 kg)   SpO2 95%   BMI 18.32 kg/m²   TEMPERATURE:  Current - Temp: 97.7 °F (36.5 °C); Max - Temp  Av.9 °F (37.2 °C)  Min: 97.4 °F (36.3 °C)  Max: 101.7 °F (38.7 °C)    Physical Assessment:  General appearance:  frail  Mental Status:  MARYAN-pt non-verbal  Lungs: dm in bases  Heart:  regular rate and rhythm, no murmur  Abdomen:  soft, nontender, nondistended, normal bowel sounds, no masses, splenomegaly  Extremities:  no edema, redness, tenderness in the calves  Skin:  no gross lesions, rashes, induration    Data Review:  LABS and IMAGING:     CBC  Recent Labs     193 19  0554   WBC 11.2 14.1* 8.5   HGB 11.1* 10.9* 9.9*   MCV 90.5 90.5 94.2   RDW 16.5* 17.1* 17.2*   * 185 See Reflexed IPF Result       ANEMIA STUDIES  No results for input(s): LABIRON, TIBC, FERRITIN, [id number], FOLATE, OCCULTBLD in the last 72 hours.     BMP  Recent Labs     19  0413 19  0554    136 136   K 3.3* 3.6* 3.0*   * 109* 108*   CO2 15* 15* 17*   BUN 18 24* 23   CREATININE 0.36* 0.55 0.44*   GLUCOSE 110* 122* 104*   CALCIUM 8.5* 8.6 8.2*       LFTS  Recent Labs     19  1510   ALKPHOS 81   ALT 20   AST 28   BILITOT 1.05   BILIDIR 0.29   LABALBU 3.4*       AMYLASE/LIPASE/AMMONIA  Recent Labs     19  0819 19  1239   AMMONIA 52* 46       Acute Hepatitis Panel   No results found for: HEPBSAG, HEPCAB,

## 2019-04-15 LAB
ABSOLUTE EOS #: 0.27 K/UL (ref 0–0.44)
ABSOLUTE IMMATURE GRANULOCYTE: 0.06 K/UL (ref 0–0.3)
ABSOLUTE LYMPH #: 1.3 K/UL (ref 1.1–3.7)
ABSOLUTE MONO #: 1.23 K/UL (ref 0.1–1.2)
AMMONIA: 42 UMOL/L (ref 11–51)
ANION GAP SERPL CALCULATED.3IONS-SCNC: 7 MMOL/L (ref 9–17)
ANTI-NUCLEAR ANTIBODY (ANA): NEGATIVE
BASOPHILS # BLD: 0 % (ref 0–2)
BASOPHILS ABSOLUTE: <0.03 K/UL (ref 0–0.2)
BUN BLDV-MCNC: 24 MG/DL (ref 8–23)
BUN/CREAT BLD: ABNORMAL (ref 9–20)
CALCIUM SERPL-MCNC: 8.3 MG/DL (ref 8.6–10.4)
CASE NUMBER:: NORMAL
CHLORIDE BLD-SCNC: 108 MMOL/L (ref 98–107)
CMV IGM: 0.5
CO2: 18 MMOL/L (ref 20–31)
CREAT SERPL-MCNC: 0.42 MG/DL (ref 0.5–0.9)
CYTOMEGALOVIRUS IGG ANTIBODY: 29.1
DIFFERENTIAL TYPE: ABNORMAL
DIRECT EXAM: NORMAL
EOSINOPHILS RELATIVE PERCENT: 3 % (ref 1–4)
GFR AFRICAN AMERICAN: >60 ML/MIN
GFR NON-AFRICAN AMERICAN: >60 ML/MIN
GFR SERPL CREATININE-BSD FRML MDRD: ABNORMAL ML/MIN/{1.73_M2}
GFR SERPL CREATININE-BSD FRML MDRD: ABNORMAL ML/MIN/{1.73_M2}
GLUCOSE BLD-MCNC: 112 MG/DL (ref 70–99)
HCT VFR BLD CALC: 31.8 % (ref 36.3–47.1)
HEMOGLOBIN: 10.2 G/DL (ref 11.9–15.1)
IMMATURE GRANULOCYTES: 1 %
LYMPHOCYTES # BLD: 13 % (ref 24–43)
Lab: NORMAL
MAGNESIUM: 1.9 MG/DL (ref 1.6–2.6)
MCH RBC QN AUTO: 29.5 PG (ref 25.2–33.5)
MCHC RBC AUTO-ENTMCNC: 32.1 G/DL (ref 28.4–34.8)
MCV RBC AUTO: 91.9 FL (ref 82.6–102.9)
MITOCHONDRIAL ANTIBODY: 3.4 UNITS (ref 0–20)
MONOCYTES # BLD: 12 % (ref 3–12)
NRBC AUTOMATED: 0 PER 100 WBC
PDW BLD-RTO: 17.3 % (ref 11.8–14.4)
PLATELET # BLD: ABNORMAL K/UL (ref 138–453)
PLATELET ESTIMATE: ABNORMAL
PLATELET, FLUORESCENCE: 107 K/UL (ref 138–453)
PLATELET, IMMATURE FRACTION: 7.6 % (ref 1.1–10.3)
PMV BLD AUTO: ABNORMAL FL (ref 8.1–13.5)
POTASSIUM SERPL-SCNC: 3.4 MMOL/L (ref 3.7–5.3)
PROCALCITONIN: 0.11 NG/ML
RBC # BLD: 3.46 M/UL (ref 3.95–5.11)
RBC # BLD: ABNORMAL 10*6/UL
SEG NEUTROPHILS: 71 % (ref 36–65)
SEGMENTED NEUTROPHILS ABSOLUTE COUNT: 7.08 K/UL (ref 1.5–8.1)
SMOOTH MUSCLE ANTIBODY: 17 UNITS (ref 0–19)
SODIUM BLD-SCNC: 133 MMOL/L (ref 135–144)
SPECIMEN DESCRIPTION: NORMAL
SPECIMEN DESCRIPTION: NORMAL
SPECIMEN TYPE: NORMAL
TOTAL PROTEIN, BODY FLUID: <1 G/DL
WBC # BLD: 10 K/UL (ref 3.5–11.3)
WBC # BLD: ABNORMAL 10*3/UL

## 2019-04-15 PROCEDURE — 84145 PROCALCITONIN (PCT): CPT

## 2019-04-15 PROCEDURE — 2580000003 HC RX 258: Performed by: NURSE PRACTITIONER

## 2019-04-15 PROCEDURE — 85055 RETICULATED PLATELET ASSAY: CPT

## 2019-04-15 PROCEDURE — 99232 SBSQ HOSP IP/OBS MODERATE 35: CPT | Performed by: FAMILY MEDICINE

## 2019-04-15 PROCEDURE — 92507 TX SP LANG VOICE COMM INDIV: CPT

## 2019-04-15 PROCEDURE — APPNB45 APP NON BILLABLE 31-45 MINUTES: Performed by: NURSE PRACTITIONER

## 2019-04-15 PROCEDURE — 6370000000 HC RX 637 (ALT 250 FOR IP): Performed by: NURSE PRACTITIONER

## 2019-04-15 PROCEDURE — 80048 BASIC METABOLIC PNL TOTAL CA: CPT

## 2019-04-15 PROCEDURE — C9254 INJECTION, LACOSAMIDE: HCPCS | Performed by: PSYCHIATRY & NEUROLOGY

## 2019-04-15 PROCEDURE — 2580000003 HC RX 258: Performed by: PSYCHIATRY & NEUROLOGY

## 2019-04-15 PROCEDURE — 94762 N-INVAS EAR/PLS OXIMTRY CONT: CPT

## 2019-04-15 PROCEDURE — 97530 THERAPEUTIC ACTIVITIES: CPT

## 2019-04-15 PROCEDURE — 83735 ASSAY OF MAGNESIUM: CPT

## 2019-04-15 PROCEDURE — 6360000002 HC RX W HCPCS: Performed by: PSYCHIATRY & NEUROLOGY

## 2019-04-15 PROCEDURE — 85025 COMPLETE CBC W/AUTO DIFF WBC: CPT

## 2019-04-15 PROCEDURE — 2580000003 HC RX 258: Performed by: EMERGENCY MEDICINE

## 2019-04-15 PROCEDURE — 93005 ELECTROCARDIOGRAM TRACING: CPT

## 2019-04-15 PROCEDURE — 97110 THERAPEUTIC EXERCISES: CPT

## 2019-04-15 PROCEDURE — 2500000003 HC RX 250 WO HCPCS: Performed by: EMERGENCY MEDICINE

## 2019-04-15 PROCEDURE — 6370000000 HC RX 637 (ALT 250 FOR IP): Performed by: STUDENT IN AN ORGANIZED HEALTH CARE EDUCATION/TRAINING PROGRAM

## 2019-04-15 PROCEDURE — 92526 ORAL FUNCTION THERAPY: CPT

## 2019-04-15 PROCEDURE — APPNB15 APP NON BILLABLE TIME 0-15 MINS: Performed by: NURSE PRACTITIONER

## 2019-04-15 PROCEDURE — 99233 SBSQ HOSP IP/OBS HIGH 50: CPT | Performed by: INTERNAL MEDICINE

## 2019-04-15 PROCEDURE — 2000000003 HC NEURO ICU R&B

## 2019-04-15 PROCEDURE — 6370000000 HC RX 637 (ALT 250 FOR IP): Performed by: EMERGENCY MEDICINE

## 2019-04-15 PROCEDURE — 6370000000 HC RX 637 (ALT 250 FOR IP): Performed by: PSYCHIATRY & NEUROLOGY

## 2019-04-15 PROCEDURE — 99233 SBSQ HOSP IP/OBS HIGH 50: CPT | Performed by: PSYCHIATRY & NEUROLOGY

## 2019-04-15 PROCEDURE — 6360000002 HC RX W HCPCS

## 2019-04-15 PROCEDURE — 51701 INSERT BLADDER CATHETER: CPT

## 2019-04-15 PROCEDURE — 51798 US URINE CAPACITY MEASURE: CPT

## 2019-04-15 PROCEDURE — 36415 COLL VENOUS BLD VENIPUNCTURE: CPT

## 2019-04-15 PROCEDURE — 82140 ASSAY OF AMMONIA: CPT

## 2019-04-15 PROCEDURE — 6360000002 HC RX W HCPCS: Performed by: NURSE PRACTITIONER

## 2019-04-15 RX ORDER — POTASSIUM CHLORIDE 1.5 G/1.77G
60 POWDER, FOR SOLUTION ORAL ONCE
Status: COMPLETED | OUTPATIENT
Start: 2019-04-15 | End: 2019-04-15

## 2019-04-15 RX ORDER — ATROPINE SULFATE 0.4 MG/ML
0.5 AMPUL (ML) INJECTION ONCE
Status: DISCONTINUED | OUTPATIENT
Start: 2019-04-15 | End: 2019-04-18

## 2019-04-15 RX ORDER — FUROSEMIDE 20 MG/1
20 TABLET ORAL DAILY
Status: DISCONTINUED | OUTPATIENT
Start: 2019-04-15 | End: 2019-04-18 | Stop reason: HOSPADM

## 2019-04-15 RX ORDER — SPIRONOLACTONE 25 MG/1
50 TABLET ORAL DAILY
Status: DISCONTINUED | OUTPATIENT
Start: 2019-04-15 | End: 2019-04-18 | Stop reason: HOSPADM

## 2019-04-15 RX ORDER — PROPRANOLOL HYDROCHLORIDE 20 MG/1
20 TABLET ORAL 2 TIMES DAILY
Status: DISCONTINUED | OUTPATIENT
Start: 2019-04-16 | End: 2019-04-15

## 2019-04-15 RX ORDER — ATROPINE SULFATE 0.1 MG/ML
INJECTION INTRAVENOUS
Status: COMPLETED
Start: 2019-04-15 | End: 2019-04-15

## 2019-04-15 RX ADMIN — SPIRONOLACTONE 50 MG: 25 TABLET ORAL at 10:18

## 2019-04-15 RX ADMIN — RIFAXIMIN 550 MG: 550 TABLET ORAL at 08:39

## 2019-04-15 RX ADMIN — CEFTRIAXONE SODIUM 1 G: 1 INJECTION, POWDER, FOR SOLUTION INTRAMUSCULAR; INTRAVENOUS at 14:46

## 2019-04-15 RX ADMIN — ENOXAPARIN SODIUM 40 MG: 40 INJECTION SUBCUTANEOUS at 08:39

## 2019-04-15 RX ADMIN — PROPRANOLOL HYDROCHLORIDE 40 MG: 40 TABLET ORAL at 08:39

## 2019-04-15 RX ADMIN — LACTULOSE 20 G: 10 SOLUTION ORAL at 08:39

## 2019-04-15 RX ADMIN — LACTULOSE 20 G: 10 SOLUTION ORAL at 21:07

## 2019-04-15 RX ADMIN — DEXTROSE MONOHYDRATE 100 MG: 50 INJECTION, SOLUTION INTRAVENOUS at 10:33

## 2019-04-15 RX ADMIN — RIFAXIMIN 550 MG: 550 TABLET ORAL at 21:07

## 2019-04-15 RX ADMIN — ATROPINE SULFATE 0.5 MG: 0.1 INJECTION PARENTERAL at 12:05

## 2019-04-15 RX ADMIN — Medication 15 ML: at 21:44

## 2019-04-15 RX ADMIN — FAMOTIDINE 20 MG: 10 INJECTION, SOLUTION INTRAVENOUS at 08:39

## 2019-04-15 RX ADMIN — AMLODIPINE BESYLATE 10 MG: 10 TABLET ORAL at 08:39

## 2019-04-15 RX ADMIN — Medication 10 ML: at 21:07

## 2019-04-15 RX ADMIN — Medication 500 MG: at 08:39

## 2019-04-15 RX ADMIN — FUROSEMIDE 20 MG: 20 TABLET ORAL at 10:18

## 2019-04-15 RX ADMIN — POTASSIUM CHLORIDE 60 MEQ: 1.5 POWDER, FOR SOLUTION ORAL at 08:39

## 2019-04-15 NOTE — PROGRESS NOTES
ADDENDUM:    In the context of patients bradycardia, would hold NSBB (propranolol) for cirrhotic reasons until a full cardiac work up is completed. Diffuse ST depressions seen on EKG today may suggest kia-cardiac effusion. I called chemistry to add \"Total Protein\" to ascitic fluid removed on 4/12/19 to evaluate for cardiogenic contribution of recurrent ascites. Will follow up results.

## 2019-04-15 NOTE — PROGRESS NOTES
Palliative Care Progress Note    NAME:  Kael Marx  MEDICAL RECORD NUMBER:  [id number]  AGE: 66 y.o. GENDER: female  : 1940  TODAY'S DATE:  4/15/2019    Reason for Consult:  goals of care  History of Present Illness     The patient is a 66 y.o. Non-/non  female who presents with Fall; Altered Mental Status; and Trauma      Referred to Palliative Care by  ?x Physician   ? Nursing  ? Family Request   ? Other:       She was admitted to the neuro critical care service for Intracranial bleed ([facility] [address] [I62.9]. Her hospital course has been associated with acute CVA The patient has a complicated medical history and has been hospitalized since 2019  3:09 PM.    OVERNIGHT EVENTS:  - Patient had episodes of bradycardia with heart rate in 30s overnight  - Patient was given atropine  - Cardiology has been consulted     BP (!) 124/40   Pulse (!) 40   Temp 97.5 °F (36.4 °C) (Oral)   Resp 20   Ht 5' 5\" (1.651 m)   Wt 110 lb 1.6 oz (49.9 kg)   SpO2 94%   BMI 18.32 kg/m²     Assessment        REVIEW OF SYSTEMS    ?x   UNABLE TO OBTAIN: Patient confused    Constitutional:  ?   Chills   ? Fatigue   ? Fevers   ? Malaise   ? Weight loss   ? Other:     Respiratory:   ?  Cough    ? Shortness of breath    ? Chest pain    ? Other:     Cardiovascular:   ?  Chest pain  ? Dyspnea    ? Exertional chest pressure/discomfort     ? Fatigue      ? Palpitations    ? Syncope   ? Other:     Gastrointestinal:   ?  Abdominal pain   ? Constipation    ? Diarrhea    ? Dysphagia   ? Reflux             ? Vomiting   ? Other:     Genitourinary:  ?  Dysuria     ? Frequency   ? Hematuria   ? Nocturia   ? Urinary incontinence   ? Other:     Musculoskeletal:   ? Back pain    ? Muscle weakness   ? Myalgias    ? Neck pain   ? Stiff joints   ? Other:     Behavioral/Psych:   ? Anxiety    ? Depression     ? Mood swings   ?  Other:     PHYSICAL ASSESSMENT:     General: ?  Oriented x3      ? well appearing care  Communications with primary service  Caregiver support/education  Code status clarified: Full Code  Code status clarified: [facility]  Code status clarified: DNRCCA  Stepwise withdrawal of futile treatments     Principle Problem/Diagnosis:  Intracranial hemorrhage ([facility] [address]    Additional Assessments:  Principal Problem:    Intracranial hemorrhage ([facility] [address]  Active Problems:    Altered mental status    Encounter for palliative care    Acute encephalopathy    History of esophageal varices with bleeding    Acute cystitis without hematuria    Seizure-like activity (HCC)    History of liver disease    Hyperammonemia (HCC)    1- Symptom management/ pain control     Pain Assessment:  Pain is controlled with current analgesics. Medication(s) being used: acetaminophen. Anxiety:  none                          Dyspnea:  none                          Fatigue:  none    We feel the patient symptoms are being controlled. her current regimen is reviewed by myself and discussed with the staff. 2- Goals of care evaluation   The patient goals of care are spiritual needs, remain at home, strengthening relationships, preserve independence/autonomy/control and support for family/caregiver   Goals of care discussed with:    ? Patient independently    ? Patient and Family    ?x Family or Healthcare DPOA independently    ? Unable to discuss with patient, family/DPOA not present    3- Code Status  DNR-CCA    4- Other recommendations  - We will continue to provide comfort and support to the patient and the family    Please call with any palliative questions or concerns. Palliative Care Team is available via perfect serve or via phone. Palliative Care will continue to follow Ms. Maya's care as needed. The note has been dictated by dragon, typing errors may be a possibility     Thank you for allowing Palliative Care to participate in the care of Ms. Maya .        Electronically signed by   Kaylah Mansfield MD  Palliative Care Team  on 4/15/2019 at 12:38 PM    Palliative care office: [phone]

## 2019-04-15 NOTE — PROGRESS NOTES
Speech Language Pathology  Speech Language Pathology  [facility]    Dysphagia Treatment Note    Date: 4/15/2019  Patients Name: Marck Vigil  MRN: [id number]  Diagnosis:   Patient Active Problem List   Diagnosis Code    Intracranial hemorrhage ([facility] [address] I62.9    Altered mental status R41.82    Encounter for palliative care Z51.5    Acute encephalopathy G93.40    History of esophageal varices with bleeding Z87.19    Acute cystitis without hematuria N30.00    Seizure-like activity ([facility] [address] R56.9    History of liver disease Z87.19    Hyperammonemia ([facility] [address] E72.20       Pain: pt denies    Dysphagia Treatment  Treatment time: 10:40-10:50    Subjective: [x] Alert [x] Cooperative     [] Confused     [] Agitated    [] Lethargic    Objective/Assessment:    Pt. Seen for reassessment of swallow function at bedside. RN and pt's daughter note pt has had difficulty with solid foods. Recommend pt downgrade to Dysphagia II Mechanically altered diet with thin liquids as evidenced by no overt s/s of aspiration noted with consistencies tested. Pt's daughter reports that pt has not been able to tolerate soft solid foods such as quesadilla. She also reports pocketing of solids on the R side. Recommend small sips and bites, check for pocketing on Right side, only feed when alert and awake and upright at 90 degrees for all PO intake. Recommend close monitoring for overt/clinical s/s of aspiration and D/C PO intake and complete Modified Barium Swallow Study should they occur.       Puree: WFL  Nectar tsp and straw: WFL  Thin tsp and straw: WFL  Soft solid: Moderately extended mastication noted, WFL        Plan:  [x] Continue  services    [] Discharge from [address] [place] [address] Dr:        Discharge recommendations: [] Inpatient Rehab   [] [place]   [] Outpatient Therapy  [] Follow up at trauma clinic   [x] Other: therapy         Treatment completed by: SRI Pham

## 2019-04-15 NOTE — PROGRESS NOTES
THE [facility] [place] [facility] Progress Note    Kathie Cárdenas is a 66 y.o. female patient. Hospitalization Day:10      Chief consult reason:   Ascites    Subjective:  Pt seen and examined. Pt much more alert today, up in chair and interacting with daughter in law, verbal. Danielle Singh continues to improve. Abdomen has no more distention than yesterday-still slightly firm. VITALS:  BP (!) 146/62   Pulse 73   Temp 98.2 °F (36.8 °C) (Axillary)   Resp 26   Ht 5' 5\" (1.651 m)   Wt 110 lb 1.6 oz (49.9 kg)   SpO2 98%   BMI 18.32 kg/m²   TEMPERATURE:  Current - Temp: 98.2 °F (36.8 °C); Max - Temp  Av.8 °F (37.1 °C)  Min: 98 °F (36.7 °C)  Max: 101.5 °F (38.6 °C)    Physical Assessment:  General appearance:  frail  Mental Status:  Pt is slightly verbal now  Lungs: dm in bases  Heart:  regular rate and rhythm, no murmur  Abdomen:  soft, nontender, slightly distended, normal bowel sounds, no masses, splenomegaly  Extremities:  no edema, redness, tenderness in the calves  Skin:  no gross lesions, rashes, induration    Data Review:  LABS and IMAGING:     CBC  Recent Labs     193 19  0554 04/15/19  0440   WBC 14.1* 8.5 10.0   HGB 10.9* 9.9* 10.2*   MCV 90.5 94.2 91.9   RDW 17.1* 17.2* 17.3*    See Reflexed IPF Result See Reflexed IPF Result       ANEMIA STUDIES  No results for input(s): LABIRON, TIBC, FERRITIN, CPYVENCA49, FOLATE, OCCULTBLD in the last 72 hours.     BMP  Recent Labs     19  0413 19  0554 04/15/19  0440    136 133*   K 3.6* 3.0* 3.4*   * 108* 108*   CO2 15* 17* 18*   BUN 24* 23 24*   CREATININE 0.55 0.44* 0.42*   GLUCOSE 122* 104* 112*   CALCIUM 8.6 8.2* 8.3*       LFTS  Recent Labs     19  1510   ALKPHOS 81   ALT 20   AST 28   BILITOT 1.05   BILIDIR 0.29   LABALBU 3.4*       AMYLASE/LIPASE/AMMONIA  Recent Labs     19  1239   AMMONIA 46       Acute Hepatitis Panel   No results found for: HEPBSAG, HEPCAB, HEPBIGM, HEPAIGM    HCV Genotype   No results found for: HEPATITISCGENOTYPE    HCV Quantitative   No results found for: HCVQNT    LIVER WORK UP:    AFP  Lab Results   Component Value Date    AFP 2.1 04/12/2019       Alpha 1 antitrypsin   No results found for: A1A    Anti - Liver/Kidney Ab  No results found for: LIVER-KIDNEYMICROSOMALAB    VONDA  No results found for: VONDA    AMA  No results found for: Ramez Conway    ASMA  No results found for: SMOOTHMUSCAB    Ceruloplasmin  Lab Results   Component Value Date    CERULOPLSM 29 04/14/2019       Celiac panel  No results found for: TISSTRNTIIGG, TTGIGA, IGA    PT/INR  Recent Labs     04/12/19  1510   PROTIME 13.7*   INR 1.3       Cancer Markers:  CEA:    Recent Labs     04/12/19  1700   CEA 3.7     Ca 125:  No results for input(s):  in the last 72 hours. Ca 19-9:   Invalid input(s):   AFP:   Recent Labs     04/12/19  1510   AFP 2.1       Lactic acid:Invalid input(s): LACTIC ACID    Radiology Review:    No results found. Principal Problem:    Intracranial hemorrhage ([address]  Active Problems:    Altered mental status    Encounter for palliative care    Acute encephalopathy    History of esophageal varices with bleeding    Acute cystitis without hematuria    Seizure-like activity (HCC)    History of liver disease    Hyperammonemia (HCC)  Resolved Problems:    * No resolved hospital problems. 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 PMH includes h/o Hep C, cirrhosis, esophageal varices s/p banding 2017. Abdomen today is slightly distended and firm. -CEA 3.7, AFP 2.1     Ascites-most likely related to decompensated cirrhosis-could be secondary to UTI. Mentation is better today. Plan of care:  1. Cont with Lactulose enemas and/or oral route daily to produce 3-4 bm's daily  2. Cont with PO meds at this time. Will start Lasix 20 mh po and Aldactone 50 mg po  3. Will consider repeat paracentesis tomorrow if diuretics show no improvement.    4. Will follow up on outstanding additional serologies  5. Will cont to monitor    Thank you for allowing me to participate in the care of your patient. Please feel free to contact me with any questions or concerns.      Hanny Cazares, [address] [facility]  [phone]

## 2019-04-15 NOTE — PROGRESS NOTES
Daily Progress Note  Neuro Critical Care    Patient Name: Melanie Nunez  Patient : 1940  Room/Bed: 22/0522-01  Allergies: No Known Allergies  Problem List:   Patient Active Problem List   Diagnosis    Intracranial hemorrhage ([address]    Altered mental status    Encounter for palliative care    Acute encephalopathy    History of esophageal varices with bleeding    Acute cystitis without hematuria    Seizure-like activity ([address]    History of liver disease    Hyperammonemia ([address]       CHIEF COMPLAINT:     Altered mental status     INTERVAL HISTORY    Initial Presentation (Admitted 19): The patient is a 66 y. o. female with a history of Hepatitis C suspected to be from previous blood transfusion, cirrhosis, and esophageal varices s/p banding May 2017 who initially presented as a trauma alert on 19 after being found down at home. LKW was ~24h prior to arrival. [facility] revealed left thalamic IPH with intraventricular extension into lateral, 3rd and 4th ventricles.  Trauma survey otherwise unremarkable except age indeterminate L2 compression fracture.  Initially evaluated by Trauma, but IPH thought to be spontaneous rather than traumatic.  Neuro Critical Care and Neurosurgery consulted.  Admitted to the Neuro ICU.  EVD placed at bedside.       ICH score: 1     Hospital Course:   :  Cardene gtt weaned off around midnight, -140s. EVD output 27ml/24hr. Required PRN fentanyl for pain overnight. :  Placed on BiPAP overnight due to accessory muscle use.  Sp02 stable, maintaining secretions.  BP stable, Cardene stopped.  Mentation gradually improving.  EVD pulled out accidentally.    :  Borderline hypertension overnight.  Started Imdur yesterday.  Lopressor, hydralazine, labetalol given PRN.  On room air overnight w/ stable Sp02.  Single episode of elevated temp at 38.0. Pt otherwise afebrile.  Home medications reconciled.  Started on Propranolol.  Doing well sitting at side of bed working with therapy, more verbal with improved speech.  Transferred to Stepdown status under Internal Medicine. 4/11:  Worsening mentation; Internal Medicine transferred patient back to Neuro ICU and Neuro Critical Care. Repeat CT Head stable.  Febrile overnight, Tmax 102F.  CXR with trace left infusion, no infectious process.  Blood cultures sent.  UA positive nitrite, moderate LE.  Started on Rocephin for UTI. STAT LTME ordered to r/o seizures.  Ammonia 45 (down from 125 with Lactulose). 4/12: Mild improvement in mentation; more alert but remains altered. Left foot rhythmic tapping; no direct correlation on LTME.  LTME showing right and left mid temporal sharp waves conferred an increase risk for focal onset seizures. Given 200mg IV Vimpat then continued on 100mg BID. GI consulted; paracentesis 700ml from LLQ.    4/13: Paracentesis studies not suggestive of SBP.  4/14: Improvement in mentation.       Last 24h:   Transient episode of bradycardia with HR briefly in the high 30's overnight. Having multiple >3-4 BM's per day with Lactulose, evening dose held per nursing. Requiring intermittent straight cath for acute retention. On exam this morning, patient is alert. She is more interactive than previously; able to tell me she has 2 sons and tries to tell me their names. Remains confused and has difficulty following commands. Right upper extremity with extension to pain. Right lower extremity withdraws to pain. This afternoon, patient developed new onset bradycardia with HR as low as 35 and persisting in the 30-40's. Patient appeared pale and less interactive. SBP remained stable 110-120's. Given Atropine 0.5mg IVP x1 with mild improvement in HR to mid to high 40's.     CURRENT MEDICATIONS:  SCHEDULED MEDICATIONS:   potassium chloride  60 mEq Oral Once    vitamin B-1  500 mg Oral Daily    rifaximin  550 mg Oral BID    lacosamide (VIMPAT) IVPB  100 mg Intravenous Q12H    amLODIPine  10 mg Oral Daily    cefTRIAXone (ROCEPHIN) IV  1 g Intravenous Q24H    lactulose  20 g Oral TID    propranolol  40 mg Oral BID    enoxaparin  40 mg Subcutaneous Daily    sodium chloride flush  10 mL Intravenous 2 times per day    famotidine (PEPCID) injection  20 mg Intravenous Daily    chlorhexidine  15 mL Mouth/Throat BID     CONTINUOUS INFUSIONS:   sodium chloride 50 mL/hr at 19     PRN MEDICATIONS:   acetaminophen, hydrALAZINE, labetalol, potassium chloride **OR** potassium alternative oral replacement **OR** potassium chloride, fentanNYL, sodium chloride flush, magnesium hydroxide, ondansetron, oxyCODONE    VITALS:  Temperature Range: Temp: 98.6 °F (37 °C) Temp  Av.7 °F (37.1 °C)  Min: 97.7 °F (36.5 °C)  Max: 101.5 °F (38.6 °C)  BP Range: Systolic (24FIO), CFY:998 , Min:114 , XKS:423     Diastolic (65XZR), ZYF:54, Min:34, Max:145    Pulse Range: Pulse  Av.3  Min: 52  Max: 86  Respiration Range: Resp  Av.9  Min: 21  Max: 31  Current Pulse Ox: SpO2: 95 %  24HR Pulse Ox Range: SpO2  Av.5 %  Min: 95 %  Max: 98 %  Patient Vitals for the past 12 hrs:   BP Temp Temp src Pulse Resp SpO2   04/15/19 0605 (!) 145/46 -- -- 72 25 95 %   04/15/19 0505 (!) 139/45 -- -- 73 26 96 %   04/15/19 0405 (!) 136/43 98.6 °F (37 °C) Oral 83 29 95 %   04/15/19 0305 (!) 129/48 -- -- 78 29 95 %   04/15/19 0205 (!) 130/59 -- -- 86 30 95 %   04/15/19 0105 (!) 115/42 -- -- 52 25 95 %   04/15/19 0005 (!) 128/34 98.2 °F (36.8 °C) Oral 75 29 95 %   19 2305 (!) 114/51 -- -- 75 26 96 %   19 (!) 121/42 -- -- 72 25 96 %   19 (!) 122/49 -- -- 82 26 95 %   19 (!) 139/50 -- -- 77 -- --   19 (!) 139/50 98 °F (36.7 °C) Oral 79 29 95 %   19 (!) 126/41 -- -- 80 25 --     Estimated body mass index is 18.32 kg/m² as calculated from the following:    Height as of this encounter: 5' 5\" (1.651 m).     Weight as of this encounter: 110 lb 1.6 oz (49.9 kg).  []<16 Severe malnutrition  []16-16.99 Moderate malnutrition  []17-18.49 Mild malnutrition  [x]18.5-24.9 Normal  []25-29.9 Overweight (not obese)  []30-34.9 Obese class 1 (Low Risk)  []35-39.9 Obese class 2 (Moderate Risk)  []?40 Obese class 3 (High Risk)    RECENT LABS:   Lab Results   Component Value Date    WBC 10.0 04/15/2019    HGB 10.2 (L) 04/15/2019    HCT 31.8 (L) 04/15/2019    PLT See Reflexed IPF Result 04/15/2019    ALT 20 04/12/2019    AST 28 04/12/2019     (L) 04/15/2019    K 3.4 (L) 04/15/2019     (H) 04/15/2019    CREATININE 0.42 (L) 04/15/2019    BUN 24 (H) 04/15/2019    CO2 18 (L) 04/15/2019    INR 1.3 04/12/2019     24 HOUR INTAKE/OUTPUT:    Intake/Output Summary (Last 24 hours) at 4/15/2019 0700  Last data filed at 4/15/2019 0415  Gross per 24 hour   Intake 1498 ml   Output 600 ml   Net 898 ml       RADIOLOGY:   Xr Pelvis (1-2 Views)    Result Date: 4/5/2019  EXAMINATION: SINGLE XRAY VIEW OF THE PELVIS 4/5/2019 3:23 pm COMPARISON: None. HISTORY: ORDERING SYSTEM PROVIDED HISTORY: trauma TECHNOLOGIST PROVIDED HISTORY: trauma Initial evaluation. FINDINGS: The osseous structures appear osteopenic. No convincing acute abnormality seen of the bony pelvis. Degenerative changes of the lumbar spine, sacroiliac joints and hips bilaterally. Vascular calcifications are noted. Osteopenia without convincing acute abnormality of the bony pelvis. Xr Shoulder Right (min 2 Views)    Result Date: 4/6/2019  EXAMINATION: 3 XRAY VIEWS OF THE RIGHT SHOULDER; AP AND LATERAL XRAY VIEWS OF THE RIGHT FOREARM; 3 XRAY VIEWS OF THE RIGHT ELBOW 4/6/2019 3:10 am COMPARISON: None. HISTORY: ORDERING SYSTEM PROVIDED HISTORY: trauma TECHNOLOGIST PROVIDED HISTORY: trauma FINDINGS: Right shoulder: Three views of the right shoulder. No acute fracture or dislocation. Soft tissue swelling about the shoulder. Normal alignment of the glenohumeral and acromioclavicular joints. No aggressive skeletal lesion.   Visualized lung is clear.  Visualized ribs are intact. Osteopenia. Right elbow: Frontal, lateral and oblique views. Soft tissue swelling about the elbow. No radiographic evidence of an elbow joint effusion. No acute fracture or malalignment. Mild ulnohumeral compartment DJD. Osteopenia. Right forearm: Frontal and lateral views. Soft tissue swelling about the forearm. No acute fracture or malalignment. Mild-to-moderate DJD in the wrist.  Osteopenia. No acute fracture in the right shoulder, right elbow or right forearm. Osteopenia. Mild DJD in the right elbow and mild-to-moderate DJD in the right wrist.     Xr Elbow Right (min 3 Views)    Result Date: 4/6/2019  EXAMINATION: 3 XRAY VIEWS OF THE RIGHT SHOULDER; AP AND LATERAL XRAY VIEWS OF THE RIGHT FOREARM; 3 XRAY VIEWS OF THE RIGHT ELBOW 4/6/2019 3:10 am COMPARISON: None. HISTORY: ORDERING SYSTEM PROVIDED HISTORY: trauma TECHNOLOGIST PROVIDED HISTORY: trauma FINDINGS: Right shoulder: Three views of the right shoulder. No acute fracture or dislocation. Soft tissue swelling about the shoulder. Normal alignment of the glenohumeral and acromioclavicular joints. No aggressive skeletal lesion. Visualized lung is clear. Visualized ribs are intact. Osteopenia. Right elbow: Frontal, lateral and oblique views. Soft tissue swelling about the elbow. No radiographic evidence of an elbow joint effusion. No acute fracture or malalignment. Mild ulnohumeral compartment DJD. Osteopenia. Right forearm: Frontal and lateral views. Soft tissue swelling about the forearm. No acute fracture or malalignment. Mild-to-moderate DJD in the wrist.  Osteopenia. No acute fracture in the right shoulder, right elbow or right forearm. Osteopenia.  Mild DJD in the right elbow and mild-to-moderate DJD in the right wrist.     Xr Radius Ulna Right (2 Views)    Result Date: 4/6/2019  EXAMINATION: 3 XRAY VIEWS OF THE RIGHT SHOULDER; AP AND LATERAL XRAY VIEWS OF THE RIGHT FOREARM; 3 XRAY VIEWS OF the occipital horns of the lateral ventricles. Previously noted right frontal approach ventricular drainage catheter has been removed. Small amount of hyperdense blood along the catheter tract. No progressive hydrocephalus. Previous pneumocephalus has resolved. No significant midline shift. The basal cisterns are grossly patent. No large territorial infarct. Atherosclerosis of the intracranial vertebral and internal carotid arteries. Mild generalized cerebral and cerebellar volume loss. ORBITS: The visualized portion of the orbits demonstrate no acute abnormality. SINUSES: The visualized paranasal sinuses and mastoid air cells demonstrate no acute abnormality. SOFT TISSUES/SKULL:  No acute abnormality of the visualized skull or soft tissues. Stable size and appearance of the intraparenchymal hematoma centered in the left thalamus with mild surrounding vasogenic edema and mass effect. Compared to CT head done April 5, 2019 there is improvement of previously noted intraventricular hemorrhage with no on the a small amount of hemorrhage layering in the occipital horns of the lateral ventricles. Interval removal of the right frontal approach ventricular drainage catheter. No progressive hydrocephalus. Trace blood along the catheter tract. Ct Head Wo Contrast    Result Date: 4/6/2019  EXAMINATION: CT OF THE HEAD WITHOUT CONTRAST  4/5/2019 11:11 pm TECHNIQUE: CT of the head was performed without the administration of intravenous contrast. Dose modulation, iterative reconstruction, and/or weight based adjustment of the mA/kV was utilized to reduce the radiation dose to as low as reasonably achievable.  COMPARISON: CT head 04/05/2029 HISTORY: ORDERING SYSTEM PROVIDED HISTORY: f/u [facility] s/p EVD placement TECHNOLOGIST PROVIDED HISTORY: Ordering Physician Provided Reason for Exam: EVD placement follow up Acuity: Unknown Type of Exam: Subsequent/Follow-up FINDINGS: BRAIN/VENTRICLES: Interval placement of right frontal approach external ventricular drainage device. Tip terminates within the frontal horn left lateral ventricle near the caudal thalamic groove. Interval pneumocephalus identified consistent with interval placement. Redemonstration of the left thalamic intraparenchymal hemorrhage measuring 3.6 x 2.5 cm in size with evidence of subarachnoid decompression/subarachnoid extension. Evidence of blood products within the lateral 3rd and 4th ventricle identified. Evidence of blood products identified overlying both frontal and parietal lobes and along the interhemispheric falx new from the prior examination but may be related to redistribution of subarachnoid blood products. No shift of midline structures. Basal cisterns are patent. Mild generalized involutional change with chronic small vessel ischemic disease. Intracranial vascular calcifications. . ORBITS: The visualized portion of the orbits demonstrate no acute abnormality. SINUSES: Mild ethmoid sinus mucosal thickening. SOFT TISSUES/SKULL:  No depressed skull fracture. Edema identified overlying the right temporalis muscle may be related to prior trauma or surgery. Interval placement of right frontal approach EVD. Evolving left thalamic intraparenchymal hemorrhage with secondary subarachnoid extension and blood products within the lateral 3rd and 4th ventricles. The blood products identified overlying both frontal lobes parietal lobes and along the interhemispheric falx could be related to placement by EVD versus redistribution of the previously noted subarachnoid blood products. Ct Head Wo Contrast    Result Date: 4/5/2019  EXAMINATION: CT OF THE HEAD WITHOUT CONTRAST  4/5/2019 3:19 pm TECHNIQUE: CT of the head was performed without the administration of intravenous contrast. Dose modulation, iterative reconstruction, and/or weight based adjustment of the mA/kV was utilized to reduce the radiation dose to as low as reasonably achievable. COMPARISON: None. HISTORY: ORDERING SYSTEM PROVIDED HISTORY: trauma TECHNOLOGIST PROVIDED HISTORY: FINDINGS: BRAIN/VENTRICLES: There is an intraparenchymal hemorrhage within the left thalamic region measuring 3.2 x 2.1 x 3.6 cm. There is adjacent vasogenic edema. There are hemorrhagic products within the lateral ventricles bilaterally as well as the 3rd ventricle, cerebral aqueduct, and 4th ventricle. There is minimal rightward midline shift. There is age-appropriate cerebral volume loss. The infratentorial structures are otherwise unremarkable. ORBITS: The visualized portion of the orbits demonstrate no acute abnormality. SINUSES: The visualized paranasal sinuses and mastoid air cells demonstrate no acute abnormality. SOFT TISSUES/SKULL:  There is right-sided soft tissue swelling involving the right frontal, parietal, and temporal region. There is no definite acute osseous abnormalities. Intraparenchymal hemorrhage in the left thalamic region with adjacent vasogenic edema. Hemorrhagic products within the lateral ventricles, 3rd ventricle, cerebral aqueduct, and 4th ventricle. Minimal rightward midline shift. Critical results were called by Dr. Olayinka Pyle to Dr. Berta Coats on 4/5/2019 at 16:02. Ct Facial Bones Wo Contrast    Result Date: 4/5/2019  EXAMINATION: CT OF THE FACE WITHOUT CONTRAST  4/5/2019 3:24 pm TECHNIQUE: CT of the face was performed without the administration of intravenous contrast. Multiplanar reformatted images are provided for review. Dose modulation, iterative reconstruction, and/or weight based adjustment of the mA/kV was utilized to reduce the radiation dose to as low as reasonably achievable. COMPARISON: None HISTORY: ORDERING SYSTEM PROVIDED HISTORY: trauma; significant R facial bruising FINDINGS: FACIAL BONES:  The maxilla, pterygoid plates and zygomatic arches are intact. The mandible is intact. The mandibular condyles are normally situated.   The nasal bones and maxillary nasal processes are intact. ORBITS:  The globes appear intact. The extraocular muscles, optic nerve sheath complexes and lacrimal glands appear unremarkable. No retrobulbar hematoma or mass is seen. The orbital walls and rims are intact. SINUSES/MASTOIDS:  The paranasal sinuses and mastoid air cells are well aerated. No acute fracture is seen. SOFT TISSUES:  Preseptal swelling of the right face extending superiorly along the supraorbital ridge into the right frontal scalp. Asymmetric swelling of the lateral face on the right compared with the left. No fluid collection is identified. No air or unexpected radiopaque foreign bodies. Soft tissue swelling/edema along the upper neck circumferentially right greater than left. The visualized intracranial contents show acute left thalamic intraparenchymal blood products and intraventricular blood products. 1. Bruising of the face without fracture, radiopaque foreign body, or superficial fluid collection. 2. Acute left thalamic hemorrhage with intraventricular extension. This is previously called to Dr. Holley Hedrick by Dr. Gonzalo Everett at  on 4/5/19. Ct Cervical Spine Wo Contrast    Result Date: 4/5/2019  EXAMINATION: CT OF THE CERVICAL SPINE WITHOUT CONTRAST 4/5/2019 3:19 pm TECHNIQUE: CT of the cervical spine was performed without the administration of intravenous contrast. Multiplanar reformatted images are provided for review. Dose modulation, iterative reconstruction, and/or weight based adjustment of the mA/kV was utilized to reduce the radiation dose to as low as reasonably achievable. COMPARISON: None. HISTORY: ORDERING SYSTEM PROVIDED HISTORY: trauma FINDINGS: BONES/ALIGNMENT: There is no evidence of an acute cervical spine fracture. There is normal alignment of the cervical spine except moderate dextroconvex scoliosis and reversal of the normal lordotic curvature. .  Decompressive laminectomies at C4 and C5.   Posterior fusion rods and pedicular screws at C3, 4, 5 and 6 on the right and C5 and 6 on the left. DEGENERATIVE CHANGES: Multilevel disc space narrowing. SOFT TISSUES: There is no prevertebral soft tissue swelling. No acute abnormality of the cervical spine. Posterior interbody fixation as above. Ct Thoracic Spine Wo Contrast    Result Date: 4/5/2019  EXAMINATION: CT OF THE THORACIC SPINE WITHOUT CONTRAST  4/5/2019 3:18 pm: TECHNIQUE: CT of the thoracic spine was performed without the administration of intravenous contrast. Multiplanar reformatted images are provided for review. Dose modulation, iterative reconstruction, and/or weight based adjustment of the mA/kV was utilized to reduce the radiation dose to as low as reasonably achievable. COMPARISON: None. HISTORY: ORDERING SYSTEM PROVIDED HISTORY: trauma FINDINGS: BONES/ALIGNMENT: There is normal alignment of the spine. The vertebral body heights are maintained. No osseous destructive lesion is seen. DEGENERATIVE CHANGES: No gross spinal canal stenosis or bony neural foraminal narrowing of the thoracic spine. SOFT TISSUES: No paraspinal mass is seen. Unremarkable CT of the thoracic spine. Ct Lumbar Spine Wo Contrast    Result Date: 4/5/2019  EXAMINATION: CT OF THE LUMBAR SPINE WITHOUT CONTRAST  4/5/2019 TECHNIQUE: CT of the lumbar spine was performed without the administration of intravenous contrast. Multiplanar reformatted images are provided for review. Dose modulation, iterative reconstruction, and/or weight based adjustment of the mA/kV was utilized to reduce the radiation dose to as low as reasonably achievable. COMPARISON: None HISTORY: ORDERING SYSTEM PROVIDED HISTORY: trauma TECHNOLOGIST PROVIDED HISTORY: trauma FINDINGS: BONES/ALIGNMENT: There is minimal compression deformity of L4 vertebral body with less than 20% height loss/wedging anteriorly. Multilevel degenerative disc disease is evident. The facets are intact. The spinous processes are intact.   Lamina and pedicles are intact. There is evidence of prior posterior decompression. DEGENERATIVE CHANGES: Multilevel degenerative disc disease with facet arthropathy. SOFT TISSUES/RETROPERITONEUM: Diverticulosis. Atherosclerotic changes of aorta. Large stool throughout the colon. An avidly enhancing dilated left ovarian vessel is noted. 1. Age-indeterminate compression deformity of L4 with less than 20% height loss and no significant retropulsion. 2. Osteopenic skeleton with multilevel degenerative changes. Xr Chest Portable    Result Date: 4/11/2019  EXAMINATION: SINGLE XRAY VIEW OF THE CHEST 4/11/2019 6:48 am COMPARISON: April 6, 2019 HISTORY: ORDERING SYSTEM PROVIDED HISTORY: tachypnic, fever, rule out aspiration ? TECHNOLOGIST PROVIDED HISTORY: tachypnic, fever, rule out aspiration ? FINDINGS: Cardiac monitoring leads overlie the chest.  Borderline cardiomegaly. Trace left effusion. No pneumothorax. No airspace consolidation. Trace left effusion. Xr Chest Portable    Result Date: 4/6/2019  EXAMINATION: SINGLE XRAY VIEW OF THE CHEST 4/6/2019 5:27 am COMPARISON: 04/05/2019 HISTORY: ORDERING SYSTEM PROVIDED HISTORY: Concern for aspiration TECHNOLOGIST PROVIDED HISTORY: Concern for aspiration FINDINGS: Cardiomediastinal silhouette and pulmonary vasculature are within normal limits. There is no focal airspace consolidation. No evidence of pneumothorax or pleural effusion on this limited supine study. No acute osseous abnormality. No acute intrathoracic process. Xr Chest Portable    Result Date: 4/5/2019  EXAMINATION: SINGLE XRAY VIEW OF THE CHEST 4/5/2019 3:24 pm COMPARISON: None. HISTORY: ORDERING SYSTEM PROVIDED HISTORY: trauma TECHNOLOGIST PROVIDED HISTORY: trauma FINDINGS: Slight increase in interstitial markings. The lungs are without acute focal process. There is no effusion or pneumothorax. The cardiomediastinal silhouette is without acute process.  The osseous structures are without acute process. No fracture or pneumothorax. Increased interstitial markings     Cta Neck W Contrast    Result Date: 4/5/2019  EXAMINATION: CTA OF THE HEAD WITH CONTRAST; CTA OF THE NECK 4/5/2019 3:32 pm: TECHNIQUE: CTA of the head/brain was performed with the administration of intravenous contrast. Multiplanar reformatted images are provided for review. MIP images are provided for review. Dose modulation, iterative reconstruction, and/or weight based adjustment of the mA/kV was utilized to reduce the radiation dose to as low as reasonably achievable.; CTA of the neck was performed with the administration of intravenous contrast. Multiplanar reformatted images are provided for review. MIP images are provided for review. Stenosis of the internal carotid arteries measured using NASCET criteria. Dose modulation, iterative reconstruction, and/or weight based adjustment of the mA/kV was utilized to reduce the radiation dose to as low as reasonably achievable. COMPARISON: None. HISTORY: ORDERING SYSTEM PROVIDED HISTORY: [facility], ?hemorrhagic stroke TECHNOLOGIST PROVIDED HISTORY: ; ORDERING SYSTEM PROVIDED HISTORY: [facility], ?hemorrhagic stroke TECHNOLOGIST PROVIDED HISTORY: Ordering Physician Provided Reason for Exam: head bleed/ found down Acuity: Unknown FINDINGS: CTA NECK: AORTIC ARCH/ARCH VESSELS: There is a normal branch pattern of the aortic arch. No significant stenosis is seen of the innominate artery or subclavian arteries. CAROTID ARTERIES: The common carotid arteries are normal in appearance without evidence of a flow limiting stenosis. The internal carotid arteries are normal in appearance without evidence of a flow limiting stenosis by NASCET criteria. No dissection or arterial injury is seen. VERTEBRAL ARTERIES: The vertebral arteries both arise from the subclavian arteries and are normal in caliber without evidence of flow limiting stenosis or dissection. SOFT TISSUES: The lung apices are clear.   No cervical or superior mediastinal lymphadenopathy. The visualized portion of the larynx and pharynx appear unremarkable. The parotid, submandibular and thyroid glands demonstrate no acute abnormality. BONES: The visualized osseous structures appear unremarkable. CTA HEAD: ANTERIOR CIRCULATION: The internal carotid arteries are normal in course and caliber without focal stenosis. The anterior cerebral and middle cerebral arteries demonstrate no focal stenosis. POSTERIOR CIRCULATION: The posterior cerebral arteries demonstrate no focal stenosis. The vertebral and basilar arteries appear unremarkable. BRAIN: There is redemonstration of an intraparenchymal hemorrhage within the left thalamic region as well as intraventricular hemorrhagic products. Unremarkable CTA of the head and neck. Critical results were called by Dr. Aleksandra Serrano to Dr. Tyronne Dakin on 4/5/2019 at 16:12. Us Abdomen Limited    Result Date: 4/12/2019  EXAMINATION: RIGHT UPPER QUADRANT ULTRASOUND 4/12/2019 12:07 am COMPARISON: CT chest, abdomen and pelvis done April 5, 2019. HISTORY: ORDERING SYSTEM PROVIDED HISTORY: eval for ascites in setting of adominal distension, cirrhosis, leukocytosis TECHNOLOGIST PROVIDED HISTORY: eval for ascites in setting of adominal distension, cirrhosis, leukocytosis FINDINGS: Abdominal ultrasound was performed for evaluation of abdominal ascites. Small to moderate amount of ascites throughout the bilateral abdomen. Small amount of moderate abdominal ascites throughout the bilateral abdomen. Us Guided Paracentesis    Result Date: 4/12/2019  PROCEDURE: ULTRASOUND GUIDED PARACENTESIS 4/12/2019 HISTORY: ORDERING SYSTEM PROVIDED HISTORY: r/o SBP TECHNOLOGIST PROVIDED HISTORY: r/o SBP PHYSICIANS: This procedure was performed by Denise Arthur PA-C under the indirect supervision of Dr. Alex Brewster.  TECHNIQUE: Informed consent was obtained after a detailed explanation of the procedure including risks, benefits, and alternatives. A time-out was performed prior to the beginning of the procedure. Universal protocol was followed. The left lower abdomen was prepped and draped in sterile fashion and local anesthesia was achieved with lidocaine. A 5 Argentine needle sheath was advanced under ultrasound guidance into ascites and paracentesis was performed. Approximately 700 mL of light yellow colored ascitic fluid was removed. A sample was sent for laboratory analysis. The catheter was removed and a sterile dressing was applied. Postprocedural scan demonstrated no residual ascitic fluid. The patient tolerated the procedure well left the department in stable condition. FINDINGS: A total of 700 mL of light yellow colored ascitic fluid was removed and sent for laboratory analysis. Successful ultrasound guided diagnostic paracentesis. Ct Chest Abdomen Pelvis W Contrast    Result Date: 4/5/2019  EXAMINATION: CT OF THE CHEST, ABDOMEN, AND PELVIS WITH CONTRAST 4/5/2019 3:19 pm TECHNIQUE: CT of the chest, abdomen and pelvis was performed with the administration of intravenous contrast. Multiplanar reformatted images are provided for review. Dose modulation, iterative reconstruction, and/or weight based adjustment of the mA/kV was utilized to reduce the radiation dose to as low as reasonably achievable. COMPARISON: None HISTORY: ORDERING SYSTEM PROVIDED HISTORY: trauma TECHNOLOGIST PROVIDED HISTORY: Ordering Physician Provided Reason for Exam: found down Acuity: Unknown FINDINGS: Chest: Mediastinum:  Thoracic aorta and central portion of the pulmonary artery opacify normally. The ascending thoracic aorta is of normal caliber. Heart size is normal. There is no pericardial effusion. No mediastinal or hilar lymph nodes exceed the CT criteria for abnormal enlargement. Lungs/pleura: Clear Soft Tissues/Bones: No fracture identified Abdomen/Pelvis: Organs: The abdominal wall appears normal. Liver appears somewhat nodular. Spleen is enlarged. Gastroesophageal varices are noted. Pancreas appears normal.  The adrenals are normal.  The gallbladder surgically absent. . Kidneys appear normal. The bladder appears normal. GI/Bowel: The stomach,small bowel, and colon appear normal. Appendix is not identified. Pelvis: Uterus appears atrophic. Peritoneum/Retroperitoneum: The abdominal aorta and iliac arteries are normal in caliber. There is no pathologic adenopathy. There is calcified plaque along the aorta and its branches. Bones/Soft Tissues: Normal     No acute traumatic sequelae. Cirrhosis, splenomegaly, and esophageal varices consistent with portal venous hypertension. Cta Head W Contrast    Result Date: 4/5/2019  EXAMINATION: CTA OF THE HEAD WITH CONTRAST; CTA OF THE NECK 4/5/2019 3:32 pm: TECHNIQUE: CTA of the head/brain was performed with the administration of intravenous contrast. Multiplanar reformatted images are provided for review. MIP images are provided for review. Dose modulation, iterative reconstruction, and/or weight based adjustment of the mA/kV was utilized to reduce the radiation dose to as low as reasonably achievable.; CTA of the neck was performed with the administration of intravenous contrast. Multiplanar reformatted images are provided for review. MIP images are provided for review. Stenosis of the internal carotid arteries measured using NASCET criteria. Dose modulation, iterative reconstruction, and/or weight based adjustment of the mA/kV was utilized to reduce the radiation dose to as low as reasonably achievable. COMPARISON: None. HISTORY: ORDERING SYSTEM PROVIDED HISTORY: SAH, ?hemorrhagic stroke TECHNOLOGIST PROVIDED HISTORY: ; ORDERING SYSTEM PROVIDED HISTORY: SAH, ?hemorrhagic stroke TECHNOLOGIST PROVIDED HISTORY: Ordering Physician Provided Reason for Exam: head bleed/ found down Acuity: Unknown FINDINGS: CTA NECK: AORTIC ARCH/ARCH VESSELS: There is a normal branch pattern of the aortic arch.  No significant stenosis is seen of the innominate artery or subclavian arteries. CAROTID ARTERIES: The common carotid arteries are normal in appearance without evidence of a flow limiting stenosis. The internal carotid arteries are normal in appearance without evidence of a flow limiting stenosis by NASCET criteria. No dissection or arterial injury is seen. VERTEBRAL ARTERIES: The vertebral arteries both arise from the subclavian arteries and are normal in caliber without evidence of flow limiting stenosis or dissection. SOFT TISSUES: The lung apices are clear. No cervical or superior mediastinal lymphadenopathy. The visualized portion of the larynx and pharynx appear unremarkable. The parotid, submandibular and thyroid glands demonstrate no acute abnormality. BONES: The visualized osseous structures appear unremarkable. CTA HEAD: ANTERIOR CIRCULATION: The internal carotid arteries are normal in course and caliber without focal stenosis. The anterior cerebral and middle cerebral arteries demonstrate no focal stenosis. POSTERIOR CIRCULATION: The posterior cerebral arteries demonstrate no focal stenosis. The vertebral and basilar arteries appear unremarkable. BRAIN: There is redemonstration of an intraparenchymal hemorrhage within the left thalamic region as well as intraventricular hemorrhagic products. Unremarkable CTA of the head and neck. Critical results were called by Dr. Fatoumata Mcdonald to Dr. Kajal Mathias on 4/5/2019 at 16:12. Labs and Images reviewed with:  [] Dr. Favian Hagan. Latoya    [] Dr. Amber Bronson  [] Dr. Christ Diaz  [] There are no new interval images to review. PHYSICAL EXAM       CONSTITUTIONAL:  Alert. Confused. Able to tell me she has 2 sons, tries to tell me their names. Difficulty following commands.    HEAD:  normocephalic, atraumatic    EYES:  PERRL, EOMI   ENT:  moist mucous membranes   NECK:  supple, symmetric   LUNGS:  Equal air entry bilaterally, clear   CARDIOVASCULAR:  normal s1 / s2, RRR, distal pulses intact   ABDOMEN:  Soft, no rigidity, normal bowel sounds   NEUROLOGIC:  Mental Status:  Alert, confused. Not answering orientation questions or following commands consistently. Cranial Nerves:    III: Pupils:  equal, round, reactive to light  III,IV,VI: Extra Ocular Movements: intact  VII: Facial strength: abnormal - right facial droop    Motor Exam:    Right upper extremity extends to pain. Right lower extremity withdraws to pain. Able to antigravity left upper and left lower extremities; 3/5 strength. DRAINS:  [x] There are no drains for Neuro Critical Care to monitor at this time. ASSESSMENT AND PLAN:       This is a 66 y. o. female with a history of  Hepatitis C suspected to be from previous blood transfusion, cirrhosis, and esophageal varices s/p banding May 2017 who initially presented as a trauma alert on 4/5/19 after being found down at home.  LKW was ~24h prior to arrival. [facility] revealed left thalamic IPH with intraventricular extension into lateral, 3rd and 4th ventricles.  EVD placed at bedside on admit, removed 4/8.  Patient exam had been improving and she was transferred to Stepdown status 4/9.  Transferred back to Neuro ICU and Neuro Critical Care due to altered mentation, fevers.       NEUROLOGIC:  - Altered mentation/encephalopathy, improving slowly  - Left thalamic IPH with associated edema and intraventricular hemorrhage, minimal shift  - No vascular abnormality on CTA Head/Neck  - EVD placed at bedside 4/7, removed 4/9  - Clinical exam had been improving until overnight into 4/11 when patient mentation progressively declined  - Repeat CT Head 4/11 showed stable IPH in left thalamus, improvement of IVH, no worsening hydro  - LTME showed right and left mid temporal sharp waves conferred an increase risk for focal onset seizures   - Continue Vimpat 100mg BID  - Goal SBP<160  - Neuro checks per protocol     CARDIOVASCULAR:  - Goal SBP<160  - Continue Norvasc 10mg QD  - PRN Hydralazine/Labetalol  - Significant bradycardia this afternoon with HR 30-40's, appeared symptomatic  - Given Atropine 0.5mg IVP x1 with mild improvement (HR ~47)  - Patient has been on Propranolol throughout hospital course without bradycardia, HOLD for now  - Cardiology consulted  - Recent Echo 4/5/19 EF 26%, grade I diastolic dysfunction, mild pulmonary HTN  - Continue telemetry     PULMONARY:  - Maintaining O2 sats on room air  - CXR 4/11 with trace left effusion, no infiltrate/infectious process     RENAL/FLUID/ELECTROLYTE:  - Normal renal functioning  - BUN 24/ Creatinine 0.42  - Monitor I&O  - Required intermittent straight cath overnight for retention  - Stop maintenance IVF, tolerating PO intake better  - Hypokalemia, K 3.4, replace with total 60meq KCl  - Mild hyponatremia, 133, continue to monitor  - Replace electrolytes PRN  - Daily BMP     GI/NUTRITION:  NUTRITION:  DIET GENERAL;   - History of Hepatitis C, thought to be secondary to blood transfusions  - Cirrhosis  - US Abdomen showed small amount of moderate abdominal ascites throughout bilateral abdomen  - GI following; appreciate recommendations  - S/P Paracentesis on 4/12 with 700ml light yellow fluid obtained from LLQ  - No bacteria on culture, WBC 31; low suspicion for SBP  - Started on Lasix 20mg QD and Aldactone 50mg QD per GI  - GI considering repeat paracentesis tomorrow in no improvement with diuretics  - Hyperammonemia resolved, Ammonia 42  - Continue Lactulose 20g TID; titrate to 3-4 BM per day  - Bowel regimen: Milk of magnesia PRN  - GI prophylaxis: Pepcid     ID:  - 1x fever 38.6 overnight  - No leukocytosis, WBC 10.0  - UA 4/11 positive nitrite, moderate LE; suggestive of UTI  - Blood cultures 4/11 with no growth   - Paracentesis fluid eval not suggestive of SBP  - Repeat Urine culture 4/13 with no growth  - Continue Rocephin for UTI x5 days, last dose today  - Continue to monitor for fevers  - Daily CBC     HEME:   - H&H 10.2/31.8  - Platelets 107  - Daily CBC     ENDOCRINE:  - Continue to monitor blood glucose, goal <180  - Glucose well controlled     OTHER:  - PT/OT/ST  - Code Status: DNR-CCA, no intubation    DISPOSITION:  [x] To remain ICU for close hemodynamic monitoring in setting of bradycardia. We will continue to follow along. For any changes in exam or patient status please contact Neuro Critical Care.       CHENTE Hanna - [place]  Neuro Critical Care  Pager [phone]  4/15/2019     7:00 AM

## 2019-04-15 NOTE — PROGRESS NOTES
Occupational Therapy Not Seen Note    DATE: 4/15/2019  Name: Melanie Nunez  : 1940  MRN: [id number]    Patient not available for Occupational Therapy due to:    Pt has orders in for B LE dopplers to rule out DVT.   Next Scheduled Treatment: 19    Electronically signed by JASMYNE Vasquez on 4/15/2019 at 2:01 PM

## 2019-04-15 NOTE — SIGNIFICANT EVENT
Continued bradycardia with HR as low as 35. Patient has been on Propronol throughout admission without bradycardia. Propranolol discontinued. Given Atropine 0.5mg IVP x1. Mild improvement with HR mid to high 40's. Cardiology consulted. CHENTE Steev CNP.   Neuro Critical Care  04/15/19 12:24 PM

## 2019-04-15 NOTE — PROGRESS NOTES
51-75% - For breakfast had 100% cream of wheat and some jello  · Oral Nutrition Supplement (ONS) Orders: Standard High Calorie Oral Supplement  · ONS intake: 26-50% - At bedside - pt working on drinking  · Anthropometric Measures:  · Ht: 5' 5\" (165.1 cm)   · Current Body Wt: 110 lb 1.6 oz (49.9 kg)  · Admission Body Wt: 110 lb 1.6 oz (49.9 kg)  · Ideal Body Wt: 125 lb (56.7 kg), % Ideal Body 88%  · BMI Classification: BMI <18.5 Underweight    Nutrition Interventions:   Continue current diet, Continue current ONS  Continued Inpatient Monitoring, Education Not Indicated, Speech Therapy    Nutrition Evaluation:   · Evaluation: Progressing toward goals   · Goals: Oral intakes to met at least 50% of estimated nutrition needs.     · Monitoring: Meal Intake, Supplement Intake, Diet Tolerance, Skin Integrity, Weight, Pertinent Labs, Mental Status/Confusion, Diarrhea    Electronically signed by Kwadwo Bernard RD, SILVIANO on 4/15/19 at 11:17 AM    Contact Number: [phone]

## 2019-04-15 NOTE — PROGRESS NOTES
Patient heart rate sustaining at 39-40bpm. Recheck /33. Neuro CC NP notified. Continuing to monitor.

## 2019-04-15 NOTE — PLAN OF CARE
Problem: HEMODYNAMIC STATUS  Goal: Patient has stable vital signs and fluid balance  4/15/2019 0503 by Suze Mtz RN  Outcome: Met This Shift  Note:   Blood pressure remains within ordered parameters. Will continue to monitor. Problem: ACTIVITY INTOLERANCE/IMPAIRED MOBILITY  Goal: Mobility/activity is maintained at optimum level for patient  4/15/2019 0503 by Suze Mtz RN  Outcome: Met This Shift  Note:   Neuro assessment completed, fall precautions in place, aspirations precautions in place, assess for barriers in communication and mobility, interventions to assist in communication and mobility in place, encouraged to call for assistance, adaptive devices used as needed, assess emotional state and support offered, encouraged patient to communicate by available means, and support systems included in patient care.

## 2019-04-15 NOTE — CARE COORDINATION
Transitional planning. Accepted at [facility], Sowmy[place] with [facility] will start pre-cert when pt is closer to discharge. Pt is DNRCCA wih no intubations. Palliative care following pt.

## 2019-04-15 NOTE — PROGRESS NOTES
Physical Therapy  [address]  Daily [address] Note  NAME: Suma Bush  : 1940  MRN: [id number]    Date of Service: 4/15/2019    Discharge Recommendations:Further therapy recommended at discharge. PT Equipment Recommendations  Equipment Needed: No    Patient Diagnosis(es): The primary encounter diagnosis was Altered mental status, unspecified altered mental status type. A diagnosis of Intracranial hemorrhage ([facility] [address] was also pertinent to this visit. has no past medical history on file. has no past surgical history on file. Restrictions  Restrictions/Precautions  Restrictions/Precautions: General Precautions, Fall Risk  Required Braces or Orthoses?: No  Position Activity Restriction  Other position/activity restrictions: Up with assist, DNR-CCA  Subjective   General  Chart Reviewed: Yes  Response To Previous Treatment: Not applicable  Subjective  Subjective: RN and pt agreeable to PT . Pt supine upon arrival. Left pt in chair with  alarm in place. Pain Screening  Patient Currently in Pain: Denies  Vital Signs  Patient Currently in Pain: Denies       Orientation  Orientation  Overall Orientation Status: Impaired  Cognition      Objective   Bed mobility  Rolling to Left: Moderate assistance;2 Person assistance  Supine to Sit: Moderate assistance;2 Person assistance  Scooting: Moderate assistance;2 Person assistance  Transfers  Sit to Stand: Maximum Assistance  Stand to sit: Maximum Assistance  Bed to Chair: Dependent/Total(Sera stedy)  Comment: S<>S x 2 on sera Stedy requiring  Max Ax2, pt demo excessive right lateral lean. Ambulation  Ambulation?: No(Not appropriate at this time)     Balance  Posture: Fair  Sitting - Static: Fair  Sitting - Dynamic: Fair;-  Standing - Static: Fair;-  Standing - Dynamic: Fair;-  Comments: EOB sfor 13minutes requiring MOd A to mainting upright sitting. demo posterior lean. S<>S x2 for less than 20seconds each.  Pt impulsive when tired and demo no safety awareness. Exercises: Seated LE exercise program: Long Arc Quads, hip abduction/adduction, heel/toe raises, and marches. Reps: 10  Upper extremity exercises: Bicep curl, shoulder flexion/extension, punches, tricep curl, shoulder abduction/adduction. Reps: 10  Comments: SOCORROOM. Assessment   Body structures, Functions, Activity limitations: Decreased functional mobility ; Decreased endurance;Decreased ROM; Decreased strength;Decreased balance;Decreased safe awareness  Assessment: Sat at EOB ~13 minutes with Mod A, sit to stand x 2 for less than 20secs, limited by edurance and fatigue  Prognosis: Fair  REQUIRES PT FOLLOW UP: Yes  Activity Tolerance  Activity Tolerance: Patient limited by fatigue;Patient Tolerated treatment well;Patient limited by endurance  Activity Tolerance: R side weakness.       Goals  Short term goals  Time Frame for Short term goals: 15  Short term goal 1: Pt to demonstrate good sitting/standing static/dynamic balance   Short term goal 2: Pt to complete bed mobility tasks with Lisa  Short term goal 3: Pt to transfer using RW with Lisa  Short term goal 4: Pt to tolerate 30 min activity to assist with increasing strength and endurance    Plan    Plan  Times per week: 5-6x/week  Times per day: Daily  Current Treatment Recommendations: Strengthening, ROM, Balance Training, Functional Mobility Training, Transfer Training, Safety Education & Training, Endurance Training, Patient/Caregiver Education & Training  Safety Devices  Type of devices: Nurse notified, Call light within reach, Bed alarm in place, Left in chair, Chair alarm in place  Restraints  Initially in place: No     Therapy Time   Individual Concurrent Group Co-treatment   Time In 0810         Time Out 0848         Minutes INDIGO Howard

## 2019-04-16 ENCOUNTER — APPOINTMENT (OUTPATIENT)
Dept: ULTRASOUND IMAGING | Age: 79
DRG: 023 | End: 2019-04-16
Payer: COMMERCIAL

## 2019-04-16 LAB
ABSOLUTE EOS #: 0.88 K/UL (ref 0–0.4)
ABSOLUTE IMMATURE GRANULOCYTE: 0 K/UL (ref 0–0.3)
ABSOLUTE LYMPH #: 1.39 K/UL (ref 1–4.8)
ABSOLUTE MONO #: 1.26 K/UL (ref 0.1–0.8)
ALBUMIN FLUID: 0.4 G/DL
ANION GAP SERPL CALCULATED.3IONS-SCNC: 8 MMOL/L (ref 9–17)
BASOPHILS # BLD: 0 % (ref 0–2)
BASOPHILS ABSOLUTE: 0 K/UL (ref 0–0.2)
BUN BLDV-MCNC: 28 MG/DL (ref 8–23)
BUN/CREAT BLD: ABNORMAL (ref 9–20)
CALCIUM SERPL-MCNC: 8.9 MG/DL (ref 8.6–10.4)
CHLORIDE BLD-SCNC: 107 MMOL/L (ref 98–107)
CO2: 18 MMOL/L (ref 20–31)
CREAT SERPL-MCNC: 0.55 MG/DL (ref 0.5–0.9)
DIFFERENTIAL TYPE: ABNORMAL
DIRECT EXAM: NORMAL
EBV EARLY ANTIGEN IGG: 20 U/ML
EBV INTERPRETATION: ABNORMAL
EBV NUCLEAR AG AB: 474 U/ML
EKG ATRIAL RATE: 41 BPM
EKG P AXIS: 44 DEGREES
EKG P-R INTERVAL: 132 MS
EKG Q-T INTERVAL: 504 MS
EKG QRS DURATION: 78 MS
EKG QTC CALCULATION (BAZETT): 415 MS
EKG R AXIS: -24 DEGREES
EKG T AXIS: 21 DEGREES
EKG VENTRICULAR RATE: 41 BPM
EOSINOPHILS RELATIVE PERCENT: 7 % (ref 1–4)
EPSTEIN-BARR VCA IGG: 1386 U/ML
EPSTEIN-BARR VCA IGM: 321 U/ML
GFR AFRICAN AMERICAN: >60 ML/MIN
GFR NON-AFRICAN AMERICAN: >60 ML/MIN
GFR SERPL CREATININE-BSD FRML MDRD: ABNORMAL ML/MIN/{1.73_M2}
GFR SERPL CREATININE-BSD FRML MDRD: ABNORMAL ML/MIN/{1.73_M2}
GLUCOSE BLD-MCNC: 113 MG/DL (ref 70–99)
HAV IGM SER IA-ACNC: NONREACTIVE
HCT VFR BLD CALC: 32.1 % (ref 36.3–47.1)
HEMOGLOBIN: 9.9 G/DL (ref 11.9–15.1)
HEPATITIS B CORE IGM ANTIBODY: NONREACTIVE
HEPATITIS B SURFACE ANTIGEN: NONREACTIVE
HEPATITIS C ANTIBODY: REACTIVE
HERPES TYPE 1/2 IGM COMBINED: 1.55
IMMATURE GRANULOCYTES: 0 %
LYMPHOCYTES # BLD: 11 % (ref 24–44)
Lab: NORMAL
MAGNESIUM: 2.2 MG/DL (ref 1.6–2.6)
MCH RBC QN AUTO: 29.4 PG (ref 25.2–33.5)
MCHC RBC AUTO-ENTMCNC: 30.8 G/DL (ref 28.4–34.8)
MCV RBC AUTO: 95.3 FL (ref 82.6–102.9)
MONOCYTES # BLD: 10 % (ref 1–7)
MORPHOLOGY: ABNORMAL
MORPHOLOGY: ABNORMAL
NRBC AUTOMATED: 0 PER 100 WBC
PDW BLD-RTO: 18.2 % (ref 11.8–14.4)
PLATELET # BLD: 142 K/UL (ref 138–453)
PLATELET ESTIMATE: ABNORMAL
PMV BLD AUTO: 12.3 FL (ref 8.1–13.5)
POTASSIUM SERPL-SCNC: 4.3 MMOL/L (ref 3.7–5.3)
PROCALCITONIN: 0.1 NG/ML
RBC # BLD: 3.37 M/UL (ref 3.95–5.11)
RBC # BLD: ABNORMAL 10*6/UL
SEG NEUTROPHILS: 72 % (ref 36–66)
SEGMENTED NEUTROPHILS ABSOLUTE COUNT: 9.07 K/UL (ref 1.8–7.7)
SODIUM BLD-SCNC: 133 MMOL/L (ref 135–144)
SPECIMEN DESCRIPTION: NORMAL
SPECIMEN TYPE: NORMAL
SURGICAL PATHOLOGY REPORT: NORMAL
TOTAL PROTEIN: 6.8 G/DL (ref 6.4–8.3)
TROPONIN INTERP: ABNORMAL
TROPONIN T: ABNORMAL NG/ML
TROPONIN, HIGH SENSITIVITY: 33 NG/L (ref 0–14)
WBC # BLD: 12.6 K/UL (ref 3.5–11.3)
WBC # BLD: ABNORMAL 10*3/UL

## 2019-04-16 PROCEDURE — 2500000003 HC RX 250 WO HCPCS: Performed by: EMERGENCY MEDICINE

## 2019-04-16 PROCEDURE — 92526 ORAL FUNCTION THERAPY: CPT

## 2019-04-16 PROCEDURE — 36415 COLL VENOUS BLD VENIPUNCTURE: CPT

## 2019-04-16 PROCEDURE — 2000000003 HC NEURO ICU R&B

## 2019-04-16 PROCEDURE — 6360000002 HC RX W HCPCS: Performed by: PSYCHIATRY & NEUROLOGY

## 2019-04-16 PROCEDURE — 51701 INSERT BLADDER CATHETER: CPT

## 2019-04-16 PROCEDURE — 84155 ASSAY OF PROTEIN SERUM: CPT

## 2019-04-16 PROCEDURE — 87070 CULTURE OTHR SPECIMN AEROBIC: CPT

## 2019-04-16 PROCEDURE — 80048 BASIC METABOLIC PNL TOTAL CA: CPT

## 2019-04-16 PROCEDURE — 2580000003 HC RX 258: Performed by: PSYCHIATRY & NEUROLOGY

## 2019-04-16 PROCEDURE — C1729 CATH, DRAINAGE: HCPCS

## 2019-04-16 PROCEDURE — 94762 N-INVAS EAR/PLS OXIMTRY CONT: CPT

## 2019-04-16 PROCEDURE — 82042 OTHER SOURCE ALBUMIN QUAN EA: CPT

## 2019-04-16 PROCEDURE — 88112 CYTOPATH CELL ENHANCE TECH: CPT

## 2019-04-16 PROCEDURE — 0W9G3ZX DRAINAGE OF PERITONEAL CAVITY, PERCUTANEOUS APPROACH, DIAGNOSTIC: ICD-10-PCS | Performed by: RADIOLOGY

## 2019-04-16 PROCEDURE — 84145 PROCALCITONIN (PCT): CPT

## 2019-04-16 PROCEDURE — C9254 INJECTION, LACOSAMIDE: HCPCS | Performed by: PSYCHIATRY & NEUROLOGY

## 2019-04-16 PROCEDURE — 83735 ASSAY OF MAGNESIUM: CPT

## 2019-04-16 PROCEDURE — 49083 ABD PARACENTESIS W/IMAGING: CPT

## 2019-04-16 PROCEDURE — 6370000000 HC RX 637 (ALT 250 FOR IP): Performed by: STUDENT IN AN ORGANIZED HEALTH CARE EDUCATION/TRAINING PROGRAM

## 2019-04-16 PROCEDURE — 2580000003 HC RX 258: Performed by: EMERGENCY MEDICINE

## 2019-04-16 PROCEDURE — 51798 US URINE CAPACITY MEASURE: CPT

## 2019-04-16 PROCEDURE — 6370000000 HC RX 637 (ALT 250 FOR IP): Performed by: NURSE PRACTITIONER

## 2019-04-16 PROCEDURE — 84484 ASSAY OF TROPONIN QUANT: CPT

## 2019-04-16 PROCEDURE — 87075 CULTR BACTERIA EXCEPT BLOOD: CPT

## 2019-04-16 PROCEDURE — APPNB15 APP NON BILLABLE TIME 0-15 MINS: Performed by: NURSE PRACTITIONER

## 2019-04-16 PROCEDURE — 93970 EXTREMITY STUDY: CPT

## 2019-04-16 PROCEDURE — 89051 BODY FLUID CELL COUNT: CPT

## 2019-04-16 PROCEDURE — 88305 TISSUE EXAM BY PATHOLOGIST: CPT

## 2019-04-16 PROCEDURE — 85025 COMPLETE CBC W/AUTO DIFF WBC: CPT

## 2019-04-16 PROCEDURE — 87205 SMEAR GRAM STAIN: CPT

## 2019-04-16 PROCEDURE — 2709999900 HC NON-CHARGEABLE SUPPLY

## 2019-04-16 PROCEDURE — 6360000002 HC RX W HCPCS: Performed by: NURSE PRACTITIONER

## 2019-04-16 PROCEDURE — 99233 SBSQ HOSP IP/OBS HIGH 50: CPT | Performed by: PSYCHIATRY & NEUROLOGY

## 2019-04-16 RX ADMIN — SPIRONOLACTONE 50 MG: 25 TABLET ORAL at 08:38

## 2019-04-16 RX ADMIN — Medication 10 ML: at 08:31

## 2019-04-16 RX ADMIN — RIFAXIMIN 550 MG: 550 TABLET ORAL at 19:56

## 2019-04-16 RX ADMIN — AMLODIPINE BESYLATE 10 MG: 10 TABLET ORAL at 08:31

## 2019-04-16 RX ADMIN — CEFTRIAXONE SODIUM 1 G: 1 INJECTION, POWDER, FOR SOLUTION INTRAMUSCULAR; INTRAVENOUS at 14:49

## 2019-04-16 RX ADMIN — Medication 10 ML: at 19:56

## 2019-04-16 RX ADMIN — Medication 15 ML: at 11:50

## 2019-04-16 RX ADMIN — RIFAXIMIN 550 MG: 550 TABLET ORAL at 08:31

## 2019-04-16 RX ADMIN — ENOXAPARIN SODIUM 40 MG: 40 INJECTION SUBCUTANEOUS at 09:15

## 2019-04-16 RX ADMIN — FAMOTIDINE 20 MG: 10 INJECTION, SOLUTION INTRAVENOUS at 08:31

## 2019-04-16 RX ADMIN — HYDRALAZINE HYDROCHLORIDE 5 MG: 20 INJECTION INTRAMUSCULAR; INTRAVENOUS at 07:36

## 2019-04-16 RX ADMIN — DEXTROSE MONOHYDRATE 100 MG: 50 INJECTION, SOLUTION INTRAVENOUS at 00:00

## 2019-04-16 RX ADMIN — Medication 15 ML: at 19:56

## 2019-04-16 RX ADMIN — FUROSEMIDE 20 MG: 20 TABLET ORAL at 08:38

## 2019-04-16 RX ADMIN — DEXTROSE MONOHYDRATE 100 MG: 50 INJECTION, SOLUTION INTRAVENOUS at 16:05

## 2019-04-16 NOTE — PROGRESS NOTES
Physical Therapy  DATE: 2019    NAME: Winter Villanueva  MRN: [id number]   : 1940    Patient not seen this date for Physical Therapy due to:  [] Blood transfusion in progress  [] Hemodialysis  []  Patient Declined  [] Spine Precautions   [] Strict Bedrest  [] Surgery/ Procedure  [] Testing      [x] Other: Pt leaving room to IR. PT helped RN with SS transfer from chair to bed, MaxA +2 with cueing. Check back as appropriate. [] PT being discontinued at this time. Patient independent. No further needs. [] PT being discontinued at this time as the patient has been transferred to palliative care. No further needs.     Berry Del Toro, SPT  The above documentation was reviewed and accepted by Ollie Favre, Physical Therapist.

## 2019-04-16 NOTE — PLAN OF CARE
Patient not attempting to ambulate per self. Utilizing [place] and 2 assist into chair. Patient remains free of injury. Will continue to monitor.

## 2019-04-16 NOTE — PROGRESS NOTES
Physical Therapy  DATE: 2019    NAME: Kathie Cárdenas  MRN: [id number]   : 1940    Patient not seen this date for Physical Therapy due to:  [] Blood transfusion in progress  [] Hemodialysis  []  Patient Declined  [] Spine Precautions   [] Strict Bedrest  [] Surgery/ Procedure  [x] Testing: B LE doppler ordered with no confirmed results at this time. Will check back as appropriate. [] Other        [] PT being discontinued at this time. Patient independent. No further needs. [] PT being discontinued at this time as the patient has been transferred to palliative care. No further needs.     SHANTEL Tillman  The above documentation was reviewed and accepted by Kishor Mccord, Physical Therapist.

## 2019-04-16 NOTE — CARE COORDINATION
Transitional planning. Spoke to pt's nurse. Plans for paracentesis today or tomorrow. [facility] will start pre-cert when pt is read for DC (they had pre-cert last week but will need to complete a new one). Pt is a DNRCCA with no intubations. Palliative care will continue to follow.

## 2019-04-16 NOTE — PROGRESS NOTES
Spoke with patient's son Radha Lara via phone about setting up a meeting to speak with family about patient's complicated hospital course and current status.   Radha Lara contacted his brother Lucila Cordero and meeting set up for tomorrow 4/17 at [address] [place] [address]  Neuro Critical Care  04/16/19 5:26 PM

## 2019-04-16 NOTE — PROGRESS NOTES
THE [facility] AT [place] [facility] Progress Note    Virginia Cantrell is a 66 y.o. female patient. Hospitalization Day:11      Chief consult reason:   Ascites    Subjective:  Pt seen and examined. Pt much more alert today, up in bed having breakfast with staff. Mentation continues to improve. Abdomen slightly softer than yesterday with diuretics being started yesterday. Pt appears slight dyspneic. VITALS:  BP (!) 143/63   Pulse 71   Temp 97.5 °F (36.4 °C) (Oral)   Resp 25   Ht 5' 5\" (1.651 m)   Wt 110 lb 1.6 oz (49.9 kg)   SpO2 97%   BMI 18.32 kg/m²   TEMPERATURE:  Current - Temp: 97.5 °F (36.4 °C); Max - Temp  Av.7 °F (36.5 °C)  Min: 97.2 °F (36.2 °C)  Max: 99.2 °F (37.3 °C)    Physical Assessment:  General appearance:  frail  Mental Status:  Pt is slightly verbal now  Lungs: dm in bases  Heart:  regular rate and rhythm, no murmur  Abdomen:  soft, nontender, slightly distended, normal bowel sounds, no masses, splenomegaly  Extremities:  no edema, redness, tenderness in the calves  Skin:  no gross lesions, rashes, induration    Data Review:  LABS and IMAGING:     CBC  Recent Labs     19  0554 04/15/19  0440 19  0453   WBC 8.5 10.0 12.6*   HGB 9.9* 10.2* 9.9*   MCV 94.2 91.9 95.3   RDW 17.2* 17.3* 18.2*   PLT See Reflexed IPF Result See Reflexed IPF Result 142       ANEMIA STUDIES  No results for input(s): LABIRON, TIBC, FERRITIN, RHXOHCVB91, FOLATE, OCCULTBLD in the last 72 hours. BMP  Recent Labs     19  0554 04/15/19  0440 19  0453    133* 133*   K 3.0* 3.4* 4.3   * 108* 107   CO2 17* 18* 18*   BUN 23 24* 28*   CREATININE 0.44* 0.42* 0.55   GLUCOSE 104* 112* 113*   CALCIUM 8.2* 8.3* 8.9       LFTS  No results for input(s): ALKPHOS, ALT, AST, BILITOT, BILIDIR, LABALBU in the last 72 hours.     AMYLASE/LIPASE/AMMONIA  Recent Labs     19  1239 04/15/19  1127   AMMONIA 46 42       Acute Hepatitis Panel   No results found for: HEPBSAG, HEPCAB, HEPBIGM, HEPAIGM    HCV Genotype   No results found for: HEPATITISCGENOTYPE    HCV Quantitative   No results found for: HCVQNT    LIVER WORK UP:    AFP  Lab Results   Component Value Date    AFP 2.1 04/12/2019       Alpha 1 antitrypsin   No results found for: A1A    Anti - Liver/Kidney Ab  No results found for: [facility]    VONDA  Lab Results   Component Value Date    VONDA NEGATIVE 04/12/2019       AMA  Lab Results   Component Value Date    MITOAB 3.4 04/12/2019       ASMA  Lab Results   Component Value Date    SMOOTHMUSCAB 17 04/12/2019       Ceruloplasmin  Lab Results   Component Value Date    CERULOPLSM 29 04/14/2019       Celiac panel  No results found for: TISSTRNTIIGG, TTGIGA, IGA    PT/INR  No results for input(s): PROTIME, INR in the last 72 hours. Cancer Markers:  CEA:    No results for input(s): CEA in the last 72 hours. Ca 125:  No results for input(s):  in the last 72 hours. Ca 19-9:   Invalid input(s):   AFP:   No results for input(s): AFP in the last 72 hours. Lactic acid:Invalid input(s): LACTIC ACID    Radiology Review:    No results found. Principal Problem:    Intracranial hemorrhage ([address]  Active Problems:    Altered mental status    Encounter for palliative care    Acute encephalopathy    History of esophageal varices with bleeding    Acute cystitis without hematuria    Seizure-like activity (HCC)    History of liver disease    Hyperammonemia (HCC)    Bradycardia  Resolved Problems:    * No resolved hospital problems. 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 PMH includes h/o Hep C, cirrhosis, esophageal varices s/p banding 2017. Abdomen today is slightly distended and firm. -CEA 3.7, AFP 2.1     Ascites-most likely related to decompensated cirrhosis-could be secondary to UTI. Mentation is better today. Pt slightly dyspneic. Pt had approx 600 ml in urine output yesterday and 7 bm in the last 24 hrs. Plan of care:  1.  Cont with Lactulose enemas and/or oral route daily to produce 3-4 bm's daily  2. Cont with PO meds at this time. Will cont with Lasix 20 mh po and Aldactone 50 mg po at this time. 3. Will order paracentesis with fluid analysis and albumin since pt is dyspnic  4. Will follow up on outstanding additional serologies  5. Will cont to monitor    Thank you for allowing me to participate in the care of your patient. Please feel free to contact me with any questions or concerns.      Zaina Saeed, [address]  Gastroenterology  [phone]

## 2019-04-16 NOTE — PROGRESS NOTES
Daily Progress Note  Neuro Critical Care    Patient Name: Dedra Plaza  Patient : 1940  Room/Bed: [facility]/0522-01  Allergies: No Known Allergies  Problem List:   Patient Active Problem List   Diagnosis    Intracranial hemorrhage ([facility] [address]    Altered mental status    Encounter for palliative care    Acute encephalopathy    History of esophageal varices with bleeding    Acute cystitis without hematuria    Seizure-like activity ([facility] [address]    History of liver disease    Hyperammonemia (HCC)    Bradycardia       CHIEF COMPLAINT:     Altered mental status     INTERVAL HISTORY    Initial Presentation (Admitted 19): The patient is a 66 y. o. female with a history of Hepatitis C suspected to be from previous blood transfusion, cirrhosis, and esophageal varices s/p banding May 2017 who initially presented as a trauma alert on 19 after being found down at home. LKW was ~24h prior to arrival. Critical access hospital revealed left thalamic IPH with intraventricular extension into lateral, 3rd and 4th ventricles.  Trauma survey otherwise unremarkable except age indeterminate L2 compression fracture.  Initially evaluated by Trauma, but IPH thought to be spontaneous rather than traumatic.  Neuro Critical Care and Neurosurgery consulted.  Admitted to the Neuro ICU.  EVD placed at bedside.       ICH score: 1     Hospital Course:   :  Cardene gtt weaned off around midnight, -140s. EVD output 27ml/24hr. Required PRN fentanyl for pain overnight. :  Placed on BiPAP overnight due to accessory muscle use.  Sp02 stable, maintaining secretions.  BP stable, Cardene stopped.  Mentation gradually improving.  EVD pulled out accidentally.    :  Borderline hypertension overnight.  Started Imdur yesterday.  Lopressor, hydralazine, labetalol given PRN.  On room air overnight w/ stable Sp02.  Single episode of elevated temp at 38.0. Pt otherwise afebrile.  Home medications reconciled.  Started on Propranolol.  Doing well sitting at side of bed working with therapy, more verbal with improved speech.  Transferred to Stepdown status under Internal Medicine. 4/11:  Worsening mentation; Internal Medicine transferred patient back to Neuro ICU and Neuro Critical Care. Repeat CT Head stable.  Febrile overnight, Tmax 102F.  CXR with trace left infusion, no infectious process.  Blood cultures sent.  UA positive nitrite, moderate LE.  Started on Rocephin for UTI. STAT LTME ordered to r/o seizures.  Ammonia 45 (down from 125 with Lactulose). 4/12: Mild improvement in mentation; more alert but remains altered. Left foot rhythmic tapping; no direct correlation on LTME.  LTME showing right and left mid temporal sharp waves conferred an increase risk for focal onset seizures. Given 200mg IV Vimpat then continued on 100mg BID. GI consulted; paracentesis 700ml from LLQ.    4/13: Paracentesis studies not suggestive of SBP.  4/14: Improvement in mentation.       Last 24h:   Transient episode of bradycardia with HR briefly in the high 30's overnight. Having multiple >3-4 BM's per day with Lactulose, evening dose held per nursing. Requiring intermittent straight cath for acute retention. On exam this morning, patient is alert. She is more interactive than previously; able to tell me she has 2 sons and tries to tell me their names. Remains confused and has difficulty following commands. Right upper extremity with extension to pain. Right lower extremity withdraws to pain. This afternoon, patient developed new onset bradycardia with HR as low as 35 and persisting in the 30-40's. Patient appeared pale and less interactive. SBP remained stable 110-120's. Given Atropine 0.5mg IVP x1 with mild improvement in HR to mid to high 40's.     CURRENT MEDICATIONS:  SCHEDULED MEDICATIONS:   furosemide  20 mg Oral Daily    spironolactone  50 mg Oral Daily    atropine  0.5 mg Intravenous Once    rifaximin  550 mg Oral BID    lacosamide (VIMPAT) IVPB  100 mg Intravenous Q12H    amLODIPine  10 mg Oral Daily    cefTRIAXone (ROCEPHIN) IV  1 g Intravenous Q24H    lactulose  20 g Oral TID    enoxaparin  40 mg Subcutaneous Daily    sodium chloride flush  10 mL Intravenous 2 times per day    famotidine (PEPCID) injection  20 mg Intravenous Daily    chlorhexidine  15 mL Mouth/Throat BID     CONTINUOUS INFUSIONS:    PRN MEDICATIONS:   hydrALAZINE, labetalol, potassium chloride **OR** potassium alternative oral replacement **OR** potassium chloride, sodium chloride flush, magnesium hydroxide, ondansetron    VITALS:  Temperature Range: Temp: 97.6 °F (36.4 °C) Temp  Av.8 °F (36.6 °C)  Min: 97.2 °F (36.2 °C)  Max: 99.2 °F (37.3 °C)  BP Range: Systolic (92PRV), SUP:284 , Min:102 , DIS:655     Diastolic (05VRH), UCT:04, Min:26, Max:72    Pulse Range: Pulse  Av.8  Min: 39  Max: 74  Respiration Range: Resp  Av.8  Min: 19  Max: 29  Current Pulse Ox: SpO2: 94 %  24HR Pulse Ox Range: SpO2  Av.1 %  Min: 93 %  Max: 98 %  Patient Vitals for the past 12 hrs:   BP Temp Temp src Pulse Resp SpO2   19 0504 (!) 136/40 -- -- (!) 39 25 --   19 0434 (!) 147/40 -- -- -- -- --   19 0403 (!) 122/45 97.6 °F (36.4 °C) Oral (!) 43 28 --   19 0334 (!) 134/45 -- -- (!) 40 24 --   19 0304 (!) 136/37 -- -- (!) 42 24 --   19 0300 -- -- -- -- -- 94 %   19 0252 (!) 143/37 -- -- (!) 39 25 --   19 0200 -- -- -- -- -- 95 %   19 0133 (!) 115/36 -- -- (!) 40 25 --   19 0104 (!) 116/27 -- -- (!) 39 29 --   19 0100 -- -- -- -- -- 97 %   19 0033 (!) 116/45 97.2 °F (36.2 °C) Oral (!) 40 25 --   19 0003 (!) 122/26 -- -- (!) 41 23 --   19 0000 -- -- -- -- -- 95 %   04/15/19 2300 -- -- -- -- -- 96 %   04/15/19 2200 -- -- -- -- -- 97 %   04/15/19 2100 -- -- -- (!) 44 23 94 %   04/15/19 2004 (!) 139/53 -- -- (!) 43 27 --   04/15/19 2000 -- 99.2 °F (37.3 °C) Oral (!) 43 26 96 %     Estimated body mass index is 18.32 kg/m² as calculated from the following:    Height as of this encounter: 5' 5\" (1.651 m). Weight as of this encounter: 110 lb 1.6 oz (49.9 kg).  []<16 Severe malnutrition  []16-16.99 Moderate malnutrition  []17-18.49 Mild malnutrition  [x]18.5-24.9 Normal  []25-29.9 Overweight (not obese)  []30-34.9 Obese class 1 (Low Risk)  []35-39.9 Obese class 2 (Moderate Risk)  []?40 Obese class 3 (High Risk)    RECENT LABS:   Lab Results   Component Value Date    WBC 12.6 (H) 04/16/2019    HGB 9.9 (L) 04/16/2019    HCT 32.1 (L) 04/16/2019     04/16/2019    ALT 20 04/12/2019    AST 28 04/12/2019     (L) 04/16/2019    K 4.3 04/16/2019     04/16/2019    CREATININE 0.55 04/16/2019    BUN 28 (H) 04/16/2019    CO2 18 (L) 04/16/2019    INR 1.3 04/12/2019     24 HOUR INTAKE/OUTPUT:    Intake/Output Summary (Last 24 hours) at 4/16/2019 0732  Last data filed at 4/16/2019 0504  Gross per 24 hour   Intake 263 ml   Output 610 ml   Net -347 ml       RADIOLOGY:   Xr Pelvis (1-2 Views)    Result Date: 4/5/2019  EXAMINATION: SINGLE XRAY VIEW OF THE PELVIS 4/5/2019 3:23 pm COMPARISON: None. HISTORY: ORDERING SYSTEM PROVIDED HISTORY: trauma TECHNOLOGIST PROVIDED HISTORY: trauma Initial evaluation. FINDINGS: The osseous structures appear osteopenic. No convincing acute abnormality seen of the bony pelvis. Degenerative changes of the lumbar spine, sacroiliac joints and hips bilaterally. Vascular calcifications are noted. Osteopenia without convincing acute abnormality of the bony pelvis. Xr Shoulder Right (min 2 Views)    Result Date: 4/6/2019  EXAMINATION: 3 XRAY VIEWS OF THE RIGHT SHOULDER; AP AND LATERAL XRAY VIEWS OF THE RIGHT FOREARM; 3 XRAY VIEWS OF THE RIGHT ELBOW 4/6/2019 3:10 am COMPARISON: None. HISTORY: ORDERING SYSTEM PROVIDED HISTORY: trauma TECHNOLOGIST PROVIDED HISTORY: trauma FINDINGS: Right shoulder: Three views of the right shoulder. No acute fracture or dislocation.   Soft tissue swelling about the shoulder. Normal alignment of the glenohumeral and acromioclavicular joints. No aggressive skeletal lesion. Visualized lung is clear. Visualized ribs are intact. Osteopenia. Right elbow: Frontal, lateral and oblique views. Soft tissue swelling about the elbow. No radiographic evidence of an elbow joint effusion. No acute fracture or malalignment. Mild ulnohumeral compartment DJD. Osteopenia. Right forearm: Frontal and lateral views. Soft tissue swelling about the forearm. No acute fracture or malalignment. Mild-to-moderate DJD in the wrist.  Osteopenia. No acute fracture in the right shoulder, right elbow or right forearm. Osteopenia. Mild DJD in the right elbow and mild-to-moderate DJD in the right wrist.     Xr Elbow Right (min 3 Views)    Result Date: 4/6/2019  EXAMINATION: 3 XRAY VIEWS OF THE RIGHT SHOULDER; AP AND LATERAL XRAY VIEWS OF THE RIGHT FOREARM; 3 XRAY VIEWS OF THE RIGHT ELBOW 4/6/2019 3:10 am COMPARISON: None. HISTORY: ORDERING SYSTEM PROVIDED HISTORY: trauma TECHNOLOGIST PROVIDED HISTORY: trauma FINDINGS: Right shoulder: Three views of the right shoulder. No acute fracture or dislocation. Soft tissue swelling about the shoulder. Normal alignment of the glenohumeral and acromioclavicular joints. No aggressive skeletal lesion. Visualized lung is clear. Visualized ribs are intact. Osteopenia. Right elbow: Frontal, lateral and oblique views. Soft tissue swelling about the elbow. No radiographic evidence of an elbow joint effusion. No acute fracture or malalignment. Mild ulnohumeral compartment DJD. Osteopenia. Right forearm: Frontal and lateral views. Soft tissue swelling about the forearm. No acute fracture or malalignment. Mild-to-moderate DJD in the wrist.  Osteopenia. No acute fracture in the right shoulder, right elbow or right forearm. Osteopenia.  Mild DJD in the right elbow and mild-to-moderate DJD in the right wrist.     Xr Radius Ulna Right (2 Views)    Result Date: 4/6/2019  EXAMINATION: 3 XRAY VIEWS OF THE RIGHT SHOULDER; AP AND LATERAL XRAY VIEWS OF THE RIGHT FOREARM; 3 XRAY VIEWS OF THE RIGHT ELBOW 4/6/2019 3:10 am COMPARISON: None. HISTORY: ORDERING SYSTEM PROVIDED HISTORY: trauma TECHNOLOGIST PROVIDED HISTORY: trauma FINDINGS: Right shoulder: Three views of the right shoulder. No acute fracture or dislocation. Soft tissue swelling about the shoulder. Normal alignment of the glenohumeral and acromioclavicular joints. No aggressive skeletal lesion. Visualized lung is clear. Visualized ribs are intact. Osteopenia. Right elbow: Frontal, lateral and oblique views. Soft tissue swelling about the elbow. No radiographic evidence of an elbow joint effusion. No acute fracture or malalignment. Mild ulnohumeral compartment DJD. Osteopenia. Right forearm: Frontal and lateral views. Soft tissue swelling about the forearm. No acute fracture or malalignment. Mild-to-moderate DJD in the wrist.  Osteopenia. No acute fracture in the right shoulder, right elbow or right forearm. Osteopenia. Mild DJD in the right elbow and mild-to-moderate DJD in the right wrist.     Ct Head Wo Contrast    Result Date: 4/11/2019  EXAMINATION: CT OF THE HEAD WITHOUT CONTRAST  4/11/2019 5:04 am TECHNIQUE: CT of the head was performed without the administration of intravenous contrast. Dose modulation, iterative reconstruction, and/or weight based adjustment of the mA/kV was utilized to reduce the radiation dose to as low as reasonably achievable. COMPARISON: April 5, 2019. HISTORY: ORDERING SYSTEM PROVIDED HISTORY: ENCEPHALOPATHY TECHNOLOGIST PROVIDED HISTORY: Ordering Physician Provided Reason for Exam: follow up Acuity: Unknown Type of Exam: Unknown FINDINGS: BRAIN/VENTRICLES: Stable size and appearance of the hyperdense intraparenchymal hematoma centered in the left thalamus with intraventricular extension.   Similar appearing surrounding vasogenic edema with mass effect on the left lateral and 3rd ventricles. Interval improved intraventricular hemorrhage with now only a small amount of hemorrhage layering in the occipital horns of the lateral ventricles. Previously noted right frontal approach ventricular drainage catheter has been removed. Small amount of hyperdense blood along the catheter tract. No progressive hydrocephalus. Previous pneumocephalus has resolved. No significant midline shift. The basal cisterns are grossly patent. No large territorial infarct. Atherosclerosis of the intracranial vertebral and internal carotid arteries. Mild generalized cerebral and cerebellar volume loss. ORBITS: The visualized portion of the orbits demonstrate no acute abnormality. SINUSES: The visualized paranasal sinuses and mastoid air cells demonstrate no acute abnormality. SOFT TISSUES/SKULL:  No acute abnormality of the visualized skull or soft tissues. Stable size and appearance of the intraparenchymal hematoma centered in the left thalamus with mild surrounding vasogenic edema and mass effect. Compared to CT head done April 5, 2019 there is improvement of previously noted intraventricular hemorrhage with no on the a small amount of hemorrhage layering in the occipital horns of the lateral ventricles. Interval removal of the right frontal approach ventricular drainage catheter. No progressive hydrocephalus. Trace blood along the catheter tract. Ct Head Wo Contrast    Result Date: 4/6/2019  EXAMINATION: CT OF THE HEAD WITHOUT CONTRAST  4/5/2019 11:11 pm TECHNIQUE: CT of the head was performed without the administration of intravenous contrast. Dose modulation, iterative reconstruction, and/or weight based adjustment of the mA/kV was utilized to reduce the radiation dose to as low as reasonably achievable.  COMPARISON: CT head 04/05/2029 HISTORY: ORDERING SYSTEM PROVIDED HISTORY: f/u [facility] s/p EVD placement TECHNOLOGIST PROVIDED HISTORY: Ordering Physician Provided Reason for Exam: EVD placement follow up Acuity: Unknown Type of Exam: Subsequent/Follow-up FINDINGS: BRAIN/VENTRICLES: Interval placement of right frontal approach external ventricular drainage device. Tip terminates within the frontal horn left lateral ventricle near the caudal thalamic groove. Interval pneumocephalus identified consistent with interval placement. Redemonstration of the left thalamic intraparenchymal hemorrhage measuring 3.6 x 2.5 cm in size with evidence of subarachnoid decompression/subarachnoid extension. Evidence of blood products within the lateral 3rd and 4th ventricle identified. Evidence of blood products identified overlying both frontal and parietal lobes and along the interhemispheric falx new from the prior examination but may be related to redistribution of subarachnoid blood products. No shift of midline structures. Basal cisterns are patent. Mild generalized involutional change with chronic small vessel ischemic disease. Intracranial vascular calcifications. . ORBITS: The visualized portion of the orbits demonstrate no acute abnormality. SINUSES: Mild ethmoid sinus mucosal thickening. SOFT TISSUES/SKULL:  No depressed skull fracture. Edema identified overlying the right temporalis muscle may be related to prior trauma or surgery. Interval placement of right frontal approach EVD. Evolving left thalamic intraparenchymal hemorrhage with secondary subarachnoid extension and blood products within the lateral 3rd and 4th ventricles. The blood products identified overlying both frontal lobes parietal lobes and along the interhemispheric falx could be related to placement by EVD versus redistribution of the previously noted subarachnoid blood products.      Ct Head Wo Contrast    Result Date: 4/5/2019  EXAMINATION: CT OF THE HEAD WITHOUT CONTRAST  4/5/2019 3:19 pm TECHNIQUE: CT of the head was performed without the administration of intravenous contrast. Dose modulation, iterative reconstruction, and/or weight based adjustment of the mA/kV was utilized to reduce the radiation dose to as low as reasonably achievable. COMPARISON: None. HISTORY: ORDERING SYSTEM PROVIDED HISTORY: trauma TECHNOLOGIST PROVIDED HISTORY: FINDINGS: BRAIN/VENTRICLES: There is an intraparenchymal hemorrhage within the left thalamic region measuring 3.2 x 2.1 x 3.6 cm. There is adjacent vasogenic edema. There are hemorrhagic products within the lateral ventricles bilaterally as well as the 3rd ventricle, cerebral aqueduct, and 4th ventricle. There is minimal rightward midline shift. There is age-appropriate cerebral volume loss. The infratentorial structures are otherwise unremarkable. ORBITS: The visualized portion of the orbits demonstrate no acute abnormality. SINUSES: The visualized paranasal sinuses and mastoid air cells demonstrate no acute abnormality. SOFT TISSUES/SKULL:  There is right-sided soft tissue swelling involving the right frontal, parietal, and temporal region. There is no definite acute osseous abnormalities. Intraparenchymal hemorrhage in the left thalamic region with adjacent vasogenic edema. Hemorrhagic products within the lateral ventricles, 3rd ventricle, cerebral aqueduct, and 4th ventricle. Minimal rightward midline shift. Critical results were called by Dr. Amanda Fry to Dr. Rock Shankar on 4/5/2019 at 16:02. Ct Facial Bones Wo Contrast    Result Date: 4/5/2019  EXAMINATION: CT OF THE FACE WITHOUT CONTRAST  4/5/2019 3:24 pm TECHNIQUE: CT of the face was performed without the administration of intravenous contrast. Multiplanar reformatted images are provided for review. Dose modulation, iterative reconstruction, and/or weight based adjustment of the mA/kV was utilized to reduce the radiation dose to as low as reasonably achievable.  COMPARISON: None HISTORY: ORDERING SYSTEM PROVIDED HISTORY: trauma; significant R facial bruising FINDINGS: FACIAL BONES:  The maxilla, pterygoid plates and zygomatic arches are intact. The mandible is intact. The mandibular condyles are normally situated. The nasal bones and maxillary nasal processes are intact. ORBITS:  The globes appear intact. The extraocular muscles, optic nerve sheath complexes and lacrimal glands appear unremarkable. No retrobulbar hematoma or mass is seen. The orbital walls and rims are intact. SINUSES/MASTOIDS:  The paranasal sinuses and mastoid air cells are well aerated. No acute fracture is seen. SOFT TISSUES:  Preseptal swelling of the right face extending superiorly along the supraorbital ridge into the right frontal scalp. Asymmetric swelling of the lateral face on the right compared with the left. No fluid collection is identified. No air or unexpected radiopaque foreign bodies. Soft tissue swelling/edema along the upper neck circumferentially right greater than left. The visualized intracranial contents show acute left thalamic intraparenchymal blood products and intraventricular blood products. 1. Bruising of the face without fracture, radiopaque foreign body, or superficial fluid collection. 2. Acute left thalamic hemorrhage with intraventricular extension. This is previously called to Dr. Rena Lehman by Dr. Crystal Demarco at  on 4/5/19. Ct Cervical Spine Wo Contrast    Result Date: 4/5/2019  EXAMINATION: CT OF THE CERVICAL SPINE WITHOUT CONTRAST 4/5/2019 3:19 pm TECHNIQUE: CT of the cervical spine was performed without the administration of intravenous contrast. Multiplanar reformatted images are provided for review. Dose modulation, iterative reconstruction, and/or weight based adjustment of the mA/kV was utilized to reduce the radiation dose to as low as reasonably achievable. COMPARISON: None. HISTORY: ORDERING SYSTEM PROVIDED HISTORY: trauma FINDINGS: BONES/ALIGNMENT: There is no evidence of an acute cervical spine fracture.  There is normal alignment of the cervical spine except moderate dextroconvex scoliosis and reversal of the normal lordotic curvature. .  Decompressive laminectomies at C4 and C5. Posterior fusion rods and pedicular screws at C3, 4, 5 and 6 on the right and C5 and 6 on the left. DEGENERATIVE CHANGES: Multilevel disc space narrowing. SOFT TISSUES: There is no prevertebral soft tissue swelling. No acute abnormality of the cervical spine. Posterior interbody fixation as above. Ct Thoracic Spine Wo Contrast    Result Date: 4/5/2019  EXAMINATION: CT OF THE THORACIC SPINE WITHOUT CONTRAST  4/5/2019 3:18 pm: TECHNIQUE: CT of the thoracic spine was performed without the administration of intravenous contrast. Multiplanar reformatted images are provided for review. Dose modulation, iterative reconstruction, and/or weight based adjustment of the mA/kV was utilized to reduce the radiation dose to as low as reasonably achievable. COMPARISON: None. HISTORY: ORDERING SYSTEM PROVIDED HISTORY: trauma FINDINGS: BONES/ALIGNMENT: There is normal alignment of the spine. The vertebral body heights are maintained. No osseous destructive lesion is seen. DEGENERATIVE CHANGES: No gross spinal canal stenosis or bony neural foraminal narrowing of the thoracic spine. SOFT TISSUES: No paraspinal mass is seen. Unremarkable CT of the thoracic spine. Ct Lumbar Spine Wo Contrast    Result Date: 4/5/2019  EXAMINATION: CT OF THE LUMBAR SPINE WITHOUT CONTRAST  4/5/2019 TECHNIQUE: CT of the lumbar spine was performed without the administration of intravenous contrast. Multiplanar reformatted images are provided for review. Dose modulation, iterative reconstruction, and/or weight based adjustment of the mA/kV was utilized to reduce the radiation dose to as low as reasonably achievable. COMPARISON: None HISTORY: ORDERING SYSTEM PROVIDED HISTORY: trauma TECHNOLOGIST PROVIDED HISTORY: trauma FINDINGS: BONES/ALIGNMENT: There is minimal compression deformity of L4 vertebral body with less than 20% height loss/wedging anteriorly. Multilevel degenerative disc disease is evident. The facets are intact. The spinous processes are intact. Lamina and pedicles are intact. There is evidence of prior posterior decompression. DEGENERATIVE CHANGES: Multilevel degenerative disc disease with facet arthropathy. SOFT TISSUES/RETROPERITONEUM: Diverticulosis. Atherosclerotic changes of aorta. Large stool throughout the colon. An avidly enhancing dilated left ovarian vessel is noted. 1. Age-indeterminate compression deformity of L4 with less than 20% height loss and no significant retropulsion. 2. Osteopenic skeleton with multilevel degenerative changes. Xr Chest Portable    Result Date: 4/11/2019  EXAMINATION: SINGLE XRAY VIEW OF THE CHEST 4/11/2019 6:48 am COMPARISON: April 6, 2019 HISTORY: ORDERING SYSTEM PROVIDED HISTORY: tachypnic, fever, rule out aspiration ? TECHNOLOGIST PROVIDED HISTORY: tachypnic, fever, rule out aspiration ? FINDINGS: Cardiac monitoring leads overlie the chest.  Borderline cardiomegaly. Trace left effusion. No pneumothorax. No airspace consolidation. Trace left effusion. Xr Chest Portable    Result Date: 4/6/2019  EXAMINATION: SINGLE XRAY VIEW OF THE CHEST 4/6/2019 5:27 am COMPARISON: 04/05/2019 HISTORY: ORDERING SYSTEM PROVIDED HISTORY: Concern for aspiration TECHNOLOGIST PROVIDED HISTORY: Concern for aspiration FINDINGS: Cardiomediastinal silhouette and pulmonary vasculature are within normal limits. There is no focal airspace consolidation. No evidence of pneumothorax or pleural effusion on this limited supine study. No acute osseous abnormality. No acute intrathoracic process. Xr Chest Portable    Result Date: 4/5/2019  EXAMINATION: SINGLE XRAY VIEW OF THE CHEST 4/5/2019 3:24 pm COMPARISON: None. HISTORY: ORDERING SYSTEM PROVIDED HISTORY: trauma TECHNOLOGIST PROVIDED HISTORY: trauma FINDINGS: Slight increase in interstitial markings. The lungs are without acute focal process.   There is no effusion or pneumothorax. The cardiomediastinal silhouette is without acute process. The osseous structures are without acute process. No fracture or pneumothorax. Increased interstitial markings     Cta Neck W Contrast    Result Date: 4/5/2019  EXAMINATION: CTA OF THE HEAD WITH CONTRAST; CTA OF THE NECK 4/5/2019 3:32 pm: TECHNIQUE: CTA of the head/brain was performed with the administration of intravenous contrast. Multiplanar reformatted images are provided for review. MIP images are provided for review. Dose modulation, iterative reconstruction, and/or weight based adjustment of the mA/kV was utilized to reduce the radiation dose to as low as reasonably achievable.; CTA of the neck was performed with the administration of intravenous contrast. Multiplanar reformatted images are provided for review. MIP images are provided for review. Stenosis of the internal carotid arteries measured using NASCET criteria. Dose modulation, iterative reconstruction, and/or weight based adjustment of the mA/kV was utilized to reduce the radiation dose to as low as reasonably achievable. COMPARISON: None. HISTORY: ORDERING SYSTEM PROVIDED HISTORY: SAH, ?hemorrhagic stroke TECHNOLOGIST PROVIDED HISTORY: ; ORDERING SYSTEM PROVIDED HISTORY: SAH, ?hemorrhagic stroke TECHNOLOGIST PROVIDED HISTORY: Ordering Physician Provided Reason for Exam: head bleed/ found down Acuity: Unknown FINDINGS: CTA NECK: AORTIC ARCH/ARCH VESSELS: There is a normal branch pattern of the aortic arch. No significant stenosis is seen of the innominate artery or subclavian arteries. CAROTID ARTERIES: The common carotid arteries are normal in appearance without evidence of a flow limiting stenosis. The internal carotid arteries are normal in appearance without evidence of a flow limiting stenosis by NASCET criteria. No dissection or arterial injury is seen.  VERTEBRAL ARTERIES: The vertebral arteries both arise from the subclavian arteries and are normal in caliber without evidence of flow limiting stenosis or dissection. SOFT TISSUES: The lung apices are clear. No cervical or superior mediastinal lymphadenopathy. The visualized portion of the larynx and pharynx appear unremarkable. The parotid, submandibular and thyroid glands demonstrate no acute abnormality. BONES: The visualized osseous structures appear unremarkable. CTA HEAD: ANTERIOR CIRCULATION: The internal carotid arteries are normal in course and caliber without focal stenosis. The anterior cerebral and middle cerebral arteries demonstrate no focal stenosis. POSTERIOR CIRCULATION: The posterior cerebral arteries demonstrate no focal stenosis. The vertebral and basilar arteries appear unremarkable. BRAIN: There is redemonstration of an intraparenchymal hemorrhage within the left thalamic region as well as intraventricular hemorrhagic products. Unremarkable CTA of the head and neck. Critical results were called by Dr. Tammi Stein to Dr. Rosalba John on 4/5/2019 at 16:12. Us Abdomen Limited    Result Date: 4/12/2019  EXAMINATION: RIGHT UPPER QUADRANT ULTRASOUND 4/12/2019 12:07 am COMPARISON: CT chest, abdomen and pelvis done April 5, 2019. HISTORY: ORDERING SYSTEM PROVIDED HISTORY: eval for ascites in setting of adominal distension, cirrhosis, leukocytosis TECHNOLOGIST PROVIDED HISTORY: eval for ascites in setting of adominal distension, cirrhosis, leukocytosis FINDINGS: Abdominal ultrasound was performed for evaluation of abdominal ascites. Small to moderate amount of ascites throughout the bilateral abdomen. Small amount of moderate abdominal ascites throughout the bilateral abdomen. Us Guided Paracentesis    Result Date: 4/12/2019  PROCEDURE: ULTRASOUND GUIDED PARACENTESIS 4/12/2019 HISTORY: ORDERING SYSTEM PROVIDED HISTORY: r/o SBP TECHNOLOGIST PROVIDED HISTORY: r/o SBP PHYSICIANS: This procedure was performed by Jennifer Vieira PA-C under the indirect supervision of Dr. Tiffany Raymundo. mucous membranes   NECK:  supple, symmetric   LUNGS:  Equal air entry bilaterally, clear   CARDIOVASCULAR:  normal s1 / s2, RRR, distal pulses intact   ABDOMEN:  Soft, no rigidity, normal bowel sounds   NEUROLOGIC:  Mental Status:  Alert, confused. Not answering orientation questions or following commands consistently. Cranial Nerves:    III: Pupils:  equal, round, reactive to light  III,IV,VI: Extra Ocular Movements: intact  VII: Facial strength: abnormal - right facial droop    Motor Exam:    Right upper extremity extends to pain. Right lower extremity withdraws to pain. Able to antigravity left upper and left lower extremities; 3/5 strength. DRAINS:  [x] There are no drains for Neuro Critical Care to monitor at this time. ASSESSMENT AND PLAN:       This is a 66 y. o. female with a history of  Hepatitis C suspected to be from previous blood transfusion, cirrhosis, and esophageal varices s/p banding May 2017 who initially presented as a trauma alert on 4/5/19 after being found down at home.  LKW was ~24h prior to arrival. [facility] revealed left thalamic IPH with intraventricular extension into lateral, 3rd and 4th ventricles.  EVD placed at bedside on admit, removed 4/8.  Patient exam had been improving and she was transferred to Stepdown status 4/9.  Transferred back to Neuro ICU and Neuro Critical Care due to altered mentation, fevers.       NEUROLOGIC:  - Altered mentation/encephalopathy, improving slowly  - Left thalamic IPH with associated edema and intraventricular hemorrhage, minimal shift  - No vascular abnormality on CTA Head/Neck  - EVD placed at bedside 4/7, removed 4/9  - Clinical exam had been improving until overnight into 4/11 when patient mentation progressively declined  - Repeat CT Head 4/11 showed stable IPH in left thalamus, improvement of IVH, no worsening hydro  - LTME showed right and left mid temporal sharp waves conferred an increase risk for focal onset seizures   - Continue Vimpat 100mg BID  - Goal SBP<160  - Neuro checks per protocol     CARDIOVASCULAR:  - Episode of bradycardia 4/15 @ 12PM continued until 7AM today. EKG showing possible second degree heart block. F/U cardiology recs. Trop at baseline. Propranolol d/c'd. - Goal SBP<160  - Continue Norvasc 10mg QD  - PRN Hydralazine/Labetalol  - Recent Echo 4/5/19 EF 38%, grade I diastolic dysfunction, mild pulmonary HTN  - Continue telemetry     PULMONARY:  - Maintaining O2 sats on room air  - CXR 4/11 with trace left effusion, no infiltrate/infectious process     RENAL/FLUID/ELECTROLYTE:  - Normal renal functioning  - BUN 24/ Creatinine 0.42  - Monitor I&O  - Required intermittent straight cath overnight for retention  - Stop maintenance IVF, tolerating PO intake better  - Hypokalemia, K 3.4, replace with total 60meq KCl  - Mild hyponatremia, 133, continue to monitor  - Replace electrolytes PRN  - Daily BMP     GI/NUTRITION:  NUTRITION:  DIET GENERAL;   - History of Hepatitis C, thought to be secondary to blood transfusions  - Cirrhosis  - US Abdomen showed small amount of moderate abdominal ascites throughout bilateral abdomen  - GI following; appreciate recommendations  - S/P Paracentesis on 4/12 with 700ml light yellow fluid obtained from LLQ. GI will repeat paracentesis today d/t abd distention & mild labored breathing/shallow breath sounds on exam.   - No bacteria on culture, WBC 31; low suspicion for SBP  - Started on Lasix 20mg QD and Aldactone 50mg QD per GI, continue. - Hyperammonemia resolved, Ammonia 42  - Continue Lactulose 20g TID; titrate to 3-4 BM per day  - Bowel regimen: Milk of magnesia PRN  - GI prophylaxis: Pepcid     ID:  - afebrile overnight, d/c rocephin today. - No leukocytosis, WBC 12  - UA 4/11 positive nitrite, moderate LE; suggestive of UTI, resolved, NG on cultures.    - Blood cultures 4/11 with no growth   - Paracentesis fluid eval not suggestive of SBP  - Continue to monitor for fevers  - Daily CBC     HEME:   - H&H stable  - Platelets stable  - Daily CBC     ENDOCRINE:  - Continue to monitor blood glucose, goal <180  - Glucose well controlled     OTHER:  - PT/OT/ST  - Code Status: DNR-CCA, no intubation    DISPOSITION:  [x] To remain ICU for close hemodynamic monitoring in setting of bradycardia. We will continue to follow along. For any changes in exam or patient status please contact Neuro Critical Care.       Negrita Castro MD  Neuro Critical Care  Pager [phone]  4/16/2019     7:32 AM

## 2019-04-16 NOTE — PLAN OF CARE
Problem: HEMODYNAMIC STATUS  Goal: Patient has stable vital signs and fluid balance  Outcome: Ongoing     Problem: ACTIVITY INTOLERANCE/IMPAIRED MOBILITY  Goal: Mobility/activity is maintained at optimum level for patient  Outcome: Ongoing     Problem: COMMUNICATION IMPAIRMENT  Goal: Ability to express needs and understand communication  Outcome: Ongoing     Problem: Falls - Risk of:  Goal: Will remain free from falls  Description  Will remain free from falls  Outcome: Ongoing     Problem: Falls - Risk of:  Goal: Absence of physical injury  Description  Absence of physical injury  Outcome: Ongoing     Problem: Risk for Impaired Skin Integrity  Goal: Tissue integrity - skin and mucous membranes  Description  Structural intactness and normal physiological function of skin and  mucous membranes.   4/16/2019 0340 by Manuela Rayo RN  Outcome: Ongoing

## 2019-04-16 NOTE — PROGRESS NOTES
in the last 72 hours. Lipids: No results for input(s): CHOL, HDL in the last 72 hours. Invalid input(s): LDLCALCU  INR:   No results for input(s): INR in the last 72 hours. Objective:   Vitals: BP (!) 164/50   Pulse 82   Temp 96.8 °F (36 °C)   Resp (!) 31   Ht 5' 5\" (1.651 m)   Wt 110 lb 1.6 oz (49.9 kg)   SpO2 95%   BMI 18.32 kg/m²   General appearance: alert and cooperative with exam. Deloris Hidleydi. Able to answer no when questioned about chest pain but limited verbal responses noted  HEENT: Head: Normocephalic, no lesions, without obvious abnormality. Neck:no JVD, trachea midline, no adenopathy  Lungs: Clear to auscultation throughout. On RA currently without distress  Heart: Regular rate and rhythm, s1/s2 auscultated, no murmurs. Tele SR 86  Abdomen: soft, non-tender, bowel sounds active  Extremities: no edema  Neurologic: not done    Echo 4/8/19  Summary  Mild senile sclerosis of the aortic valve with no stenosis or regurgitation  seen. Mild to moderate mitral regurgitation. Mild tricuspid regurgitation. Mild pulmonary hypertension. Mild pulmonic insufficiency. Left ventricle is normal in size. Global left ventricular systolic function  is normal. Estimated ejection fraction is 55 % . Grade I (mild) left ventricular diastolic dysfunction. Assessment / Acute Cardiac Problems:   1. 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 Right EVD placement and removal  3. Acute resp failure s/p extubation  4. 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 HTN  6. Spontaneous bradycardia - BB induced vs prolong vagal response       Patient Active Problem List:     Intracranial hemorrhage ([facility] [address]     Altered mental status     Encounter for palliative care      Plan of Treatment:   1. No further bradycardia. Will recommend to continue to hold BB. 2. Echo reviewed  3. Continue Amlodipine for BP control.  Consider ACE/ARB if BP continues to increase off BB.  4. No further cardiac work-up/testing at this time. Per RN, family to have meeting tomorrow and discuss possible Hospice consult and d/c to nursing facility. Please call with further questions/concerns.      Electronically signed by CHENTE Raines CNP on 4/16/2019 at 1:29 PM  [address]  [phone]

## 2019-04-16 NOTE — PROGRESS NOTES
Speech Language Pathology  Speech Language Pathology  [facility]    Dysphagia Treatment Note    Date: 4/16/2019  Patients Name: Kael Marx  MRN: [id number]  Diagnosis: dysphagia  Patient Active Problem List   Diagnosis Code    Intracranial hemorrhage ([facility] [address] I62.9    Altered mental status R41.82    Encounter for palliative care Z51.5    Acute encephalopathy G93.40    History of esophageal varices with bleeding Z87.19    Acute cystitis without hematuria N30.00    Seizure-like activity ([facility] [address] R56.9    History of liver disease Z87.19    Hyperammonemia ([facility] [address] E72.20    Bradycardia R00.1       Pain: 0/10    Dysphagia Treatment  Treatment time: 900-910    Subjective: [x] Alert [x] Cooperative     [] Confused     [] Agitated    [] Lethargic    Objective/Assessment:    Pt. Seen for diet tolerance monitoring. Per RN pt. Had difficulty with eggs and other solids. She is currently taking puree at bedside with no overt s/s of aspiration and decreased fatigue. Recommend downgrade diet to Puree Level I solids at this time. RN in agreement. Plan:  [x] Continue ST services    [] Discharge from ST:        Discharge recommendations: [] Inpatient Rehab   [] [place]   [] Outpatient Therapy  [] Follow up at trauma clinic   [x] Other: Further therapy recommended at discharge. Treatment completed by:  SRI Malin

## 2019-04-17 LAB
ABSOLUTE EOS #: 0.26 K/UL (ref 0–0.44)
ABSOLUTE IMMATURE GRANULOCYTE: 0.06 K/UL (ref 0–0.3)
ABSOLUTE LYMPH #: 0.92 K/UL (ref 1.1–3.7)
ABSOLUTE MONO #: 0.84 K/UL (ref 0.1–1.2)
ALPHA-1 ANTITRYPSIN PHENOTYPE: NORMAL
ALPHA-1 ANTITRYPSIN: 168 MG/DL (ref 90–200)
ANION GAP SERPL CALCULATED.3IONS-SCNC: 12 MMOL/L (ref 9–17)
APPEARANCE FLUID: NORMAL
BASO FLUID: NORMAL %
BASOPHILS # BLD: 0 % (ref 0–2)
BASOPHILS ABSOLUTE: <0.03 K/UL (ref 0–0.2)
BUN BLDV-MCNC: 23 MG/DL (ref 8–23)
BUN/CREAT BLD: ABNORMAL (ref 9–20)
CALCIUM SERPL-MCNC: 8.1 MG/DL (ref 8.6–10.4)
CASE NUMBER:: NORMAL
CHLORIDE BLD-SCNC: 103 MMOL/L (ref 98–107)
CO2: 18 MMOL/L (ref 20–31)
COLOR FLUID: NORMAL
CREAT SERPL-MCNC: 0.32 MG/DL (ref 0.5–0.9)
CULTURE: NORMAL
CULTURE: NORMAL
DIFFERENTIAL TYPE: ABNORMAL
EOSINOPHIL FLUID: NORMAL %
EOSINOPHILS RELATIVE PERCENT: 3 % (ref 1–4)
FLUID DIFF COMMENT: NORMAL
GFR AFRICAN AMERICAN: >60 ML/MIN
GFR NON-AFRICAN AMERICAN: >60 ML/MIN
GFR SERPL CREATININE-BSD FRML MDRD: ABNORMAL ML/MIN/{1.73_M2}
GFR SERPL CREATININE-BSD FRML MDRD: ABNORMAL ML/MIN/{1.73_M2}
GLUCOSE BLD-MCNC: 109 MG/DL (ref 70–99)
HCT VFR BLD CALC: 31.4 % (ref 36.3–47.1)
HEMOGLOBIN: 10.3 G/DL (ref 11.9–15.1)
IMMATURE GRANULOCYTES: 1 %
LYMPHOCYTES # BLD: 12 % (ref 24–43)
LYMPHOCYTES, BODY FLUID: 5 %
Lab: NORMAL
Lab: NORMAL
MAGNESIUM: 1.9 MG/DL (ref 1.6–2.6)
MCH RBC QN AUTO: 29.4 PG (ref 25.2–33.5)
MCHC RBC AUTO-ENTMCNC: 32.8 G/DL (ref 28.4–34.8)
MCV RBC AUTO: 89.7 FL (ref 82.6–102.9)
MONOCYTE, FLUID: NORMAL %
MONOCYTES # BLD: 11 % (ref 3–12)
NEUTROPHIL, FLUID: 17 %
NRBC AUTOMATED: 0 PER 100 WBC
OTHER CELLS FLUID: NORMAL %
PDW BLD-RTO: 17.6 % (ref 11.8–14.4)
PLATELET # BLD: 95 K/UL (ref 138–453)
PLATELET ESTIMATE: ABNORMAL
PMV BLD AUTO: 12.1 FL (ref 8.1–13.5)
POTASSIUM SERPL-SCNC: 3.7 MMOL/L (ref 3.7–5.3)
RBC # BLD: 3.5 M/UL (ref 3.95–5.11)
RBC # BLD: ABNORMAL 10*6/UL
RBC FLUID: <3000 /MM3
SEG NEUTROPHILS: 73 % (ref 36–65)
SEGMENTED NEUTROPHILS ABSOLUTE COUNT: 5.73 K/UL (ref 1.5–8.1)
SODIUM BLD-SCNC: 133 MMOL/L (ref 135–144)
SPECIMEN DESCRIPTION: NORMAL
SPECIMEN TYPE: NORMAL
WBC # BLD: 7.8 K/UL (ref 3.5–11.3)
WBC # BLD: ABNORMAL 10*3/UL
WBC FLUID: 85 /MM3

## 2019-04-17 PROCEDURE — 6370000000 HC RX 637 (ALT 250 FOR IP): Performed by: STUDENT IN AN ORGANIZED HEALTH CARE EDUCATION/TRAINING PROGRAM

## 2019-04-17 PROCEDURE — 2580000003 HC RX 258: Performed by: PSYCHIATRY & NEUROLOGY

## 2019-04-17 PROCEDURE — 99233 SBSQ HOSP IP/OBS HIGH 50: CPT | Performed by: INTERNAL MEDICINE

## 2019-04-17 PROCEDURE — 97535 SELF CARE MNGMENT TRAINING: CPT

## 2019-04-17 PROCEDURE — 6370000000 HC RX 637 (ALT 250 FOR IP): Performed by: NURSE PRACTITIONER

## 2019-04-17 PROCEDURE — 36415 COLL VENOUS BLD VENIPUNCTURE: CPT

## 2019-04-17 PROCEDURE — 84100 ASSAY OF PHOSPHORUS: CPT

## 2019-04-17 PROCEDURE — 2580000003 HC RX 258: Performed by: EMERGENCY MEDICINE

## 2019-04-17 PROCEDURE — 92507 TX SP LANG VOICE COMM INDIV: CPT

## 2019-04-17 PROCEDURE — 94762 N-INVAS EAR/PLS OXIMTRY CONT: CPT

## 2019-04-17 PROCEDURE — 85025 COMPLETE CBC W/AUTO DIFF WBC: CPT

## 2019-04-17 PROCEDURE — 2000000003 HC NEURO ICU R&B

## 2019-04-17 PROCEDURE — APPNB45 APP NON BILLABLE 31-45 MINUTES: Performed by: INTERNAL MEDICINE

## 2019-04-17 PROCEDURE — 51701 INSERT BLADDER CATHETER: CPT

## 2019-04-17 PROCEDURE — 6360000002 HC RX W HCPCS: Performed by: NURSE PRACTITIONER

## 2019-04-17 PROCEDURE — C9254 INJECTION, LACOSAMIDE: HCPCS | Performed by: PSYCHIATRY & NEUROLOGY

## 2019-04-17 PROCEDURE — 2060000000 HC ICU INTERMEDIATE R&B

## 2019-04-17 PROCEDURE — 83735 ASSAY OF MAGNESIUM: CPT

## 2019-04-17 PROCEDURE — 2500000003 HC RX 250 WO HCPCS: Performed by: EMERGENCY MEDICINE

## 2019-04-17 PROCEDURE — 97530 THERAPEUTIC ACTIVITIES: CPT

## 2019-04-17 PROCEDURE — 82330 ASSAY OF CALCIUM: CPT

## 2019-04-17 PROCEDURE — 99497 ADVNCD CARE PLAN 30 MIN: CPT | Performed by: FAMILY MEDICINE

## 2019-04-17 PROCEDURE — 51798 US URINE CAPACITY MEASURE: CPT

## 2019-04-17 PROCEDURE — 6360000002 HC RX W HCPCS: Performed by: PSYCHIATRY & NEUROLOGY

## 2019-04-17 PROCEDURE — 80048 BASIC METABOLIC PNL TOTAL CA: CPT

## 2019-04-17 RX ADMIN — DEXTROSE MONOHYDRATE 100 MG: 50 INJECTION, SOLUTION INTRAVENOUS at 03:40

## 2019-04-17 RX ADMIN — Medication 15 ML: at 19:56

## 2019-04-17 RX ADMIN — AMLODIPINE BESYLATE 10 MG: 10 TABLET ORAL at 09:15

## 2019-04-17 RX ADMIN — HYDRALAZINE HYDROCHLORIDE 5 MG: 20 INJECTION INTRAMUSCULAR; INTRAVENOUS at 19:57

## 2019-04-17 RX ADMIN — Medication 10 ML: at 09:17

## 2019-04-17 RX ADMIN — FAMOTIDINE 20 MG: 10 INJECTION, SOLUTION INTRAVENOUS at 09:15

## 2019-04-17 RX ADMIN — ENOXAPARIN SODIUM 40 MG: 40 INJECTION SUBCUTANEOUS at 09:16

## 2019-04-17 RX ADMIN — LACTULOSE 20 G: 10 SOLUTION ORAL at 19:57

## 2019-04-17 RX ADMIN — SODIUM CHLORIDE 100 MG: 9 INJECTION, SOLUTION INTRAVENOUS at 15:25

## 2019-04-17 RX ADMIN — LACTULOSE 20 G: 10 SOLUTION ORAL at 14:25

## 2019-04-17 RX ADMIN — Medication 10 ML: at 19:58

## 2019-04-17 RX ADMIN — HYDRALAZINE HYDROCHLORIDE 5 MG: 20 INJECTION INTRAMUSCULAR; INTRAVENOUS at 15:04

## 2019-04-17 RX ADMIN — FUROSEMIDE 20 MG: 20 TABLET ORAL at 09:15

## 2019-04-17 RX ADMIN — Medication 15 ML: at 11:11

## 2019-04-17 RX ADMIN — SPIRONOLACTONE 50 MG: 25 TABLET ORAL at 09:15

## 2019-04-17 RX ADMIN — HYDRALAZINE HYDROCHLORIDE 5 MG: 20 INJECTION INTRAMUSCULAR; INTRAVENOUS at 23:05

## 2019-04-17 RX ADMIN — RIFAXIMIN 550 MG: 550 TABLET ORAL at 19:57

## 2019-04-17 RX ADMIN — LACTULOSE 20 G: 10 SOLUTION ORAL at 09:16

## 2019-04-17 RX ADMIN — RIFAXIMIN 550 MG: 550 TABLET ORAL at 09:15

## 2019-04-17 ASSESSMENT — PAIN SCALES - GENERAL
PAINLEVEL_OUTOF10: 0
PAINLEVEL_OUTOF10: 0

## 2019-04-17 NOTE — PLAN OF CARE
Problem: Falls - Risk of:  Goal: Will remain free from falls  Description  Will remain free from falls  Outcome: Met This Shift  Fall precautions in place. Bed in lowest position, wheels locked, bed alarm in place and activated. Non-skid socks on patient. Fall risk ID on patient, call light within reach. Environment free of clutter and adequate lighting provided. Patient is encouraged to call out before getting out of bed and for any other needs. Will continue to monitor     Problem: HEMODYNAMIC STATUS  Goal: Patient has stable vital signs and fluid balance  Outcome: Ongoing    Problem: ACTIVITY INTOLERANCE/IMPAIRED MOBILITY  Goal: Mobility/activity is maintained at optimum level for patient  Outcome: Ongoing     Problem: COMMUNICATION IMPAIRMENT  Goal: Ability to express needs and understand communication  Outcome: Ongoing    Problem: Risk for Impaired Skin Integrity  Goal: Tissue integrity - skin and mucous membranes  Description  Structural intactness and normal physiological function of skin and  mucous membranes. 4/17/2019 1626 by Carlyn Pollard RN  Outcome: Ongoing   Skin assessment performed during this shift. No new skin breakdown noted. Patient is reminded to turn every 2 hours. Will continue to monitor.

## 2019-04-17 NOTE — CARE COORDINATION
Writer spoke with Sowmya at [facility] to start pre-cert. Sowmya in need of updated therapy notes for approval to transfer. Speech completed today. OT and PT needs, RN called for treatment visits today. Will need pre-cert, potential to transfer today/tomorrow if accepted. 1710 Updated Sowmya at [facility] that therapy treatments completed. She will update writer tomorrow on status.

## 2019-04-17 NOTE — PROGRESS NOTES
Speech Language Pathology  Speech Language Pathology  [facility]    Speech Language Treatment Note    Date: 4/17/2019  Patients Name: Faisal Grullon  MRN: [id number]  Diagnosis:   Patient Active Problem List   Diagnosis Code    Intracranial hemorrhage ([facility] I62.9    Altered mental status R41.82    Encounter for palliative care Z51.5    Acute encephalopathy G93.40    History of esophageal varices with bleeding Z87.19    Acute cystitis without hematuria N30.00    Seizure-like activity ([facility] [address] R56.9    History of liver disease Z87.19    Hyperammonemia ([facility] E72.20    Bradycardia R00.1       Pain: 0/10    Speech and Language Treatment  Treatment time: 900-911    Subjective: [x] Alert [x] Cooperative     [] Confused     [] Agitated    [] Lethargic      Objective/Assessment:  Auditory Comprehension: Single unit commands 4/6 no increase with cues. Yes/no questions with known response 3/5. Verbal Expression: Automatics 1-10, DOROTHY 4/7. Confrontational Naming 0/4 increased to 4/4 with max cues. Sentence completion 2/4 with max cues. Pt. With fluent responses. Other: Spoke with RN who reports family meeting is happening today. RN would like to upgrade diet as pt.able tolerate. ST in agreement as pt. With no overt s/s of aspiration noted. Plan:  [x] Continue ST services    [] Discharge from ST:      Discharge recommendations: [] Inpatient Rehab   [] [place]   [] Outpatient Therapy  [] Follow up at trauma clinic   [] Other: Further therapy recommended at discharge. Treatment completed by:  Dayanna Izquierdo M.S. [id number] [facility]

## 2019-04-17 NOTE — PROGRESS NOTES
Physical Therapy  Facility/Department: [address]  Daily Treatment Note  NAME: Madeleine Moeller  : 1940  MRN: [id number]    Date of Service: 2019    Discharge Recommendations:  Further therapy recommended at discharge. Patient Diagnosis(es): The primary encounter diagnosis was Altered mental status, unspecified altered mental status type. A diagnosis of Intracranial hemorrhage ([address] was also pertinent to this visit. has no past medical history on file. has no past surgical history on file. Restrictions  Restrictions/Precautions  Restrictions/Precautions: General Precautions, Fall Risk  Required Braces or Orthoses?: No  Position Activity Restriction  Other position/activity restrictions: Up with assist, DNR-CCA  Subjective   General  Chart Reviewed: Yes  Response To Previous Treatment: Patient with no complaints from previous session. Family / Caregiver Present: Yes(Son)  Subjective  Subjective: RN and pt agreeable to PT. Pt in chair upon arrival. RN agrees to have pt back to bed. Pain Screening  Patient Currently in Pain: Denies  Vital Signs  Patient Currently in Pain: Denies       Orientation  Orientation  Overall Orientation Status: Impaired  Orientation Level: Oriented to person  Cognition      Objective   Bed mobility  Rolling to Left: Moderate assistance;2 Person assistance  Rolling to Right: Moderate assistance;2 Person assistance  Sit to Supine: Maximum assistance;2 Person assistance  Scooting: Maximal assistance  Comment: Pt MaxA with sit<>supine with cueing to hold her RUE across her body using her LUE. Pt is ModA +2 for rolling, and is able to hold herself on her side using her LUE and the bedrail with CGA-Lisa when she is rolled to the R side.   Transfers  Sit to Stand: Maximum Assistance;2 Person Assistance  Stand to sit: Maximum Assistance;2 Person Assistance  Bed to Chair: Dependent/Total(SS transfer)  Comment: Pt completes sit<>stands with MaxA +2 assist. Pt demo excessive right lean d/t right sided deficits. She is able to correct with verbal cueing while sitting in SS, but is unable to hold midline posture and becomes fatigued after ~10sec. Pt total assist with SS transfer from chair to bed. Pt sat EOB for roughly ~12 min. Able to complete LLE LAQ x3 with assist for remaining 7 reps. Ankle pumps x10 sitting EOB, actively LLE and passively RLE.      ROM:  PROM RUE x10 all planes in supine  PROM RLE x10 knee flex, ankle pf/df  Pt able to wash her face with washcloth using her LUE showing functional ROM, while sitting EOB with RUE support to encourage WB through extremity. Pt required cueing for attention to task. Quickly became fatigued with task. Balance  Sitting - Static: Fair;+  Sitting - Dynamic: Poor  Standing - Static: Poor  Standing - Dynamic: Poor  Comments: Balance assessed sitting EOB and in SS. Assessment   Body structures, Functions, Activity limitations: Decreased functional mobility ; Decreased endurance;Decreased ROM; Decreased strength;Decreased balance;Decreased safe awareness  Assessment: Pt MaxA-ModA x2 bed mobility tasks. MaxA x2 sit<>stand transfer to SS. Total transfer to/from EOB<>chair using SS with excessive R side lean d/t R sided deficits. Pt able to correct posture to midline with verbal cueing, unable to maintain midline posture. Pt sat EOB ~13 min with CGA-Lisa for first 10 min and then became fatigued requiring ModA for remaining 2 min. Pt continues to present with AMS. Family at [facility]. Pt returned to supine in bed, RN notified.    Prognosis: Fair  Decision Making: Medium Complexity  REQUIRES PT FOLLOW UP: Yes  Activity Tolerance  Activity Tolerance: Patient limited by fatigue;Patient Tolerated treatment well;Patient limited by endurance       AM-PAC Score  AM-PAC Inpatient Mobility Raw Score : 8  AM-PAC Inpatient T-Scale Score : 28.52  Mobility Inpatient CMS 0-100% Score: 86.62  Mobility Inpatient CMS G-Code Modifier : CM Goals  Short term goals  Time Frame for Short term goals: 15  Short term goal 1: Pt to demonstrate good sitting/standing static/dynamic balance   Short term goal 2: Pt to complete bed mobility tasks with Lisa  Short term goal 3: Pt to transfer using RW with Lisa  Short term goal 4: Pt to tolerate 30 min activity to assist with increasing strength and endurance    Plan    Plan  Times per week: 5-6x/week  Times per day: Daily  Current Treatment Recommendations: Strengthening, ROM, Balance Training, Functional Mobility Training, Transfer Training, Safety Education & Training, Endurance Training, Patient/Caregiver Education & Training  Safety Devices  Type of devices: Nurse notified, Call light within reach, Bed alarm in place, Left in bed, Gait belt  Restraints  Initially in place: No     Therapy Time   Individual Concurrent Group Co-treatment   Time In 0111         Time Out 0145         Minutes 34         Timed Code Treatment Minutes: SHANTEL Barrow  The above documentation was reviewed and accepted by Milka Mccarty, Physical Therapist.

## 2019-04-17 NOTE — PATIENT CARE CONFERENCE
Palliative Care Family Conference    Patient: Dedra Plaza  Room: 0522/0522-01    Attended By: Dr. Blood [facility] attending, Dr. Harper Glover neuro critical care attending, [facility] neuro critical care nurse practitioner, medical student, patient's son Alexis Wheatley and his wife [facility] and patient's other son Loulou Mari on phone conference call    Reason for meeting: Follow-up to prior meeting  Disease progression  Functional decline  Discuss goals of care  Treatment options/plans  Provide clinical updates and answer questions  Share information and provider education for the family  Listen to patient/family concerns  Assess family understanding, concerns, and coping  Poor prognosis is anticipated  Provide emotional support to the family  Other major care decisions  Code status and hospice care     Conference Summary:  - The meeting was held in the conference room on the 5th floor    - The meeting was conducted by Dr. Harper Glover and myself, attended by [facility] neuro critical care nurse practitioner, medical student patient's son Alexis Wheatley [facility] ) and his wife and patient's other son Loulou Mari on phone conference call    - Dr. Harper Glover discussed patient's current medical conditions with the family and updated them regarding patient's guarded prognosis and also discussed about patient's poor quality of life    - I discussed with the family regarding comfort care and explained to them about different types of code status     - I explained the family about hospice care at different levels    - Patient's family wanted the patients code status to remain as DNR CCA with no intubation    - Patient's family wanted [facility] hospice facility to be contacted for patient's discharge planning    - We informed family that patient's  will be working with them for patient's transfer to the facility of their choice    - We offered comfort and emotional support to the family        Conclusion/Plan:  - We will follow-up with hospice consult  - We

## 2019-04-17 NOTE — PLAN OF CARE
Problem: Risk for Impaired Skin Integrity  Goal: Tissue integrity - skin and mucous membranes  Description  Structural intactness and normal physiological function of skin and  mucous membranes. Outcome: Ongoing  Note:   Skin assessment complete. Interventions in place. See doc flowsheet for interventions initiated. Pt turned every two hours. Cream applied to patient to prevent sores. Bed linen remains dry under patient.  Will continue to monitor for additional needs and skin breakdown

## 2019-04-17 NOTE — PROGRESS NOTES
Nutrition Assessment    Type and Reason for Visit: Reassess    Nutrition Recommendations:   - Continue current Dysphagia 1 Pureed diet. Encourage intakes and monitor tolerance to diet consistency. - Will provide frozen Magic Cup ONS along with Ensure Enlive. Encourage intakes. - Monitor plan of care. Nutrition Assessment: Pt's diet downgraded to Purees by Speech yesterday. RN reports pt taking bites of her meals and some of Ensure supplements. Needs some assistance with eating at meals. Malnutrition Assessment:  · Malnutrition Status: Meets the criteria for moderate malnutrition  · Context: Acute illness or injury  · Findings of the 6 clinical characteristics of malnutrition (Minimum of 2 out of 6 clinical characteristics is required to make the diagnosis of moderate or severe Protein Calorie Malnutrition based on AND/ASPEN Guidelines):  1. Energy Intake-Less than or equal to 75% of estimated energy requirement, Greater than or equal to 7 days    2. Weight Loss-Unable to assess  3. Fat Loss-Mild subcutaneous fat loss, Orbital, Triceps  4. Muscle Loss-Mild muscle mass loss, Temples (temporalis muscle), Interosseous  5.  Fluid Accumulation-Mild fluid accumulation, Extremities, Ascites    Nutrition Risk Level: High    Nutrient Needs:  · Estimated Daily Total Kcal: 5340-9237 kcal/day  · Estimated Daily Protein (g): 55-75 gm pro/day    Nutrition Diagnosis:   · Problem: Inadequate oral intake  · Etiology: related to Current Condition, Mentation, Appetite     Signs and symptoms:  as evidenced by variable PO intakes, need for oral supplements    Objective Information:  · Wound Type: (Areas of bruising and redness)  · Current Nutrition Therapies:  · Oral Diet Orders: Dysphagia 1 (Pureed)   · Oral Diet intake: 1-25%  · Oral Nutrition Supplement (ONS) Orders: Standard High Calorie Oral Supplement  · ONS intake: 26-50%  · Anthropometric Measures:  · Ht: 5' 5\" (165.1 cm)   · Current Body Wt: 110 lb 1.6 oz (49.9

## 2019-04-17 NOTE — PROGRESS NOTES
face, pt brought wash cloth to face with verbal cue x1. Pt washed eyes and returned hand to lap. Pt instructed to finish washing face requiring verbal cues x3 to initiate task. Balance  Sitting Balance: Minimal assistance(Min/mod A seated EOB with verbal cues for trunk control d/t occassional posterior R lateral lean)  Standing Balance: Moderate assistance  Standing Balance  Time: 2 minutes w/seated rest break x1. Activity: Static standing in esha stedy. Sit to stand:  Moderate assistance(x2 from recliner to esha stedy.)  Stand to sit: Moderate assistance(x2.)  Functional Mobility  Functional Mobility Comments: MARYAN  Bed mobility  Rolling to Left: 2 Person assistance;Maximum assistance  Rolling to Right: Moderate assistance;2 Person assistance  Sit to Supine: Maximum assistance;2 Person assistance  Scooting: Maximal assistance;2 Person assistance  Cognition  Overall Cognitive Status: Exceptions  Following Commands: Inconsistently follows commands  Attention Span: Attends with cues to redirect  Memory: Decreased recall of recent events;Decreased short term memory  Safety Judgement: Decreased awareness of need for assistance;Decreased awareness of need for safety  Problem Solving: Assistance required to correct errors made;Assistance required to identify errors made  Initiation: Requires cues for some  Sequencing: Requires cues for all     Plan   Plan  Times per week: 4-5x  Current Treatment Recommendations: Balance Training, Functional Mobility Training, Endurance Training, Home Management Training, Equipment Evaluation, Education, & procurement, Patient/Caregiver Education & Training, Self-Care / ADL, Safety Education & Training, Strengthening, Neuromuscular Re-education, Cognitive Reorientation  Goals  Short term goals  Time Frame for Short term goals: Pt will by discharge   Short term goal 1: demo ADL UB/LB dressing/bathing activity seated with setup and min A/min vc's  Short term goal 2: demo standing during

## 2019-04-17 NOTE — PROGRESS NOTES
Daily Progress Note  Neuro Critical Care    Patient Name: Manav Avendano  Patient : 1940  Room/Bed: 0522/0522-01  Allergies: No Known Allergies  Problem List:   Patient Active Problem List   Diagnosis    Intracranial hemorrhage ([facility] [address]    Altered mental status    Encounter for palliative care    Acute encephalopathy    History of esophageal varices with bleeding    Acute cystitis without hematuria    Seizure-like activity ([facility] [address]    History of liver disease    Hyperammonemia (HCC)    Bradycardia       INTERVAL HISTORY    Initial Presentation   The patient is a 66 y. o. female with a history of Hepatitis C suspected to be from previous blood transfusion, cirrhosis, and esophageal varices s/p banding May 2017 who initially presented as a trauma alert on 19 after being found down at home. LKW was ~24h prior to arrival. [facility] revealed left thalamic IPH with intraventricular extension into lateral, 3rd and 4th ventricles.  Trauma survey otherwise unremarkable except age indeterminate L2 compression fracture.  Initially evaluated by Trauma, but IPH thought to be spontaneous rather than traumatic.  Neuro Critical Care and Neurosurgery consulted.  Admitted to the Neuro ICU.  EVD placed at bedside.       ICH score: 1     Hospital Course:   :  Cardene gtt weaned off around midnight, -140s. EVD output 27ml/24hr. Required PRN fentanyl for pain overnight. :  Placed on BiPAP overnight due to accessory muscle use.  Sp02 stable, maintaining secretions.  BP stable, Cardene stopped.  Mentation gradually improving.  EVD pulled out accidentally.    :  Borderline hypertension overnight.  Started Imdur yesterday.  Lopressor, hydralazine, labetalol given PRN.  On room air overnight w/ stable Sp02.  Single episode of elevated temp at 38.0. Pt otherwise afebrile.  Home medications reconciled.  Started on Propranolol.  Doing well sitting at side of bed working with therapy, more verbal with improved speech.  Transferred to Stepdown status under Internal Medicine. 4/11:  Worsening mentation; Internal Medicine transferred patient back to Neuro ICU and Neuro Critical Care.  Repeat CT Head stable.  Febrile overnight, Tmax 102F.  CXR with trace left infusion, no infectious process.  Blood cultures sent. Pato Stantono positive nitrite, moderate LE.  Started on Rocephin for UTI.  STAT LTME ordered to r/o seizures.  Ammonia 45 (down from 125 with Lactulose). 4/12: Mild improvement in mentation; more alert but remains altered. Left foot rhythmic tapping; no direct correlation on LTME.  LTME showing right and left mid temporal sharp waves conferred an increase risk for focal onset seizures.  Given 200mg IV Vimpat then continued on 100mg BID. GI consulted; paracentesis 700ml from LLQ.    4/13: Paracentesis studies not suggestive of SBP.  4/14: Improvement in mentation.     4/15   Transient episode of bradycardia with HR briefly in the high 30's overnight. Having multiple >3-4 BM's per day with Lactulose, evening dose held per nursing. Requiring intermittent straight cath for acute retention. On exam this morning, patient is alert. She is more interactive than previously; able to tell me she has 2 sons and tries to tell me their names. Remains confused and has difficulty following commands. Right upper extremity with extension to pain. Right lower extremity withdraws to pain. This afternoon, patient developed new onset bradycardia with HR as low as 35 and persisting in the 30-40's. Patient appeared pale and less interactive. SBP remained stable 110-120's. Given Atropine 0.5mg IVP x1 with mild improvement in HR to mid to high 40's. Last 24 hours:  No acute events overnight. Vital wise stable , Map> 70 , afebrile, no bradycardic episode. GI on board for ascites, on lasiz 20 mg and aldactone 50 mg as per GI, off Rocephin last dose yesterday. Had family meeting family decided to pursue towards SNF with Requests NOT REPORTED     Direct Exam MANY NEUTROPHILS (A)     Direct Exam MANY MONONUCLEAR WHITE BLOOD CELLS SEEN (A)     Direct Exam NO ORGANISMS SEEN     Direct Exam       Gram stain made from cytocentrifuged specimen. Organisms and cells will be concentrated. Culture PENDING    GRAM STAIN    Collection Time: 04/16/19  2:36 PM   Result Value Ref Range    Specimen Description . ASCITIC FLUID . PARACENTESIS FLUID     Special Requests NOT REPORTED     Direct Exam DUPLICATE ORDER    Body Fluid Cell Count with Differential    Collection Time: 04/16/19  2:37 PM   Result Value Ref Range    Color, Fluid NOT REPORTED     Appearance, Fluid NOT REPORTED     WBC, Fluid 85 /mm3    RBC, Fluid <3,000 /mm3    Specimen Type . ASCITIC FLUID     Neutrophil Count, Fluid 17 %    Lymphocytes, Body Fluid 5 %    Monocyte Count, Fluid  %    Eos, Fluid  0 %    Basos, Fluid  0 %    Other Cells, Fluid PENDING %    Fluid Diff Comment     Albumin, Fluid    Collection Time: 04/16/19  2:37 PM   Result Value Ref Range    Specimen Type . ASCITIC FLUID     Albumin, Fluid 0.4 g/dL   CBC Auto Differential    Collection Time: 04/17/19  4:04 AM   Result Value Ref Range    WBC 7.8 3.5 - 11.3 k/uL    RBC 3.50 (L) 3.95 - 5.11 m/uL    Hemoglobin 10.3 (L) 11.9 - 15.1 g/dL    Hematocrit 31.4 (L) 36.3 - 47.1 %    MCV 89.7 82.6 - 102.9 fL    MCH 29.4 25.2 - 33.5 pg    MCHC 32.8 28.4 - 34.8 g/dL    RDW 17.6 (H) 11.8 - 14.4 %    Platelets 95 (L) 563 - 453 k/uL    MPV 12.1 8.1 - 13.5 fL    NRBC Automated 0.0 0.0 per 100 WBC    Differential Type NOT REPORTED     WBC Morphology NOT REPORTED     RBC Morphology ANISOCYTOSIS PRESENT     Platelet Estimate NOT REPORTED     Seg Neutrophils 73 (H) 36 - 65 %    Lymphocytes 12 (L) 24 - 43 %    Monocytes 11 3 - 12 %    Eosinophils % 3 1 - 4 %    Basophils 0 0 - 2 %    Immature Granulocytes 1 (H) 0 %    Segs Absolute 5.73 1.50 - 8.10 k/uL    Absolute Lymph # 0.92 (L) 1.10 - 3.70 k/uL    Absolute Mono # 0.84 0.10 - 1.20 k/uL Absolute Eos # 0.26 0.00 - 0.44 k/uL    Basophils # <0.03 0.00 - 0.20 k/uL    Absolute Immature Granulocyte 0.06 0.00 - 0.30 k/uL   Basic Metabolic Panel    Collection Time: 04/17/19  4:04 AM   Result Value Ref Range    Glucose 109 (H) 70 - 99 mg/dL    BUN 23 8 - 23 mg/dL    CREATININE 0.32 (L) 0.50 - 0.90 mg/dL    Bun/Cre Ratio NOT REPORTED 9 - 20    Calcium 8.1 (L) 8.6 - 10.4 mg/dL    Sodium 133 (L) 135 - 144 mmol/L    Potassium 3.7 3.7 - 5.3 mmol/L    Chloride 103 98 - 107 mmol/L    CO2 18 (L) 20 - 31 mmol/L    Anion Gap 12 9 - 17 mmol/L    GFR Non-African American >60 >60 mL/min    GFR African American >60 >60 mL/min    GFR Comment          GFR Staging NOT REPORTED    MAGNESIUM    Collection Time: 04/17/19  4:04 AM   Result Value Ref Range    Magnesium 1.9 1.6 - 2.6 mg/dL         RADIOLOGY   Xr Pelvis (1-2 Views)    Result Date: 4/5/2019  EXAMINATION: SINGLE XRAY VIEW OF THE PELVIS 4/5/2019 3:23 pm COMPARISON: None. HISTORY: ORDERING SYSTEM PROVIDED HISTORY: trauma TECHNOLOGIST PROVIDED HISTORY: trauma Initial evaluation. FINDINGS: The osseous structures appear osteopenic. No convincing acute abnormality seen of the bony pelvis. Degenerative changes of the lumbar spine, sacroiliac joints and hips bilaterally. Vascular calcifications are noted. Osteopenia without convincing acute abnormality of the bony pelvis. Xr Shoulder Right (min 2 Views)    Result Date: 4/6/2019  EXAMINATION: 3 XRAY VIEWS OF THE RIGHT SHOULDER; AP AND LATERAL XRAY VIEWS OF THE RIGHT FOREARM; 3 XRAY VIEWS OF THE RIGHT ELBOW 4/6/2019 3:10 am COMPARISON: None. HISTORY: ORDERING SYSTEM PROVIDED HISTORY: trauma TECHNOLOGIST PROVIDED HISTORY: trauma FINDINGS: Right shoulder: Three views of the right shoulder. No acute fracture or dislocation. Soft tissue swelling about the shoulder. Normal alignment of the glenohumeral and acromioclavicular joints. No aggressive skeletal lesion. Visualized lung is clear.   Visualized ribs are 3:10 am COMPARISON: None. HISTORY: ORDERING SYSTEM PROVIDED HISTORY: trauma TECHNOLOGIST PROVIDED HISTORY: trauma FINDINGS: Right shoulder: Three views of the right shoulder. No acute fracture or dislocation. Soft tissue swelling about the shoulder. Normal alignment of the glenohumeral and acromioclavicular joints. No aggressive skeletal lesion. Visualized lung is clear. Visualized ribs are intact. Osteopenia. Right elbow: Frontal, lateral and oblique views. Soft tissue swelling about the elbow. No radiographic evidence of an elbow joint effusion. No acute fracture or malalignment. Mild ulnohumeral compartment DJD. Osteopenia. Right forearm: Frontal and lateral views. Soft tissue swelling about the forearm. No acute fracture or malalignment. Mild-to-moderate DJD in the wrist.  Osteopenia. No acute fracture in the right shoulder, right elbow or right forearm. Osteopenia. Mild DJD in the right elbow and mild-to-moderate DJD in the right wrist.     Ct Head Wo Contrast    Result Date: 4/6/2019  EXAMINATION: CT OF THE HEAD WITHOUT CONTRAST  4/5/2019 11:11 pm TECHNIQUE: CT of the head was performed without the administration of intravenous contrast. Dose modulation, iterative reconstruction, and/or weight based adjustment of the mA/kV was utilized to reduce the radiation dose to as low as reasonably achievable. COMPARISON: CT head 04/05/2029 HISTORY: ORDERING SYSTEM PROVIDED HISTORY: f/u [facility] s/p EVD placement TECHNOLOGIST PROVIDED HISTORY: Ordering Physician Provided Reason for Exam: EVD placement follow up Acuity: Unknown Type of Exam: Subsequent/Follow-up FINDINGS: BRAIN/VENTRICLES: Interval placement of right frontal approach external ventricular drainage device. Tip terminates within the frontal horn left lateral ventricle near the caudal thalamic groove. Interval pneumocephalus identified consistent with interval placement.   Redemonstration of the left thalamic intraparenchymal hemorrhage measuring 3.6 x 2.5 cm in size with evidence of subarachnoid decompression/subarachnoid extension. Evidence of blood products within the lateral 3rd and 4th ventricle identified. Evidence of blood products identified overlying both frontal and parietal lobes and along the interhemispheric falx new from the prior examination but may be related to redistribution of subarachnoid blood products. No shift of midline structures. Basal cisterns are patent. Mild generalized involutional change with chronic small vessel ischemic disease. Intracranial vascular calcifications. . ORBITS: The visualized portion of the orbits demonstrate no acute abnormality. SINUSES: Mild ethmoid sinus mucosal thickening. SOFT TISSUES/SKULL:  No depressed skull fracture. Edema identified overlying the right temporalis muscle may be related to prior trauma or surgery. Interval placement of right frontal approach EVD. Evolving left thalamic intraparenchymal hemorrhage with secondary subarachnoid extension and blood products within the lateral 3rd and 4th ventricles. The blood products identified overlying both frontal lobes parietal lobes and along the interhemispheric falx could be related to placement by EVD versus redistribution of the previously noted subarachnoid blood products. Ct Head Wo Contrast    Result Date: 4/5/2019  EXAMINATION: CT OF THE HEAD WITHOUT CONTRAST  4/5/2019 3:19 pm TECHNIQUE: CT of the head was performed without the administration of intravenous contrast. Dose modulation, iterative reconstruction, and/or weight based adjustment of the mA/kV was utilized to reduce the radiation dose to as low as reasonably achievable. COMPARISON: None. HISTORY: ORDERING SYSTEM PROVIDED HISTORY: trauma TECHNOLOGIST PROVIDED HISTORY: FINDINGS: BRAIN/VENTRICLES: There is an intraparenchymal hemorrhage within the left thalamic region measuring 3.2 x 2.1 x 3.6 cm. There is adjacent vasogenic edema.   There are hemorrhagic products within the lateral ventricles bilaterally as well as the 3rd ventricle, cerebral aqueduct, and 4th ventricle. There is minimal rightward midline shift. There is age-appropriate cerebral volume loss. The infratentorial structures are otherwise unremarkable. ORBITS: The visualized portion of the orbits demonstrate no acute abnormality. SINUSES: The visualized paranasal sinuses and mastoid air cells demonstrate no acute abnormality. SOFT TISSUES/SKULL:  There is right-sided soft tissue swelling involving the right frontal, parietal, and temporal region. There is no definite acute osseous abnormalities. Intraparenchymal hemorrhage in the left thalamic region with adjacent vasogenic edema. Hemorrhagic products within the lateral ventricles, 3rd ventricle, cerebral aqueduct, and 4th ventricle. Minimal rightward midline shift. Critical results were called by Dr. Arturo Burks to Dr. Marcie Rebolledo on 4/5/2019 at 16:02. Ct Facial Bones Wo Contrast    Result Date: 4/5/2019  EXAMINATION: CT OF THE FACE WITHOUT CONTRAST  4/5/2019 3:24 pm TECHNIQUE: CT of the face was performed without the administration of intravenous contrast. Multiplanar reformatted images are provided for review. Dose modulation, iterative reconstruction, and/or weight based adjustment of the mA/kV was utilized to reduce the radiation dose to as low as reasonably achievable. COMPARISON: None HISTORY: ORDERING SYSTEM PROVIDED HISTORY: trauma; significant R facial bruising FINDINGS: FACIAL BONES:  The maxilla, pterygoid plates and zygomatic arches are intact. The mandible is intact. The mandibular condyles are normally situated. The nasal bones and maxillary nasal processes are intact. ORBITS:  The globes appear intact. The extraocular muscles, optic nerve sheath complexes and lacrimal glands appear unremarkable. No retrobulbar hematoma or mass is seen. The orbital walls and rims are intact.  SINUSES/MASTOIDS:  The paranasal sinuses and mastoid Contrast    Result Date: 4/5/2019  EXAMINATION: CT OF THE THORACIC SPINE WITHOUT CONTRAST  4/5/2019 3:18 pm: TECHNIQUE: CT of the thoracic spine was performed without the administration of intravenous contrast. Multiplanar reformatted images are provided for review. Dose modulation, iterative reconstruction, and/or weight based adjustment of the mA/kV was utilized to reduce the radiation dose to as low as reasonably achievable. COMPARISON: None. HISTORY: ORDERING SYSTEM PROVIDED HISTORY: trauma FINDINGS: BONES/ALIGNMENT: There is normal alignment of the spine. The vertebral body heights are maintained. No osseous destructive lesion is seen. DEGENERATIVE CHANGES: No gross spinal canal stenosis or bony neural foraminal narrowing of the thoracic spine. SOFT TISSUES: No paraspinal mass is seen. Unremarkable CT of the thoracic spine. Ct Lumbar Spine Wo Contrast    Result Date: 4/5/2019  EXAMINATION: CT OF THE LUMBAR SPINE WITHOUT CONTRAST  4/5/2019 TECHNIQUE: CT of the lumbar spine was performed without the administration of intravenous contrast. Multiplanar reformatted images are provided for review. Dose modulation, iterative reconstruction, and/or weight based adjustment of the mA/kV was utilized to reduce the radiation dose to as low as reasonably achievable. COMPARISON: None HISTORY: ORDERING SYSTEM PROVIDED HISTORY: trauma TECHNOLOGIST PROVIDED HISTORY: trauma FINDINGS: BONES/ALIGNMENT: There is minimal compression deformity of L4 vertebral body with less than 20% height loss/wedging anteriorly. Multilevel degenerative disc disease is evident. The facets are intact. The spinous processes are intact. Lamina and pedicles are intact. There is evidence of prior posterior decompression. DEGENERATIVE CHANGES: Multilevel degenerative disc disease with facet arthropathy. SOFT TISSUES/RETROPERITONEUM: Diverticulosis. Atherosclerotic changes of aorta. Large stool throughout the colon.   An avidly enhancing dilated left ovarian vessel is noted. 1. Age-indeterminate compression deformity of L4 with less than 20% height loss and no significant retropulsion. 2. Osteopenic skeleton with multilevel degenerative changes. Xr Chest Portable    Result Date: 4/6/2019  EXAMINATION: SINGLE XRAY VIEW OF THE CHEST 4/6/2019 5:27 am COMPARISON: 04/05/2019 HISTORY: ORDERING SYSTEM PROVIDED HISTORY: Concern for aspiration TECHNOLOGIST PROVIDED HISTORY: Concern for aspiration FINDINGS: Cardiomediastinal silhouette and pulmonary vasculature are within normal limits. There is no focal airspace consolidation. No evidence of pneumothorax or pleural effusion on this limited supine study. No acute osseous abnormality. No acute intrathoracic process. Xr Chest Portable    Result Date: 4/5/2019  EXAMINATION: SINGLE XRAY VIEW OF THE CHEST 4/5/2019 3:24 pm COMPARISON: None. HISTORY: ORDERING SYSTEM PROVIDED HISTORY: trauma TECHNOLOGIST PROVIDED HISTORY: trauma FINDINGS: Slight increase in interstitial markings. The lungs are without acute focal process. There is no effusion or pneumothorax. The cardiomediastinal silhouette is without acute process. The osseous structures are without acute process. No fracture or pneumothorax. Increased interstitial markings     Cta Neck W Contrast    Result Date: 4/5/2019  EXAMINATION: CTA OF THE HEAD WITH CONTRAST; CTA OF THE NECK 4/5/2019 3:32 pm: TECHNIQUE: CTA of the head/brain was performed with the administration of intravenous contrast. Multiplanar reformatted images are provided for review. MIP images are provided for review. Dose modulation, iterative reconstruction, and/or weight based adjustment of the mA/kV was utilized to reduce the radiation dose to as low as reasonably achievable.; CTA of the neck was performed with the administration of intravenous contrast. Multiplanar reformatted images are provided for review. MIP images are provided for review.  Stenosis of the internal CTA of the head and neck. Critical results were called by Dr. Marilee Cervantes to Dr. Charu Kramer on 4/5/2019 at 16:12.     Ct Chest Abdomen Pelvis W Contrast    Result Date: 4/5/2019  EXAMINATION: CT OF THE CHEST, ABDOMEN, AND PELVIS WITH CONTRAST 4/5/2019 3:19 pm TECHNIQUE: CT of the chest, abdomen and pelvis was performed with the administration of intravenous contrast. Multiplanar reformatted images are provided for review. Dose modulation, iterative reconstruction, and/or weight based adjustment of the mA/kV was utilized to reduce the radiation dose to as low as reasonably achievable. COMPARISON: None HISTORY: ORDERING SYSTEM PROVIDED HISTORY: trauma TECHNOLOGIST PROVIDED HISTORY: Ordering Physician Provided Reason for Exam: found down Acuity: Unknown FINDINGS: Chest: Mediastinum:  Thoracic aorta and central portion of the pulmonary artery opacify normally. The ascending thoracic aorta is of normal caliber. Heart size is normal. There is no pericardial effusion. No mediastinal or hilar lymph nodes exceed the CT criteria for abnormal enlargement. Lungs/pleura: Clear Soft Tissues/Bones: No fracture identified Abdomen/Pelvis: Organs: The abdominal wall appears normal. Liver appears somewhat nodular. Spleen is enlarged. Gastroesophageal varices are noted. Pancreas appears normal.  The adrenals are normal.  The gallbladder surgically absent. . Kidneys appear normal. The bladder appears normal. GI/Bowel: The stomach,small bowel, and colon appear normal. Appendix is not identified. Pelvis: Uterus appears atrophic. Peritoneum/Retroperitoneum: The abdominal aorta and iliac arteries are normal in caliber. There is no pathologic adenopathy. There is calcified plaque along the aorta and its branches. Bones/Soft Tissues: Normal     No acute traumatic sequelae. Cirrhosis, splenomegaly, and esophageal varices consistent with portal venous hypertension.      Cta Head W Contrast    Result Date: 4/5/2019  EXAMINATION: CTA OF THE HEAD WITH CONTRAST; CTA OF THE NECK 4/5/2019 3:32 pm: TECHNIQUE: CTA of the head/brain was performed with the administration of intravenous contrast. Multiplanar reformatted images are provided for review. MIP images are provided for review. Dose modulation, iterative reconstruction, and/or weight based adjustment of the mA/kV was utilized to reduce the radiation dose to as low as reasonably achievable.; CTA of the neck was performed with the administration of intravenous contrast. Multiplanar reformatted images are provided for review. MIP images are provided for review. Stenosis of the internal carotid arteries measured using NASCET criteria. Dose modulation, iterative reconstruction, and/or weight based adjustment of the mA/kV was utilized to reduce the radiation dose to as low as reasonably achievable. COMPARISON: None. HISTORY: ORDERING SYSTEM PROVIDED HISTORY: [facility], ?hemorrhagic stroke TECHNOLOGIST PROVIDED HISTORY: ; ORDERING SYSTEM PROVIDED HISTORY: [facility], ?hemorrhagic stroke TECHNOLOGIST PROVIDED HISTORY: Ordering Physician Provided Reason for Exam: head bleed/ found down Acuity: Unknown FINDINGS: CTA NECK: AORTIC ARCH/ARCH VESSELS: There is a normal branch pattern of the aortic arch. No significant stenosis is seen of the innominate artery or subclavian arteries. CAROTID ARTERIES: The common carotid arteries are normal in appearance without evidence of a flow limiting stenosis. The internal carotid arteries are normal in appearance without evidence of a flow limiting stenosis by NASCET criteria. No dissection or arterial injury is seen. VERTEBRAL ARTERIES: The vertebral arteries both arise from the subclavian arteries and are normal in caliber without evidence of flow limiting stenosis or dissection. SOFT TISSUES: The lung apices are clear. No cervical or superior mediastinal lymphadenopathy. The visualized portion of the larynx and pharynx appear unremarkable.   The parotid, submandibular and thyroid glands demonstrate no acute abnormality. BONES: The visualized osseous structures appear unremarkable. CTA HEAD: ANTERIOR CIRCULATION: The internal carotid arteries are normal in course and caliber without focal stenosis. The anterior cerebral and middle cerebral arteries demonstrate no focal stenosis. POSTERIOR CIRCULATION: The posterior cerebral arteries demonstrate no focal stenosis. The vertebral and basilar arteries appear unremarkable. BRAIN: There is redemonstration of an intraparenchymal hemorrhage within the left thalamic region as well as intraventricular hemorrhagic products. Unremarkable CTA of the head and neck. Critical results were called by Dr. Olayinka Pyle to Dr. Berta Coats on 4/5/2019 at 16:12. Labs and Images reviewed with:  [x] Dr. Luke Klein     [] Dr. Juhi Hernandez  [] Dr. Rachael Gonzalez  [] there are no new interval images to review. ASSESSMENT AND PLAN:     ASSESSMENT:     This is a 66 y. o. female with a history of  Hepatitis C suspected to be from previous blood transfusion, cirrhosis, and esophageal varices s/p banding May 2017 who initially presented as a trauma alert on 4/5/19 after being found down at home.  LKW was ~24h prior to arrival. [facility] revealed left thalamic IPH with intraventricular extension into lateral, 3rd and 4th ventricles.  EVD placed at bedside on admit, removed 4/8.  Patient exam had been improving and she was transferred to Stepdown status 4/9.  Transferred back to Neuro ICU and Neuro Critical Care due to altered mentation, fevers.       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, minimal shift  - No vascular abnormality on CTA Head/Neck  - EVD placed at bedside 4/7, removed 4/9  - Repeat CT Head 4/11 showed stable IPH in left thalamus, improvement of IVH, no worsening hydro  - LTME showed right and left mid temporal sharp waves conferred an increase risk for focal onset seizures   - Continue Vimpat 100mg BID  - Goal SBP<160  - Neuro checks per protocol     CARDIOVASCULAR:  -Bradycardia episode resolved  - Goal SBP<160  - Continue Norvasc 10mg QD  - PRN Hydralazine/Labetalol  - Recent Echo 4/5/19 EF 57%, grade I diastolic dysfunction, mild pulmonary HTN  - Continue telemetry     PULMONARY:  - Maintaining O2 sats on room air  - CXR 4/11 with trace left effusion, no infiltrate/infectious process     RENAL/FLUID/ELECTROLYTE:  - Normal renal functioning  - BUN 23/ Creatinine 0.32  - Monitor I&O  - Mild hyponatremia, 133, continue to monitor  - Replace electrolytes PRN  - Daily BMP     GI/NUTRITION:  NUTRITION:  DIET GENERAL;   - History of Hepatitis C, thought to be secondary to blood transfusions  - Cirrhosis  - US Abdomen showed small amount of moderate abdominal ascites throughout bilateral abdomen  - GI following; appreciate recommendations  - S/P Paracentesis on 4/12 with 700ml light yellow fluid obtained from LLQ. GI will repeat paracentesis today d/t abd distention & mild labored breathing/shallow breath sounds on exam.   - No bacteria on culture, WBC 31; low suspicion for SBP  - Started on Lasix 20mg QD and Aldactone 50mg QD per GI, continue. - Hyperammonemia resolved, Ammonia 42  - Continue Lactulose 20g TID; titrate to 3-4 BM per day  - Bowel regimen: Milk of magnesia PRN  - GI prophylaxis: Pepcid     ID:  - afebrile overnight, d/c rocephin today. - No leukocytosis, WBC 7.8, off antibiotics  - UA 4/11 positive nitrite, moderate LE; suggestive of UTI, resolved, NG on cultures.    - Blood cultures 4/11 with no growth   - Paracentesis fluid eval not suggestive of SBP  - Continue to monitor for fevers  - Daily CBC     HEME:   - H&H stable  - Platelets stable  - Daily CBC     ENDOCRINE:  - Continue to monitor blood glucose, goal <180  - Glucose well controlled     OTHER:  - PT/OT/ST  - Code Status: DNR-CCA, no intubation              DISPOSITION:  [x] To remain ICU:   [] OK for out of ICU from Neuro Critical Care standpoint    For any changes in exam or patient status please contact Neuro Critical Care.       Jovanni Shin MD  Neuro Critical Care  Pager [phone]  4/17/2019     7:48 AM

## 2019-04-17 NOTE — PROGRESS NOTES
[facility]    Gastroenterology Daily Progress Note      Patient: Roberta Reynoso   :    1940   Facility:   [facility] [facility]   Date:     2019  Consultant:   Viktor Villareal CNP      Subjective:     66 y.o. female admitted 2019 with Intracranial bleed ([facility] [address] [I62.9] and seen for ascites. Patient seen and examined. Staff reports patient's mentation waxes and wanes. Patient currently alert with some expressive aphasia. Speech therapist at bedside and reports patient's diet change to puréed yesterday after she was noted to be taking long time to chew solid food. Patient denies any nausea, vomiting, or abdominal pain. Staff reports patient had 4 bowel movements yesterday   Family meeting planned today        Objective     Scheduled Meds:   lacosamide (VIMPAT) IVPB  100 mg Intravenous Q12H    furosemide  20 mg Oral Daily    spironolactone  50 mg Oral Daily    atropine  0.5 mg Intravenous Once    rifaximin  550 mg Oral BID    amLODIPine  10 mg Oral Daily    lactulose  20 g Oral TID    enoxaparin  40 mg Subcutaneous Daily    sodium chloride flush  10 mL Intravenous 2 times per day    famotidine (PEPCID) injection  20 mg Intravenous Daily    chlorhexidine  15 mL Mouth/Throat BID       Vital Signs:  BP (!) 136/47   Pulse 82   Temp 96.8 °F (36 °C) (Oral)   Resp 23   Ht 5' 5\" (1.651 m)   Wt 110 lb 1.6 oz (49.9 kg)   SpO2 97%   BMI 18.32 kg/m²      Physical Exam:   General:  Alert and cooperative speaking in 1-2 word sentences, no accessory muscle use. Chest:   Bilateral vesicular breath sounds, no rales or wheezes. Cardiac:  S1 S2 RR, no murmurs or gallops  Abdomen: Soft, non-tender, mildly distended, BS audible. SKIN:  No rashes, good skin turgor. No jaundice  Extremities:  No edema, no clubbing, No cyanosis  Neuro:  Alert, oriented to self.  Follows some commands    Lab and Imaging Review     CBC  Recent Labs     04/15/19  0440 19  4159 pneumocephalus has resolved. No significant midline shift. The basal cisterns are grossly patent. No large territorial infarct. Atherosclerosis of the intracranial vertebral and internal carotid arteries. Mild generalized cerebral and cerebellar volume loss. ORBITS: The visualized portion of the orbits demonstrate no acute abnormality. SINUSES: The visualized paranasal sinuses and mastoid air cells demonstrate no acute abnormality. SOFT TISSUES/SKULL:  No acute abnormality of the visualized skull or soft tissues. Stable size and appearance of the intraparenchymal hematoma centered in the left thalamus with mild surrounding vasogenic edema and mass effect. Compared to CT head done April 5, 2019 there is improvement of previously noted intraventricular hemorrhage with no on the a small amount of hemorrhage layering in the occipital horns of the lateral ventricles. Interval removal of the right frontal approach ventricular drainage catheter. No progressive hydrocephalus. Trace blood along the catheter tract. Ct Facial Bones Wo Contrast    Result Date: 4/5/2019  EXAMINATION: CT OF THE FACE WITHOUT CONTRAST  4/5/2019 3:24 pm TECHNIQUE: CT of the face was performed without the administration of intravenous contrast. Multiplanar reformatted images are provided for review. Dose modulation, iterative reconstruction, and/or weight based adjustment of the mA/kV was utilized to reduce the radiation dose to as low as reasonably achievable. COMPARISON: None HISTORY: ORDERING SYSTEM PROVIDED HISTORY: trauma; significant R facial bruising FINDINGS: FACIAL BONES:  The maxilla, pterygoid plates and zygomatic arches are intact. The mandible is intact. The mandibular condyles are normally situated. The nasal bones and maxillary nasal processes are intact. ORBITS:  The globes appear intact. The extraocular muscles, optic nerve sheath complexes and lacrimal glands appear unremarkable.   No retrobulbar hematoma or mass is seen.  The orbital walls and rims are intact. SINUSES/MASTOIDS:  The paranasal sinuses and mastoid air cells are well aerated. No acute fracture is seen. SOFT TISSUES:  Preseptal swelling of the right face extending superiorly along the supraorbital ridge into the right frontal scalp. Asymmetric swelling of the lateral face on the right compared with the left. No fluid collection is identified. No air or unexpected radiopaque foreign bodies. Soft tissue swelling/edema along the upper neck circumferentially right greater than left. The visualized intracranial contents show acute left thalamic intraparenchymal blood products and intraventricular blood products. 1. Bruising of the face without fracture, radiopaque foreign body, or superficial fluid collection. 2. Acute left thalamic hemorrhage with intraventricular extension. This is previously called to Dr. Holley Hedrick by Dr. Gonzalo Everett at  on 4/5/19. Us Guided Paracentesis    Result Date: 4/16/2019  PROCEDURE: ULTRASOUND GUIDED PARACENTESIS 4/16/2019 HISTORY: ORDERING SYSTEM PROVIDED HISTORY: Ascites TECHNOLOGIST PROVIDED HISTORY: Ascites Please give albumin based on volume removed TECHNIQUE: Informed consent was obtained after a detailed explanation of the procedure including risks, benefits, and alternatives. Universal protocol was followed. The right abdomen was prepped and draped in sterile fashion and local anesthesia was achieved with lidocaine. An 8 French needle sheath was advanced under ultrasound guidance into ascites and paracentesis was performed. The patient tolerated the procedure well. FINDINGS: A total of 1.1 L was removed. Fluid appeared straw-colored and non turbid. Sample was provided for further analysis, as needed. Successful ultrasound guided diagnostic paracentesis.      Us Guided Paracentesis    Result Date: 4/12/2019  PROCEDURE: ULTRASOUND GUIDED PARACENTESIS 4/12/2019 HISTORY: ORDERING SYSTEM PROVIDED HISTORY: r/o SBP TECHNOLOGIST PROVIDED HISTORY: r/o SBP PHYSICIANS: This procedure was performed by Janie Ordoñez PA-C under the indirect supervision of Dr. Federico Morgan. TECHNIQUE: Informed consent was obtained after a detailed explanation of the procedure including risks, benefits, and alternatives. A time-out was performed prior to the beginning of the procedure. Universal protocol was followed. The left lower abdomen was prepped and draped in sterile fashion and local anesthesia was achieved with lidocaine. A 5 [place] needle sheath was advanced under ultrasound guidance into ascites and paracentesis was performed. Approximately 700 mL of light yellow colored ascitic fluid was removed. A sample was sent for laboratory analysis. The catheter was removed and a sterile dressing was applied. Postprocedural scan demonstrated no residual ascitic fluid. The patient tolerated the procedure well left the department in stable condition. FINDINGS: A total of 700 mL of light yellow colored ascitic fluid was removed and sent for laboratory analysis. Successful ultrasound guided diagnostic paracentesis. Assessment:     1. 67 y/o female with hx decompensated Hep C cirrhosis, esophageal varices s/p banding 2017 presented with  intracranial hemorrhage and encephalopathy. GI consulted for ascites management. - S/P paracentesis - 04/16/2019 (1.1 liter) - No SBP. Paracentesis 04/12/2019 (700 ml)   - Patient's hemoglobin is stable. Shows no signs of GI bleeding. Plan:     1. Continue lactulose and titrate to 3-4 bowel movements per day  2. Continue current management with Lasix 20 mg and Aldactone 50 mg. Recommend restarting non-selective beta blocker as O/P   3. Monitor CBC and BMP  4. Family meeting planned today. Follow-up on families goals of care  5. Patient will need GI O/P follow up. She can follow up with her [facility] GI team at [facility]. 6. GI will sign off.  Please call GI with any questions, concerns, or acute change in clinical status      This plan was formulated in collaboration with Dr. Rich Guadalupe    Electronically signed by Edson Shen CNP on 4/17/2019 at 7:46 AM    Please note that this note was generated using a voice recognition dictation software. Although every effort was made to ensure the accuracy of this automated transcription, some errors in transcription may have occurred.

## 2019-04-18 VITALS
RESPIRATION RATE: 22 BRPM | SYSTOLIC BLOOD PRESSURE: 120 MMHG | WEIGHT: 110.1 LBS | BODY MASS INDEX: 18.34 KG/M2 | HEIGHT: 65 IN | TEMPERATURE: 97.8 F | HEART RATE: 76 BPM | OXYGEN SATURATION: 96 % | DIASTOLIC BLOOD PRESSURE: 54 MMHG

## 2019-04-18 LAB
ABSOLUTE EOS #: 0.17 K/UL (ref 0–0.44)
ABSOLUTE IMMATURE GRANULOCYTE: 0.06 K/UL (ref 0–0.3)
ABSOLUTE LYMPH #: 1.4 K/UL (ref 1.1–3.7)
ABSOLUTE MONO #: 1.32 K/UL (ref 0.1–1.2)
ANION GAP SERPL CALCULATED.3IONS-SCNC: 13 MMOL/L (ref 9–17)
BASOPHILS # BLD: 0 % (ref 0–2)
BASOPHILS ABSOLUTE: 0.03 K/UL (ref 0–0.2)
BUN BLDV-MCNC: 25 MG/DL (ref 8–23)
BUN/CREAT BLD: ABNORMAL (ref 9–20)
CALCIUM IONIZED: 1.13 MMOL/L (ref 1.13–1.33)
CALCIUM SERPL-MCNC: 8.5 MG/DL (ref 8.6–10.4)
CHLORIDE BLD-SCNC: 105 MMOL/L (ref 98–107)
CO2: 17 MMOL/L (ref 20–31)
CREAT SERPL-MCNC: 0.4 MG/DL (ref 0.5–0.9)
CULTURE: ABNORMAL
DIFFERENTIAL TYPE: ABNORMAL
DIRECT EXAM: ABNORMAL
EOSINOPHILS RELATIVE PERCENT: 2 % (ref 1–4)
GFR AFRICAN AMERICAN: >60 ML/MIN
GFR NON-AFRICAN AMERICAN: >60 ML/MIN
GFR SERPL CREATININE-BSD FRML MDRD: ABNORMAL ML/MIN/{1.73_M2}
GFR SERPL CREATININE-BSD FRML MDRD: ABNORMAL ML/MIN/{1.73_M2}
GLUCOSE BLD-MCNC: 102 MG/DL (ref 70–99)
HCT VFR BLD CALC: 34.1 % (ref 36.3–47.1)
HEMOGLOBIN: 10.8 G/DL (ref 11.9–15.1)
IMMATURE GRANULOCYTES: 1 %
LYMPHOCYTES # BLD: 13 % (ref 24–43)
Lab: ABNORMAL
MAGNESIUM: 2.2 MG/DL (ref 1.6–2.6)
MAGNESIUM: 2.2 MG/DL (ref 1.6–2.6)
MCH RBC QN AUTO: 29.4 PG (ref 25.2–33.5)
MCHC RBC AUTO-ENTMCNC: 31.7 G/DL (ref 28.4–34.8)
MCV RBC AUTO: 92.9 FL (ref 82.6–102.9)
MONOCYTES # BLD: 12 % (ref 3–12)
NRBC AUTOMATED: 0 PER 100 WBC
PDW BLD-RTO: 17.8 % (ref 11.8–14.4)
PHOSPHORUS: 3.3 MG/DL (ref 2.6–4.5)
PLATELET # BLD: 149 K/UL (ref 138–453)
PLATELET ESTIMATE: ABNORMAL
PMV BLD AUTO: 11.9 FL (ref 8.1–13.5)
POTASSIUM SERPL-SCNC: 3.9 MMOL/L (ref 3.7–5.3)
RBC # BLD: 3.67 M/UL (ref 3.95–5.11)
RBC # BLD: ABNORMAL 10*6/UL
SEG NEUTROPHILS: 73 % (ref 36–65)
SEGMENTED NEUTROPHILS ABSOLUTE COUNT: 7.97 K/UL (ref 1.5–8.1)
SODIUM BLD-SCNC: 135 MMOL/L (ref 135–144)
SPECIMEN DESCRIPTION: ABNORMAL
SURGICAL PATHOLOGY REPORT: NORMAL
WBC # BLD: 11 K/UL (ref 3.5–11.3)
WBC # BLD: ABNORMAL 10*3/UL

## 2019-04-18 PROCEDURE — 99238 HOSP IP/OBS DSCHRG MGMT 30/<: CPT | Performed by: PSYCHIATRY & NEUROLOGY

## 2019-04-18 PROCEDURE — 51798 US URINE CAPACITY MEASURE: CPT

## 2019-04-18 PROCEDURE — 6370000000 HC RX 637 (ALT 250 FOR IP): Performed by: STUDENT IN AN ORGANIZED HEALTH CARE EDUCATION/TRAINING PROGRAM

## 2019-04-18 PROCEDURE — 36415 COLL VENOUS BLD VENIPUNCTURE: CPT

## 2019-04-18 PROCEDURE — 2580000003 HC RX 258: Performed by: EMERGENCY MEDICINE

## 2019-04-18 PROCEDURE — 80048 BASIC METABOLIC PNL TOTAL CA: CPT

## 2019-04-18 PROCEDURE — C9254 INJECTION, LACOSAMIDE: HCPCS | Performed by: PSYCHIATRY & NEUROLOGY

## 2019-04-18 PROCEDURE — 85025 COMPLETE CBC W/AUTO DIFF WBC: CPT

## 2019-04-18 PROCEDURE — 6360000002 HC RX W HCPCS: Performed by: PSYCHIATRY & NEUROLOGY

## 2019-04-18 PROCEDURE — 92507 TX SP LANG VOICE COMM INDIV: CPT

## 2019-04-18 PROCEDURE — 6370000000 HC RX 637 (ALT 250 FOR IP): Performed by: PSYCHIATRY & NEUROLOGY

## 2019-04-18 PROCEDURE — 83735 ASSAY OF MAGNESIUM: CPT

## 2019-04-18 PROCEDURE — 51701 INSERT BLADDER CATHETER: CPT

## 2019-04-18 PROCEDURE — 6370000000 HC RX 637 (ALT 250 FOR IP): Performed by: NURSE PRACTITIONER

## 2019-04-18 PROCEDURE — 2580000003 HC RX 258: Performed by: PSYCHIATRY & NEUROLOGY

## 2019-04-18 RX ORDER — LACOSAMIDE 100 MG/1
100 TABLET ORAL 2 TIMES DAILY
Status: DISCONTINUED | OUTPATIENT
Start: 2019-04-18 | End: 2019-04-18 | Stop reason: HOSPADM

## 2019-04-18 RX ORDER — PROPRANOLOL HYDROCHLORIDE 10 MG/1
10 TABLET ORAL 2 TIMES DAILY
Qty: 90 TABLET | Refills: 3 | Status: SHIPPED | OUTPATIENT
Start: 2019-04-18 | End: 2019-04-18 | Stop reason: HOSPADM

## 2019-04-18 RX ORDER — PROPRANOLOL HYDROCHLORIDE 10 MG/1
10 TABLET ORAL 2 TIMES DAILY
Status: DISCONTINUED | OUTPATIENT
Start: 2019-04-18 | End: 2019-04-18 | Stop reason: HOSPADM

## 2019-04-18 RX ORDER — FUROSEMIDE 20 MG/1
20 TABLET ORAL DAILY
Qty: 60 TABLET | Refills: 3 | Status: SHIPPED | OUTPATIENT
Start: 2019-04-19

## 2019-04-18 RX ORDER — AMLODIPINE BESYLATE 10 MG/1
10 TABLET ORAL DAILY
Qty: 30 TABLET | Refills: 3 | Status: SHIPPED | OUTPATIENT
Start: 2019-04-19

## 2019-04-18 RX ORDER — SPIRONOLACTONE 50 MG/1
50 TABLET, FILM COATED ORAL DAILY
Qty: 30 TABLET | Refills: 3 | Status: SHIPPED | OUTPATIENT
Start: 2019-04-19

## 2019-04-18 RX ORDER — LACOSAMIDE 100 MG/1
100 TABLET ORAL 2 TIMES DAILY
Qty: 60 TABLET | Refills: 1 | Status: SHIPPED | OUTPATIENT
Start: 2019-04-18 | End: 2020-05-19

## 2019-04-18 RX ADMIN — SPIRONOLACTONE 50 MG: 25 TABLET ORAL at 09:21

## 2019-04-18 RX ADMIN — Medication 10 ML: at 09:22

## 2019-04-18 RX ADMIN — AMLODIPINE BESYLATE 10 MG: 10 TABLET ORAL at 09:21

## 2019-04-18 RX ADMIN — Medication 15 ML: at 09:22

## 2019-04-18 RX ADMIN — PROPRANOLOL HYDROCHLORIDE 10 MG: 10 TABLET ORAL at 11:54

## 2019-04-18 RX ADMIN — SODIUM CHLORIDE 100 MG: 9 INJECTION, SOLUTION INTRAVENOUS at 03:03

## 2019-04-18 RX ADMIN — FUROSEMIDE 20 MG: 20 TABLET ORAL at 09:21

## 2019-04-18 RX ADMIN — ENOXAPARIN SODIUM 40 MG: 40 INJECTION SUBCUTANEOUS at 09:22

## 2019-04-18 RX ADMIN — RIFAXIMIN 550 MG: 550 TABLET ORAL at 09:21

## 2019-04-18 ASSESSMENT — PAIN SCALES - GENERAL: PAINLEVEL_OUTOF10: 0

## 2019-04-18 NOTE — PROGRESS NOTES
Speech Language Pathology  Speech Language Pathology  [address] [facility]    Speech Language Treatment Note    Date: 4/18/2019  Patients Name: Marino Martinez  MRN: [id number]  Diagnosis:   Patient Active Problem List   Diagnosis Code    Intracranial hemorrhage ([facility] I62.9    Altered mental status R41.82    Encounter for palliative care Z51.5    Acute encephalopathy G93.40    History of esophageal varices with bleeding Z87.19    Acute cystitis without hematuria N30.00    Seizure-like activity ([facility] [address] R56.9    History of liver disease Z87.19    Hyperammonemia ([facility] [address] E72.20    Bradycardia R00.1    [facility] K74.60, R18.8    Chronic hepatitis C without hepatic coma ([facility] B18.2       Pain: 0/10    Speech and Language Treatment  Treatment time: 10:02-10:22    Subjective: [x] Alert [] Cooperative     [] Confused     [] Agitated    [x] Lethargic      Objective/Assessment:     Auditory Comprehension: Answering Yes/No Questions: 5/10 increased to 8/10 with mod verbal cues and repetitions. Verbal Expression: Bio Info: 0/4 increased to 2/4 with max verbal cues. Automatics - #'s: 0/5 increased to 5/5 with max verbal cues. DOROTHY: 0/7 increased to 5/7 with max verbal cues. Picture ID: 2/5 increased to 4/5 with max verbal/phonemic cues. Pt lethargic throughout session, required min verbal/tactile cues to stay on task. Further therapy recommended at discharge. Plan:  [x] Continue  services    [] Discharge from :      Discharge recommendations: [] Inpatient Rehab   [] [place]   [] Outpatient Therapy  [] Follow up at trauma clinic   [x] Other: therapy       Completed by Yoana Lamas,  Clinician  Co-signed by Jeff Kahn. JANAY/SLP

## 2019-04-18 NOTE — CARE COORDINATION
[facility] can accept pt for transfer today. [facility] arranged for 2 pm.  Will need NURYS     HENS completed  AVS completed  Left voicemail message for pt's son/POA Hans Damian regarding planned transfer. Number for report provided to [facility]. Informed pt of transfer at 2 pm.    Pt's son Hans Damian returned call. Verified time of transfer and Hans Damian will follow up with [facility] for admission.   Faxed signed script to [facility]

## 2019-04-18 NOTE — PROGRESS NOTES
Attempted to call report to the facility that patient is going to. Phone continuously rang with nobody picking up. Will try to call report again shortly.

## 2019-04-18 NOTE — DISCHARGE SUMMARY
Neurology Discharge Summary     Patient Identification:  Ulices Chavez is a 66 y.o. female. :  1940  Admit Date:  2019  Discharge date and time: 19  Attending Provider: Wendy Burden MD     Account Number: [de-identified]                                   Admission Diagnoses:   Intracranial bleed [facility]) [I62.9]    Discharge Diagnoses:  Principal Problem:    Intracranial hemorrhage ([facility]  Active Problems:    Altered mental status    Encounter for palliative care    Acute encephalopathy    History of esophageal varices with bleeding    Acute cystitis without hematuria    Seizure-like activity ([facility] [address]    History of liver disease    Hyperammonemia (HCC)    Bradycardia    Cirrhosis of liver with ascites ([facility] [address]    [facility]  Resolved Problems:    * No resolved hospital problems. *      Discharge Medications:    Current Discharge Medication List           Details   lacosamide (VIMPAT) 100 MG TABS tablet Take 1 tablet by mouth 2 times daily. Qty: 60 tablet, Refills: 1    Associated Diagnoses: Seizure-like activity ([facility] [address]      amLODIPine (NORVASC) 10 MG tablet Take 1 tablet by mouth daily  Qty: 30 tablet, Refills: 3      rifaximin (XIFAXAN) 550 MG tablet Take 1 tablet by mouth 2 times daily  Qty: 42 tablet, Refills: 1              Details   furosemide (LASIX) 20 MG tablet Take 1 tablet by mouth daily  Qty: 60 tablet, Refills: 3      spironolactone (ALDACTONE) 50 MG tablet Take 1 tablet by mouth daily  Qty: 30 tablet, Refills: 3              Details   Multiple Vitamins-Minerals (THERAPEUTIC MULTIVITAMIN-MINERALS) tablet Take 1 tablet by mouth daily      PANTOPRAZOLE SODIUM PO Take 20 mg by mouth      gabapentin (NEURONTIN) 300 MG capsule Take 300 mg by mouth 3 times daily.       traZODone (DESYREL) 50 MG tablet Take 50 mg by mouth nightly           Current Discharge Medication List      STOP taking these medications       propranolol (INDERAL) 20 MG tablet Comments:   Reason alert but remains altered.  Left foot rhythmic tapping; no direct correlation on LTME.  LTME showing right and left mid temporal sharp waves conferred an increase risk for focal onset seizures.  Given 200mg IV Vimpat then continued on 100mg BID. GI consulted; paracentesis 700ml from LLQ.    4/13: Paracentesis studies not suggestive of SBP.  4/14: Improvement in mentation.    4/15   Transient episode of bradycardia with HR briefly in the high 30's overnight.  Having multiple >3-4 BM's per day with Lactulose, evening dose held per nursing.  Requiring intermittent straight cath for acute retention.  On exam this morning, patient is alert. Radha Sosa is more interactive than previously; able to tell me she has 2 sons and tries to tell me their names.  Remains confused and has difficulty following commands.  Right upper extremity with extension to pain.  Right lower extremity withdraws to pain.  This afternoon, patient developed new onset bradycardia with HR as low as 35 and persisting in the 30-40's.  Patient appeared pale and less interactive.  SBP remained stable 110-120's.  Given Atropine 0.5mg IVP x1 with mild improvement in HR to mid to high 40's. 4/17:  No acute events overnight. Vital wise stable , Map> 70 , afebrile, no bradycardic episode. GI on board for ascites, on lasiz 20 mg and aldactone 50 mg as per GI, off Rocephin last dose yesterday. Had family meeting family decided to pursue towards SNF with adjuvant hospice care,  to work with family on dispo planning           Last 24h:      No acute overnight events, vital wise stable , map > 70 afebrile , no bradycardiac episode sodium 135 as per am lab. Awaiting  snf and adjuvant hospice  dispo paper work to be completed.          Patient got approved to [facility] as per family wishes and is stable from NICU to be discharged           CONSTITUTIONAL:  Alert.  Confused. Says she at [employer] following commands.    HEAD:  normocephalic, atraumatic    EYES:  PERRL, EOMI   ENT:  moist mucous membranes   NECK:  supple, symmetric   LUNGS:  Equal air entry bilaterally, clear   CARDIOVASCULAR:  normal s1 / s2, RRR, distal pulses intact   ABDOMEN:  Soft, no rigidity, normal bowel sounds   NEUROLOGIC:  Mental Status:  Alert, confused.  Not answering orientation questions or following commands consistently.     Cranial Nerves:    III: Pupils:  equal, round, reactive to light  III,IV,VI: Extra Ocular Movements: intact        Motor Exam:    Right upper extremty flaccid. Right lower extremity withdraws to pain, wriggles toes on LLE  Able to antigravity left upper and left lower extremities; 3/5 strength.                 Significant Diagnostics:  IVH    Disposition:   SNF    Patient Instructions:   Patient to resume meds as per GI and f/u with GI for ascities  Patient to continue Vimapat 100 mg bid  Patient to f/u with PCP in one week      Activity: activity as tolerated  Diet: Dysphagia puree diet        Jarett Aly MD

## 2019-04-18 NOTE — PLAN OF CARE
Problem: Risk for Impaired Skin Integrity  Goal: Tissue integrity - skin and mucous membranes  Description  Structural intactness and normal physiological function of skin and  mucous membranes. 4/18/2019 0550 by Arun Leon RN  Outcome: Ongoing  Note:   Skin assessment complete. Interventions in place. See doc flowsheet for interventions initiated. Pt turned every two hours. Linens remain dry. Cream applied when needed to help with breakdown. Will continue to monitor for additional needs and skin breakdown.

## 2019-04-18 NOTE — PROGRESS NOTES
Daily Progress Note  Neuro Critical Care    Patient Name: Mary Hodge  Patient : 1940  Room/Bed: [facility]/0522-01  Allergies: No Known Allergies  Problem List:   Patient Active Problem List   Diagnosis    Intracranial hemorrhage ([facility] [address]    Altered mental status    Encounter for palliative care    Acute encephalopathy    History of esophageal varices with bleeding    Acute cystitis without hematuria    Seizure-like activity ([facility] [address]    History of liver disease    Hyperammonemia (HCC)    Bradycardia    Cirrhosis of liver with ascites (HCC)    Chronic hepatitis C without hepatic coma ([facility] [address]            INTERVAL HISTORY    Initial Presentation     Initial Presentation   The patient is a 66 y. o. female with a history of Hepatitis C suspected to be from previous blood transfusion, cirrhosis, and esophageal varices s/p banding May 2017 who initially presented as a trauma alert on 19 after being found down at home. LKW was ~24h prior to arrival. [facility] revealed left thalamic IPH with intraventricular extension into lateral, 3rd and 4th ventricles.  Trauma survey otherwise unremarkable except age indeterminate L2 compression fracture.  Initially evaluated by Trauma, but IPH thought to be spontaneous rather than traumatic.  Neuro Critical Care and Neurosurgery consulted.  Admitted to the Neuro ICU.  EVD placed at bedside.       ICH score: 1     Hospital Course:   :  Cardene gtt weaned off around midnight, -140s. EVD output 27ml/24hr. Required PRN fentanyl for pain overnight. :  Placed on BiPAP overnight due to accessory muscle use.  Sp02 stable, maintaining secretions.  BP stable, Cardene stopped.  Mentation gradually improving.  EVD pulled out accidentally.    :  Borderline hypertension overnight.  Started Imdur yesterday.  Lopressor, hydralazine, labetalol given PRN.  On room air overnight w/ stable Sp02.  Single episode of elevated temp at 38.0. Pt otherwise afebrile.  Home medications Rocephin last dose yesterday. Had family meeting family decided to pursue towards SNF with adjuvant hospice care,  to work with family on dispo planning        Last 24h:     No acute overnight events, vital wise stable , map > 70 afebrile , no bradycardiac episode sodium 135 as per am lab. Awaiting  snf and adjuvant hospice  dispo paper work to be completed.         CURRENT MEDICATIONS:  SCHEDULED MEDICATIONS:   lacosamide (VIMPAT) IVPB  100 mg Intravenous BID    furosemide  20 mg Oral Daily    spironolactone  50 mg Oral Daily    atropine  0.5 mg Intravenous Once    rifaximin  550 mg Oral BID    amLODIPine  10 mg Oral Daily    lactulose  20 g Oral TID    enoxaparin  40 mg Subcutaneous Daily    sodium chloride flush  10 mL Intravenous 2 times per day    chlorhexidine  15 mL Mouth/Throat BID     CONTINUOUS INFUSIONS:    PRN MEDICATIONS:   hydrALAZINE, labetalol, potassium chloride **OR** potassium alternative oral replacement **OR** potassium chloride, sodium chloride flush, magnesium hydroxide, ondansetron    VITALS:  Temperature Range: Temp: 98.4 °F (36.9 °C) Temp  Av °F (36.7 °C)  Min: 97.3 °F (36.3 °C)  Max: 98.5 °F (36.9 °C)  BP Range: Systolic (96FRT), EIW:967 , Min:129 , RRB:725     Diastolic (60KTP), NCF:89, Min:28, Max:59    Pulse Range: Pulse  Av.5  Min: 52  Max: 105  Respiration Range: Resp  Av.3  Min: 21  Max: 28  Current Pulse Ox: SpO2: 96 %  24HR Pulse Ox Range: SpO2  Av %  Min: 94 %  Max: 98 %  Patient Vitals for the past 12 hrs:   BP Temp Temp src Pulse Resp SpO2   19 0702 (!) 150/50 -- -- 96 24 96 %   19 0602 (!) 145/59 -- -- 97 25 96 %   19 0502 (!) 140/51 -- -- 91 23 96 %   19 0406 (!) 158/52 98.4 °F (36.9 °C) Oral 72 21 96 %   19 0302 (!) 149/48 -- -- 98 25 95 %   19 0202 (!) 160/50 -- -- 105 26 96 %   19 0102 (!) 158/44 -- -- 101 26 96 %   19 0002 (!) 168/38 98.1 °F (36.7 °C) Oral 97 28 96 %   19 2302 (!) 168/31 -- -- 85 24 95 %   04/17/19 2202 (!) 151/38 -- -- 80 21 96 %   04/17/19 2102 (!) 144/45 -- -- 94 23 96 %     Estimated body mass index is 18.32 kg/m² as calculated from the following:    Height as of this encounter: 5' 5\" (1.651 m). Weight as of this encounter: 110 lb 1.6 oz (49.9 kg).  []<16 Severe malnutrition  []16-16.99 Moderate malnutrition  []17-18.49 Mild malnutrition  [x]18.5-24.9 Normal  []25-29.9 Overweight (not obese)  []30-34.9 Obese class 1 (Low Risk)  []35-39.9 Obese class 2 (Moderate Risk)  []?40 Obese class 3 (High Risk)    RECENT LABS:   Lab Results   Component Value Date    WBC 11.0 04/18/2019    HGB 10.8 (L) 04/18/2019    HCT 34.1 (L) 04/18/2019     04/18/2019    ALT 20 04/12/2019    AST 28 04/12/2019     04/18/2019    K 3.9 04/18/2019     04/18/2019    CREATININE 0.40 (L) 04/18/2019    BUN 25 (H) 04/18/2019    CO2 17 (L) 04/18/2019    INR 1.3 04/12/2019     24 HOUR INTAKE/OUTPUT:    Intake/Output Summary (Last 24 hours) at 4/18/2019 0813  Last data filed at 4/18/2019 0502  Gross per 24 hour   Intake 422.43 ml   Output 600 ml   Net -177.57 ml       RADIOLOGY:   Xr Pelvis (1-2 Views)    Result Date: 4/5/2019  EXAMINATION: SINGLE XRAY VIEW OF THE PELVIS 4/5/2019 3:23 pm COMPARISON: None. HISTORY: ORDERING SYSTEM PROVIDED HISTORY: trauma TECHNOLOGIST PROVIDED HISTORY: trauma Initial evaluation. FINDINGS: The osseous structures appear osteopenic. No convincing acute abnormality seen of the bony pelvis. Degenerative changes of the lumbar spine, sacroiliac joints and hips bilaterally. Vascular calcifications are noted. Osteopenia without convincing acute abnormality of the bony pelvis. Xr Shoulder Right (min 2 Views)    Result Date: 4/6/2019  EXAMINATION: 3 XRAY VIEWS OF THE RIGHT SHOULDER; AP AND LATERAL XRAY VIEWS OF THE RIGHT FOREARM; 3 XRAY VIEWS OF THE RIGHT ELBOW 4/6/2019 3:10 am COMPARISON: None.  HISTORY: ORDERING SYSTEM PROVIDED HISTORY: trauma TECHNOLOGIST PROVIDED HISTORY: trauma FINDINGS: Right shoulder: Three views of the right shoulder. No acute fracture or dislocation. Soft tissue swelling about the shoulder. Normal alignment of the glenohumeral and acromioclavicular joints. No aggressive skeletal lesion. Visualized lung is clear. Visualized ribs are intact. Osteopenia. Right elbow: Frontal, lateral and oblique views. Soft tissue swelling about the elbow. No radiographic evidence of an elbow joint effusion. No acute fracture or malalignment. Mild ulnohumeral compartment DJD. Osteopenia. Right forearm: Frontal and lateral views. Soft tissue swelling about the forearm. No acute fracture or malalignment. Mild-to-moderate DJD in the wrist.  Osteopenia. No acute fracture in the right shoulder, right elbow or right forearm. Osteopenia. Mild DJD in the right elbow and mild-to-moderate DJD in the right wrist.     Xr Elbow Right (min 3 Views)    Result Date: 4/6/2019  EXAMINATION: 3 XRAY VIEWS OF THE RIGHT SHOULDER; AP AND LATERAL XRAY VIEWS OF THE RIGHT FOREARM; 3 XRAY VIEWS OF THE RIGHT ELBOW 4/6/2019 3:10 am COMPARISON: None. HISTORY: ORDERING SYSTEM PROVIDED HISTORY: trauma TECHNOLOGIST PROVIDED HISTORY: trauma FINDINGS: Right shoulder: Three views of the right shoulder. No acute fracture or dislocation. Soft tissue swelling about the shoulder. Normal alignment of the glenohumeral and acromioclavicular joints. No aggressive skeletal lesion. Visualized lung is clear. Visualized ribs are intact. Osteopenia. Right elbow: Frontal, lateral and oblique views. Soft tissue swelling about the elbow. No radiographic evidence of an elbow joint effusion. No acute fracture or malalignment. Mild ulnohumeral compartment DJD. Osteopenia. Right forearm: Frontal and lateral views. Soft tissue swelling about the forearm. No acute fracture or malalignment. Mild-to-moderate DJD in the wrist.  Osteopenia.      No acute fracture in the right hyperdense intraparenchymal hematoma centered in the left thalamus with intraventricular extension. Similar appearing surrounding vasogenic edema with mass effect on the left lateral and 3rd ventricles. Interval improved intraventricular hemorrhage with now only a small amount of hemorrhage layering in the occipital horns of the lateral ventricles. Previously noted right frontal approach ventricular drainage catheter has been removed. Small amount of hyperdense blood along the catheter tract. No progressive hydrocephalus. Previous pneumocephalus has resolved. No significant midline shift. The basal cisterns are grossly patent. No large territorial infarct. Atherosclerosis of the intracranial vertebral and internal carotid arteries. Mild generalized cerebral and cerebellar volume loss. ORBITS: The visualized portion of the orbits demonstrate no acute abnormality. SINUSES: The visualized paranasal sinuses and mastoid air cells demonstrate no acute abnormality. SOFT TISSUES/SKULL:  No acute abnormality of the visualized skull or soft tissues. Stable size and appearance of the intraparenchymal hematoma centered in the left thalamus with mild surrounding vasogenic edema and mass effect. Compared to CT head done April 5, 2019 there is improvement of previously noted intraventricular hemorrhage with no on the a small amount of hemorrhage layering in the occipital horns of the lateral ventricles. Interval removal of the right frontal approach ventricular drainage catheter. No progressive hydrocephalus. Trace blood along the catheter tract. Ct Head Wo Contrast    Result Date: 4/6/2019  EXAMINATION: CT OF THE HEAD WITHOUT CONTRAST  4/5/2019 11:11 pm TECHNIQUE: CT of the head was performed without the administration of intravenous contrast. Dose modulation, iterative reconstruction, and/or weight based adjustment of the mA/kV was utilized to reduce the radiation dose to as low as reasonably achievable. COMPARISON: CT head 04/05/2029 HISTORY: ORDERING SYSTEM PROVIDED HISTORY: f/u H s/p EVD placement TECHNOLOGIST PROVIDED HISTORY: Ordering Physician Provided Reason for Exam: EVD placement follow up Acuity: Unknown Type of Exam: Subsequent/Follow-up FINDINGS: BRAIN/VENTRICLES: Interval placement of right frontal approach external ventricular drainage device. Tip terminates within the frontal horn left lateral ventricle near the caudal thalamic groove. Interval pneumocephalus identified consistent with interval placement. Redemonstration of the left thalamic intraparenchymal hemorrhage measuring 3.6 x 2.5 cm in size with evidence of subarachnoid decompression/subarachnoid extension. Evidence of blood products within the lateral 3rd and 4th ventricle identified. Evidence of blood products identified overlying both frontal and parietal lobes and along the interhemispheric falx new from the prior examination but may be related to redistribution of subarachnoid blood products. No shift of midline structures. Basal cisterns are patent. Mild generalized involutional change with chronic small vessel ischemic disease. Intracranial vascular calcifications. . ORBITS: The visualized portion of the orbits demonstrate no acute abnormality. SINUSES: Mild ethmoid sinus mucosal thickening. SOFT TISSUES/SKULL:  No depressed skull fracture. Edema identified overlying the right temporalis muscle may be related to prior trauma or surgery. Interval placement of right frontal approach EVD. Evolving left thalamic intraparenchymal hemorrhage with secondary subarachnoid extension and blood products within the lateral 3rd and 4th ventricles. The blood products identified overlying both frontal lobes parietal lobes and along the interhemispheric falx could be related to placement by EVD versus redistribution of the previously noted subarachnoid blood products.      Ct Head Wo Contrast    Result Date: 4/5/2019  EXAMINATION: CT OF reasonably achievable. COMPARISON: None HISTORY: ORDERING SYSTEM PROVIDED HISTORY: trauma; significant R facial bruising FINDINGS: FACIAL BONES:  The maxilla, pterygoid plates and zygomatic arches are intact. The mandible is intact. The mandibular condyles are normally situated. The nasal bones and maxillary nasal processes are intact. ORBITS:  The globes appear intact. The extraocular muscles, optic nerve sheath complexes and lacrimal glands appear unremarkable. No retrobulbar hematoma or mass is seen. The orbital walls and rims are intact. SINUSES/MASTOIDS:  The paranasal sinuses and mastoid air cells are well aerated. No acute fracture is seen. SOFT TISSUES:  Preseptal swelling of the right face extending superiorly along the supraorbital ridge into the right frontal scalp. Asymmetric swelling of the lateral face on the right compared with the left. No fluid collection is identified. No air or unexpected radiopaque foreign bodies. Soft tissue swelling/edema along the upper neck circumferentially right greater than left. The visualized intracranial contents show acute left thalamic intraparenchymal blood products and intraventricular blood products. 1. Bruising of the face without fracture, radiopaque foreign body, or superficial fluid collection. 2. Acute left thalamic hemorrhage with intraventricular extension. This is previously called to Dr. Pepe Pereira by Dr. Dean Bernard at  on 4/5/19. Ct Cervical Spine Wo Contrast    Result Date: 4/5/2019  EXAMINATION: CT OF THE CERVICAL SPINE WITHOUT CONTRAST 4/5/2019 3:19 pm TECHNIQUE: CT of the cervical spine was performed without the administration of intravenous contrast. Multiplanar reformatted images are provided for review. Dose modulation, iterative reconstruction, and/or weight based adjustment of the mA/kV was utilized to reduce the radiation dose to as low as reasonably achievable. COMPARISON: None.  HISTORY: ORDERING SYSTEM PROVIDED HISTORY: trauma TECHNOLOGIST PROVIDED HISTORY: trauma FINDINGS: BONES/ALIGNMENT: There is minimal compression deformity of L4 vertebral body with less than 20% height loss/wedging anteriorly. Multilevel degenerative disc disease is evident. The facets are intact. The spinous processes are intact. Lamina and pedicles are intact. There is evidence of prior posterior decompression. DEGENERATIVE CHANGES: Multilevel degenerative disc disease with facet arthropathy. SOFT TISSUES/RETROPERITONEUM: Diverticulosis. Atherosclerotic changes of aorta. Large stool throughout the colon. An avidly enhancing dilated left ovarian vessel is noted. 1. Age-indeterminate compression deformity of L4 with less than 20% height loss and no significant retropulsion. 2. Osteopenic skeleton with multilevel degenerative changes. Xr Chest Portable    Result Date: 4/11/2019  EXAMINATION: SINGLE XRAY VIEW OF THE CHEST 4/11/2019 6:48 am COMPARISON: April 6, 2019 HISTORY: ORDERING SYSTEM PROVIDED HISTORY: tachypnic, fever, rule out aspiration ? TECHNOLOGIST PROVIDED HISTORY: tachypnic, fever, rule out aspiration ? FINDINGS: Cardiac monitoring leads overlie the chest.  Borderline cardiomegaly. Trace left effusion. No pneumothorax. No airspace consolidation. Trace left effusion. Xr Chest Portable    Result Date: 4/6/2019  EXAMINATION: SINGLE XRAY VIEW OF THE CHEST 4/6/2019 5:27 am COMPARISON: 04/05/2019 HISTORY: ORDERING SYSTEM PROVIDED HISTORY: Concern for aspiration TECHNOLOGIST PROVIDED HISTORY: Concern for aspiration FINDINGS: Cardiomediastinal silhouette and pulmonary vasculature are within normal limits. There is no focal airspace consolidation. No evidence of pneumothorax or pleural effusion on this limited supine study. No acute osseous abnormality. No acute intrathoracic process. Xr Chest Portable    Result Date: 4/5/2019  EXAMINATION: SINGLE XRAY VIEW OF THE CHEST 4/5/2019 3:24 pm COMPARISON: None.  HISTORY: ORDERING SYSTEM PROVIDED HISTORY: trauma TECHNOLOGIST PROVIDED HISTORY: trauma FINDINGS: Slight increase in interstitial markings. The lungs are without acute focal process. There is no effusion or pneumothorax. The cardiomediastinal silhouette is without acute process. The osseous structures are without acute process. No fracture or pneumothorax. Increased interstitial markings     Vl Dup Lower Extremity Venous Bilateral    Result Date: 4/17/2019    [facility]  Vascular Lower Extremities DVT Study Procedure   Patient Name   Darrina Rivera  Date of Study           04/16/2019                 A   Date of Birth  1940   Gender                  Female   Age            66 year(s)   Race                       Room Number    0522   Corporate ID # [id number]   Patient Acct # [de-identified]   MR #           [id number]      Sonographer             Arvind Mayers   Accession #    [id number]    Interpreting Physician  Nathen Membreno   Referring      [address] [place] [address]      Referring Physician  Nurse          Rita Hensley CNP  Practitioner  Procedure Type of Study:   Veins: Lower Extremities DVT Study, Venous Scan Lower Bilateral.  Indications for Study:R/O DVT. Patient Status: In Patient. Technical Quality:Adequate visualization. Conclusions   Summary   No evidence of superficial or deep venous thrombosis in both lower  extremities.    Signature   ----------------------------------------------------------------  Electronically signed by Adonis Dooley(Sonographer) on  04/16/2019 08:51 AM  ----------------------------------------------------------------   ----------------------------------------------------------------  Electronically signed by Ntahen Membreno(Interpreting physician)  on 04/17/2019 12:23 AM  ----------------------------------------------------------------  Findings:   Right Impression:                    Left Impression:  The common femoral, femoral,         The common femoral, femoral,  popliteal and tibial veins           popliteal and tibial veins  demonstrate normal compressibility   demonstrate normal compressibility  and augmentation. and augmentation. Normal compressibility of the great  Normal compressibility of the great  saphenous vein. saphenous vein. Normal compressibility of the small  Normal compressibility of the small  saphenous vein. saphenous vein. Velocities are measured in cm/s ; Diameters are measured in cm Right Lower Extremities DVT Study Measurements Right 2D Measurements +------------------------------------+----------+---------------+----------+ ! Location                            ! Visualized! Compressibility! Thrombosis! +------------------------------------+----------+---------------+----------+ ! Common Femoral                      !Yes       ! Yes            ! None      ! +------------------------------------+----------+---------------+----------+ ! Prox Femoral                        !Yes       ! Yes            ! None      ! +------------------------------------+----------+---------------+----------+ ! Mid Femoral                         !Yes       ! Yes            ! None      ! +------------------------------------+----------+---------------+----------+ ! Dist Femoral                        !Yes       ! Yes            ! None      ! +------------------------------------+----------+---------------+----------+ ! Deep Femoral                        !No        !               !          ! +------------------------------------+----------+---------------+----------+ ! Popliteal                           !Yes       ! Yes            ! None      ! +------------------------------------+----------+---------------+----------+ ! Sapheno Femoral Junction            ! Yes       ! Yes            ! None      ! +------------------------------------+----------+---------------+----------+ ! PTV                                 ! Yes       ! Yes            ! None ! +------------------------------------+----------+---------------+----------+ ! Peroneal                            !No        !               !          ! +------------------------------------+----------+---------------+----------+ ! Gastroc                             ! Yes       ! Yes            ! None      ! +------------------------------------+----------+---------------+----------+ ! GSV Thigh                           ! Yes       ! Yes            ! None      ! +------------------------------------+----------+---------------+----------+ ! GSV Knee                            ! Yes       ! Yes            ! None      ! +------------------------------------+----------+---------------+----------+ ! GSV Ankle                           ! Yes       ! Yes            ! None      ! +------------------------------------+----------+---------------+----------+ ! SSV                                 ! Yes       ! Yes            ! None      ! +------------------------------------+----------+---------------+----------+ Right Doppler Measurements +---------------------------+------+------+--------------------------------+ ! Location                   ! Signal!Reflux! Reflux (msec)                   ! +---------------------------+------+------+--------------------------------+ ! Common Femoral             !Phasic!      !                                ! +---------------------------+------+------+--------------------------------+ ! Prox Femoral               !Phasic!      !                                ! +---------------------------+------+------+--------------------------------+ ! Popliteal                  !Phasic!      !                                ! +---------------------------+------+------+--------------------------------+ Left Lower Extremities DVT Study Measurements Left 2D Measurements +------------------------------------+----------+---------------+----------+ ! Location                            ! Visualized! Compressibility! Thrombosis! +------------------------------------+----------+---------------+----------+ ! Common Femoral                      !Yes       ! Yes            ! None      ! +------------------------------------+----------+---------------+----------+ ! Prox Femoral                        !Yes       ! Yes            ! None      ! +------------------------------------+----------+---------------+----------+ ! Mid Femoral                         !Yes       ! Yes            ! None      ! +------------------------------------+----------+---------------+----------+ ! Dist Femoral                        !Yes       ! Yes            ! None      ! +------------------------------------+----------+---------------+----------+ ! Deep Femoral                        !No        !               !          ! +------------------------------------+----------+---------------+----------+ ! Popliteal                           !Yes       ! Yes            ! None      ! +------------------------------------+----------+---------------+----------+ ! Sapheno Femoral Junction            ! Yes       ! Yes            ! None      ! +------------------------------------+----------+---------------+----------+ ! PTV                                 ! Yes       ! Yes            ! None      ! +------------------------------------+----------+---------------+----------+ ! Peroneal                            !Yes       ! Yes            ! None      ! +------------------------------------+----------+---------------+----------+ ! Gastroc                             ! Yes       ! Yes            ! None      ! +------------------------------------+----------+---------------+----------+ ! GSV Thigh                           ! Yes       ! Yes            ! None      ! +------------------------------------+----------+---------------+----------+ ! GSV Knee                            ! Yes       ! Yes            ! None      ! +------------------------------------+----------+---------------+----------+ ! GSV Ankle !Yes       !Yes            ! None      ! +------------------------------------+----------+---------------+----------+ ! SSV                                 ! Yes       ! Yes            ! None      ! +------------------------------------+----------+---------------+----------+ Left Doppler Measurements +---------------------------+------+------+--------------------------------+ ! Location                   ! Signal!Reflux! Reflux (msec)                   ! +---------------------------+------+------+--------------------------------+ ! Common Femoral             !Phasic!      !                                ! +---------------------------+------+------+--------------------------------+ ! Prox Femoral               !Phasic!      !                                ! +---------------------------+------+------+--------------------------------+ ! Popliteal                  !Phasic!      !                                ! +---------------------------+------+------+--------------------------------+    Cta Neck W Contrast    Result Date: 4/5/2019  EXAMINATION: CTA OF THE HEAD WITH CONTRAST; CTA OF THE NECK 4/5/2019 3:32 pm: TECHNIQUE: CTA of the head/brain was performed with the administration of intravenous contrast. Multiplanar reformatted images are provided for review. MIP images are provided for review. Dose modulation, iterative reconstruction, and/or weight based adjustment of the mA/kV was utilized to reduce the radiation dose to as low as reasonably achievable.; CTA of the neck was performed with the administration of intravenous contrast. Multiplanar reformatted images are provided for review. MIP images are provided for review. Stenosis of the internal carotid arteries measured using NASCET criteria. Dose modulation, iterative reconstruction, and/or weight based adjustment of the mA/kV was utilized to reduce the radiation dose to as low as reasonably achievable. COMPARISON: None.  HISTORY: ORDERING SYSTEM PROVIDED HISTORY: SAH, ?hemorrhagic stroke TECHNOLOGIST PROVIDED HISTORY: ; ORDERING SYSTEM PROVIDED HISTORY: SAH, ?hemorrhagic stroke TECHNOLOGIST PROVIDED HISTORY: Ordering Physician Provided Reason for Exam: head bleed/ found down Acuity: Unknown FINDINGS: CTA NECK: AORTIC ARCH/ARCH VESSELS: There is a normal branch pattern of the aortic arch. No significant stenosis is seen of the innominate artery or subclavian arteries. CAROTID ARTERIES: The common carotid arteries are normal in appearance without evidence of a flow limiting stenosis. The internal carotid arteries are normal in appearance without evidence of a flow limiting stenosis by NASCET criteria. No dissection or arterial injury is seen. VERTEBRAL ARTERIES: The vertebral arteries both arise from the subclavian arteries and are normal in caliber without evidence of flow limiting stenosis or dissection. SOFT TISSUES: The lung apices are clear. No cervical or superior mediastinal lymphadenopathy. The visualized portion of the larynx and pharynx appear unremarkable. The parotid, submandibular and thyroid glands demonstrate no acute abnormality. BONES: The visualized osseous structures appear unremarkable. CTA HEAD: ANTERIOR CIRCULATION: The internal carotid arteries are normal in course and caliber without focal stenosis. The anterior cerebral and middle cerebral arteries demonstrate no focal stenosis. POSTERIOR CIRCULATION: The posterior cerebral arteries demonstrate no focal stenosis. The vertebral and basilar arteries appear unremarkable. BRAIN: There is redemonstration of an intraparenchymal hemorrhage within the left thalamic region as well as intraventricular hemorrhagic products. Unremarkable CTA of the head and neck. Critical results were called by Dr. Tan King to Dr. Rosalee Najera on 4/5/2019 at 16:12.      Us Abdomen Limited    Result Date: 4/12/2019  EXAMINATION: RIGHT UPPER QUADRANT ULTRASOUND 4/12/2019 12:07 am COMPARISON: CT chest, abdomen and pelvis done April 5, 2019. HISTORY: ORDERING SYSTEM PROVIDED HISTORY: eval for ascites in setting of adominal distension, cirrhosis, leukocytosis TECHNOLOGIST PROVIDED HISTORY: eval for ascites in setting of adominal distension, cirrhosis, leukocytosis FINDINGS: Abdominal ultrasound was performed for evaluation of abdominal ascites. Small to moderate amount of ascites throughout the bilateral abdomen. Small amount of moderate abdominal ascites throughout the bilateral abdomen. Us Guided Paracentesis    Result Date: 4/16/2019  PROCEDURE: ULTRASOUND GUIDED PARACENTESIS 4/16/2019 HISTORY: ORDERING SYSTEM PROVIDED HISTORY: Ascites TECHNOLOGIST PROVIDED HISTORY: Ascites Please give albumin based on volume removed TECHNIQUE: Informed consent was obtained after a detailed explanation of the procedure including risks, benefits, and alternatives. Universal protocol was followed. The right abdomen was prepped and draped in sterile fashion and local anesthesia was achieved with lidocaine. An 8 French needle sheath was advanced under ultrasound guidance into ascites and paracentesis was performed. The patient tolerated the procedure well. FINDINGS: A total of 1.1 L was removed. Fluid appeared straw-colored and non turbid. Sample was provided for further analysis, as needed. Successful ultrasound guided diagnostic paracentesis. Us Guided Paracentesis    Result Date: 4/12/2019  PROCEDURE: ULTRASOUND GUIDED PARACENTESIS 4/12/2019 HISTORY: ORDERING SYSTEM PROVIDED HISTORY: r/o SBP TECHNOLOGIST PROVIDED HISTORY: r/o SBP PHYSICIANS: This procedure was performed by Nina Lovelace PA-C under the indirect supervision of Dr. Shadi Garcia. TECHNIQUE: Informed consent was obtained after a detailed explanation of the procedure including risks, benefits, and alternatives. A time-out was performed prior to the beginning of the procedure. Universal protocol was followed.  The left lower abdomen was prepped and draped in sterile fashion and local anesthesia was achieved with lidocaine. A 5 [place] needle sheath was advanced under ultrasound guidance into ascites and paracentesis was performed. Approximately 700 mL of light yellow colored ascitic fluid was removed. A sample was sent for laboratory analysis. The catheter was removed and a sterile dressing was applied. Postprocedural scan demonstrated no residual ascitic fluid. The patient tolerated the procedure well left the department in stable condition. FINDINGS: A total of 700 mL of light yellow colored ascitic fluid was removed and sent for laboratory analysis. Successful ultrasound guided diagnostic paracentesis. Ct Chest Abdomen Pelvis W Contrast    Result Date: 4/5/2019  EXAMINATION: CT OF THE CHEST, ABDOMEN, AND PELVIS WITH CONTRAST 4/5/2019 3:19 pm TECHNIQUE: CT of the chest, abdomen and pelvis was performed with the administration of intravenous contrast. Multiplanar reformatted images are provided for review. Dose modulation, iterative reconstruction, and/or weight based adjustment of the mA/kV was utilized to reduce the radiation dose to as low as reasonably achievable. COMPARISON: None HISTORY: ORDERING SYSTEM PROVIDED HISTORY: trauma TECHNOLOGIST PROVIDED HISTORY: Ordering Physician Provided Reason for Exam: found down Acuity: Unknown FINDINGS: Chest: Mediastinum:  Thoracic aorta and central portion of the pulmonary artery opacify normally. The ascending thoracic aorta is of normal caliber. Heart size is normal. There is no pericardial effusion. No mediastinal or hilar lymph nodes exceed the CT criteria for abnormal enlargement. Lungs/pleura: Clear Soft Tissues/Bones: No fracture identified Abdomen/Pelvis: Organs: The abdominal wall appears normal. Liver appears somewhat nodular. Spleen is enlarged. Gastroesophageal varices are noted. Pancreas appears normal.  The adrenals are normal.  The gallbladder surgically absent. . Kidneys appear normal. The bladder appears normal. GI/Bowel: The stomach,small bowel, and colon appear normal. Appendix is not identified. Pelvis: Uterus appears atrophic. Peritoneum/Retroperitoneum: The abdominal aorta and iliac arteries are normal in caliber. There is no pathologic adenopathy. There is calcified plaque along the aorta and its branches. Bones/Soft Tissues: Normal     No acute traumatic sequelae. Cirrhosis, splenomegaly, and esophageal varices consistent with portal venous hypertension. Cta Head W Contrast    Result Date: 4/5/2019  EXAMINATION: CTA OF THE HEAD WITH CONTRAST; CTA OF THE NECK 4/5/2019 3:32 pm: TECHNIQUE: CTA of the head/brain was performed with the administration of intravenous contrast. Multiplanar reformatted images are provided for review. MIP images are provided for review. Dose modulation, iterative reconstruction, and/or weight based adjustment of the mA/kV was utilized to reduce the radiation dose to as low as reasonably achievable.; CTA of the neck was performed with the administration of intravenous contrast. Multiplanar reformatted images are provided for review. MIP images are provided for review. Stenosis of the internal carotid arteries measured using NASCET criteria. Dose modulation, iterative reconstruction, and/or weight based adjustment of the mA/kV was utilized to reduce the radiation dose to as low as reasonably achievable. COMPARISON: None. HISTORY: ORDERING SYSTEM PROVIDED HISTORY: SAH, ?hemorrhagic stroke TECHNOLOGIST PROVIDED HISTORY: ; ORDERING SYSTEM PROVIDED HISTORY: SAH, ?hemorrhagic stroke TECHNOLOGIST PROVIDED HISTORY: Ordering Physician Provided Reason for Exam: head bleed/ found down Acuity: Unknown FINDINGS: CTA NECK: AORTIC ARCH/ARCH VESSELS: There is a normal branch pattern of the aortic arch. No significant stenosis is seen of the innominate artery or subclavian arteries.  CAROTID ARTERIES: The common carotid arteries are normal in appearance without evidence of a flow limiting consistently.     Cranial Nerves:    III: Pupils:  equal, round, reactive to light  III,IV,VI: Extra Ocular Movements: intact        Motor Exam:    Right upper extremty flaccid. Right lower extremity withdraws to pain, wriggles toes on LLE  Able to antigravity left upper and left lower extremities; 3/5 strength.                      .     ASSESSMENT AND PLAN:       This is a 66 y. o. female with a history of  Hepatitis C suspected to be from previous blood transfusion, cirrhosis, and esophageal varices s/p banding May 2017 who initially presented as a trauma alert on 4/5/19 after being found down at home.  LKW was ~24h prior to arrival. Pending sale to [facility] revealed left thalamic IPH with intraventricular extension into lateral, 3rd and 4th ventricles.  EVD placed at bedside on admit, removed 4/8.  Patient exam had been improving and she was transferred to Stepdown status 4/9.  Transferred back to Neuro ICU and Neuro Critical Care due to altered mentation, fevers.       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, minimal shift  - No vascular abnormality on CTA Head/Neck  - EVD placed at bedside 4/7, removed 4/9  - Repeat CT Head 4/11 showed stable IPH in left thalamus, improvement of IVH, no worsening hydro  - LTME showed right and left mid temporal sharp waves conferred an increase risk for focal onset seizures   - Continue Vimpat 100mg BID  - Goal SBP<160  - Neuro checks per protocol     CARDIOVASCULAR:  -Bradycardia episode resolved  - Goal SBP<160  - Continue Norvasc 10mg QD  - PRN Hydralazine/Labetalol  - Recent Echo 4/5/19 EF 70%, grade I diastolic dysfunction, mild pulmonary HTN  - Continue telemetry     PULMONARY:  - Maintaining O2 sats on room air  - CXR 4/11 with trace left effusion, no infiltrate/infectious process     RENAL/FLUID/ELECTROLYTE:  - Normal renal functioning  - BUN 25/ Creatinine 0.40  - Monitor I&O  -

## 2019-04-22 LAB
CULTURE: ABNORMAL
DIRECT EXAM: ABNORMAL
Lab: ABNORMAL
SPECIMEN DESCRIPTION: ABNORMAL

## 2019-05-01 NOTE — ED PROVIDER NOTES
[facility]     Emergency Department     Faculty Attestation    I performed a history and physical examination of the patient and discussed management with the resident. I reviewed the residents note and agree with the documented findings and plan of care. Any areas of disagreement are noted on the chart. I was personally present for the key portions of any procedures. I have documented in the chart those procedures where I was not present during the key portions. I have reviewed the emergency nurses triage note. I agree with the chief complaint, past medical history, past surgical history, allergies, medications, social and family history as documented unless otherwise noted below. Documentation of the HPI, Physical Exam and Medical Decision Making performed by medical students or scribes is based on my personal performance of the HPI, PE and MDM. For Physician Assistant/ Nurse Practitioner cases/documentation I have personally evaluated this patient and have completed at least one if not all key elements of the E/M (history, physical exam, and MDM). Additional findings are as noted.     Vital signs:   Vitals:    04/18/19 1305   BP: (!) 120/54   Pulse: 76   Resp: 22   Temp:    SpO2: 96%                Christian Boyer M.D,  Attending Emergency  Physician           Oralia Castaneda MD  05/01/19 [id number]

## 2019-05-11 LAB
BASOPHILS ABSOLUTE: 0.1 /ΜL
BASOPHILS RELATIVE PERCENT: 0.4 %
EOSINOPHILS ABSOLUTE: 0.6 /ΜL
EOSINOPHILS RELATIVE PERCENT: 3.3 %
HCT VFR BLD CALC: 35.7 % (ref 36–46)
HEMOGLOBIN: 10.9 G/DL (ref 12–16)
LYMPHOCYTES ABSOLUTE: 1.3 /ΜL
LYMPHOCYTES RELATIVE PERCENT: 7.2 %
MCH RBC QN AUTO: 26.9 PG
MCHC RBC AUTO-ENTMCNC: 30.5 G/DL
MCV RBC AUTO: 88.1 FL
MONOCYTES ABSOLUTE: 1.8 /ΜL
MONOCYTES RELATIVE PERCENT: 9.7 %
NEUTROPHILS ABSOLUTE: 14.4 /ΜL
NEUTROPHILS RELATIVE PERCENT: 78 %
PDW BLD-RTO: ABNORMAL %
PLATELET # BLD: 189 K/ΜL
PMV BLD AUTO: ABNORMAL FL
RBC # BLD: 4.05 10^6/ΜL
WBC # BLD: 18.43 10^3/ML